# Patient Record
Sex: FEMALE | Race: WHITE | NOT HISPANIC OR LATINO | Employment: FULL TIME | URBAN - METROPOLITAN AREA
[De-identification: names, ages, dates, MRNs, and addresses within clinical notes are randomized per-mention and may not be internally consistent; named-entity substitution may affect disease eponyms.]

---

## 2017-11-20 ENCOUNTER — OFFICE VISIT (OUTPATIENT)
Dept: OBSTETRICS AND GYNECOLOGY | Facility: CLINIC | Age: 42
End: 2017-11-20
Payer: COMMERCIAL

## 2017-11-20 VITALS
DIASTOLIC BLOOD PRESSURE: 70 MMHG | BODY MASS INDEX: 30.86 KG/M2 | WEIGHT: 174.19 LBS | HEIGHT: 63 IN | SYSTOLIC BLOOD PRESSURE: 118 MMHG

## 2017-11-20 DIAGNOSIS — Z12.39 BREAST CANCER SCREENING: ICD-10-CM

## 2017-11-20 DIAGNOSIS — Z01.419 WELL WOMAN EXAM WITH ROUTINE GYNECOLOGICAL EXAM: Primary | ICD-10-CM

## 2017-11-20 DIAGNOSIS — Z12.4 CERVICAL CANCER SCREENING: ICD-10-CM

## 2017-11-20 PROCEDURE — 99999 PR PBB SHADOW E&M-NEW PATIENT-LVL III: CPT | Mod: PBBFAC,,, | Performed by: OBSTETRICS & GYNECOLOGY

## 2017-11-20 PROCEDURE — 88175 CYTOPATH C/V AUTO FLUID REDO: CPT

## 2017-11-20 PROCEDURE — 87624 HPV HI-RISK TYP POOLED RSLT: CPT

## 2017-11-20 PROCEDURE — 99386 PREV VISIT NEW AGE 40-64: CPT | Mod: S$GLB,,, | Performed by: OBSTETRICS & GYNECOLOGY

## 2017-11-20 NOTE — PROGRESS NOTES
[facility]  Ambulatory Clinic  Obstetrics & Gynecology    Visit Date:  2017    Chief Complaint:  Annual GYN exam    History of Present Illness:      Leia Shultz is a 42 y.o. , new pt to me, here for a gynecologic exam.    Pt has no major complaints today.      Menses are regular, not heavy or painful.    Pt current method of family planning is vasectomy.    Pt denies family h/o adverse reaction to hormonal contraception.    Pt denies h/o abnormal pap, last pap ~3 years ago per pt in [place].    Pt denies active sexually transmitted infections.    Pt denies h/o abnormal mammogram, last mammo ~2 years ago per pt.    Pt performs monthly self breast examination, non-smoker, uses seat belts, and denies abuse.     Pt denies abnormal vaginal bleeding, vaginal discharge, dysmenorrhea, dyspareunia, pelvic pain, bloating, early satiety, unintentional weight loss, breast mass/skin changes, incontinence, GI or urinary complaints.      Otherwise, the pt is in her usual state of health and has good follow-up with her PCP.    Past History:  Gynecologic history as noted above.    Review of Systems:      GENERAL:  No fever, fatigue, excessive weight gain or loss  HEENT:  No headaches, hearing changes, visual disturbance  RESPIRATORY:  No cough, shortness of breath  CARDIOVASCULAR:  No chest pain, heart palpitations, leg swelling  BREAST:  No lump, pain, nipple discharge, skin changes  GASTROINTESTINAL:  No nausea, vomiting, constipation, diarrhea, abd pain, rectal bleeding   GENITOURINARY:  See HPI  ENDOCRINE:  No heat or cold intolerance  HEMATOLOGIC:  No easy bruisability or bleeding   LYMPHATICS:  No enlarged nodes  MUSCULOSKELETAL:  No joint pain or swelling  SKIN:  No rash, lesions, jaundice  NEUROLOGIC:  No dizziness, weakness, syncope  PSYCHIATRIC:  No depression, homicidal/suicidal ideations, anxiety or mood swings    Physical Exam:     /70 (BP Location: Right arm, Patient  "Position: Sitting, BP Method: Large (Manual))   Ht 5' 3" (1.6 m)   Wt 79 kg (174 lb 2.6 oz)   LMP 2017 (Exact Date)   BMI 30.85 kg/m²   Pulse 60, Resp rate 18  Patient's last menstrual period was 2017 (exact date).     GENERAL:  No acute distress, well-nourished  HEENT:  Atraumatic, anicteric, moist mucus membranes. Neck supple w/o masses.  BREAST:  Symmetric, nontender, no obvious masses, adenopathy, skin changes or nipple discharge.  LUNGS:  Clear to auscultation  HEART:  Regular rate and rhythm, no murmurs, gallops, or rubs  ABDOMEN:  Soft, non-tender, non-distended, normoactive bowel sounds, no obvious organomegaly  EXT:  Symmetric w/o cramping, claudication, or edema. +2 distal pulses.  SKIN:  No rashes or bruising  PSYCH:  Mood and affect appropriate    GENITOURINARY:    VULVAR:  Female external genitalia w/o obvious lesions. Female hair distribution. Normal urethral meatus. No gross lymphadenopathy.   VAGINA:  Pink, moist, well-rugated. Good support. No obvious lesion. No discharge.  CERVIX:  No cervical motion tenderness, discharge, or obvious lesions.   UTERUS:  Small, non-tender, normal contour  ADNEXA:  No masses, non-tender    RECTAL:  Declined. No obvious external lesions  WET PREP:  Negative     Chaperone present for exam.    Assessment:     42 y.o. :    1. Well woman gynecologic exam    Plan:    A gynecologic health assessment was performed with age appropriate counseling.    Cervical cancer screening - pap obtained.    STI screening - pt declined.      Screening mammogram ordered, pt advised to call to schedule.    Encourage healthy lifestyle modifications, monthly self breast exams, Ca/Vit D.    F/u with PCP for health maintenance.    Return 1 year for gynecologic exam, or sooner as needed.  All questions answered, pt voiced understanding.        Cl Chacon MD      "

## 2017-11-21 ENCOUNTER — HOSPITAL ENCOUNTER (OUTPATIENT)
Dept: RADIOLOGY | Facility: HOSPITAL | Age: 42
Discharge: HOME | End: 2017-11-21
Attending: OBSTETRICS & GYNECOLOGY
Payer: COMMERCIAL

## 2017-11-21 ENCOUNTER — OFFICE VISIT (OUTPATIENT)
Dept: FAMILY MEDICINE | Facility: CLINIC | Age: 42
End: 2017-11-21
Payer: COMMERCIAL

## 2017-11-21 VITALS
HEIGHT: 63 IN | HEART RATE: 85 BPM | DIASTOLIC BLOOD PRESSURE: 80 MMHG | TEMPERATURE: 99 F | RESPIRATION RATE: 16 BRPM | WEIGHT: 172.81 LBS | SYSTOLIC BLOOD PRESSURE: 110 MMHG | OXYGEN SATURATION: 97 % | BODY MASS INDEX: 30.62 KG/M2

## 2017-11-21 DIAGNOSIS — E53.8 VITAMIN B 12 DEFICIENCY: ICD-10-CM

## 2017-11-21 DIAGNOSIS — Z12.39 BREAST CANCER SCREENING: ICD-10-CM

## 2017-11-21 DIAGNOSIS — Z98.84 HISTORY OF GASTRIC RESTRICTIVE SURGERY: ICD-10-CM

## 2017-11-21 DIAGNOSIS — E55.9 VITAMIN D DEFICIENCY: ICD-10-CM

## 2017-11-21 DIAGNOSIS — Z00.00 ANNUAL PHYSICAL EXAM: Primary | ICD-10-CM

## 2017-11-21 PROCEDURE — 77063 BREAST TOMOSYNTHESIS BI: CPT | Mod: 26,,, | Performed by: RADIOLOGY

## 2017-11-21 PROCEDURE — 99386 PREV VISIT NEW AGE 40-64: CPT | Mod: S$GLB,,, | Performed by: FAMILY MEDICINE

## 2017-11-21 PROCEDURE — 99999 PR PBB SHADOW E&M-EST. PATIENT-LVL III: CPT | Mod: PBBFAC,,, | Performed by: FAMILY MEDICINE

## 2017-11-21 PROCEDURE — 77067 SCR MAMMO BI INCL CAD: CPT | Mod: TC

## 2017-11-21 PROCEDURE — 77067 SCR MAMMO BI INCL CAD: CPT | Mod: 26,,, | Performed by: RADIOLOGY

## 2017-11-21 NOTE — PROGRESS NOTES
Lipid Panel  done received records             Pap Smear with HPV Cotest  done 11/20/17 with [email or web address]      Mammogram Scheduled for today.     Influenza Vaccine Done 11/15/17 with work ; pt will get information fax

## 2017-11-21 NOTE — PROGRESS NOTES
Subjective:       Patient ID: Leia Shultz is a 42 y.o. female.    Chief Complaint: Annual Exam    HPI    Annual Physical    Diet: very healthy overall    Exercise: 5.5 hours per day    Immunizations required: flu    Cancer screenings required: utd    Other screenings required: utd     Acute complaints today:     Patient states that she would like to have vitamin levels checked as she has had difficulty maintaining proper vitamin levels ever since her gastric sleeve surgery. Her last levels were checked 10 months ago or so. She is currently using a patch form of vitamin administration and is post to help with absorption and patients who have had bypass surgeries.      No current outpatient prescriptions on file prior to visit.     No current facility-administered medications on file prior to visit.        Past Medical History:   Diagnosis Date    Anemia     Arthritis     Heart murmur        Family History   Problem Relation Age of Onset    Clotting disorder Mother     Hypotension Mother     Skin cancer Father     Crohn's disease Sister     Cancer Brother     No Known Problems Maternal Grandmother     No Known Problems Maternal Grandfather     No Known Problems Paternal Grandmother     No Known Problems Paternal Grandfather         reports that she has never smoked. She has never used smokeless tobacco. She reports that she drinks alcohol. She reports that she does not use drugs.    Review of Systems   Constitutional: Negative for chills and fever.   HENT: Negative for congestion, ear pain, hearing loss, rhinorrhea and sore throat.    Eyes: Negative for pain and discharge.   Respiratory: Negative for cough and shortness of breath.    Cardiovascular: Negative for chest pain and palpitations.   Gastrointestinal: Negative for diarrhea, nausea and vomiting.   Genitourinary: Negative for difficulty urinating, dysuria and frequency.   Allergic/Immunologic: Negative for environmental allergies and food  allergies.   Neurological: Negative for seizures and weakness.   Psychiatric/Behavioral: Negative for dysphoric mood. The patient is not nervous/anxious.        Objective:     Vitals:    11/21/17 0834   BP: 110/80   Pulse: 85   Resp: 16   Temp: 98.7 °F (37.1 °C)        Physical Exam   Constitutional: She is oriented to person, place, and time. She appears well-developed.   O   HENT:   Head: Normocephalic and atraumatic.   Right Ear: External ear normal. No foreign bodies. Tympanic membrane is not injected. No middle ear effusion.   Left Ear: External ear normal. No foreign bodies.  No middle ear effusion.   Mouth/Throat: Oropharynx is clear and moist. No oral lesions. No dental abscesses.   Eyes: Conjunctivae and EOM are normal. Pupils are equal, round, and reactive to light. Right eye exhibits no discharge. Left eye exhibits no discharge.   Neck: No tracheal deviation present. No thyromegaly present.   Cardiovascular: Normal rate, regular rhythm and normal heart sounds.  Exam reveals no gallop and no friction rub.    No murmur heard.  Pulmonary/Chest: Effort normal and breath sounds normal. No respiratory distress. She has no wheezes. She has no rales.   Abdominal: Soft. She exhibits no distension and no mass. There is no tenderness. There is no rebound and no guarding.   Lymphadenopathy:     She has no cervical adenopathy.   Neurological: She is alert and oriented to person, place, and time.   Skin: Skin is warm and dry.   Psychiatric: She has a normal mood and affect.   Vitals reviewed.      Assessment:       1. Annual physical exam    2. History of gastric restrictive surgery    3. Vitamin D deficiency    4. Vitamin B 12 deficiency        Plan:       Leia was seen today for annual exam.    Diagnoses and all orders for this visit:    Annual physical exam  -     TSH; Future  -     T4, free; Future  Counseled on age appropriate medical preventative services, including age appropriate cancer screenings, over all  nutritional health, need for a consistent exercise regimen and an over all push towards maintaining a vigorous and active lifestyle.      Counseled on age appropriate vaccines and discussed upcoming health care needs based on age/gender.  Spent time with patient counseling on need for a good patient/doctor relationship moving forward.      Release of information request sent to prior physician to obtain recent lab work and shot records.    History of gastric restrictive surgery  -     TSH; Future  -     T4, free; Future    Vitamin D deficiency  -     Vitamin D; Future    Vitamin B 12 deficiency  -     Vitamin B12; Future  -     Folate; Future  -     TSH; Future  -     T4, free; Future            Return in about 3 months (around 2/21/2018).      Pt verbalized understanding and agreed with our plan.

## 2017-11-22 ENCOUNTER — TELEPHONE (OUTPATIENT)
Dept: FAMILY MEDICINE | Facility: CLINIC | Age: 42
End: 2017-11-22

## 2017-11-22 NOTE — TELEPHONE ENCOUNTER
Sent [employer] message, phone was constantly busy.        ----- Message from Mendez Blas MD sent at 11/21/2017  8:27 PM CST -----  Please notify patient results are abnormal. Her vitamin D is just a little low, but is not at the point where this needs to be supplemented. Her folate which is also known as B9 is low and this is significant. Please make sure that she is taking a vitamin D complex which should contain B9 by mouth. She made a reference to using patches but it seems that this is not keeping her folate level at an adequate concentration.please have patient follow-up as scheduled Thanks.

## 2017-11-24 LAB
HPV16 AG SPEC QL: NEGATIVE
HPV16+18+H RISK 12 DNA CVX-IMP: NEGATIVE
HPV18 DNA SPEC QL NAA+PROBE: NEGATIVE

## 2018-02-12 ENCOUNTER — LAB VISIT (OUTPATIENT)
Dept: LAB | Facility: HOSPITAL | Age: 43
End: 2018-02-12
Attending: FAMILY MEDICINE
Payer: COMMERCIAL

## 2018-02-12 ENCOUNTER — OFFICE VISIT (OUTPATIENT)
Dept: FAMILY MEDICINE | Facility: CLINIC | Age: 43
End: 2018-02-12
Payer: COMMERCIAL

## 2018-02-12 VITALS
BODY MASS INDEX: 31.68 KG/M2 | DIASTOLIC BLOOD PRESSURE: 82 MMHG | TEMPERATURE: 98 F | SYSTOLIC BLOOD PRESSURE: 122 MMHG | RESPIRATION RATE: 18 BRPM | WEIGHT: 178.81 LBS | HEIGHT: 63 IN | OXYGEN SATURATION: 98 % | HEART RATE: 69 BPM

## 2018-02-12 DIAGNOSIS — D18.01 CHERRY HEMANGIOMA: ICD-10-CM

## 2018-02-12 DIAGNOSIS — E53.8 LOW FOLATE: ICD-10-CM

## 2018-02-12 DIAGNOSIS — R53.83 FATIGUE, UNSPECIFIED TYPE: ICD-10-CM

## 2018-02-12 DIAGNOSIS — Z98.84 HISTORY OF GASTRIC RESTRICTIVE SURGERY: ICD-10-CM

## 2018-02-12 DIAGNOSIS — Z98.84 HISTORY OF GASTRIC RESTRICTIVE SURGERY: Primary | ICD-10-CM

## 2018-02-12 DIAGNOSIS — L82.1 SEBORRHEIC KERATOSES: ICD-10-CM

## 2018-02-12 LAB
ALBUMIN SERPL BCP-MCNC: 3.8 G/DL
ALP SERPL-CCNC: 65 U/L
ALT SERPL W/O P-5'-P-CCNC: 12 U/L
ANION GAP SERPL CALC-SCNC: 8 MMOL/L
AST SERPL-CCNC: 16 U/L
BASOPHILS # BLD AUTO: 0.05 K/UL
BASOPHILS NFR BLD: 0.8 %
BILIRUB SERPL-MCNC: 0.6 MG/DL
BUN SERPL-MCNC: 8 MG/DL
CALCIUM SERPL-MCNC: 9.6 MG/DL
CHLORIDE SERPL-SCNC: 105 MMOL/L
CO2 SERPL-SCNC: 26 MMOL/L
CREAT SERPL-MCNC: 0.8 MG/DL
DIFFERENTIAL METHOD: NORMAL
EOSINOPHIL # BLD AUTO: 0.2 K/UL
EOSINOPHIL NFR BLD: 3.3 %
ERYTHROCYTE [DISTWIDTH] IN BLOOD BY AUTOMATED COUNT: 11.7 %
EST. GFR  (AFRICAN AMERICAN): >60 ML/MIN/1.73 M^2
EST. GFR  (NON AFRICAN AMERICAN): >60 ML/MIN/1.73 M^2
GLUCOSE SERPL-MCNC: 88 MG/DL
HCT VFR BLD AUTO: 39.9 %
HGB BLD-MCNC: 13 G/DL
IMM GRANULOCYTES # BLD AUTO: 0.03 K/UL
IMM GRANULOCYTES NFR BLD AUTO: 0.5 %
LYMPHOCYTES # BLD AUTO: 1.9 K/UL
LYMPHOCYTES NFR BLD: 31.3 %
MAGNESIUM SERPL-MCNC: 2.1 MG/DL
MCH RBC QN AUTO: 31 PG
MCHC RBC AUTO-ENTMCNC: 32.6 G/DL
MCV RBC AUTO: 95 FL
MONOCYTES # BLD AUTO: 0.4 K/UL
MONOCYTES NFR BLD: 6.7 %
NEUTROPHILS # BLD AUTO: 3.5 K/UL
NEUTROPHILS NFR BLD: 57.4 %
NRBC BLD-RTO: 0 /100 WBC
PLATELET # BLD AUTO: 309 K/UL
PMV BLD AUTO: 9.2 FL
POTASSIUM SERPL-SCNC: 4.8 MMOL/L
PROT SERPL-MCNC: 7.2 G/DL
RBC # BLD AUTO: 4.19 M/UL
SODIUM SERPL-SCNC: 139 MMOL/L
WBC # BLD AUTO: 6.13 K/UL

## 2018-02-12 PROCEDURE — 84630 ASSAY OF ZINC: CPT

## 2018-02-12 PROCEDURE — 80053 COMPREHEN METABOLIC PANEL: CPT

## 2018-02-12 PROCEDURE — 99999 PR PBB SHADOW E&M-EST. PATIENT-LVL IV: CPT | Mod: PBBFAC,,, | Performed by: FAMILY MEDICINE

## 2018-02-12 PROCEDURE — 85025 COMPLETE CBC W/AUTO DIFF WBC: CPT

## 2018-02-12 PROCEDURE — 83735 ASSAY OF MAGNESIUM: CPT

## 2018-02-12 PROCEDURE — 99214 OFFICE O/P EST MOD 30 MIN: CPT | Mod: S$GLB,,, | Performed by: FAMILY MEDICINE

## 2018-02-12 PROCEDURE — 36415 COLL VENOUS BLD VENIPUNCTURE: CPT | Mod: PO

## 2018-02-12 PROCEDURE — 3008F BODY MASS INDEX DOCD: CPT | Mod: S$GLB,,, | Performed by: FAMILY MEDICINE

## 2018-02-12 NOTE — PROGRESS NOTES
"Subjective:       Patient ID: Leia Shultz is a 42 y.o. female.    Chief Complaint: lab paperwork (discuss lab and life insurance paperwork) and Skin Problem (DERMATOLOGY REFERRAL)    HPI    Fatigue - a/w nausea x 3 weeks but progressively worsening since the onset. We reviewed her recent labs which showed a low folate level.     She is adherent with her vitamin supplementation but feels "off".     She also has noted some skin lesions that she noted over the past 3 + months ago. She has a famhx of skin cancer melanoma in her dad so she is considered.     Current Outpatient Prescriptions on File Prior to Visit   Medication Sig Dispense Refill    MULTIVITAMIN ORAL Take by mouth once daily.      OMEPRAZOLE ORAL Take by mouth as needed.       No current facility-administered medications on file prior to visit.        Past Medical History:   Diagnosis Date    Anemia     Arthritis     Heart murmur        Family History   Problem Relation Age of Onset    Clotting disorder Mother     Hypotension Mother     Skin cancer Father     Crohn's disease Sister     Cancer Brother     No Known Problems Maternal Grandmother     No Known Problems Maternal Grandfather     No Known Problems Paternal Grandmother     No Known Problems Paternal Grandfather         reports that she has never smoked. She has never used smokeless tobacco. She reports that she drinks alcohol. She reports that she does not use drugs.    Review of Systems   Constitutional: Positive for fatigue. Negative for activity change, fever and unexpected weight change.   HENT: Negative for hearing loss, rhinorrhea and trouble swallowing.    Eyes: Negative for discharge and visual disturbance.   Respiratory: Negative for chest tightness and wheezing.    Cardiovascular: Negative for chest pain and palpitations.   Gastrointestinal: Positive for nausea. Negative for blood in stool, constipation, diarrhea and vomiting.   Endocrine: Negative for polydipsia and " polyuria.   Genitourinary: Negative for difficulty urinating, dysuria, hematuria and menstrual problem.   Musculoskeletal: Negative for arthralgias, joint swelling and neck pain.   Neurological: Negative for weakness and headaches.   Psychiatric/Behavioral: Negative for confusion and dysphoric mood.       Objective:     Vitals:    02/12/18 0849   BP: 122/82   Pulse: 69   Resp: 18   Temp: 98 °F (36.7 °C)        Physical Exam   Constitutional: She appears well-developed. No distress.   HENT:   Head: Normocephalic and atraumatic.       Eyes: Conjunctivae are normal. No scleral icterus.   Pulmonary/Chest: Effort normal.   Neurological: She is alert.   Skin: She is not diaphoretic.        Psychiatric: She has a normal mood and affect. Her behavior is normal.   Vitals reviewed.      Assessment:       1. History of gastric restrictive surgery    2. Low folate    3. Fatigue, unspecified type    4. Seborrheic keratoses    5. Cherry hemangioma        Plan:       Leia was seen today for lab paperwork and skin problem.    Diagnoses and all orders for this visit:    History of gastric restrictive surgery  -     Zinc; Future  -     CBC auto differential; Future  -     Comprehensive metabolic panel; Future  -     Magnesium; Future  Will check the remainder of her usual labs to assure that she is not having deficiency creating her current sxs of fatigue.     Low folate  Pt asked to trouble her current dose.     Fatigue, unspecified type  Pt asked to trouble her current dose.     Seborrheic keratoses  -     Ambulatory consult to Dermatology  Benign - pt does have famhx of melanoma, so should be followed by derm.     Cherry hemangioma  -     Ambulatory consult to Dermatology            Follow-up if symptoms worsen or fail to improve.        Pt verbalized understanding and agreed with our plan.     At least 25 minutes were spent today with the patient in the office, which more than 50% of the time was spent on evaluation and  counseling regarding The primary encounter diagnosis was History of gastric restrictive surgery. Diagnoses of Low folate, Fatigue, unspecified type, Seborrheic keratoses, and Cherry hemangioma were also pertinent to this visit..

## 2018-02-15 LAB — ZINC SERPL-MCNC: 74 UG/DL (ref 60–130)

## 2018-11-20 ENCOUNTER — OFFICE VISIT (OUTPATIENT)
Dept: OBSTETRICS AND GYNECOLOGY | Facility: CLINIC | Age: 43
End: 2018-11-20
Payer: COMMERCIAL

## 2018-11-20 VITALS
WEIGHT: 157.88 LBS | BODY MASS INDEX: 27.97 KG/M2 | DIASTOLIC BLOOD PRESSURE: 70 MMHG | SYSTOLIC BLOOD PRESSURE: 110 MMHG | HEIGHT: 63 IN

## 2018-11-20 DIAGNOSIS — Z01.419 WELL WOMAN EXAM WITH ROUTINE GYNECOLOGICAL EXAM: Primary | ICD-10-CM

## 2018-11-20 DIAGNOSIS — Z12.39 BREAST CANCER SCREENING: ICD-10-CM

## 2018-11-20 DIAGNOSIS — Z30.09 FAMILY PLANNING COUNSELING: ICD-10-CM

## 2018-11-20 PROCEDURE — 99396 PREV VISIT EST AGE 40-64: CPT | Mod: S$GLB,,, | Performed by: OBSTETRICS & GYNECOLOGY

## 2018-11-20 PROCEDURE — 99999 PR PBB SHADOW E&M-EST. PATIENT-LVL III: CPT | Mod: PBBFAC,,, | Performed by: OBSTETRICS & GYNECOLOGY

## 2018-11-20 NOTE — PROGRESS NOTES
"[facility]  Ambulatory Clinic  Obstetrics & Gynecology    Visit Date:  2018    Chief Complaint:  Annual GYN exam    History of Present Illness:      Leia Shultz is a 43 y.o.  here for a gynecologic exam.  Pt has no major complaints today.    Menses are regular, not heavy or painful.  Pt current method of family planning is vasectomy.    Pt is in process of getting a divorce and is considering Copper IUD.  Pt denies family h/o adverse reaction to hormonal contraception.  Pt denies h/o abnormal pap, last pap .  Pt denies active sexually transmitted infections.  Pt denies h/o abnormal mammogram, last mammo .  Pt performs monthly self breast examination, non-smoker, uses seat belts, and denies abuse.   Pt denies abnormal vaginal bleeding, vaginal discharge, dysmenorrhea, dyspareunia, pelvic pain, bloating, early satiety, unintentional weight loss, breast mass/skin changes, incontinence, GI or urinary complaints.    Otherwise, the pt is in her usual state of health.    Past History:  Gynecologic history as noted above.    Review of Systems:      GENERAL:  No fever, fatigue, excessive weight gain or loss  HEENT:  No headaches, hearing changes, visual disturbance  RESPIRATORY:  No cough, shortness of breath  CARDIOVASCULAR:  No chest pain, heart palpitations, leg swelling  BREAST:  No lump, pain, nipple discharge, skin changes  GASTROINTESTINAL:  No nausea, vomiting, constipation, diarrhea, abd pain, rectal bleeding   GENITOURINARY:  See HPI  ENDOCRINE:  No heat or cold intolerance  HEMATOLOGIC:  No easy bruisability or bleeding   LYMPHATICS:  No enlarged nodes  MUSCULOSKELETAL:  No joint pain or swelling  SKIN:  No rash, lesions, jaundice  NEUROLOGIC:  No dizziness, weakness, syncope  PSYCHIATRIC:  No depression, homicidal/suicidal ideations, anxiety or mood swings    Physical Exam:     /70 (BP Location: Right arm, Patient Position: Sitting)   Ht 5' 3" (1.6 m)   Wt " 71.6 kg (157 lb 13.6 oz)   LMP 10/28/2018 (Exact Date)   BMI 27.96 kg/m²       GENERAL:  No acute distress, well-nourished  HEENT:  Atraumatic, anicteric, moist mucus membranes, neck supple w/o masses.  BREAST:  Symmetric, nontender, no obvious masses, adenopathy, skin changes or nipple discharge.  LUNGS:  Clear to auscultation  HEART:  Regular rate and rhythm, no murmurs, gallops, or rubs  ABDOMEN:  Soft, non-tender, non-distended, normoactive bowel sounds, no obvious organomegaly  EXT:  Symmetric w/o cramping, claudication, or edema. +2 distal pulses.  SKIN:  No rashes or bruising  PSYCH:  Mood and affect appropriate  GENITOURINARY:  NFEG no lesion. No vaginal or cervical lesion. No bleeding or discharge. No CMT. Uterus and ovaries small, NT. Declined rectal exam. No obvious external lesions.    Chaperone present for exam.    Assessment:     43 y.o. :    1. Well woman gynecologic exam  2. Family planning - Copper IUD    Plan:    A gynecologic health assessment was performed with age appropriate counseling.  Cervical cancer screening - pap up to date  STI screening - pt declined.    Screening mammogram ordered, pt advised to call to schedule.  Discussed contraceptive options.  Pt is considering copper IUD.  Order form signed.  Pt will notified us of her decision.  Risks, benefits, and alternatives to copper IUD discussed.  Encourage healthy lifestyle modifications, monthly self breast exams, Ca/Vit D, f/u with PCP for health maintenance.  F/u 1 year for GYN exam, or sooner as needed.    All questions answered, pt voiced understanding.        Cl Chacon MD

## 2018-11-26 ENCOUNTER — PATIENT MESSAGE (OUTPATIENT)
Dept: OBSTETRICS AND GYNECOLOGY | Facility: CLINIC | Age: 43
End: 2018-11-26

## 2018-11-27 ENCOUNTER — HOSPITAL ENCOUNTER (OUTPATIENT)
Dept: RADIOLOGY | Facility: HOSPITAL | Age: 43
Discharge: HOME | End: 2018-11-27
Attending: OBSTETRICS & GYNECOLOGY
Payer: COMMERCIAL

## 2018-11-27 DIAGNOSIS — Z12.39 BREAST CANCER SCREENING: ICD-10-CM

## 2018-11-27 PROCEDURE — 77067 SCR MAMMO BI INCL CAD: CPT | Mod: 26,,, | Performed by: RADIOLOGY

## 2018-11-27 PROCEDURE — 77063 BREAST TOMOSYNTHESIS BI: CPT | Mod: 26,,, | Performed by: RADIOLOGY

## 2018-11-27 PROCEDURE — 77063 BREAST TOMOSYNTHESIS BI: CPT | Mod: TC

## 2018-11-28 ENCOUNTER — OFFICE VISIT (OUTPATIENT)
Dept: FAMILY MEDICINE | Facility: CLINIC | Age: 43
End: 2018-11-28
Payer: COMMERCIAL

## 2018-11-28 ENCOUNTER — LAB VISIT (OUTPATIENT)
Dept: LAB | Facility: HOSPITAL | Age: 43
End: 2018-11-28
Attending: FAMILY MEDICINE
Payer: COMMERCIAL

## 2018-11-28 VITALS
HEART RATE: 93 BPM | TEMPERATURE: 98 F | OXYGEN SATURATION: 99 % | BODY MASS INDEX: 27.03 KG/M2 | DIASTOLIC BLOOD PRESSURE: 80 MMHG | HEIGHT: 63 IN | RESPIRATION RATE: 16 BRPM | WEIGHT: 152.56 LBS | SYSTOLIC BLOOD PRESSURE: 108 MMHG

## 2018-11-28 DIAGNOSIS — Z00.00 WELL ADULT EXAM: Primary | ICD-10-CM

## 2018-11-28 DIAGNOSIS — Z00.00 WELL ADULT EXAM: ICD-10-CM

## 2018-11-28 DIAGNOSIS — Z98.84 HISTORY OF GASTRIC RESTRICTIVE SURGERY: ICD-10-CM

## 2018-11-28 DIAGNOSIS — J30.9 ALLERGIC RHINITIS, UNSPECIFIED SEASONALITY, UNSPECIFIED TRIGGER: ICD-10-CM

## 2018-11-28 LAB
25(OH)D3+25(OH)D2 SERPL-MCNC: 29 NG/ML
BASOPHILS # BLD AUTO: 0.07 K/UL
BASOPHILS NFR BLD: 1.1 %
CHOLEST SERPL-MCNC: 193 MG/DL
CHOLEST/HDLC SERPL: 2.5 {RATIO}
DIFFERENTIAL METHOD: ABNORMAL
EOSINOPHIL # BLD AUTO: 0.3 K/UL
EOSINOPHIL NFR BLD: 5.2 %
ERYTHROCYTE [DISTWIDTH] IN BLOOD BY AUTOMATED COUNT: 12.2 %
FOLATE SERPL-MCNC: 2.1 NG/ML
HCT VFR BLD AUTO: 43.8 %
HDLC SERPL-MCNC: 78 MG/DL
HDLC SERPL: 40.4 %
HGB BLD-MCNC: 14.2 G/DL
IMM GRANULOCYTES # BLD AUTO: 0.03 K/UL
IMM GRANULOCYTES NFR BLD AUTO: 0.5 %
LDLC SERPL CALC-MCNC: 105.6 MG/DL
LYMPHOCYTES # BLD AUTO: 1.7 K/UL
LYMPHOCYTES NFR BLD: 26.2 %
MCH RBC QN AUTO: 31.4 PG
MCHC RBC AUTO-ENTMCNC: 32.4 G/DL
MCV RBC AUTO: 97 FL
MONOCYTES # BLD AUTO: 0.5 K/UL
MONOCYTES NFR BLD: 7.2 %
NEUTROPHILS # BLD AUTO: 3.8 K/UL
NEUTROPHILS NFR BLD: 59.8 %
NONHDLC SERPL-MCNC: 115 MG/DL
NRBC BLD-RTO: 0 /100 WBC
PLATELET # BLD AUTO: 395 K/UL
PMV BLD AUTO: 8.7 FL
RBC # BLD AUTO: 4.52 M/UL
TRIGL SERPL-MCNC: 47 MG/DL
VIT B12 SERPL-MCNC: 443 PG/ML
WBC # BLD AUTO: 6.37 K/UL

## 2018-11-28 PROCEDURE — 36415 COLL VENOUS BLD VENIPUNCTURE: CPT | Mod: PO

## 2018-11-28 PROCEDURE — 82746 ASSAY OF FOLIC ACID SERUM: CPT

## 2018-11-28 PROCEDURE — 80061 LIPID PANEL: CPT

## 2018-11-28 PROCEDURE — 82306 VITAMIN D 25 HYDROXY: CPT

## 2018-11-28 PROCEDURE — 85025 COMPLETE CBC W/AUTO DIFF WBC: CPT

## 2018-11-28 PROCEDURE — 99396 PREV VISIT EST AGE 40-64: CPT | Mod: S$GLB,,, | Performed by: FAMILY MEDICINE

## 2018-11-28 PROCEDURE — 82607 VITAMIN B-12: CPT

## 2018-11-28 PROCEDURE — 99999 PR PBB SHADOW E&M-EST. PATIENT-LVL IV: CPT | Mod: PBBFAC,,, | Performed by: FAMILY MEDICINE

## 2018-11-28 RX ORDER — FLUTICASONE PROPIONATE 50 MCG
1 SPRAY, SUSPENSION (ML) NASAL 2 TIMES DAILY
Qty: 1 BOTTLE | Refills: 3 | Status: SHIPPED | OUTPATIENT
Start: 2018-11-28 | End: 2019-12-06

## 2018-11-28 NOTE — PROGRESS NOTES
Subjective:       Patient ID: Leia Shultz is a 43 y.o. female.    Chief Complaint: Annual Exam    HPI    Annual Physical    Diet: Pt has cut back on carb intake significantly.     Exercise: dances semi regularly    Immunizations required: UTD    Cancer screenings required: Pap smear    Other screenings required: vitamin screenings.     Acute complaints today:    Sinus x 3 days that began when the weather changed. A/w runny nose and dry cough.     Current Outpatient Medications on File Prior to Visit   Medication Sig Dispense Refill    FLUVIRIN [id number], PF, 45 mcg (15 mcg x 3)/0.5 mL Syrg ADM 0.5ML IM UTD  0    MULTIVITAMIN ORAL Take by mouth once daily.      OMEPRAZOLE ORAL Take by mouth as needed.       No current facility-administered medications on file prior to visit.        Past Medical History:   Diagnosis Date    Anemia     Arthritis     Heart murmur        Family History   Problem Relation Age of Onset    Clotting disorder Mother     Hypotension Mother     Skin cancer Father     Crohn's disease Sister     Cancer Brother     No Known Problems Maternal Grandmother     No Known Problems Maternal Grandfather     No Known Problems Paternal Grandmother     No Known Problems Paternal Grandfather         reports that  has never smoked. she has never used smokeless tobacco. She reports that she drinks alcohol. She reports that she does not use drugs.    Review of Systems   Constitutional: Negative for activity change and unexpected weight change.   HENT: Negative for hearing loss, rhinorrhea and trouble swallowing.    Eyes: Negative for discharge and visual disturbance.   Respiratory: Negative for chest tightness and wheezing.    Cardiovascular: Negative for chest pain and palpitations.   Gastrointestinal: Positive for constipation. Negative for blood in stool, diarrhea and vomiting.   Endocrine: Negative for polydipsia and polyuria.   Genitourinary: Negative for difficulty urinating, dysuria,  hematuria and menstrual problem.   Musculoskeletal: Negative for arthralgias, joint swelling and neck pain.   Neurological: Positive for headaches. Negative for weakness.   Psychiatric/Behavioral: Positive for dysphoric mood. Negative for confusion.       Objective:     Vitals:    11/28/18 0751   BP: 108/80   Pulse: 93   Resp: 16   Temp: 98 °F (36.7 °C)        Physical Exam    Assessment:       1. Well adult exam    2. History of gastric restrictive surgery    3. Allergic rhinitis, unspecified seasonality, unspecified trigger        Plan:       Leia was seen today for annual exam.    Diagnoses and all orders for this visit:    Well adult exam  -     Lipid panel; Future  -     CBC auto differential; Future  -     Folate; Future  -     Vitamin B12; Future  -     Vitamin D; Future  Pt down 26 lbs with diet and exercise!!!!   Counseled on age appropriate medical preventative services, including age appropriate cancer screenings, over all nutritional health, need for a consistent exercise regimen and an over all push towards maintaining a vigorous and active lifestyle.      Counseled on age appropriate vaccines and discussed upcoming health care needs based on age/gender.  Spent time with patient counseling on need for a good patient/doctor relationship moving forward.      History of gastric restrictive surgery  -     Lipid panel; Future  -     CBC auto differential; Future  -     Folate; Future  -     Vitamin B12; Future  -     Vitamin D; Future    Allergic rhinitis, unspecified seasonality, unspecified trigger  -     fluticasone (FLONASE) 50 mcg/actuation nasal spray; 1 spray (50 mcg total) by Each Nare route 2 (two) times daily.            Follow-up for Annual Physical.        Pt verbalized understanding and agreed with our plan.

## 2018-11-29 ENCOUNTER — PATIENT MESSAGE (OUTPATIENT)
Dept: FAMILY MEDICINE | Facility: CLINIC | Age: 43
End: 2018-11-29

## 2018-12-26 ENCOUNTER — TELEPHONE (OUTPATIENT)
Dept: OBSTETRICS AND GYNECOLOGY | Facility: CLINIC | Age: 43
End: 2018-12-26

## 2019-01-10 ENCOUNTER — PROCEDURE VISIT (OUTPATIENT)
Dept: OBSTETRICS AND GYNECOLOGY | Facility: CLINIC | Age: 44
End: 2019-01-10
Payer: COMMERCIAL

## 2019-01-10 VITALS
BODY MASS INDEX: 27.12 KG/M2 | WEIGHT: 153.13 LBS | DIASTOLIC BLOOD PRESSURE: 76 MMHG | SYSTOLIC BLOOD PRESSURE: 104 MMHG

## 2019-01-10 DIAGNOSIS — Z30.430 ENCOUNTER FOR INSERTION OF COPPER IUD: Primary | ICD-10-CM

## 2019-01-10 DIAGNOSIS — Z32.02 NEGATIVE PREGNANCY TEST: ICD-10-CM

## 2019-01-10 LAB
B-HCG UR QL: NEGATIVE
CTP QC/QA: YES

## 2019-01-10 PROCEDURE — 81025 URINE PREGNANCY TEST: CPT | Mod: S$GLB,,, | Performed by: OBSTETRICS & GYNECOLOGY

## 2019-01-10 PROCEDURE — 58300 INSERT INTRAUTERINE DEVICE: CPT | Mod: S$GLB,,, | Performed by: OBSTETRICS & GYNECOLOGY

## 2019-01-10 PROCEDURE — 58300 PR INSERT INTRAUTERINE DEVICE: ICD-10-PCS | Mod: S$GLB,,, | Performed by: OBSTETRICS & GYNECOLOGY

## 2019-01-10 PROCEDURE — 81025 POCT URINE PREGNANCY: ICD-10-PCS | Mod: S$GLB,,, | Performed by: OBSTETRICS & GYNECOLOGY

## 2019-01-10 NOTE — PROCEDURES
[facility]  Ambulatory Clinic   Obstetrics & Gynecology    Date:  1/10/2019    Procedure:  ParaGARD IUD insertion (CPT 28211)    LMP:  Patient's last menstrual period was 2018 (exact date).    UPT:  Negative    Indication:  IUD contraception    History:  Leia Shultz is a 43 y.o. , here for ParaGARD IUD insertion.  Pt has no major complaints today.       Consents:  We discussed the risks, benefits, indications, and alternatives to IUD use. She understands that with IUD insertion there is a risk of bleeding, infection, uterine perforation, and expulsion of device. All of her questions were answered to her satisfaction. Pt voiced understanding, Informed consents obtained.     Vitals:  /76 (BP Location: Right arm)   Wt 69.4 kg (153 lb 1.8 oz)   LMP 2018 (Exact Date)   BMI 27.12 kg/m²     Physical Exam:  Abdomen soft, non-tender, no masses. External genitalia, vaginal wall and cervix without gross abnormality.  Bimanual exam reveals a small week size, non-tender, mid-plain uterus, without adnexal mass or tenderness.     Procedure Details:      A time out was performed to confirmed the correct patient and procedure.    The cervix was visualized with a speculum.     Cervix was cleaned with betadine.      A single tooth tenaculum was placed on the anterior lip of the cervix.     The uterus sounds to ~7 cm using sterile technique.     The IUD was loaded and placed high in the uterine fundus without difficulty using sterile technique.     The string was then cut with a ~3 cm tail.    The tenaculum and speculum were removed.      The patient tolerated the procedure well.  Sterile technique was maintained.  Hemostasis noted.  VSSAF, pain scale 0/10 at end of procedure.    Assessment:      Encounter for insertion of intrauterine contraceptive device (ParaGARD (NDC 33703-647-05), Lot: [id number] EXP: 10/2024)    Plan:      Post IUD placement counseling and precautions  discussed.    Manage post IUD placement pain with NSAIDS, Tylenol prn.    Pt advised on how to check for IUD strings.    Pt was clearly reminded that the ParaGARD IUD will need to be removed within 10 years from date of insertion.    Return 4 weeks for IUD check, or sooner for any concerns.  All questions answered, pt voiced understanding.  Go to ER for any emergencies.        Cl Chacon MD

## 2019-02-07 ENCOUNTER — OFFICE VISIT (OUTPATIENT)
Dept: OBSTETRICS AND GYNECOLOGY | Facility: CLINIC | Age: 44
End: 2019-02-07
Payer: COMMERCIAL

## 2019-02-07 VITALS
SYSTOLIC BLOOD PRESSURE: 114 MMHG | WEIGHT: 156.94 LBS | HEIGHT: 63 IN | BODY MASS INDEX: 27.81 KG/M2 | DIASTOLIC BLOOD PRESSURE: 72 MMHG

## 2019-02-07 DIAGNOSIS — Z30.431 IUD CHECK UP: Primary | ICD-10-CM

## 2019-02-07 PROCEDURE — 3008F BODY MASS INDEX DOCD: CPT | Mod: CPTII,S$GLB,, | Performed by: OBSTETRICS & GYNECOLOGY

## 2019-02-07 PROCEDURE — 99999 PR PBB SHADOW E&M-EST. PATIENT-LVL III: CPT | Mod: PBBFAC,,, | Performed by: OBSTETRICS & GYNECOLOGY

## 2019-02-07 PROCEDURE — 99999 PR PBB SHADOW E&M-EST. PATIENT-LVL III: ICD-10-PCS | Mod: PBBFAC,,, | Performed by: OBSTETRICS & GYNECOLOGY

## 2019-02-07 PROCEDURE — 99213 OFFICE O/P EST LOW 20 MIN: CPT | Mod: S$GLB,,, | Performed by: OBSTETRICS & GYNECOLOGY

## 2019-02-07 PROCEDURE — 3008F PR BODY MASS INDEX (BMI) DOCUMENTED: ICD-10-PCS | Mod: CPTII,S$GLB,, | Performed by: OBSTETRICS & GYNECOLOGY

## 2019-02-07 PROCEDURE — 99213 PR OFFICE/OUTPT VISIT, EST, LEVL III, 20-29 MIN: ICD-10-PCS | Mod: S$GLB,,, | Performed by: OBSTETRICS & GYNECOLOGY

## 2019-02-07 RX ORDER — NITROFURANTOIN 25; 75 MG/1; MG/1
CAPSULE ORAL
Refills: 0 | COMMUNITY
Start: 2019-01-23 | End: 2019-06-14

## 2019-02-07 NOTE — PROGRESS NOTES
"[facility]  Ambulatory Clinic  Obstetrics & Gynecology    Visit Date:  2019    Chief Complaint:  ParaGARD IUD check      Subjective:      Leia Shultz is a 43 y.o.  here for ParaGARD IUD check.  Pt is very pleased with her IUD.    She denies any abnormal vaginal bleeding, vaginal discharge, dysmenorrhea, dyspareunia, pelvic pain, breast mass/skin changes, GI or urinary complaints.     Otherwise, pt is in her usual state of health and has good follow-up with her PCP.    Review of Systems:     Constitutional:  No fever, fatigue  Respiratory:  No shortness of breath  Cardiovascular:  No chest pain, leg swelling  Gastrointestinal:  No abdominal pain  Genitourinary:  No dysuria, frequency  Reproductive:  See HPI     Objective:    /72 (BP Location: Right arm, Patient Position: Sitting)   Ht 5' 3" (1.6 m)   Wt 71.2 kg (156 lb 15.5 oz)   BMI 27.81 kg/m²      GENERAL:  NAD. Well-nourished.  HEENT:  NCAT, moist mucus membranes. Neck supple.  LUNGS:  CTA-B.  HEART:  RRR, no m/g/r.  ABDOMEN:  Soft, non-tender, non-distended. Normoactive BS. No obvious organomegaly.   EXT:  Symmetric w/o cramping, claudication, or edema. +2 distal pulses.   SKIN:  No rashes or bruising.  NEURO:  Grossly intact bilaterally.  PSYCH:  Mood & affect appropriate.      PELVIC:  Female external genitalia w/o any obvious lesions. Female hair distribution. Adequate perineal body. Normal urethral meatus. No gross lymphadenopathy.   Vagina:  Pink, moist, well-rugated. Adequate support. No obvious lesion. No discharge.   Cervix:  No cervical motion tenderness, discharge, or obvious lesions. IUD string visualized ~3 cm.  Uterus:  Small, non-tender, normal contour.  Adnexa:  No masses, non-tender.   Rectal:  Declined. No obvious external lesions.   Wet prep:  Negative.    Chaperone present for exam.    Assessment:     43 y.o. :    1. ParaGARD IUD check     Plan:    Risks, benefits, and alternatives to " ParaGARD reviewed.  Pt tolerating it well and would like to continue.    Pt advised on how to check for IUD strings.    Pt was clearly reminded that the ParaGARD IUD will need to be removed within 10 years from date of insertion.    Encourage healthy lifestyle modifications.      F/u with PCP for health maintenance.    Return 1 year for well woman exam, or sooner as needed.  All questions answered, pt voiced understanding.       Cl Chacon MD

## 2019-02-14 ENCOUNTER — PATIENT MESSAGE (OUTPATIENT)
Dept: OBSTETRICS AND GYNECOLOGY | Facility: CLINIC | Age: 44
End: 2019-02-14

## 2019-04-04 ENCOUNTER — PATIENT MESSAGE (OUTPATIENT)
Dept: FAMILY MEDICINE | Facility: CLINIC | Age: 44
End: 2019-04-04

## 2019-06-14 ENCOUNTER — OFFICE VISIT (OUTPATIENT)
Dept: FAMILY MEDICINE | Facility: CLINIC | Age: 44
End: 2019-06-14
Payer: COMMERCIAL

## 2019-06-14 ENCOUNTER — LAB VISIT (OUTPATIENT)
Dept: LAB | Facility: HOSPITAL | Age: 44
End: 2019-06-14
Attending: FAMILY MEDICINE
Payer: COMMERCIAL

## 2019-06-14 ENCOUNTER — PATIENT MESSAGE (OUTPATIENT)
Dept: FAMILY MEDICINE | Facility: CLINIC | Age: 44
End: 2019-06-14

## 2019-06-14 VITALS
BODY MASS INDEX: 27.65 KG/M2 | WEIGHT: 156.06 LBS | DIASTOLIC BLOOD PRESSURE: 72 MMHG | OXYGEN SATURATION: 99 % | HEIGHT: 63 IN | RESPIRATION RATE: 20 BRPM | HEART RATE: 81 BPM | TEMPERATURE: 98 F | SYSTOLIC BLOOD PRESSURE: 98 MMHG

## 2019-06-14 DIAGNOSIS — E53.8 FOLATE DEFICIENCY: ICD-10-CM

## 2019-06-14 DIAGNOSIS — R53.83 FATIGUE, UNSPECIFIED TYPE: Primary | ICD-10-CM

## 2019-06-14 DIAGNOSIS — L98.9 SKIN LESION: Primary | ICD-10-CM

## 2019-06-14 DIAGNOSIS — Z90.3 HISTORY OF SLEEVE GASTRECTOMY: ICD-10-CM

## 2019-06-14 DIAGNOSIS — R53.83 FATIGUE, UNSPECIFIED TYPE: ICD-10-CM

## 2019-06-14 LAB
25(OH)D3+25(OH)D2 SERPL-MCNC: 28 NG/ML (ref 30–96)
ALBUMIN SERPL BCP-MCNC: 3.9 G/DL (ref 3.5–5.2)
ALP SERPL-CCNC: 62 U/L (ref 55–135)
ALT SERPL W/O P-5'-P-CCNC: 10 U/L (ref 10–44)
ANION GAP SERPL CALC-SCNC: 10 MMOL/L (ref 8–16)
AST SERPL-CCNC: 15 U/L (ref 10–40)
BASOPHILS # BLD AUTO: 0.09 K/UL (ref 0–0.2)
BASOPHILS NFR BLD: 1.4 % (ref 0–1.9)
BILIRUB SERPL-MCNC: 0.6 MG/DL (ref 0.1–1)
BUN SERPL-MCNC: 8 MG/DL (ref 6–20)
CALCIUM SERPL-MCNC: 9.5 MG/DL (ref 8.7–10.5)
CHLORIDE SERPL-SCNC: 106 MMOL/L (ref 95–110)
CO2 SERPL-SCNC: 25 MMOL/L (ref 23–29)
CREAT SERPL-MCNC: 0.7 MG/DL (ref 0.5–1.4)
DIFFERENTIAL METHOD: ABNORMAL
EOSINOPHIL # BLD AUTO: 0.2 K/UL (ref 0–0.5)
EOSINOPHIL NFR BLD: 3.6 % (ref 0–8)
ERYTHROCYTE [DISTWIDTH] IN BLOOD BY AUTOMATED COUNT: 11.8 % (ref 11.5–14.5)
EST. GFR  (AFRICAN AMERICAN): >60 ML/MIN/1.73 M^2
EST. GFR  (NON AFRICAN AMERICAN): >60 ML/MIN/1.73 M^2
FOLATE SERPL-MCNC: 4.7 NG/ML (ref 4–24)
GLUCOSE SERPL-MCNC: 83 MG/DL (ref 70–110)
HCT VFR BLD AUTO: 43.3 % (ref 37–48.5)
HGB BLD-MCNC: 14 G/DL (ref 12–16)
IMM GRANULOCYTES # BLD AUTO: 0.05 K/UL (ref 0–0.04)
IMM GRANULOCYTES NFR BLD AUTO: 0.8 % (ref 0–0.5)
LYMPHOCYTES # BLD AUTO: 2.6 K/UL (ref 1–4.8)
LYMPHOCYTES NFR BLD: 39.9 % (ref 18–48)
MCH RBC QN AUTO: 31.3 PG (ref 27–31)
MCHC RBC AUTO-ENTMCNC: 32.3 G/DL (ref 32–36)
MCV RBC AUTO: 97 FL (ref 82–98)
MONOCYTES # BLD AUTO: 0.4 K/UL (ref 0.3–1)
MONOCYTES NFR BLD: 5.5 % (ref 4–15)
NEUTROPHILS # BLD AUTO: 3.1 K/UL (ref 1.8–7.7)
NEUTROPHILS NFR BLD: 48.8 % (ref 38–73)
NRBC BLD-RTO: 0 /100 WBC
PLATELET # BLD AUTO: 353 K/UL (ref 150–350)
PMV BLD AUTO: 8.9 FL (ref 9.2–12.9)
POTASSIUM SERPL-SCNC: 4.6 MMOL/L (ref 3.5–5.1)
PROT SERPL-MCNC: 6.8 G/DL (ref 6–8.4)
RBC # BLD AUTO: 4.47 M/UL (ref 4–5.4)
SODIUM SERPL-SCNC: 141 MMOL/L (ref 136–145)
VIT B12 SERPL-MCNC: 342 PG/ML (ref 210–950)
WBC # BLD AUTO: 6.39 K/UL (ref 3.9–12.7)

## 2019-06-14 PROCEDURE — 82306 VITAMIN D 25 HYDROXY: CPT

## 2019-06-14 PROCEDURE — 36415 COLL VENOUS BLD VENIPUNCTURE: CPT | Mod: PO

## 2019-06-14 PROCEDURE — 99214 PR OFFICE/OUTPT VISIT, EST, LEVL IV, 30-39 MIN: ICD-10-PCS | Mod: S$GLB,,, | Performed by: FAMILY MEDICINE

## 2019-06-14 PROCEDURE — 99999 PR PBB SHADOW E&M-EST. PATIENT-LVL IV: ICD-10-PCS | Mod: PBBFAC,,, | Performed by: FAMILY MEDICINE

## 2019-06-14 PROCEDURE — 82746 ASSAY OF FOLIC ACID SERUM: CPT

## 2019-06-14 PROCEDURE — 3008F BODY MASS INDEX DOCD: CPT | Mod: CPTII,S$GLB,, | Performed by: FAMILY MEDICINE

## 2019-06-14 PROCEDURE — 80053 COMPREHEN METABOLIC PANEL: CPT

## 2019-06-14 PROCEDURE — 3008F PR BODY MASS INDEX (BMI) DOCUMENTED: ICD-10-PCS | Mod: CPTII,S$GLB,, | Performed by: FAMILY MEDICINE

## 2019-06-14 PROCEDURE — 99999 PR PBB SHADOW E&M-EST. PATIENT-LVL IV: CPT | Mod: PBBFAC,,, | Performed by: FAMILY MEDICINE

## 2019-06-14 PROCEDURE — 99214 OFFICE O/P EST MOD 30 MIN: CPT | Mod: S$GLB,,, | Performed by: FAMILY MEDICINE

## 2019-06-14 PROCEDURE — 85025 COMPLETE CBC W/AUTO DIFF WBC: CPT

## 2019-06-14 PROCEDURE — 82607 VITAMIN B-12: CPT

## 2019-06-14 RX ORDER — MUPIROCIN 20 MG/G
OINTMENT TOPICAL 3 TIMES DAILY
Qty: 30 G | Refills: 1 | Status: SHIPPED | OUTPATIENT
Start: 2019-06-14 | End: 2020-11-23 | Stop reason: ALTCHOICE

## 2019-06-14 NOTE — PROGRESS NOTES
"Subjective:       Patient ID: Leia Shultz is a 44 y.o. female.    Chief Complaint: Consult (regarding getting lab drawn check "vitaminm, mineral levels, exhaustion.")    HPI    Pt is noticing increasing fatigue over the past 6 weeks that has worsened over the last 2 weeks.     Stress at work has improved.     She is taking naps which is not usual for her due to this fatigue.      Menstrual cycles have been heavier over the past few months which a change from her normal. Pt has Paraguard.          Current Outpatient Medications on File Prior to Visit   Medication Sig Dispense Refill    ergocalciferol, vitamin D2, (VITAMIN D ORAL) Take by mouth once daily.      fluticasone (FLONASE) 50 mcg/actuation nasal spray 1 spray (50 mcg total) by Each Nare route 2 (two) times daily. 1 Bottle 3    FLUVIRIN [id number], PF, 45 mcg (15 mcg x 3)/0.5 mL Syrg ADM 0.5ML IM UTD  0    INV folate 800 MCG tablet Take 800 mcg by mouth 2 (two) times daily. FOR INVESTIGATIONAL USE ONLY      MULTIVITAMIN ORAL Take by mouth once daily.      OMEPRAZOLE ORAL Take by mouth as needed.      [DISCONTINUED] nitrofurantoin, macrocrystal-monohydrate, (MACROBID) 100 MG capsule TK 1 C PO BID FOR 7 DAYS  0     No current facility-administered medications on file prior to visit.        Past Medical History:   Diagnosis Date    Anemia     Arthritis     Heart murmur        Family History   Problem Relation Age of Onset    Clotting disorder Mother     Hypotension Mother     Skin cancer Father     Crohn's disease Sister     Cancer Brother     No Known Problems Maternal Grandmother     No Known Problems Maternal Grandfather     No Known Problems Paternal Grandmother     No Known Problems Paternal Grandfather         reports that she has never smoked. She has never used smokeless tobacco. She reports that she drinks alcohol. She reports that she does not use drugs.    Review of Systems   Constitutional: Negative for activity change and " unexpected weight change.   HENT: Negative for hearing loss, rhinorrhea and trouble swallowing.    Eyes: Negative for discharge and visual disturbance.   Respiratory: Negative for chest tightness and wheezing.    Cardiovascular: Negative for chest pain and palpitations.   Gastrointestinal: Negative for blood in stool, constipation, diarrhea and vomiting.   Endocrine: Negative for polydipsia and polyuria.   Genitourinary: Positive for menstrual problem. Negative for difficulty urinating, dysuria and hematuria.   Musculoskeletal: Positive for arthralgias and neck pain. Negative for joint swelling.   Neurological: Positive for headaches. Negative for weakness.   Psychiatric/Behavioral: Negative for confusion and dysphoric mood.       Objective:     Vitals:    06/14/19 0801   BP: 98/72   Pulse: 81   Resp: 20   Temp: 98 °F (36.7 °C)        Physical Exam   Constitutional: She appears well-developed. No distress.   o   HENT:   Head: Normocephalic and atraumatic.   Eyes: Conjunctivae are normal. No scleral icterus.   Pulmonary/Chest: Effort normal.   Neurological: She is alert.   Skin: She is not diaphoretic.   Psychiatric: She has a normal mood and affect. Her behavior is normal.   Vitals reviewed.      Assessment:       1. Fatigue, unspecified type    2. Folate deficiency    3. History of sleeve gastrectomy        Plan:       Leia was seen today for consult.    Diagnoses and all orders for this visit:    Fatigue, unspecified type  -     CBC auto differential; Future  -     Comprehensive metabolic panel; Future  New-differential includes poor sleep quality, anemia from heavy periods, vitamin deficiency, increased stress with new job and 1 year anniversary of her brother's passing.  Will check labs as shown here and patient will alert me if she develops any new or worsening symptoms.  Folate deficiency  -     Folate; Future    History of sleeve gastrectomy  -     Vitamin B12; Future  -     Vitamin D; Future             Follow up in about 6 months (around 12/14/2019) for Annual Physical.        Pt verbalized understanding and agreed with our plan.

## 2019-06-21 ENCOUNTER — OFFICE VISIT (OUTPATIENT)
Dept: PSYCHIATRY | Facility: CLINIC | Age: 44
End: 2019-06-21
Payer: COMMERCIAL

## 2019-06-21 DIAGNOSIS — F43.23 ADJUSTMENT DISORDER WITH MIXED ANXIETY AND DEPRESSED MOOD: Primary | ICD-10-CM

## 2019-06-21 PROCEDURE — 90791 PSYCH DIAGNOSTIC EVALUATION: CPT | Mod: S$GLB,,, | Performed by: SOCIAL WORKER

## 2019-06-21 PROCEDURE — 90791 PR PSYCHIATRIC DIAGNOSTIC EVALUATION: ICD-10-PCS | Mod: S$GLB,,, | Performed by: SOCIAL WORKER
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
## 2019-07-03 ENCOUNTER — OFFICE VISIT (OUTPATIENT)
Dept: PSYCHIATRY | Facility: CLINIC | Age: 44
End: 2019-07-03
Payer: COMMERCIAL

## 2019-07-03 DIAGNOSIS — F43.23 ADJUSTMENT DISORDER WITH MIXED ANXIETY AND DEPRESSED MOOD: Primary | ICD-10-CM

## 2019-07-03 PROCEDURE — 90834 PSYTX W PT 45 MINUTES: CPT | Mod: S$GLB,,, | Performed by: SOCIAL WORKER

## 2019-07-03 PROCEDURE — 90834 PR PSYCHOTHERAPY W/PATIENT, 45 MIN: ICD-10-PCS | Mod: S$GLB,,, | Performed by: SOCIAL WORKER
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
## 2019-07-16 ENCOUNTER — OFFICE VISIT (OUTPATIENT)
Dept: PSYCHIATRY | Facility: CLINIC | Age: 44
End: 2019-07-16
Payer: COMMERCIAL

## 2019-07-16 DIAGNOSIS — F43.23 ADJUSTMENT DISORDER WITH MIXED ANXIETY AND DEPRESSED MOOD: Primary | ICD-10-CM

## 2019-07-16 PROCEDURE — 90834 PR PSYCHOTHERAPY W/PATIENT, 45 MIN: ICD-10-PCS | Mod: S$GLB,,, | Performed by: SOCIAL WORKER

## 2019-07-16 PROCEDURE — 90834 PSYTX W PT 45 MINUTES: CPT | Mod: S$GLB,,, | Performed by: SOCIAL WORKER
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
## 2019-07-26 ENCOUNTER — OFFICE VISIT (OUTPATIENT)
Dept: PSYCHIATRY | Facility: CLINIC | Age: 44
End: 2019-07-26
Payer: COMMERCIAL

## 2019-07-26 DIAGNOSIS — F43.23 ADJUSTMENT DISORDER WITH MIXED ANXIETY AND DEPRESSED MOOD: Primary | ICD-10-CM

## 2019-07-26 PROCEDURE — 90834 PSYTX W PT 45 MINUTES: CPT | Mod: S$GLB,,, | Performed by: SOCIAL WORKER

## 2019-07-26 PROCEDURE — 90834 PR PSYCHOTHERAPY W/PATIENT, 45 MIN: ICD-10-PCS | Mod: S$GLB,,, | Performed by: SOCIAL WORKER
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
## 2019-08-09 ENCOUNTER — TELEPHONE (OUTPATIENT)
Dept: PSYCHIATRY | Facility: CLINIC | Age: 44
End: 2019-08-09

## 2019-08-09 NOTE — TELEPHONE ENCOUNTER
Returned patient's call that was left on our voicemail regarding copay charged to see Cassandra Rockweiler, LCSW  Patient was provided phone number to call [employer] Customer [employer] to clarify and help resolve issue with copay

## 2019-08-12 ENCOUNTER — OFFICE VISIT (OUTPATIENT)
Dept: PSYCHIATRY | Facility: CLINIC | Age: 44
End: 2019-08-12
Payer: COMMERCIAL

## 2019-08-12 DIAGNOSIS — F43.23 ADJUSTMENT DISORDER WITH MIXED ANXIETY AND DEPRESSED MOOD: Primary | ICD-10-CM

## 2019-08-12 PROCEDURE — 90834 PR PSYCHOTHERAPY W/PATIENT, 45 MIN: ICD-10-PCS | Mod: S$GLB,,, | Performed by: SOCIAL WORKER

## 2019-08-12 PROCEDURE — 90834 PSYTX W PT 45 MINUTES: CPT | Mod: S$GLB,,, | Performed by: SOCIAL WORKER

## 2019-08-12 NOTE — PROGRESS NOTES
"Individual Psychotherapy (PhD/LCSW)    8/12/2019    Site:  [facility]         Therapeutic Intervention: Met with patient.  Outpatient - Insight oriented psychotherapy 45 min - CPT code 24514 and Outpatient - Supportive psychotherapy 45 min - CPT Code 63896    Chief complaint/reason for encounter: depression and anxiety     Interval history and content of current session: Patient returned to clinic for follow up psychotherapy. Patient reports that she is doing "okay but weekend was bad". States that this was the first week of school and it was very difficult. 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 States that at the same time she was having a couple problems with boyfriend. States that he went on vacation with friend but before he left they got into it a couple times. States that on Monday she went to go spend time with him but they argued for over an hour about what to eat. States that only later to realize that neither one of them was very hungry. On Tuesday they got into it because he said that he was going to come to her show and then decided that he had work to do and was going to go to [employer]. States that she got upset because she thought this was bad time management. This all led to the problems Saturday night. States that boyfriend got off of work late. Came home and told her that he wanted to go out and then he was going to go spend time with his friends because he had a rough day. States that in the past they have gotten into it about her being "manipulative" when she wanted to spend time with him. They have talked about this and his struggles in past relationships. States that it hasn't been a problem but patient is hesitant about saying what she needs from relationship. Discussed ways of communicating and giving boyfriend that option to make his own choice. States that when she doesn't tell or ask him things she is taking away his " ability to make decisions. Also brought up male relationships. States that she feels like she makes these relationships and that they platonic but they want more. Discussed self compassion.     Treatment plan:  · Target symptoms: depression, anxiety   · Why chosen therapy is appropriate versus another modality: relevant to diagnosis, evidence based practice  · Outcome monitoring methods: self-report, observation  · Therapeutic intervention type: insight oriented psychotherapy, supportive psychotherapy    Risk parameters:  Patient reports no suicidal ideation  Patient reports no homicidal ideation  Patient reports no self-injurious behavior  Patient reports no violent behavior    Verbal deficits: None    Patient's response to intervention:  The patient's response to intervention is accepting.    Progress toward goals and other mental status changes:  The patient's progress toward goals is fair .    Diagnosis:     ICD-10-CM ICD-9-CM   1. Adjustment disorder with mixed anxiety and depressed mood F43.23 309.28       Plan:  individual psychotherapy    Return to clinic: 2 weeks, 1 month    Length of Service (minutes): 45     Cassandra Rockweiler, [facility]

## 2019-08-27 ENCOUNTER — OFFICE VISIT (OUTPATIENT)
Dept: PSYCHIATRY | Facility: CLINIC | Age: 44
End: 2019-08-27
Payer: COMMERCIAL

## 2019-08-27 DIAGNOSIS — F43.23 ADJUSTMENT DISORDER WITH MIXED ANXIETY AND DEPRESSED MOOD: Primary | ICD-10-CM

## 2019-08-27 PROCEDURE — 90834 PR PSYCHOTHERAPY W/PATIENT, 45 MIN: ICD-10-PCS | Mod: S$GLB,,, | Performed by: SOCIAL WORKER

## 2019-08-27 PROCEDURE — 90834 PSYTX W PT 45 MINUTES: CPT | Mod: S$GLB,,, | Performed by: SOCIAL WORKER

## 2019-08-27 NOTE — PROGRESS NOTES
"Individual Psychotherapy (PhD/LCSW)    8/27/2019    Site:  [facility]         Therapeutic Intervention: Met with patient.  Outpatient - Insight oriented psychotherapy 45 min - CPT code 42861 and Outpatient - Supportive psychotherapy 45 min - CPT Code 51848    Chief complaint/reason for encounter: depression and anxiety     Interval history and content of current session: Patient returned to clinic for follow up psychotherapy. Patient reports that she is doing okay, but made some big decisions. States that she made a step forward in her divorce. Reports that she asked her  to call [employer] and get his name off of the account. Called but later told patient that he would have to go to a branch to get name off of it. [employer] is a [place] bank so would not be able to do that for awhile. States that  "went off" on her. Later called [employer] and closed the account. Also working on moving her 401k but because she is still technically  she has to get a form signed by . Reports that she decided to get a . Called/emailed  and found one that she liked. States that she told them that she is not putting a deposit down until later. When she found the one that she liked she dropped off deposit but then went back and got it because she is unsure if she wants to go forward with this divorce. Continues to go between getting the fast divorce with no money and the possibly longer divorce with more money. Can't make a decision because she is unsure of what could happen. States that she feels stuck because she doesn't want to put herself in a position to have  go "crazy" when they go through with the divorce or when she asks for money. Discussed that she can't base her behaviors on her emotions. Discussed that she can only prepare for as much as she can with 's behaviors. Discussed that if she will never know if decision is the "right" one. Discussed make a pros/con list " for both decisions.     Treatment plan:  · Target symptoms: depression, anxiety   · Why chosen therapy is appropriate versus another modality: relevant to diagnosis, evidence based practice  · Outcome monitoring methods: self-report, observation  · Therapeutic intervention type: insight oriented psychotherapy, supportive psychotherapy    Risk parameters:  Patient reports no suicidal ideation  Patient reports no homicidal ideation  Patient reports no self-injurious behavior  Patient reports no violent behavior    Verbal deficits: None    Patient's response to intervention:  The patient's response to intervention is accepting.    Progress toward goals and other mental status changes:  The patient's progress toward goals is fair .    Diagnosis:     ICD-10-CM ICD-9-CM   1. Adjustment disorder with mixed anxiety and depressed mood F43.23 309.28       Plan:  individual psychotherapy    Return to clinic: 2 weeks, 1 month    Length of Service (minutes): 45     Cassandra Rockweiler, LCSW-BACS

## 2019-09-16 ENCOUNTER — OFFICE VISIT (OUTPATIENT)
Dept: PSYCHIATRY | Facility: CLINIC | Age: 44
End: 2019-09-16
Payer: COMMERCIAL

## 2019-09-16 DIAGNOSIS — F43.23 ADJUSTMENT DISORDER WITH MIXED ANXIETY AND DEPRESSED MOOD: Primary | ICD-10-CM

## 2019-09-16 PROCEDURE — 90834 PR PSYCHOTHERAPY W/PATIENT, 45 MIN: ICD-10-PCS | Mod: S$GLB,,, | Performed by: SOCIAL WORKER

## 2019-09-16 PROCEDURE — 90834 PSYTX W PT 45 MINUTES: CPT | Mod: S$GLB,,, | Performed by: SOCIAL WORKER

## 2019-09-16 NOTE — PROGRESS NOTES
"Individual Psychotherapy (PhD/LCSW)    9/16/2019    Site:  [facility]         Therapeutic Intervention: Met with patient.  Outpatient - Insight oriented psychotherapy 45 min - CPT code 03364 and Outpatient - Supportive psychotherapy 45 min - CPT Code 78817    Chief complaint/reason for encounter: depression and anxiety     Interval history and content of current session: Patient returned to clinic for follow up psychotherapy. Patient reports that she has been on a "roller coaster". States that she decided to an uncontested divorce. Reports that she found out that she could get a home loan and have  sign away any rights to it. Went to a seminar for first time home buyers and found out that she can get a special loan and grants from the government. Reports that she decided that if she could get a house on her own with little down payment then she was going to go through without putting up to much of a fight with . States that since they decided to go through with the divorce  has been extra helpful is getting patient set up with a  and other things. Thinks that  was worried that she was going to take him for all his money. Reports that she is worried about the other shoe dropping. States that things have been going on for so long that she is worried that they won't stay "good". States that she has been having a problem with her boss at work. Reports that boss wants her to do things that are not possible or not in her job description. States that she tries to tell him this but he doesn't listen, this is overwhelming to patient. Reports that she feels frustrated by her increase emotionality. States that she is not use to being emotional and more of a logical person. Discussed that she has had to be a logical person her whole life since she has been on her own. Discussed how at times the brain can keep emotions "locked" up because they are not advantageous " but when a person is emotionally equip to deal with them they will be let out. Discussed that because she is in a better place her brain is allowing her to be more emotional. Discussed boundaries and being open with people.     Treatment plan:  · Target symptoms: depression, anxiety   · Why chosen therapy is appropriate versus another modality: relevant to diagnosis, evidence based practice  · Outcome monitoring methods: self-report, observation  · Therapeutic intervention type: insight oriented psychotherapy, supportive psychotherapy    Risk parameters:  Patient reports no suicidal ideation  Patient reports no homicidal ideation  Patient reports no self-injurious behavior  Patient reports no violent behavior    Verbal deficits: None    Patient's response to intervention:  The patient's response to intervention is accepting.    Progress toward goals and other mental status changes:  The patient's progress toward goals is fair .    Diagnosis:     ICD-10-CM ICD-9-CM   1. Adjustment disorder with mixed anxiety and depressed mood F43.23 309.28       Plan:  individual psychotherapy    Return to clinic: 2 weeks, 1 month    Length of Service (minutes): 45     Cassandra Rockweiler, LCSW-JOSES

## 2019-10-02 ENCOUNTER — PATIENT MESSAGE (OUTPATIENT)
Dept: PSYCHIATRY | Facility: CLINIC | Age: 44
End: 2019-10-02

## 2019-10-03 ENCOUNTER — OFFICE VISIT (OUTPATIENT)
Dept: PSYCHIATRY | Facility: CLINIC | Age: 44
End: 2019-10-03
Payer: COMMERCIAL

## 2019-10-03 DIAGNOSIS — F43.23 ADJUSTMENT DISORDER WITH MIXED ANXIETY AND DEPRESSED MOOD: Primary | ICD-10-CM

## 2019-10-03 PROCEDURE — 90834 PR PSYCHOTHERAPY W/PATIENT, 45 MIN: ICD-10-PCS | Mod: S$GLB,,, | Performed by: SOCIAL WORKER

## 2019-10-03 PROCEDURE — 90834 PSYTX W PT 45 MINUTES: CPT | Mod: S$GLB,,, | Performed by: SOCIAL WORKER

## 2019-10-03 NOTE — PROGRESS NOTES
"Individual Psychotherapy (PhD/LCSW)    10/3/2019    Site:  [facility]         Therapeutic Intervention: Met with patient.  Outpatient - Insight oriented psychotherapy 45 min - CPT code 31369 and Outpatient - Supportive psychotherapy 45 min - CPT Code 07407    Chief complaint/reason for encounter: depression and anxiety     Interval history and content of current session: Patient returned to clinic for follow up psychotherapy. Patient reports that she has been doing "well". States that today is a "massive" day. After appointment is going to be going to court house to do last bit of paperwork for divorce. States that she didn't know how easy and fast the uncontested divorce could be. Last week went in and started the paperwork with the help of . States that ex- has been much nicer since he found out about patient going uncontested route. States that the two still share custody of the dogs and she frequently goes to get them all and spend time with them. States that she doesn't know if she is going to ask for them full time when she moves into her home. States that she doesn't know if it will be financially feasible. States that she also doesn't want to break up the pack and only take her dog. Discussed that she is also getting appraisal for house today and might be able to do closing as soon as Halloween. Reports that she hasn't had any "freak outs" in 3 weeks. Discussed that she isn't sure if she will have any issues today with all this going on. Discussed following up if she thinks that she isn't able to manage today after all her tasks.     Treatment plan:  · Target symptoms: depression, anxiety   · Why chosen therapy is appropriate versus another modality: relevant to diagnosis, evidence based practice  · Outcome monitoring methods: self-report, observation  · Therapeutic intervention type: insight oriented psychotherapy, supportive psychotherapy    Risk parameters:  Patient " reports no suicidal ideation  Patient reports no homicidal ideation  Patient reports no self-injurious behavior  Patient reports no violent behavior    Verbal deficits: None    Patient's response to intervention:  The patient's response to intervention is accepting.    Progress toward goals and other mental status changes:  The patient's progress toward goals is fair .    Diagnosis:     ICD-10-CM ICD-9-CM   1. Adjustment disorder with mixed anxiety and depressed mood F43.23 309.28       Plan:  individual psychotherapy    Return to clinic: 2 weeks, 1 month    Length of Service (minutes): 45     Cassandra Rockweiler, L[facility]

## 2019-10-29 ENCOUNTER — OFFICE VISIT (OUTPATIENT)
Dept: PSYCHIATRY | Facility: CLINIC | Age: 44
End: 2019-10-29
Payer: COMMERCIAL

## 2019-10-29 DIAGNOSIS — F43.23 ADJUSTMENT DISORDER WITH MIXED ANXIETY AND DEPRESSED MOOD: Primary | ICD-10-CM

## 2019-10-29 PROCEDURE — 90834 PSYTX W PT 45 MINUTES: CPT | Mod: S$GLB,,, | Performed by: SOCIAL WORKER

## 2019-10-29 PROCEDURE — 90834 PR PSYCHOTHERAPY W/PATIENT, 45 MIN: ICD-10-PCS | Mod: S$GLB,,, | Performed by: SOCIAL WORKER
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
## 2019-11-15 ENCOUNTER — OFFICE VISIT (OUTPATIENT)
Dept: PSYCHIATRY | Facility: CLINIC | Age: 44
End: 2019-11-15
Payer: COMMERCIAL

## 2019-11-15 DIAGNOSIS — F43.23 ADJUSTMENT DISORDER WITH MIXED ANXIETY AND DEPRESSED MOOD: Primary | ICD-10-CM

## 2019-11-15 PROCEDURE — 90834 PR PSYCHOTHERAPY W/PATIENT, 45 MIN: ICD-10-PCS | Mod: S$GLB,,, | Performed by: SOCIAL WORKER

## 2019-11-15 PROCEDURE — 90834 PSYTX W PT 45 MINUTES: CPT | Mod: S$GLB,,, | Performed by: SOCIAL WORKER

## 2019-11-15 NOTE — PROGRESS NOTES
"Individual Psychotherapy (PhD/LCSW)    11/15/2019    Site:  [facility]         Therapeutic Intervention: Met with patient.  Outpatient - Insight oriented psychotherapy 45 min - CPT code 96454 and Outpatient - Supportive psychotherapy 45 min - CPT Code 12495    Chief complaint/reason for encounter: depression and anxiety     Interval history and content of current session: Patient returned to clinic for follow up psychotherapy. Patient reports that she is doing "okay". States that she has been doing a lot of "house stuff". 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 Was very sore the next day. 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 Interested in the producing side and might start looking into that in the future. States that her and boyfriend have been doing well since last visit. Notices that when she makes a comment he works on correcting it. States that she is also having issues with getting "unintentionally drunk". States that at times she will get drunk and black out. Doesn't drink much or often. States that it can happen with one or four drinks. States that she doesn't recognize it until the next day. Discussed ways of managing alcohol intake better. Discussed that she notices that when she leaves the house in the evening she has a lot of anxiety about something bad happening. Discussed that it could be that she has been anxious for so long and now her brain doesn't know how to function "not being anxious". Discussed that she is going to [place] to visit her family. Hasn't seen them in several years and is excited.     Treatment plan:  · Target symptoms: depression, anxiety   · Why chosen therapy is appropriate versus another modality: relevant to diagnosis, evidence based practice  · Outcome monitoring methods: self-report, observation  · Therapeutic intervention type: insight oriented psychotherapy, supportive psychotherapy    Risk parameters:  Patient " reports no suicidal ideation  Patient reports no homicidal ideation  Patient reports no self-injurious behavior  Patient reports no violent behavior    Verbal deficits: None    Patient's response to intervention:  The patient's response to intervention is accepting.    Progress toward goals and other mental status changes:  The patient's progress toward goals is fair .    Diagnosis:     ICD-10-CM ICD-9-CM   1. Adjustment disorder with mixed anxiety and depressed mood F43.23 309.28       Plan:  individual psychotherapy    Return to clinic: 2 weeks, 1 month    Length of Service (minutes): 45     Cassandra Rockweiler, [facility]

## 2019-11-26 ENCOUNTER — PATIENT OUTREACH (OUTPATIENT)
Dept: ADMINISTRATIVE | Facility: OTHER | Age: 44
End: 2019-11-26

## 2019-11-26 ENCOUNTER — OFFICE VISIT (OUTPATIENT)
Dept: OBSTETRICS AND GYNECOLOGY | Facility: CLINIC | Age: 44
End: 2019-11-26
Payer: COMMERCIAL

## 2019-11-26 VITALS
HEIGHT: 63 IN | SYSTOLIC BLOOD PRESSURE: 110 MMHG | WEIGHT: 161.25 LBS | DIASTOLIC BLOOD PRESSURE: 74 MMHG | BODY MASS INDEX: 28.57 KG/M2

## 2019-11-26 DIAGNOSIS — Z01.419 WELL WOMAN EXAM WITH ROUTINE GYNECOLOGICAL EXAM: Primary | ICD-10-CM

## 2019-11-26 DIAGNOSIS — Z12.39 BREAST CANCER SCREENING, HIGH RISK PATIENT: ICD-10-CM

## 2019-11-26 DIAGNOSIS — Z30.431 IUD CHECK UP: ICD-10-CM

## 2019-11-26 PROCEDURE — 99999 PR PBB SHADOW E&M-EST. PATIENT-LVL III: CPT | Mod: PBBFAC,,, | Performed by: OBSTETRICS & GYNECOLOGY

## 2019-11-26 PROCEDURE — 99396 PR PREVENTIVE VISIT,EST,40-64: ICD-10-PCS | Mod: S$GLB,,, | Performed by: OBSTETRICS & GYNECOLOGY

## 2019-11-26 PROCEDURE — 99396 PREV VISIT EST AGE 40-64: CPT | Mod: S$GLB,,, | Performed by: OBSTETRICS & GYNECOLOGY

## 2019-11-26 PROCEDURE — 99999 PR PBB SHADOW E&M-EST. PATIENT-LVL III: ICD-10-PCS | Mod: PBBFAC,,, | Performed by: OBSTETRICS & GYNECOLOGY

## 2019-11-29 NOTE — PROGRESS NOTES
"[facility]  Ambulatory Clinic  Obstetrics & Gynecology    Visit Date:  2019    Chief Complaint:  Annual GYN exam    History of Present Illness:      Leia Shultz is a 44 y.o.  here for a gynecologic exam.    Pt has no major complaints today.    Menses are regular, not heavy or painful.    Pt current method of family planning is copper IUD.    Pt denies h/o abnormal pap, last pap .    Pt denies active sexually transmitted infections.    Pt denies h/o abnormal mammogram, last mammo 2018.    Pt performs monthly self breast examination, non-smoker, uses seat belts, and denies abuse.     Pt denies abnormal vaginal bleeding, vaginal discharge, dysmenorrhea, dyspareunia, pelvic pain, bloating, early satiety, unintentional weight loss, breast mass/skin changes, incontinence, GI or urinary complaints.      Otherwise, the pt is in her usual state of health.    Past History:  Gynecologic history as noted above.    Review of Systems:    GENERAL:  No fever, fatigue, excessive weight gain or loss  HEENT:  No head injury, headaches, hearing changes, visual disturbance  RESPIRATORY:  No cough, shortness of breath  CARDIOVASCULAR:  No chest pain, heart palpitations, leg swelling  BREAST:  No lump, pain, nipple discharge, skin changes  GASTROINTESTINAL:  No nausea, vomiting, constipation, diarrhea, abd pain, rectal bleeding   URINARY:  No dysuria, frequency, hematuria  GENITOURINARY:  See HPI  ENDOCRINE:  No heat or cold intolerance  HEMATOLOGIC:  No easy bruisability or bleeding   LYMPHATICS:  No enlarged nodes  MUSCULOSKELETAL:  No acute joint pain or swelling  SKIN:  No rash, lesions, jaundice  NEUROLOGIC:  No dizziness, weakness, syncope  PSYCHIATRIC:  No suicidal or homicidal ideations    Physical Exam:     /74 (BP Location: Right arm, Patient Position: Sitting)   Ht 5' 3" (1.6 m)   Wt 73.1 kg (161 lb 4.3 oz)   BMI 28.57 kg/m²       GENERAL:  No acute distress, " well-nourished  HEENT:  Atraumatic, anicteric, moist mucus membranes, neck supple w/o masses.  BREAST:  Symmetric, non-tender, no obvious masses, no skin changes, no nipple discharge.  LUNGS:  Clear to auscultation  HEART:  Regular rate and rhythm, no murmurs, gallops, or rubs  ABDOMEN:  Soft, non-tender, non-distended, normoactive bowel sounds, no obvious organomegaly  EXT:  Symmetric w/o cramping, claudication, or edema. +2 distal pulses.  SKIN:  No rashes or bruising  PSYCH:  Mood and affect appropriate    GENITOURINARY:  NFEG no lesion. No vaginal or cervical lesion. No bleeding or discharge. Good support. No CMT. Uterus and ovaries small, NT. IUD string visualized ~3 cm from Os. Declined rectal exam. No obvious external lesions.    Chaperone present for exam.    Assessment:     44 y.o.  with copper IUD:    1. Well woman gynecologic exam  2. IUD check    Plan:    A gynecologic health assessment was performed with age appropriate counseling.    Cervical cancer screening - pap up to date.    STI screening - pt declined.  Safe sex discussed.      Screening mammogram ordered, pt advised to call to schedule.    Continue Copper IUD.  Risks, benefits, and alternatives to Copper reviewed.  Pt advised Copper IUD will need to be removed 10 yrs from insertion date.  Advised on self string check.  Discussed safe sex.    Encourage healthy lifestyle modifications, monthly self breast exams, Ca/Vit D.    F/u with PCP for health maintenance.    Return 1 year for gynecologic exam, or sooner as needed.  All questions answered, pt voiced understanding.        Cl Chacon MD

## 2019-12-02 ENCOUNTER — PATIENT MESSAGE (OUTPATIENT)
Dept: OBSTETRICS AND GYNECOLOGY | Facility: CLINIC | Age: 44
End: 2019-12-02

## 2019-12-05 ENCOUNTER — HOSPITAL ENCOUNTER (OUTPATIENT)
Dept: RADIOLOGY | Facility: HOSPITAL | Age: 44
Discharge: HOME | End: 2019-12-05
Attending: OBSTETRICS & GYNECOLOGY
Payer: COMMERCIAL

## 2019-12-05 ENCOUNTER — OFFICE VISIT (OUTPATIENT)
Dept: PSYCHIATRY | Facility: CLINIC | Age: 44
End: 2019-12-05
Payer: COMMERCIAL

## 2019-12-05 DIAGNOSIS — F43.23 ADJUSTMENT DISORDER WITH MIXED ANXIETY AND DEPRESSED MOOD: Primary | ICD-10-CM

## 2019-12-05 DIAGNOSIS — Z12.31 ENCOUNTER FOR SCREENING MAMMOGRAM FOR HIGH-RISK PATIENT: ICD-10-CM

## 2019-12-05 DIAGNOSIS — Z12.39 BREAST CANCER SCREENING, HIGH RISK PATIENT: ICD-10-CM

## 2019-12-05 PROCEDURE — 90834 PSYTX W PT 45 MINUTES: CPT | Mod: S$GLB,,, | Performed by: SOCIAL WORKER

## 2019-12-05 PROCEDURE — 77063 MAMMO DIGITAL SCREENING BILAT WITH TOMOSYNTHESIS_CAD: ICD-10-PCS | Mod: 26,,, | Performed by: RADIOLOGY

## 2019-12-05 PROCEDURE — 77067 MAMMO DIGITAL SCREENING BILAT WITH TOMOSYNTHESIS_CAD: ICD-10-PCS | Mod: 26,,, | Performed by: RADIOLOGY

## 2019-12-05 PROCEDURE — 77063 BREAST TOMOSYNTHESIS BI: CPT | Mod: 26,,, | Performed by: RADIOLOGY

## 2019-12-05 PROCEDURE — 77067 SCR MAMMO BI INCL CAD: CPT | Mod: 26,,, | Performed by: RADIOLOGY

## 2019-12-05 PROCEDURE — 90834 PR PSYCHOTHERAPY W/PATIENT, 45 MIN: ICD-10-PCS | Mod: S$GLB,,, | Performed by: SOCIAL WORKER

## 2019-12-05 PROCEDURE — 77067 SCR MAMMO BI INCL CAD: CPT | Mod: TC

## 2019-12-05 NOTE — PROGRESS NOTES
"Individual Psychotherapy (PhD/LCSW)    12/5/2019    Site:  [facility]         Therapeutic Intervention: Met with patient.  Outpatient - Insight oriented psychotherapy 45 min - CPT code 34552 and Outpatient - Supportive psychotherapy 45 min - CPT Code 94850    Chief complaint/reason for encounter: depression and anxiety     Interval history and content of current session: Patient returned to clinic for follow up psychotherapy. Patient reports that she is doing well. States that she injured herself the other day with her dog and pulled a disc. Is now going to physical therapy and the chiropractor to see if that helps before surgery option. 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 States that she is only on day 3 and is already "bored". States that she has things that she has wanted to do a lot of things but never had time. Reports that things with boyfriend are going "okay". Has been more open about communication and it seems to help. States that he made the comment they would be spending Thanksgiving together. Then one day stated that he was going out of town and he would meet up with her later. States that she was upset and the next day they spoke about it. Reports that he is making more comments about their future together which is not something that they would do before. Is going home to his family and is going to tell them about her so that they can meet at the next holiday. Discussed stress at work and how her mind always goes to stress eating. Discussed other quick coping skills to help alleviate stress.     Treatment plan:  · Target symptoms: depression, anxiety   · Why chosen therapy is appropriate versus another modality: relevant to diagnosis, evidence based practice  · Outcome monitoring methods: self-report, observation  · Therapeutic intervention type: insight oriented psychotherapy, supportive psychotherapy    Risk parameters:  Patient reports no " suicidal ideation  Patient reports no homicidal ideation  Patient reports no self-injurious behavior  Patient reports no violent behavior    Verbal deficits: None    Patient's response to intervention:  The patient's response to intervention is accepting.    Progress toward goals and other mental status changes:  The patient's progress toward goals is fair .    Diagnosis:     ICD-10-CM ICD-9-CM   1. Adjustment disorder with mixed anxiety and depressed mood F43.23 309.28       Plan:  individual psychotherapy    Return to clinic: 2 weeks, 1 month    Length of Service (minutes): 45     Cassandra Rockweiler, LCS[facility]

## 2019-12-06 ENCOUNTER — OFFICE VISIT (OUTPATIENT)
Dept: FAMILY MEDICINE | Facility: CLINIC | Age: 44
End: 2019-12-06
Payer: COMMERCIAL

## 2019-12-06 ENCOUNTER — TELEPHONE (OUTPATIENT)
Dept: PSYCHIATRY | Facility: CLINIC | Age: 44
End: 2019-12-06

## 2019-12-06 VITALS
DIASTOLIC BLOOD PRESSURE: 79 MMHG | WEIGHT: 162.94 LBS | BODY MASS INDEX: 28.87 KG/M2 | OXYGEN SATURATION: 98 % | TEMPERATURE: 98 F | HEART RATE: 93 BPM | HEIGHT: 63 IN | SYSTOLIC BLOOD PRESSURE: 118 MMHG | RESPIRATION RATE: 17 BRPM

## 2019-12-06 DIAGNOSIS — M54.42 CHRONIC BILATERAL LOW BACK PAIN WITH BILATERAL SCIATICA: ICD-10-CM

## 2019-12-06 DIAGNOSIS — G89.29 CHRONIC BILATERAL LOW BACK PAIN WITH BILATERAL SCIATICA: ICD-10-CM

## 2019-12-06 DIAGNOSIS — Z00.00 WELL WOMAN EXAM (NO GYNECOLOGICAL EXAM): Primary | ICD-10-CM

## 2019-12-06 DIAGNOSIS — M54.41 CHRONIC BILATERAL LOW BACK PAIN WITH BILATERAL SCIATICA: ICD-10-CM

## 2019-12-06 PROCEDURE — 90715 TDAP VACCINE GREATER THAN OR EQUAL TO 7YO IM: ICD-10-PCS | Mod: S$GLB,,, | Performed by: FAMILY MEDICINE

## 2019-12-06 PROCEDURE — 90715 TDAP VACCINE 7 YRS/> IM: CPT | Mod: S$GLB,,, | Performed by: FAMILY MEDICINE

## 2019-12-06 PROCEDURE — 90471 IMMUNIZATION ADMIN: CPT | Mod: S$GLB,,, | Performed by: FAMILY MEDICINE

## 2019-12-06 PROCEDURE — 99999 PR PBB SHADOW E&M-EST. PATIENT-LVL IV: ICD-10-PCS | Mod: PBBFAC,,, | Performed by: FAMILY MEDICINE

## 2019-12-06 PROCEDURE — 90471 TDAP VACCINE GREATER THAN OR EQUAL TO 7YO IM: ICD-10-PCS | Mod: S$GLB,,, | Performed by: FAMILY MEDICINE

## 2019-12-06 PROCEDURE — 99999 PR PBB SHADOW E&M-EST. PATIENT-LVL IV: CPT | Mod: PBBFAC,,, | Performed by: FAMILY MEDICINE

## 2019-12-06 PROCEDURE — 99396 PREV VISIT EST AGE 40-64: CPT | Mod: 25,S$GLB,, | Performed by: FAMILY MEDICINE

## 2019-12-06 PROCEDURE — 99396 PR PREVENTIVE VISIT,EST,40-64: ICD-10-PCS | Mod: 25,S$GLB,, | Performed by: FAMILY MEDICINE

## 2019-12-06 NOTE — TELEPHONE ENCOUNTER
Called patient, per Cassandra Rockweiler, LCSW to schedule follow up appointment for January. Appointment is on January 14th at 8:00 per patient.

## 2019-12-06 NOTE — PROGRESS NOTES
"Well Woman VISIT      CHIEF COMPLAINT  Chief Complaint   Patient presents with    [facility]    Annual Exam       HPI  Leia Shultz is a 44 y.o. female who presents for physical.     Social Factors  Tobacco use: No  Ready to Quit: No  Alcohol: Yes on occasion  Intimate partner violence screening  "Do you feel safe in your current relationship?" single  "Have you ever been in a relationship in which your partner frightened you or hurt you?" No  Living Will/POA: No  Regular Exercise: Yes     Depression  Over the past two weeks, have you felt down, depressed, or hopeless? No  Over the past two weeks, have you felt little interest or pleasure in doing things? No    Reproductive Health  Followed by Dr. Cl Chacon    CHD, HTN, DM2  CHD Risk Factors: none  Women 45 years and older should be screened for dyslipidemia if at increased risk of CHD  Women 20 to 45 years of age should be screened for dyslipidemia if at increased risk of CHD  Asymptomatic adults with sustained blood pressure greater than 135/80 mm Hg (treated or untreated) should be screened for type 2 diabetes mellitus    Estimated body mass index is 28.87 kg/m² as calculated from the following:    Height as of this encounter: 5' 2.99" (1.6 m).    Weight as of this encounter: 73.9 kg (162 lb 14.7 oz).      Screening  Mammogram needed: utd  Colonoscopy needed: n/a  Osteoporosis screen needed: n/a     Women 50 to 74 years of age should be screened for breast cancer with mammography biennially.  Women should be screened for cervical cancer with Pap tests beginning at 21 years of age. Low-risk women should receive Pap testing every three years. Co-testing for human papillomavirus is an option beginning at 30 years of age, and can extend the screening interval to five years. Cervical cancer screening should be discontinued at 65 years of age or after total hysterectomy if the woman has a benign gynecologic history  Adults 50 to 75 years of age should be " "screened for colorectal cancer with an FOBT annually, sigmoidoscopy every five years with an FOBT every three years, or colonoscopy every 10 years.  Women 65 years and older should be screened for osteoporosis. Women younger than 65 years should be screened if the risk of fracture is greater than or equal to that of a 65-year-old white woman without additional risk factors.      Immunizations  delayed    ALLERGIES and MEDS were verified.   PMHx, PSHx, FHx, SOCIALHx were updated as pertinent.    REVIEW OF SYSTEMS  Review of Systems   Constitutional: Negative.    HENT: Negative for hearing loss.    Eyes: Negative for discharge.   Respiratory: Negative for wheezing.    Cardiovascular: Negative for chest pain and palpitations.   Gastrointestinal: Negative for blood in stool, constipation, diarrhea and vomiting.   Genitourinary: Negative for dysuria and hematuria.   Musculoskeletal: Negative for neck pain.   Skin: Negative.    Neurological: Negative for weakness and headaches.   Endo/Heme/Allergies: Negative for polydipsia.         PHYSICAL EXAM  VITAL SIGNS: /79 (BP Location: Left arm, Patient Position: Sitting, BP Method: Medium (Automatic))   Pulse 93   Temp 97.9 °F (36.6 °C) (Oral)   Resp 17   Ht 5' 2.99" (1.6 m)   Wt 73.9 kg (162 lb 14.7 oz)   SpO2 98%   BMI 28.87 kg/m²   GEN: Well developed, Well nourished, No acute distress.  HENT: Normocephalic, Atraumatic, Bilateral external ears normal, Nose normal, Oropharynx moist, No oral exudates.   Eyes: PERRL, EOMI, Conjunctiva normal, No discharge.   Neck: Supple, No tenderness.  Lymphatic: No cervical or supraclavicular lymphadenopathy noted.   Cardiovascular: Normal heart rate, Normal rhythm, No murmurs, No rubs, No gallops.   Thorax & Lungs: Normal breath sounds, No respiratory distress, No wheezing.  Abdomen: Soft, No tenderness, Bowel sounds normal.  Breast: deferred  Genital: deferred   Skin: Warm, Dry, No erythema, No rash.   Extremities: No edema, No " tenderness.       ASSESSMENT/PLAN    Leia was seen today for establish care and annual exam.    Diagnoses and all orders for this visit:    Well woman exam (no gynecological exam)  -     CBC auto differential; Future  -     Comprehensive metabolic panel; Future  -     Lipid panel; Future  -     Vitamin B12; Future  -     Folate; Future  -     Vitamin D; Future  -     Urinalysis; Future  -     TSH; Future  -     T4, free; Future  -     (In Office Administered) Tdap Vaccine    Chronic bilateral low back pain with bilateral sciatica  -     [facility]           FOLLOW UP: 6 months or sooner if needed    Meliton Gaviria MD

## 2019-12-18 ENCOUNTER — PATIENT MESSAGE (OUTPATIENT)
Dept: OBSTETRICS AND GYNECOLOGY | Facility: CLINIC | Age: 44
End: 2019-12-18

## 2019-12-30 ENCOUNTER — OFFICE VISIT (OUTPATIENT)
Dept: PSYCHIATRY | Facility: CLINIC | Age: 44
End: 2019-12-30
Payer: COMMERCIAL

## 2019-12-30 DIAGNOSIS — F43.23 ADJUSTMENT DISORDER WITH MIXED ANXIETY AND DEPRESSED MOOD: Primary | ICD-10-CM

## 2019-12-30 PROCEDURE — 90834 PR PSYCHOTHERAPY W/PATIENT, 45 MIN: ICD-10-PCS | Mod: S$GLB,,, | Performed by: SOCIAL WORKER

## 2019-12-30 PROCEDURE — 90834 PSYTX W PT 45 MINUTES: CPT | Mod: S$GLB,,, | Performed by: SOCIAL WORKER

## 2019-12-30 NOTE — PROGRESS NOTES
"Individual Psychotherapy (PhD/LCSW)    12/30/2019    Site:  [facility]         Therapeutic Intervention: Met with patient.  Outpatient - Insight oriented psychotherapy 45 min - CPT code 87162 and Outpatient - Supportive psychotherapy 45 min - CPT Code 02886    Chief complaint/reason for encounter: depression and anxiety     Interval history and content of current session: Patient returned to clinic for follow up psychotherapy. Patient reports that she is doing "okay". States that she went on trip to see family and it went well. States that she thought the trip was a little long but that it was nice to see family. States that she continues to have stress at work. Did leave work one day to run some errands. States that one of her biggest issues with work is that she is required to do things that don't fall under her job description. States that it is mandatory that she go to events and parties but not everyone has to go. 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 Discussed that she feels pressured to do it because the  asks. Discussed that sometimes he has grandiose ideas that he wants to do but then leaves all the work up to the employees. 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 Tried to find other suggestions but they weren't popular enough for the . Discussed communicating with  and supervisor to express her concerns in an assertive manner. Discussed being more upfront about saying no and giving tasks back to the appropriate people.     Treatment plan:  · Target symptoms: depression, anxiety   · Why chosen therapy is appropriate versus another modality: relevant to diagnosis, evidence based practice  · Outcome monitoring methods: self-report, observation  · Therapeutic intervention type: insight oriented psychotherapy, supportive psychotherapy    Risk " parameters:  Patient reports no suicidal ideation  Patient reports no homicidal ideation  Patient reports no self-injurious behavior  Patient reports no violent behavior    Verbal deficits: None    Patient's response to intervention:  The patient's response to intervention is accepting.    Progress toward goals and other mental status changes:  The patient's progress toward goals is fair .    Diagnosis:     ICD-10-CM ICD-9-CM   1. Adjustment disorder with mixed anxiety and depressed mood F43.23 309.28       Plan:  individual psychotherapy    Return to clinic: 2 weeks, 1 month    Length of Service (minutes): 45     Cassandra Rockweiler, LCSW-[facility]

## 2020-01-08 ENCOUNTER — CLINICAL SUPPORT (OUTPATIENT)
Dept: REHABILITATION | Facility: HOSPITAL | Age: 45
End: 2020-01-08
Attending: FAMILY MEDICINE
Payer: COMMERCIAL

## 2020-01-08 DIAGNOSIS — G89.29 CHRONIC BILATERAL LOW BACK PAIN WITH BILATERAL SCIATICA: ICD-10-CM

## 2020-01-08 DIAGNOSIS — R29.3 POOR POSTURE: ICD-10-CM

## 2020-01-08 DIAGNOSIS — M54.41 CHRONIC BILATERAL LOW BACK PAIN WITH BILATERAL SCIATICA: ICD-10-CM

## 2020-01-08 DIAGNOSIS — M62.81 WEAKNESS OF TRUNK MUSCULATURE: ICD-10-CM

## 2020-01-08 DIAGNOSIS — M54.42 CHRONIC BILATERAL LOW BACK PAIN WITH BILATERAL SCIATICA: ICD-10-CM

## 2020-01-08 PROCEDURE — 97161 PT EVAL LOW COMPLEX 20 MIN: CPT | Mod: PN

## 2020-01-08 PROCEDURE — 97110 THERAPEUTIC EXERCISES: CPT | Mod: PN

## 2020-01-08 NOTE — PATIENT INSTRUCTIONS
Knee-to-Chest Stretch: Bilateral        With hands behind knees, pull both knees in to chest until a comfortable stretch is felt in lower back and buttocks. Keep back relaxed. Hold ____ seconds.  Repeat ____ times per set. Do ____ sets per session. Do ____ sessions per day.     https://[employer]/128     Copyright © [employer]. All rights reserved.   Lower Trunk Rotation Stretch        Keeping back flat and feet together, rotate knees to left side. Hold ____ seconds.  Repeat ____ times per set. Do ____ sets per session. Do ____ sessions per day.     https://[employer]/122     Copyright © [employer]. All rights reserved.   Pelvic Tilt        Flatten back by tightening stomach muscles and buttocks.  Repeat ____ times per set. Do ____ sets per session. Do ____ sessions per day.     https://[employer]/134     Copyright © [employer]. All rights reserved.

## 2020-01-08 NOTE — PLAN OF CARE
[facility] - PHYSICAL THERAPY EVALUATION     Name: Leia Shultz  Clinic Number: [id number]    Therapy Diagnosis:   Encounter Diagnoses   Name Primary?    Chronic bilateral low back pain with bilateral sciatica     Poor posture     Weakness of trunk musculature      Physician: Meliton Gaviria MD    Physician Orders: PT Eval and Treat   Medical Diagnosis from Referral: Chronic bilateral low back pain with bilateral sciatica  Evaluation Date: 1/8/2020  Authorization Period Expiration: 12//5/20  Plan of Care Expiration: 4/8/20  Reassessment Due: 2/8/20  Visit # / Visits authorized: 1/ 5 (as of 1/8/20)    Time In: 7am  Time Out: 2830am  Total Billable Time: 90 minutes    Precautions: Standard/heart murmur/anemia    Pattern of pain determined: 1PEN      Subjective   Date of onset: 12/6/19  History of current condition - Leia report shronic LBP starting at 7 when she fell down a flight of stairs. As per pt report she had several hairline fractures in lower thoracic/ upper lumbar region(no xrays on file in [facility]).   Pt reports xrays are at chiropractor, whom she has sessions with 1x/wk. Pt states she has had chronic LBP since the incident in her childhood, which was exacerbated by obesity. Pt has lost several hundred pounds within the last 9 years, which has helped LBP overall.  Pt states LBP pain is L>R currently and intermittent.  Pt reports she has avoided activities in her daily life to decrease pain. Pt reports she experiences pain with carrying groceries, walking, dancing,prolonged sitting, standing approx 1 hour.  Pt reports improvement with ice and rest and chiropractic care.  Pt denies numbness/tingling into LE's, no LE weakness noted. Pt also reports pain in cervical region with HA's.     Medical History:   Past Medical History:   Diagnosis Date    Anemia     Arthritis     Heart murmur        Surgical History:   Leia Shultz  has a past surgical history that includes Dilation  "and curettage of uterus (2002); Laparoscopic gastric banding; gastric sleeve; [place] tooth extraction; and Root canal.    Medications:   Leia has a current medication list which includes the following prescription(s): ergocalciferol (vitamin d2), fluvirin [id number] (pf), inv folate, multivitamin, mupirocin, and omeprazole.    Allergies:   Review of patient's allergies indicates:  No Known Allergies     Imaging, none:     Prior Therapy: none  Prior Treatment: chiropactic care, massage  Social History: lives alone  Occupation: [employer], primarily sitting, computer work  Leisure: dancing, solo performer, performs with fire and 4 lbs swords, likes to read      Prior Level of Function: I c/ all ADL's  Current Level of Function: pain with ADL's, unable to unpack into home she just bought because of her back, unable to dance and lift, vacuuming, laundry  DME owned/used Back brace/TENS uses ocassionally        Pain:  Current 3/10, worst 5/10, best 0/10   Location: bilateral back   Description: Aching, Throbbing and Sharp  Aggravating Factors: Sitting, Standing, Walking and Lifting  Easing Factors: massage and ice  Disturbed Sleep: no        Pattern of pain questions:  1.  Where is your pain the worst? LB  2.  Is your pain constant or intermittent? intermittent  3.  Does bending forward make your typical pain worse? Coming up from bending  4.  Since the start of your back pain, has there been a change in your bowel or bladder? no  5.  What can't you do now that you use to be able to do? Dance, lifting, housework, sitting prolonged      Pts goals: "Get back to dancing"      Red Flag Screening:   Cough  Sneeze  Strain: (--)  Bladder/ bowel: (--)  Falls: (--)  Night pain: (--)  Unexplained weight loss: (--)  General health: good, vitamin deficiency    OBJECTIVE     Postural examination/scapula alignment: Rounded shoulder, Head forward, Slouched posture and Increased kyphosis  Joint integrity: Soft end feel  Skin " integrity: intact B LE's  Edema: Mild  Sitting: poor  Standing: poor  Correction of posture: better with lumbar roll    MOVEMENT LOSS    ROM Loss   Flexion hypermobile   Extension within functional limits hinging at L1   Side bending Right within functional limits   Side bending Left within functional limits   Rotation Right minimal loss   Rotation Left minimal loss     Lower Extremity Strength  Right LE  Left LE    Hip flexion: 4/5 Hip flexion: 4/5   Hip extension:  4/5 Hip extension: 4/5   Hip abduction: 4/5 Hip abduction: 4/5   Hip adduction:  4/5 Hip adduction:  4/5   Hip Internal rotation   4-/5 Hip Internal rotation 4-/5   Knee Flexion 4/5 Knee Flexion 4/5   Knee Extension 4/5 Knee Extension 4/5   Ankle dorsiflexion: 4/5 Ankle dorsiflexion: 4/5   Ankle plantarflexion: 4/5 Ankle plantarflexion: 4/5       GAIT:  Assistive Device used: none  Level of Assistance: independent  Patient displays the following gait deviations:  no gait deviations observed.     Special Tests:   Test Name  Test Result   Prone Instability Test (--)   SI Joint Provocation Test (--)   Straight Leg Raise (--)   Neural Tension Test (--)   Crossed Straight Leg Raise (--)   Walking on toes (--)   Walking on heels  (--)       NEUROLOGICAL SCREENING     Sensory deficit: light touch intact BLE's    Reflexes:    Left Right   Patella Tendon 1+ 1+   Achilles Tendon 1+ 1+   Babinski  (--) (--)   Clonus (--) (--)     REPEATED TEST MOVEMENTS:  Repeated Flexion in Standing Pain during movement, no worse   Repeated Extension in Standing pain during motion  worse   Repeated Flexion in lying pain during motion  worse   Repeated Extension in lying  pain during motion  no worse       STATIC TESTS   Sitting slouched  worse   Sitting erect better   Standing slouched worse   Standing erect  better   Lying prone in extension  no worse   Long sitting   not tested       Baseline Isometric Testing on Med X equipment: Testing administered by PT  Date of testing:  1/8/20  ROM 6-48 deg   Max Peak Torque 116   Min Peak Torque 78   Flex/Ext Ratio 1.4:1   % below normative data 24%         CMS Impairment/Limitation/Restriction for FOTO  Survey    Therapist reviewed FOTO scores for Leia Shultz on 1/8/2020.   FOTO documents entered into [employer] - see Media section.    Limitation Score: 40%  Category: Mobility    Current : CJ = at least 20% but < 40% impaired, limited or restricted  Goal: CJ = at least 20% but < 40% impaired, limited or restricted  Discharge: CJ = at least 20% but < 40% impaired, limited or restricted             Treatment   Treatment Time In: 8am  Treatment Time Out: 830  Total Treatment time separate from Evaluation: 30 minutes      Leia received therapeutic exercises to develop/improve posture, lumbar/cervical ROM, strength and muscular endurance for 30 minutes including the following exercises:     Med x dynamic exercise and baseline IM test    [facility] 1/8/2020   Visit Number 1   VAS Pain Rating 2   Flexion in Lying 10   Lumbar Extension Seat Pad 1   Femur Restraint 5   Top Dead Center 24   Counterweight 204   Lumbar Flexion 48   Lumbar Extension 6   Lumbar Peak Torque 116   Min Torque 78   Test Percent Below Normative Data 24   Ice - Z Lie (in min.) 10     Lower trunk rotation 10x  Flexion in lying 10x  Posterior pelvic tilt 10x      Written Home Exercises Provided: yes.  Exercises were reviewed and Leia was able to demonstrate them prior to the end of the session.  Leia demonstrated good  understanding of the education provided.     See EMR under Patient Instructions for exercises provided 1/8/2020.      Education provided:   - Patient received education regarding proper posture and body mechanics.  Patient was given top 10 tips handout which discusses posture seated, standing, lifting correctly, components of exercise, importance of nutrition and hydration, and importance of sleep.  - Crissy desai tried, recommended, and purchase  information was provided.    - Patient received a handout regarding anticipated muscular soreness following the isometric test and strategies for management were reviewed with patient including stretching, using ice and scheduled rest.   - Patient received education on the [facility] program, purpose of the isometric test, progression of back strengthening as well as wellness approach and systemic strengthening.  Details of the program were discussed.  Reviewed that patient should feel support/pressure from med ex restraints but no pain or discomfort and patient expressed understanding.    Leia received cold pack for 10 minutes to LB.    Assessment   Leia is a 44 y.o. female referred to [facility] with a medical diagnosis of Chronic bilateral low back pain with bilateral sciatica . Pt presents with decreased trunk strength, poor posture, 24% strength deficit compared to age norms, decreased LE strength, 40% Foto disability score, decreased endurance and decreased motor control.   Functional Activities limited by deconditioning and pain.  Pt would like to return to solo dance performances which would require having 2, 4lb swords in each hand.      Pain Pattern: 1PEN       Pt prognosis is Good.   Pt will benefit from skilled outpatient Physical Therapy to address the deficits stated above and in the chart below, provide pt/family education, and to maximize pt's level of independence. Based on the above history and physical examination an active physical therapy program is recommended.  Pt will continue to benefit from skilled outpatient physical therapy to address the deficits listed below in the chart, provide pt/family education and to maximize pt's level of independence in the home and community environment. .       Plan of care discussed with patient: Yes  Pt's spiritual, cultural and educational needs considered and patient is agreeable to the plan of care and goals as stated below:      Anticipated Barriers for therapy: none    PT Evaluation Completed? Yes    Medical necessity is demonstrated by the following problem list.    Pt presents with the following impairments:     History  Co-morbidities and personal factors that may impact the plan of care Co-morbidities:   heart murmur    Personal Factors:   lifestyle     low   Examination  Body Structures and Functions, activity limitations and participation restrictions that may impact the plan of care Body Regions:   neck  back  lower extremities  trunk    Body Systems:    gross symmetry  ROM  strength  gross coordinated movement  transfers  transitions  motor control    Participation Restrictions:   none    Activity limitations:   Learning and applying knowledge  no deficits    General Tasks and Commands  no deficits    Communication  no deficits    Mobility  lifting and carrying objects  walking    Self care  no deficits    Domestic Life  doing house work (cleaning house, washing dishes, laundry)    Interactions/Relationships  no deficits    Life Areas  no deficits    Community and Social Life  community life  recreation and leisure         low   Clinical Presentation stable and uncomplicated low   Decision Making/ Complexity Score: low       GOALS: Pt is in agreement with the following goals.    Short term goals:  6 weeks or 10 visits   1.  Pt will demonstrate increased lumbar ROM by at least 6 degrees from the initial ROM value with improvements noted in functional ROM and ability to perform ADLs.  2.  Pt will demonstrate increased maximum isometric torque value by 20% when compared to the initial value resulting in improved ability to perform bending, lifting, and carrying activities safely, confidently.    3.  Patient report a reduction in worst pain score by 1-2 points for improved tolerance for walking and sitting  4.  Pt able to perform HEP correctly with minimal cueing or supervision from therapist to encourage independent management of  "symptoms.       Long term goals: 10 weeks or 20 visits   1. Pt will demonstrate increased lumbar ROM by at least 9 degrees from initial ROM value, resulting in improved ability to perform functional fwd bending while standing and sitting.   2. Pt will demonstrate increased maximum isometric torque value by 40% when compared to the initial value resulting in improved ability to perform bending, lifting, and carrying activities safely, confidently.  3. Pt to demonstrate ability to independently control and reduce their pain through posture positioning and mechanical movements throughout a typical day.  4.  Pt will demonstrate reduced pain and improved functional outcomes as reported on the FOTO by reaching a score of CJ = at least 20% but < 40% impaired, limited or restricted or less in order to demonstrate subjective improvement in pt's condition.    5. Pt will demonstrate independence with the HEP at discharge  6.  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 Pt will be able to unpack boxes at her house( to move in) with minimal c/o LBP    Plan   Outpatient physical therapy 2x week for 10 weeks or 20 visits to include the following:   - Patient education  - Therapeutic exercise  - Manual therapy  - Performance testing   - Neuromuscular Re-education  - Therapeutic activity   - Modalities    Pt may be seen by PTA as part of the rehabilitation team.     Therapist: Steffanie Giles, PT  1/8/2020    "I certify the need for these services furnished under this plan of treatment and while under my care."    ____________________________________  Physician/Referring Practitioner    _______________  Date of Signature          "

## 2020-01-14 ENCOUNTER — OFFICE VISIT (OUTPATIENT)
Dept: PSYCHIATRY | Facility: CLINIC | Age: 45
End: 2020-01-14
Payer: COMMERCIAL

## 2020-01-14 DIAGNOSIS — F43.23 ADJUSTMENT DISORDER WITH MIXED ANXIETY AND DEPRESSED MOOD: Primary | ICD-10-CM

## 2020-01-14 PROCEDURE — 90834 PSYTX W PT 45 MINUTES: CPT | Mod: S$GLB,,, | Performed by: SOCIAL WORKER

## 2020-01-14 PROCEDURE — 90834 PR PSYCHOTHERAPY W/PATIENT, 45 MIN: ICD-10-PCS | Mod: S$GLB,,, | Performed by: SOCIAL WORKER

## 2020-01-14 NOTE — PROGRESS NOTES
"[facility] Physical Therapy Treatment      Name: Leia Shultz  Clinic Number: [id number]    Therapy Diagnosis:   Encounter Diagnoses   Name Primary?    Poor posture     Weakness of trunk musculature      Physician: Meliton Gaviria MD    Visit Date: 1/15/2020    Physician Orders: eval and treat  Medical Diagnosis: M54.42,M54.41,G89.29 (ICD-10-CM) - Chronic bilateral low back pain with bilateral sciatica  Evaluation Date: 20  Authorization Period Expiration: 20  Reassessment Due: 20  Plan of Care Certification Period: 20  Visit #/Visits authorized:      Time In: 7am  Time Out: 8am  Total Billable Time: 60 minutes    Precautions: Standard/heart murmur/anemia    Pattern of pain determined: 1PEN    Subjective   Leia reports no increase in symptoms following evaluation.      Patient reports tolerating previous visit well  Patient reports their pain to be 3/10 on a 0-10 scale with 0 being no pain and 10 being the worst pain imaginable.  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   FOTO documents entered into [employer] - see Media section.     Limitation Score: 40%  Category: Mobility     Current : CJ = at least 20% but < 40% impaired, limited or restricted  Goal: CJ = at least 20% but < 40% impaired, limited or restricted  Discharge: CJ = at least 20% but < 40% impaired, limited or restricted              Treatment    Pt was instructed in and " performed the following:     Leia received therapeutic exercises to develop/improved posture, cardiovascular endurance, muscular endurance, lumbar/cervical ROM, strength and muscular endurance for 60 minutes including the following exercises:        [facility] 1/15/2020   Visit Number 2   VAS Pain Rating 3   Treadmill Time (in min.) 10   Speed 2   Flexion in Lying 10   Lumbar Extension Seat Pad -   Femur Restraint -   Top Dead Center -   Counterweight -   Lumbar Flexion -   Lumbar Extension -   Lumbar Peak Torque -   Min Torque -   Test Percent Below Normative Data -   Lumbar Weight 55   Repetitions 20   Rating of Perceived Exertion 2   Ice - Z Lie (in min.) 10       Lower trunk rotation 10x  Flexion in lying 10x 10 seconds  Posterior pelvic tilt 10x   Posterior pelvic tilt c/ LE extension 10x          Peripheral muscle strengthening which included 1 set of 15-20 repetitions at a slow, controlled 10-13 second per rep pace focused on strengthening supporting musculature for improved body mechanics and functional mobility.  Pt and therapist focused on proper form during treatment to ensure optimal strengthening of each targeted muscle group.  Machines were utilized including torso rotation, leg extension, leg curl, chest press, upright row. Tricep extension, bicep curl, leg press, and hip abduction added visit 3          Home Exercises Provided and Patient Education Provided     Education provided:   Exertion levels and progressions with program    Written Home Exercises Provided: Patient instructed to cont prior HEP.  Exercises were reviewed and Leia was able to demonstrate them prior to the end of the session.  Leia demonstrated good  understanding of the education provided.     See EMR under Patient Instructions for exercises provided prior visit.          Assessment     Initiated Med X exercise at 55ft/lbs with pt completing 20 reps at 3/10 level of exertion.  Increase 10% next visit.  Pt able to  perform HEP with minimal cueing and reports compliance at home.  Added pelvic tilt with LE extension to increase stability efforts.  Patient is making good progress towards established goals.  Pt will continue to benefit from skilled outpatient physical therapy to address the deficits stated in the impairment chart, provide pt/family education and to maximize pt's level of independence in the home and community environment.     Anticipated Barriers for therapy: none  Pt's spiritual, cultural and educational needs considered and pt agreeable to plan of care and goals as stated below:            GOALS: Pt is in agreement with the following goals.     Short term goals:  6 weeks or 10 visits   1.  Pt will demonstrate increased lumbar ROM by at least 6 degrees from the initial ROM value with improvements noted in functional ROM and ability to perform ADLs.  2.  Pt will demonstrate increased maximum isometric torque value by 20% when compared to the initial value resulting in improved ability to perform bending, lifting, and carrying activities safely, confidently.     3.  Patient report a reduction in worst pain score by 1-2 points for improved tolerance for walking and sitting  4.  Pt able to perform HEP correctly with minimal cueing or supervision from therapist to encourage independent management of symptoms.         Long term goals: 10 weeks or 20 visits   1. Pt will demonstrate increased lumbar ROM by at least 9 degrees from initial ROM value, resulting in improved ability to perform functional fwd bending while standing and sitting.   2. Pt will demonstrate increased maximum isometric torque value by 40% when compared to the initial value resulting in improved ability to perform bending, lifting, and carrying activities safely, confidently.  3. Pt to demonstrate ability to independently control and reduce their pain through posture positioning and mechanical movements throughout a typical day.  4.  Pt will  demonstrate reduced pain and improved functional outcomes as reported on the FOTO by reaching a score of CJ = at least 20% but < 40% impaired, limited or restricted or less in order to demonstrate subjective improvement in pt's condition.    5. Pt will demonstrate independence with the HEP at discharge  6.  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 Pt will be able to unpack boxes at her house( to move in) with minimal c/o LBP    Plan   Continue with established Plan of Care towards established PT goals.

## 2020-01-14 NOTE — PROGRESS NOTES
"Individual Psychotherapy (PhD/LCSW)    1/14/2020    Site:  [facility]         Therapeutic Intervention: Met with patient.  Outpatient - Insight oriented psychotherapy 45 min - CPT code 68090 and Outpatient - Supportive psychotherapy 45 min - CPT Code 59443    Chief complaint/reason for encounter: depression and anxiety     Interval history and content of current session: Patient returned to clinic for follow up psychotherapy. Patient reports that she is "tired". States that she hasn't been sleeping well lately. States that she is able to fall asleep but struggles to stay asleep. States that she wakes up around 3 and struggles to return to sleep. Discussed that she is also having issues with her boyfriend. States that the other night the two got into an argument and she went back to his house and started packing her stuff. States that most distressing part of this is that she had been drinking and blacked out for 45 minutes. Unsure of what made her so upset that she wanted to end the relationship. States that she knows that there are things the two need to discuss but wouldn't want it to be prompted that way. States that she has connected her drinking black out to her emotional state. States in the past she has blacked out when she was upset as well. States that issues with boyfriend started a week ago. Boyfriend made plans to have a "date night" and then later in the night wanted to go out drinking. States that she wouldn't have been mad if he didn't make it this big thing that they have a date night. States that then over the weekend he was talking about his friends daughter's birthday party and how patient was going to have to move her party. This was upsetting and this is what started the fight. Doesn't believe that she is a priority for boyfriend. Also believes that he has started to lie about his drinking and his wanting to go out. Discussed that in the past she would have been very upset " and fearful of relationship ending. She states that the other day it switched from fear to anger. Discussed that in the past she would have looked at herself as unworthy and doesn't feel like that anymore. Discussed sitting with boyfriend to discuss issues when neither has been drinking.     Treatment plan:  · Target symptoms: depression, anxiety   · Why chosen therapy is appropriate versus another modality: relevant to diagnosis, evidence based practice  · Outcome monitoring methods: self-report, observation  · Therapeutic intervention type: insight oriented psychotherapy, supportive psychotherapy    Risk parameters:  Patient reports no suicidal ideation  Patient reports no homicidal ideation  Patient reports no self-injurious behavior  Patient reports no violent behavior    Verbal deficits: None    Patient's response to intervention:  The patient's response to intervention is accepting.    Progress toward goals and other mental status changes:  The patient's progress toward goals is fair .    Diagnosis:     ICD-10-CM ICD-9-CM   1. Adjustment disorder with mixed anxiety and depressed mood F43.23 309.28       Plan:  individual psychotherapy    Return to clinic: 2 weeks, 1 month    Length of Service (minutes): 45     Cassandra Rockweiler, LCS-BACS

## 2020-01-15 ENCOUNTER — CLINICAL SUPPORT (OUTPATIENT)
Dept: REHABILITATION | Facility: HOSPITAL | Age: 45
End: 2020-01-15
Attending: FAMILY MEDICINE
Payer: COMMERCIAL

## 2020-01-15 DIAGNOSIS — M62.81 WEAKNESS OF TRUNK MUSCULATURE: ICD-10-CM

## 2020-01-15 DIAGNOSIS — R29.3 POOR POSTURE: ICD-10-CM

## 2020-01-15 PROCEDURE — 97110 THERAPEUTIC EXERCISES: CPT | Mod: PN

## 2020-01-27 ENCOUNTER — CLINICAL SUPPORT (OUTPATIENT)
Dept: REHABILITATION | Facility: HOSPITAL | Age: 45
End: 2020-01-27
Attending: FAMILY MEDICINE
Payer: COMMERCIAL

## 2020-01-27 DIAGNOSIS — R29.3 POOR POSTURE: ICD-10-CM

## 2020-01-27 DIAGNOSIS — M62.81 WEAKNESS OF TRUNK MUSCULATURE: ICD-10-CM

## 2020-01-27 PROCEDURE — 97110 THERAPEUTIC EXERCISES: CPT | Mod: PN

## 2020-01-27 NOTE — PROGRESS NOTES
"[facility]      Name: Leia Shultz  Clinic Number: [id number]    Therapy Diagnosis:   Encounter Diagnoses   Name Primary?    Poor posture     Weakness of trunk musculature      Physician: Meliton Gaviria MD    Visit Date: 2020    Physician Orders: eval and treat  Medical Diagnosis: M54.42,M54.41,G89.29 (ICD-10-CM) - Chronic bilateral low back pain with bilateral sciatica  Evaluation Date: 20  Authorization Period Expiration: 20  Reassessment Due: 20  Plan of Care Certification Period: 20  Visit #/Visits authorized: 3/ 20     Time In: 7am  Time Out: 8am  Total Billable Time: 60 minutes    Precautions: Standard/heart murmur/anemia    Pattern of pain determined: 1PEN    Subjective   Leia reports an increase in achiness/pain today with weather and she had an event where she had to stand for 6 hours.    Patient reports tolerating previous visit well  Patient reports their pain to be 5/10 on a 0-10 scale with 0 being no pain and 10 being the worst pain imaginable.  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   FOTO documents entered into [employer] - see Media section.     Limitation Score: 40%  Category: Mobility     Current : CJ = at least 20% but < 40% impaired, limited or restricted  Goal: CJ = at least 20% but < 40% impaired, limited or restricted  Discharge: CJ = at least 20% but < 40% impaired, limited or " restricted              Treatment    Pt was instructed in and performed the following:     Leia received therapeutic exercises to develop/improved posture, cardiovascular endurance, muscular endurance, lumbar/cervical ROM, strength and muscular endurance for 60 minutes including the following exercises:        [facility] 1/27/2020   Visit Number 3   VAS Pain Rating 5   Treadmill Time (in min.) 10   Speed 2   Flexion in Lying 10   Manual Therapy 5   Lumbar Extension Seat Pad -   Femur Restraint -   Top Dead Center -   Counterweight -   Lumbar Flexion -   Lumbar Extension -   Lumbar Peak Torque -   Min Torque -   Test Percent Below Normative Data -   Lumbar Weight 60   Repetitions 15   Rating of Perceived Exertion 3   Ice - Z Lie (in min.) 10       Lower trunk rotation 10x  Flexion in lying 10x 10 seconds  Posterior pelvic tilt 10x   Posterior pelvic tilt c/ LE extension 10x      MANUAL:  STM  To B LB paraspinals x 5 min    Peripheral muscle strengthening which included 1 set of 15-20 repetitions at a slow, controlled 10-13 second per rep pace focused on strengthening supporting musculature for improved body mechanics and functional mobility.  Pt and therapist focused on proper form during treatment to ensure optimal strengthening of each targeted muscle group.  Machines were utilized including torso rotation, leg extension, leg curl, chest press, upright row. Tricep extension, bicep curl, leg press, and hip abduction added visit 3          Home Exercises Provided and Patient Education Provided     Education provided:   Exertion levels and progressions with program    Written Home Exercises Provided: Patient instructed to cont prior HEP.  Exercises were reviewed and Leia was able to demonstrate them prior to the end of the session.  Leia demonstrated good  understanding of the education provided.     See EMR under Patient Instructions for exercises provided prior visit.          Assessment   Pt  arrives today and states she is having increased pain from standing prolonged and weather.  Pt states with LTR to the right she felt increased L sided pain today.  Initiated STM to B LB paraspinals with pt reporting improvement in symptoms following. Increased Lumbar Med X weight to 60ft/lbs with pt completing 15 reps with 3/10 exertion.  Advised pt to only go to 15 reps secondary to increase pain today.  Patient is making good progress towards established goals.  Pt will continue to benefit from skilled outpatient physical therapy to address the deficits stated in the impairment chart, provide pt/family education and to maximize pt's level of independence in the home and community environment.     Anticipated Barriers for therapy: none  Pt's spiritual, cultural and educational needs considered and pt agreeable to plan of care and goals as stated below:            GOALS: Pt is in agreement with the following goals.     Short term goals:  6 weeks or 10 visits   1.  Pt will demonstrate increased lumbar ROM by at least 6 degrees from the initial ROM value with improvements noted in functional ROM and ability to perform ADLs.  2.  Pt will demonstrate increased maximum isometric torque value by 20% when compared to the initial value resulting in improved ability to perform bending, lifting, and carrying activities safely, confidently.     3.  Patient report a reduction in worst pain score by 1-2 points for improved tolerance for walking and sitting  4.  Pt able to perform HEP correctly with minimal cueing or supervision from therapist to encourage independent management of symptoms.         Long term goals: 10 weeks or 20 visits   1. Pt will demonstrate increased lumbar ROM by at least 9 degrees from initial ROM value, resulting in improved ability to perform functional fwd bending while standing and sitting.   2. Pt will demonstrate increased maximum isometric torque value by 40% when compared to the initial value  resulting in improved ability to perform bending, lifting, and carrying activities safely, confidently.  3. Pt to demonstrate ability to independently control and reduce their pain through posture positioning and mechanical movements throughout a typical day.  4.  Pt will demonstrate reduced pain and improved functional outcomes as reported on the FOTO by reaching a score of CJ = at least 20% but < 40% impaired, limited or restricted or less in order to demonstrate subjective improvement in pt's condition.    5. Pt will demonstrate independence with the HEP at discharge  6.  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 Pt will be able to unpack boxes at her house( to move in) with minimal c/o LBP    Plan   Continue with established Plan of Care towards established PT goals.

## 2020-01-28 NOTE — PROGRESS NOTES
"[facility]      Name: Leia Shultz  Clinic Number: [id number]    Therapy Diagnosis:   Encounter Diagnoses   Name Primary?    Poor posture     Weakness of trunk musculature      Physician: Meliton Gaviria MD    Visit Date: 2020    Physician Orders: eval and treat  Medical Diagnosis: M54.42,M54.41,G89.29 (ICD-10-CM) - Chronic bilateral low back pain with bilateral sciatica  Evaluation Date: 20  Authorization Period Expiration: 20  Reassessment Due: 20  Plan of Care Certification Period: 20  Visit #/Visits authorized:      Time In: 1658  Time Out:   Total Billable Time: 25 minutes    Precautions: Standard/heart murmur/anemia    Pattern of pain determined: 1PEN    Subjective   Leia reports continuous pain in low back especially on L side. She expresses apprehension with if she is doing too much because she does not want to further injure herself.     Patient reports tolerating previous visit well  Patient reports their pain to be 5/10 on a 0-10 scale with 0 being no pain and 10 being the worst pain imaginable.  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   FOTO documents entered into [employer] - see Media section.     Limitation Score: 40%  Category: Mobility     Current : CJ = at least 20% but < 40% impaired, limited or restricted  Goal: CJ = at least 20% but < 40% impaired, limited or restricted  Discharge: " CJ = at least 20% but < 40% impaired, limited or restricted              Treatment    Pt was instructed in and performed the following:     Leia received therapeutic exercises to develop/improved posture, cardiovascular endurance, muscular endurance, lumbar/cervical ROM, strength and muscular endurance for 58 minutes including the following exercises:     Lower trunk rotation 10x  Flexion in lying 10x 10 seconds  Posterior pelvic tilt 10x   Posterior pelvic tilt c/ LE extension 10x  +bridges 2x10  +pallof press GTB x20 ea    60# x20 3/10; last 2 reps more difficulty     [facility] 1/30/2020   Visit Number 4   VAS Pain Rating 5   Treadmill Time (in min.) 10   Speed 2   Lumbar Flexion 48   Lumbar Extension 6   Lumbar Weight 60   Repetitions 20   Rating of Perceived Exertion 3   Heat - Z Lie (in min.) 10       MANUAL:  STM  To B LB paraspinals x 8 min    Peripheral muscle strengthening which included 1 set of 15-20 repetitions at a slow, controlled 10-13 second per rep pace focused on strengthening supporting musculature for improved body mechanics and functional mobility.  Pt and therapist focused on proper form during treatment to ensure optimal strengthening of each targeted muscle group.  Machines were utilized including torso rotation, leg extension, leg curl, chest press, upright row. Tricep extension, bicep curl, leg press, and hip abduction added visit 3          Home Exercises Provided and Patient Education Provided     Education provided:   Exertion levels and progressions with program    Written Home Exercises Provided: Patient instructed to cont prior HEP.  Exercises were reviewed and Leia was able to demonstrate them prior to the end of the session.  Leia demonstrated good  understanding of the education provided.     See EMR under Patient Instructions for exercises provided prior visit.          Assessment     Pt arrives today and states she is having consistent intensity of pain and  apprehension with exercise because she does not want to exacerbate symptoms.  Continued with STM to B LB paraspinals with pt reporting improvement in symptoms following. Continued Lumbar Med X weight at 60ft/lbs with pt completing progression to 20 reps with 3/10 exertion. Reports increased difficulty with last 2-3 reps. Able to perform all peripheral strengthening without exacerbation of symptoms. Spoke to her about symptom management and exercising without exacerbating symptoms. Also spoke with her about dry needling for next session.     Patient is making good progress towards established goals.  Pt will continue to benefit from skilled outpatient physical therapy to address the deficits stated in the impairment chart, provide pt/family education and to maximize pt's level of independence in the home and community environment.     Anticipated Barriers for therapy: none  Pt's spiritual, cultural and educational needs considered and pt agreeable to plan of care and goals as stated below:            GOALS: Pt is in agreement with the following goals.     Short term goals:  6 weeks or 10 visits   1.  Pt will demonstrate increased lumbar ROM by at least 6 degrees from the initial ROM value with improvements noted in functional ROM and ability to perform ADLs. - progressing  2.  Pt will demonstrate increased maximum isometric torque value by 20% when compared to the initial value resulting in improved ability to perform bending, lifting, and carrying activities safely, confidently.  - progressing     3.  Patient report a reduction in worst pain score by 1-2 points for improved tolerance for walking and sitting - progressing  4.  Pt able to perform HEP correctly with minimal cueing or supervision from therapist to encourage independent management of symptoms.  - progressing        Long term goals: 10 weeks or 20 visits   1. Pt will demonstrate increased lumbar ROM by at least 9 degrees from initial ROM value, resulting  in improved ability to perform functional fwd bending while standing and sitting.   2. Pt will demonstrate increased maximum isometric torque value by 40% when compared to the initial value resulting in improved ability to perform bending, lifting, and carrying activities safely, confidently.  3. Pt to demonstrate ability to independently control and reduce their pain through posture positioning and mechanical movements throughout a typical day.  4.  Pt will demonstrate reduced pain and improved functional outcomes as reported on the FOTO by reaching a score of CJ = at least 20% but < 40% impaired, limited or restricted or less in order to demonstrate subjective improvement in pt's condition.    5. Pt will demonstrate independence with the HEP at discharge  6.  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 Pt will be able to unpack boxes at her house( to move in) with minimal c/o LBP    Plan   Continue with established Plan of Care towards established PT goals.     Consider 5% increase next visit. Continue with manual therapy as needed. Perform dry needling to B lumbar paraspinals if needed to reduce soft tissue restrictions and to help with pain threshold.     Saturnino Schmitt, PT, DPT  1/29/2020     details…

## 2020-01-29 ENCOUNTER — CLINICAL SUPPORT (OUTPATIENT)
Dept: REHABILITATION | Facility: HOSPITAL | Age: 45
End: 2020-01-29
Attending: FAMILY MEDICINE
Payer: COMMERCIAL

## 2020-01-29 DIAGNOSIS — M62.81 WEAKNESS OF TRUNK MUSCULATURE: ICD-10-CM

## 2020-01-29 DIAGNOSIS — R29.3 POOR POSTURE: ICD-10-CM

## 2020-01-29 PROCEDURE — 97140 MANUAL THERAPY 1/> REGIONS: CPT | Mod: PN

## 2020-01-29 PROCEDURE — 97110 THERAPEUTIC EXERCISES: CPT | Mod: PN

## 2020-02-03 ENCOUNTER — CLINICAL SUPPORT (OUTPATIENT)
Dept: REHABILITATION | Facility: HOSPITAL | Age: 45
End: 2020-02-03
Attending: FAMILY MEDICINE
Payer: COMMERCIAL

## 2020-02-03 DIAGNOSIS — M62.81 WEAKNESS OF TRUNK MUSCULATURE: ICD-10-CM

## 2020-02-03 DIAGNOSIS — R29.3 POOR POSTURE: ICD-10-CM

## 2020-02-03 PROCEDURE — 97140 MANUAL THERAPY 1/> REGIONS: CPT | Mod: PN

## 2020-02-03 PROCEDURE — 97110 THERAPEUTIC EXERCISES: CPT | Mod: PN

## 2020-02-03 NOTE — PROGRESS NOTES
"[facility]      Name: Leia Shultz  Clinic Number: [id number]    Therapy Diagnosis:   Encounter Diagnoses   Name Primary?    Poor posture     Weakness of trunk musculature      Physician: Meliton Gaviria MD    Visit Date: 2/3/2020    Physician Orders: eval and treat  Medical Diagnosis: M54.42,M54.41,G89.29 (ICD-10-CM) - Chronic bilateral low back pain with bilateral sciatica  Evaluation Date: 20  Authorization Period Expiration: 20  Reassessment Due: 20  Plan of Care Certification Period: 20  Visit #/Visits authorized:      Time In: 1700  Time Out: 1805  Total Billable Time: 25 minutes    Precautions: Standard/heart murmur/anemia    Pattern of pain determined: 1PEN    Subjective   Leia reports feeling better today compared to previous visit. Had minimal symptoms over the weekend from standing for a while.     Patient reports tolerating previous visit well  Patient reports their pain to be 2/10 on a 0-10 scale with 0 being no pain and 10 being the worst pain imaginable.  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   FOTO documents entered into [employer] - see Media section.     Limitation Score: 40%  Category: Mobility     Current : CJ = at least 20% but < 40% impaired, limited or restricted  Goal: CJ = at least 20% but < 40% impaired, limited or restricted  Discharge: CJ = at least 20% but < 40% impaired, limited " or restricted              Treatment    Pt was instructed in and performed the following:     Leia received therapeutic exercises to develop/improved posture, cardiovascular endurance, muscular endurance, lumbar/cervical ROM, strength and muscular endurance for 47 minutes including the following exercises:     LTR x20  DKTC x20    [facility] 2/3/2020   Visit Number 5   VAS Pain Rating 3   Treadmill Time (in min.) 10   Speed 2   Lumbar Flexion 48   Lumbar Extension 6   Lumbar Weight 63   Repetitions 15   Rating of Perceived Exertion 4   Ice - Z Lie (in min.) 0          Peripheral muscle strengthening which included 1 set of 15-20 repetitions at a slow, controlled 10-13 second per rep pace focused on strengthening supporting musculature for improved body mechanics and functional mobility.  Pt and therapist focused on proper form during treatment to ensure optimal strengthening of each targeted muscle group.  Machines were utilized including torso rotation, leg extension, leg curl, chest press, upright row. Tricep extension, bicep curl, leg press, and hip abduction added visit 3      Leia received the following manual therapy techniques: dry needling were applied to the: lumbar paraspinals for 17 minutes, including:    Patient provided written and verbal consent to receive functional dry needling at today's visit (see consent form scanned into chart). Pt cleared of contraindications and verbal and written consent acquired. Pt given option of copy of consent form. Pt educated on benefits and potential side effects of DN.   FDN performed to B lumbar paraspinals (4 on L and 2 on R) using unilateral twisting technique. FDN performed to reduce pain and muscle tension, promote blood flow, and improve ROM and function x 17 minutes. Pt reports familiar pain with two needles on L side (L4-5). Pt was educated on what to expect following the procedure and she verbalized understanding. Dry needling performed by Saturnino  Keshia, PT, DPT.       Home Exercises Provided and Patient Education Provided     Education provided:   Exertion levels and progressions with program  - risks/benefits of dry needling  - use of heat after dry needling session    Written Home Exercises Provided: Patient instructed to cont prior HEP.  Exercises were reviewed and Leia was able to demonstrate them prior to the end of the session.  Leia demonstrated good  understanding of the education provided.     See EMR under Patient Instructions for exercises provided prior visit.      Assessment     Pt arrives today and states she is having less symptoms compared to previous visit.  Performed dry needling to B lumbar paraspinals today. See above in OBJECTIVE for details about needle placement, pt reponse, and rationale. Progressed Lumbar Med X weight at 63ft/lbs with pt completing 15 reps with 4/10 exertion. Reports increased difficulty with last 2-3 reps. Able to perform all peripheral strengthening without exacerbation of symptoms. Spoke to her about symptom management and exercising without exacerbating symptoms.     Patient is making good progress towards established goals.  Pt will continue to benefit from skilled outpatient physical therapy to address the deficits stated in the impairment chart, provide pt/family education and to maximize pt's level of independence in the home and community environment.     Anticipated Barriers for therapy: none  Pt's spiritual, cultural and educational needs considered and pt agreeable to plan of care and goals as stated below:         GOALS: Pt is in agreement with the following goals.     Short term goals:  6 weeks or 10 visits   1.  Pt will demonstrate increased lumbar ROM by at least 6 degrees from the initial ROM value with improvements noted in functional ROM and ability to perform ADLs. - progressing  2.  Pt will demonstrate increased maximum isometric torque value by 20% when compared to the initial value  resulting in improved ability to perform bending, lifting, and carrying activities safely, confidently.  - progressing     3.  Patient report a reduction in worst pain score by 1-2 points for improved tolerance for walking and sitting - progressing  4.  Pt able to perform HEP correctly with minimal cueing or supervision from therapist to encourage independent management of symptoms.  - progressing        Long term goals: 10 weeks or 20 visits   1. Pt will demonstrate increased lumbar ROM by at least 9 degrees from initial ROM value, resulting in improved ability to perform functional fwd bending while standing and sitting.   2. Pt will demonstrate increased maximum isometric torque value by 40% when compared to the initial value resulting in improved ability to perform bending, lifting, and carrying activities safely, confidently.  3. Pt to demonstrate ability to independently control and reduce their pain through posture positioning and mechanical movements throughout a typical day.  4.  Pt will demonstrate reduced pain and improved functional outcomes as reported on the FOTO by reaching a score of CJ = at least 20% but < 40% impaired, limited or restricted or less in order to demonstrate subjective improvement in pt's condition.    5. Pt will demonstrate independence with the HEP at discharge  6.  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 Pt will be able to unpack boxes at her house( to move in) with minimal c/o LBP    Plan   Continue with established Plan of Care towards established PT goals.     Continue with manual therapy as needed. Perform dry needling to B lumbar paraspinals if needed to reduce soft tissue restrictions and to help with pain threshold.     Saturnino Schmitt, PT, DPT  2/3/2020

## 2020-02-11 ENCOUNTER — CLINICAL SUPPORT (OUTPATIENT)
Dept: REHABILITATION | Facility: HOSPITAL | Age: 45
End: 2020-02-11
Attending: FAMILY MEDICINE
Payer: COMMERCIAL

## 2020-02-11 DIAGNOSIS — R29.3 POOR POSTURE: ICD-10-CM

## 2020-02-11 DIAGNOSIS — M62.81 WEAKNESS OF TRUNK MUSCULATURE: ICD-10-CM

## 2020-02-11 PROCEDURE — 97110 THERAPEUTIC EXERCISES: CPT | Mod: PN

## 2020-02-11 NOTE — PROGRESS NOTES
"[facility]      Name: Leia Shultz  Clinic Number: [id number]    Therapy Diagnosis:   Encounter Diagnoses   Name Primary?    Poor posture     Weakness of trunk musculature        Physician: Meliton Gaviria MD    Visit Date: 2020    Physician Orders: eval and treat  Medical Diagnosis: M54.42,M54.41,G89.29 (ICD-10-CM) - Chronic bilateral low back pain with bilateral sciatica  Evaluation Date: 20  Authorization Period Expiration: 20  Reassessment Due: 20  Plan of Care Certification Period: 20  Visit #/Visits authorized:      Time In: 5:00pm  Time Out: 6:06pm  Total Billable Time: 30 minutes    Precautions: Standard/heart murmur/anemia    Pattern of pain determined: 1PEN    Subjective   Leia reports her back feels fine, her abs were sore from previous visit.     Patient reports tolerating previous visit well  Patient reports their pain to be 0/10 on a 0-10 scale with 0 being no pain and 10 being the worst pain imaginable.  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, likes to read      Pt goals: Get back to dancing"     Objective          Baseline Isometric Testing on Med X equipment: Testing administered by PT  Date of testin20  ROM 6-48 deg   Max Peak Torque 116   Min Peak Torque 78   Flex/Ext Ratio 1.4:1   % below normative data 24%         Treatment    Pt was instructed in and performed the following:     Leia received therapeutic exercises to develop/improved posture, cardiovascular endurance, muscular endurance, lumbar/cervical ROM, strength and muscular endurance for 56 minutes including the following exercises:     LTR x 10 each  DKTC x20  Posterior pelvic tilt c/ LE extension 10x  pallof press GTB x20 ea    [facility] 2020   Visit Number 6   VAS Pain Rating 1   Treadmill Time (in " min.) 10   Speed 2   Lumbar Flexion 48   Lumbar Extension 6   Lumbar Weight 63   Repetitions 20   Rating of Perceived Exertion 4   Ice - Prone (in min.) 10       Peripheral muscle strengthening which included 1 set of 15-20 repetitions at a slow, controlled 10-13 second per rep pace focused on strengthening supporting musculature for improved body mechanics and functional mobility.  Pt and therapist focused on proper form during treatment to ensure optimal strengthening of each targeted muscle group.  Machines were utilized including torso rotation, leg extension, leg curl, chest press, upright row. Tricep extension, bicep curl, leg press, and hip abduction added visit 3      Leia received the following manual therapy techniques: dry needling were applied to the: lumbar paraspinals for 00 minutes, including:    Patient provided written and verbal consent to receive functional dry needling at today's visit (see consent form scanned into chart). Pt cleared of contraindications and verbal and written consent acquired. Pt given option of copy of consent form. Pt educated on benefits and potential side effects of DN.   FDN performed to B lumbar paraspinals (4 on L and 2 on R) using unilateral twisting technique. FDN performed to reduce pain and muscle tension, promote blood flow, and improve ROM and function x 17 minutes. Pt reports familiar pain with two needles on L side (L4-5). Pt was educated on what to expect following the procedure and she verbalized understanding. Dry needling performed by Saturnino Schmitt, PT, DPT.       Home Exercises Provided and Patient Education Provided     Education provided:   Exertion levels and progressions with program  - risks/benefits of dry needling  - use of heat after dry needling session    Written Home Exercises Provided: Patient instructed to cont prior HEP.  Exercises were reviewed and Leia was able to demonstrate them prior to the end of the session.  Leia demonstrated  good  understanding of the education provided.     See EMR under Patient Instructions for exercises provided prior visit.      Assessment     Pt tolerated treatment well today. Arrived reporting no low back pain. States her abs were sore after previous visit. Pt reports good tolerance to dry needling performed at previous visit. Denies any exacerbation of symptoms. Continued Lumbar Med X weight at 63ft/lbs with pt completing 20 reps with 4/10 exertion. Reports increased difficulty with last 3-4 reps. Able to perform all peripheral strengthening without exacerbation of symptoms.     Patient is making good progress towards established goals.  Pt will continue to benefit from skilled outpatient physical therapy to address the deficits stated in the impairment chart, provide pt/family education and to maximize pt's level of independence in the home and community environment.     Anticipated Barriers for therapy: none  Pt's spiritual, cultural and educational needs considered and pt agreeable to plan of care and goals as stated below:         GOALS: Pt is in agreement with the following goals.     Short term goals:  6 weeks or 10 visits   1.  Pt will demonstrate increased lumbar ROM by at least 6 degrees from the initial ROM value with improvements noted in functional ROM and ability to perform ADLs. - progressing  2.  Pt will demonstrate increased maximum isometric torque value by 20% when compared to the initial value resulting in improved ability to perform bending, lifting, and carrying activities safely, confidently.  - progressing     3.  Patient report a reduction in worst pain score by 1-2 points for improved tolerance for walking and sitting - progressing  4.  Pt able to perform HEP correctly with minimal cueing or supervision from therapist to encourage independent management of symptoms.  - progressing        Long term goals: 10 weeks or 20 visits   1. Pt will demonstrate increased lumbar ROM by at least 9  degrees from initial ROM value, resulting in improved ability to perform functional fwd bending while standing and sitting.   2. Pt will demonstrate increased maximum isometric torque value by 40% when compared to the initial value resulting in improved ability to perform bending, lifting, and carrying activities safely, confidently.  3. Pt to demonstrate ability to independently control and reduce their pain through posture positioning and mechanical movements throughout a typical day.  4.  Pt will demonstrate reduced pain and improved functional outcomes as reported on the FOTO by reaching a score of CJ = at least 20% but < 40% impaired, limited or restricted or less in order to demonstrate subjective improvement in pt's condition.    5. Pt will demonstrate independence with the HEP at discharge  6.  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 Pt will be able to unpack boxes at her house( to move in) with minimal c/o LBP    Plan   Continue with established Plan of Care towards established PT goals.     Consider increase Med X resistance by 5% at next visit     Tasha Trinidad, PT, DPT  2/11/2020

## 2020-02-13 ENCOUNTER — CLINICAL SUPPORT (OUTPATIENT)
Dept: REHABILITATION | Facility: HOSPITAL | Age: 45
End: 2020-02-13
Attending: FAMILY MEDICINE
Payer: COMMERCIAL

## 2020-02-13 ENCOUNTER — OFFICE VISIT (OUTPATIENT)
Dept: PSYCHIATRY | Facility: CLINIC | Age: 45
End: 2020-02-13
Payer: COMMERCIAL

## 2020-02-13 DIAGNOSIS — F43.23 ADJUSTMENT DISORDER WITH MIXED ANXIETY AND DEPRESSED MOOD: Primary | ICD-10-CM

## 2020-02-13 DIAGNOSIS — R29.3 POOR POSTURE: ICD-10-CM

## 2020-02-13 DIAGNOSIS — M62.81 WEAKNESS OF TRUNK MUSCULATURE: ICD-10-CM

## 2020-02-13 PROCEDURE — 97140 MANUAL THERAPY 1/> REGIONS: CPT | Mod: PN

## 2020-02-13 PROCEDURE — 90834 PSYTX W PT 45 MINUTES: CPT | Mod: S$GLB,,, | Performed by: SOCIAL WORKER

## 2020-02-13 PROCEDURE — 97110 THERAPEUTIC EXERCISES: CPT | Mod: PN

## 2020-02-13 PROCEDURE — 90834 PR PSYCHOTHERAPY W/PATIENT, 45 MIN: ICD-10-PCS | Mod: S$GLB,,, | Performed by: SOCIAL WORKER

## 2020-02-13 NOTE — PROGRESS NOTES
"Individual Psychotherapy (PhD/LCSW)    2/13/2020    Site:  [facility]         Therapeutic Intervention: Met with patient.  Outpatient - Insight oriented psychotherapy 45 min - CPT code 37336 and Outpatient - Supportive psychotherapy 45 min - CPT Code 74400    Chief complaint/reason for encounter: depression and anxiety     Interval history and content of current session: Patient returned to clinic for follow up psychotherapy. Patient reports that she is doing well. States that she has been going to PT for her back the last couple weeks. Has been going to PT to hopefully forego surgery. States that she isn't keen on having surgery and wants to do what she can to avoid it. States that a couple weeks ago was having an "identity crisis". Fears that surgery will mean that she can't lift or dance like she use to. Discussed that she also recently had a change in medication for her folate deficiency. Once she changed medication she noticed a huge change in her anxiety and medication. States that she feels like she is back to her "normal" self. Reports that she has been thinking a lot about her relationship with boyfriend. States that she finally said something to him about birthday celebrations. States that he pushed patient to move it so that he could go to his friend's little girls birthday. States that they pushed it to the next weekend that she didn't want to do. States that he then started making plans to go out with a friend. Later told him that she doesn't think he makes her a priority and that he steam rolls her into doing things. Feels like boyfriend at times takes her for granted. Discussed the discrepancies between their priority levels. Discussed that she notices that shift in relationship and views it as growing pains. Hasn't been worried or anxious about relationship possibly ending. States that she doesn't want to break up but it doesn't cause her a great deal of stress when it comes to " mind.     Treatment plan:  · Target symptoms: depression, anxiety   · Why chosen therapy is appropriate versus another modality: relevant to diagnosis, evidence based practice  · Outcome monitoring methods: self-report, observation  · Therapeutic intervention type: insight oriented psychotherapy, supportive psychotherapy    Risk parameters:  Patient reports no suicidal ideation  Patient reports no homicidal ideation  Patient reports no self-injurious behavior  Patient reports no violent behavior    Verbal deficits: None    Patient's response to intervention:  The patient's response to intervention is accepting.    Progress toward goals and other mental status changes:  The patient's progress toward goals is fair .    Diagnosis:     ICD-10-CM ICD-9-CM   1. Adjustment disorder with mixed anxiety and depressed mood F43.23 309.28       Plan:  individual psychotherapy    Return to clinic: 2 weeks, 1 month    Length of Service (minutes): 45     Cassandra Rockweiler, LCSW-JOSES

## 2020-02-13 NOTE — PROGRESS NOTES
"[facility]      Name: Leia Shultz  Clinic Number: [id number]    Therapy Diagnosis:   Encounter Diagnoses   Name Primary?    Poor posture     Weakness of trunk musculature        Physician: Meliton Gaviria MD    Visit Date: 2020    Physician Orders: eval and treat  Medical Diagnosis: M54.42,M54.41,G89.29 (ICD-10-CM) - Chronic bilateral low back pain with bilateral sciatica  Evaluation Date: 2020  Authorization Period Expiration: 2020  Reassessment Due: 2020  Plan of Care Certification Period: 2020  Visit #/Visits authorized:      Time In: 1700  Time Out: 1801  Total Billable Time: 40 minutes    Precautions: Standard/heart murmur/anemia    Pattern of pain determined: 1PEN    Subjective   Leia reports increased soreness and symptoms following last visit. Doing better now but still has some symptoms. Pain more on R side today.    Patient reports tolerating previous visit well  Patient reports their pain to be 4/10 on a 0-10 scale with 0 being no pain and 10 being the worst pain imaginable.  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, likes to read      Pt goals: Get back to dancing"     Objective          Baseline Isometric Testing on Med X equipment: Testing administered by PT  Date of testin20  ROM 6-48 deg   Max Peak Torque 116   Min Peak Torque 78   Flex/Ext Ratio 1.4:1   % below normative data 24%         Treatment    Pt was instructed in and performed the following:     Leia received therapeutic exercises to develop/improved posture, cardiovascular endurance, muscular endurance, lumbar/cervical ROM, strength and muscular endurance for 45 minutes including the following exercises:     LTR x 10 each  DKTC x20  Posterior pelvic tilt c/ LE extension 10x  pallof press GTB x20 ea    [facility] " 2/13/2020   Visit Number 7   VAS Pain Rating 4   Treadmill Time (in min.) 10   Speed 2.6   Lumbar Flexion 48   Lumbar Extension 6   Lumbar Weight 63   Repetitions 13   Rating of Perceived Exertion 6       Peripheral muscle strengthening which included 1 set of 15-20 repetitions at a slow, controlled 10-13 second per rep pace focused on strengthening supporting musculature for improved body mechanics and functional mobility.  Pt and therapist focused on proper form during treatment to ensure optimal strengthening of each targeted muscle group.  Machines were utilized including torso rotation, leg extension, leg curl, chest press, upright row. Tricep extension, bicep curl, leg press, and hip abduction added visit 3      Leia received the following manual therapy techniques: dry needling were applied to the: lumbar paraspinals for 15 minutes, including:    Patient provided written and verbal consent to receive functional dry needling at today's visit (see consent form scanned into chart). Pt cleared of contraindications and verbal and written consent acquired. Pt given option of copy of consent form. Pt educated on benefits and potential side effects of DN.   FDN performed to B lumbar paraspinals (1 on L and 3 on R) using unilateral twisting technique. FDN performed to reduce pain and muscle tension, promote blood flow, and improve ROM and function x 15 minutes. Pt reports familiar pain with two needles on R side (L4-5). Pt was educated on what to expect following the procedure and she verbalized understanding. Dry needling performed by Saturnino Schmitt, PT, DPT.       Home Exercises Provided and Patient Education Provided     Education provided:   Exertion levels and progressions with program  - risks/benefits of dry needling  - use of heat after dry needling session    Written Home Exercises Provided: Patient instructed to cont prior HEP.  Exercises were reviewed and Leia was able to demonstrate them prior to the  end of the session.  Leia demonstrated good  understanding of the education provided.     See EMR under Patient Instructions for exercises provided prior visit.      Assessment     Pt arrived with increased symptoms compared to previous session. Instructed her to increase frequency of stretches during flare up of symptoms. Verbalized understanding.  Continued Lumbar Med X weight at 63ft/lbs with pt completing 13 reps with 6/10 exertion. Reports increased pain with last 6* of motion. Attempted reducing motion to 12-48* but still pain. Stopped at 13 reps. Able to perform all LE peripheral strengthening without exacerbation of symptoms. Reports some symptoms with UE peripheral. Performed dry needling today. See above in OBJECTIVE for details of needle placement, pt response, and rationale.     Patient is making good progress towards established goals.  Pt will continue to benefit from skilled outpatient physical therapy to address the deficits stated in the impairment chart, provide pt/family education and to maximize pt's level of independence in the home and community environment.     Anticipated Barriers for therapy: none  Pt's spiritual, cultural and educational needs considered and pt agreeable to plan of care and goals as stated below:         GOALS: Pt is in agreement with the following goals.     Short term goals:  6 weeks or 10 visits   1.  Pt will demonstrate increased lumbar ROM by at least 6 degrees from the initial ROM value with improvements noted in functional ROM and ability to perform ADLs. - progressing  2.  Pt will demonstrate increased maximum isometric torque value by 20% when compared to the initial value resulting in improved ability to perform bending, lifting, and carrying activities safely, confidently.  - progressing     3.  Patient report a reduction in worst pain score by 1-2 points for improved tolerance for walking and sitting - progressing  4.  Pt able to perform HEP correctly with  minimal cueing or supervision from therapist to encourage independent management of symptoms.  - progressing        Long term goals: 10 weeks or 20 visits   1. Pt will demonstrate increased lumbar ROM by at least 9 degrees from initial ROM value, resulting in improved ability to perform functional fwd bending while standing and sitting.   2. Pt will demonstrate increased maximum isometric torque value by 40% when compared to the initial value resulting in improved ability to perform bending, lifting, and carrying activities safely, confidently.  3. Pt to demonstrate ability to independently control and reduce their pain through posture positioning and mechanical movements throughout a typical day.  4.  Pt will demonstrate reduced pain and improved functional outcomes as reported on the FOTO by reaching a score of CJ = at least 20% but < 40% impaired, limited or restricted or less in order to demonstrate subjective improvement in pt's condition.    5. Pt will demonstrate independence with the HEP at discharge  6.  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 Pt will be able to unpack boxes at her house( to move in) with minimal c/o LBP    Plan   Continue with established Plan of Care towards established PT goals.     Assess pt's response to visit. Continue at same resistance.     Saturnino Schmitt, PT, DPT  2/13/2020

## 2020-02-20 ENCOUNTER — CLINICAL SUPPORT (OUTPATIENT)
Dept: REHABILITATION | Facility: HOSPITAL | Age: 45
End: 2020-02-20
Attending: FAMILY MEDICINE
Payer: COMMERCIAL

## 2020-02-20 DIAGNOSIS — M62.81 WEAKNESS OF TRUNK MUSCULATURE: ICD-10-CM

## 2020-02-20 DIAGNOSIS — R29.3 POOR POSTURE: ICD-10-CM

## 2020-02-20 PROCEDURE — 97110 THERAPEUTIC EXERCISES: CPT | Mod: PN

## 2020-02-20 NOTE — PROGRESS NOTES
"[facility]      Name: Leia Shultz  Clinic Number: [id number]    Therapy Diagnosis:   Encounter Diagnoses   Name Primary?    Poor posture     Weakness of trunk musculature        Physician: Meliton Gaviria MD    Visit Date: 2020    Physician Orders: eval and treat  Medical Diagnosis: M54.42,M54.41,G89.29 (ICD-10-CM) - Chronic bilateral low back pain with bilateral sciatica  Evaluation Date: 2020  Authorization Period Expiration: 2020  Reassessment Due: 2020  Plan of Care Certification Period: 2020  Visit #/Visits authorized:      Time In: 1702  Time Out: 1753  Total Billable Time: 45 minutes    Precautions: Standard/heart murmur/anemia    Pattern of pain determined: 1PEN    Subjective   Leia reports significant relief of symptoms compared to previous visit. Minimal back pain today.     Patient reports tolerating previous visit well  Patient reports their pain to be 2/10 on a 0-10 scale with 0 being no pain and 10 being the worst pain imaginable.  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, likes to read      Pt goals: Get back to dancing"     Objective          Baseline Isometric Testing on Med X equipment: Testing administered by PT  Date of testin20  ROM 6-48 deg   Max Peak Torque 116   Min Peak Torque 78   Flex/Ext Ratio 1.4:1   % below normative data 24%         Treatment    Pt was instructed in and performed the following:     Leia received therapeutic exercises to develop/improved posture, cardiovascular endurance, muscular endurance, lumbar/cervical ROM, strength and muscular endurance for 51 minutes including the following exercises:     LTR x 10 each  DKTC x20  +dying bugs x10 ea  +quadruped alt legs x8 c/ 5" holds ea  +SALPD c/ alt marches RTB x12 ea  pallof press GTB x20 ea    [facility] " [facility] 2/20/2020   Visit Number 8   VAS Pain Rating 1   Treadmill Time (in min.) 10   Speed 2.6   Lumbar Flexion 48   Lumbar Extension 6   Lumbar Weight 63   Repetitions 15   Rating of Perceived Exertion 3       Peripheral muscle strengthening which included 1 set of 15-20 repetitions at a slow, controlled 10-13 second per rep pace focused on strengthening supporting musculature for improved body mechanics and functional mobility.  Pt and therapist focused on proper form during treatment to ensure optimal strengthening of each targeted muscle group.  Machines were utilized including torso rotation, leg extension, leg curl, chest press, upright row. Tricep extension, bicep curl, leg press, and hip abduction added visit 3      Leia received the following manual therapy techniques: dry needling were applied to the: lumbar paraspinals for 0 minutes, including:    Patient provided written and verbal consent to receive functional dry needling at today's visit (see consent form scanned into chart). Pt cleared of contraindications and verbal and written consent acquired. Pt given option of copy of consent form. Pt educated on benefits and potential side effects of DN.   FDN performed to B lumbar paraspinals (1 on L and 3 on R) using unilateral twisting technique. FDN performed to reduce pain and muscle tension, promote blood flow, and improve ROM and function x 15 minutes. Pt reports familiar pain with two needles on R side (L4-5). Pt was educated on what to expect following the procedure and she verbalized understanding. Dry needling performed by Saturnino Schmitt, PT, DPT.       Home Exercises Provided and Patient Education Provided     Education provided:   Exertion levels and progressions with program  - risks/benefits of dry needling  - use of heat after dry needling session    Written Home Exercises Provided: Patient instructed to cont prior HEP.  Exercises were reviewed and Leia was able to demonstrate them prior  to the end of the session.  Leia demonstrated good  understanding of the education provided.     See EMR under Patient Instructions for exercises provided prior visit.      Assessment     Pt arrived with reduced symptoms compared to previous visit. Progressed with abdominal bracing interventions. Pt reports feeling band exercises more in arms than trunk/core. Performed 15 reps at 63 ft/lbs with 3/10 on RPE scale. No complaints of symptoms with all peripheral strengthening.     Patient is making good progress towards established goals.  Pt will continue to benefit from skilled outpatient physical therapy to address the deficits stated in the impairment chart, provide pt/family education and to maximize pt's level of independence in the home and community environment.     Anticipated Barriers for therapy: none  Pt's spiritual, cultural and educational needs considered and pt agreeable to plan of care and goals as stated below:         GOALS: Pt is in agreement with the following goals.     Short term goals:  6 weeks or 10 visits   1.  Pt will demonstrate increased lumbar ROM by at least 6 degrees from the initial ROM value with improvements noted in functional ROM and ability to perform ADLs. - progressing  2.  Pt will demonstrate increased maximum isometric torque value by 20% when compared to the initial value resulting in improved ability to perform bending, lifting, and carrying activities safely, confidently.  - progressing     3.  Patient report a reduction in worst pain score by 1-2 points for improved tolerance for walking and sitting - progressing  4.  Pt able to perform HEP correctly with minimal cueing or supervision from therapist to encourage independent management of symptoms.  - progressing        Long term goals: 10 weeks or 20 visits   1. Pt will demonstrate increased lumbar ROM by at least 9 degrees from initial ROM value, resulting in improved ability to perform functional fwd bending while  standing and sitting.   2. Pt will demonstrate increased maximum isometric torque value by 40% when compared to the initial value resulting in improved ability to perform bending, lifting, and carrying activities safely, confidently.  3. Pt to demonstrate ability to independently control and reduce their pain through posture positioning and mechanical movements throughout a typical day.  4.  Pt will demonstrate reduced pain and improved functional outcomes as reported on the FOTO by reaching a score of CJ = at least 20% but < 40% impaired, limited or restricted or less in order to demonstrate subjective improvement in pt's condition.    5. Pt will demonstrate independence with the HEP at discharge  6.  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 Pt will be able to unpack boxes at her house( to move in) with minimal c/o LBP    Plan   Continue with established Plan of Care towards established PT goals.     Assess pt's response to visit. Progress as appropriate with reps/resistance.     Saturnino Schmitt, PT, DPT  2/20/2020

## 2020-02-27 ENCOUNTER — CLINICAL SUPPORT (OUTPATIENT)
Dept: REHABILITATION | Facility: HOSPITAL | Age: 45
End: 2020-02-27
Attending: FAMILY MEDICINE
Payer: COMMERCIAL

## 2020-02-27 DIAGNOSIS — M62.81 WEAKNESS OF TRUNK MUSCULATURE: ICD-10-CM

## 2020-02-27 DIAGNOSIS — R29.3 POOR POSTURE: ICD-10-CM

## 2020-02-27 PROCEDURE — 97110 THERAPEUTIC EXERCISES: CPT | Mod: PN

## 2020-02-27 NOTE — PROGRESS NOTES
"[facility]      Name: Leia Shultz  Clinic Number: [id number]    Therapy Diagnosis:   Encounter Diagnoses   Name Primary?    Poor posture     Weakness of trunk musculature        Physician: Meliton Gaviria MD    Visit Date: 2020    Physician Orders: eval and treat  Medical Diagnosis: M54.42,M54.41,G89.29 (ICD-10-CM) - Chronic bilateral low back pain with bilateral sciatica  Evaluation Date: 2020  Authorization Period Expiration: 2020  Reassessment Due: 2020  Plan of Care Certification Period: 2020  Visit #/Visits authorized:      Time In: 1658  Time Out: 1759  Total Billable Time: 55 minutes    Precautions: Standard/heart murmur/anemia    Pattern of pain determined: 1PEN    Subjective   Leia reports significant relief of symptoms. She reports improvement with mopping and bending forward.        Patient reports tolerating previous visit well  Patient reports their pain to be 2/10 on a 0-10 scale with 0 being no pain and 10 being the worst pain imaginable.  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, likes to read      Pt goals: Get back to dancing"     Objective          Baseline Isometric Testing on Med X equipment: Testing administered by PT  Date of testin20  ROM 6-48 deg   Max Peak Torque 116   Min Peak Torque 78   Flex/Ext Ratio 1.4:1   % below normative data 24%         Treatment    Pt was instructed in and performed the following:     Leia received therapeutic exercises to develop/improved posture, cardiovascular endurance, muscular endurance, lumbar/cervical ROM, strength and muscular endurance for 60 minutes including the following exercises:     LTR x2'  DKTC x2'  +cauldrons GTB x20 ea  +reverse crunches c/ YTB 2x30"  +supermans x15 c/ 5" holds    [facility] 2020   Visit " Number 9   VAS Pain Rating 1   Treadmill Time (in min.) 10   Speed 2.6   Lumbar Flexion 48   Lumbar Extension 6   Lumbar Weight 63   Repetitions 12   Rating of Perceived Exertion 5   Ice - Z Lie (in min.) 0       Peripheral muscle strengthening which included 1 set of 15-20 repetitions at a slow, controlled 10-13 second per rep pace focused on strengthening supporting musculature for improved body mechanics and functional mobility.  Pt and therapist focused on proper form during treatment to ensure optimal strengthening of each targeted muscle group.  Machines were utilized including torso rotation, leg extension, leg curl, chest press, upright row. Tricep extension, bicep curl, leg press, and hip abduction added visit 3        Home Exercises Provided and Patient Education Provided     Education provided:   Exertion levels and progressions with program  - risks/benefits of dry needling  - use of heat after dry needling session    Written Home Exercises Provided: Patient instructed to cont prior HEP.  Exercises were reviewed and Leia was able to demonstrate them prior to the end of the session.  Leia demonstrated good  understanding of the education provided.     See EMR under Patient Instructions for exercises provided prior visit.      Assessment     Pt arrived with continued reduced symptoms. Reports of improved functional tolerance. Progressed with abdominal bracing interventions. Pt cont reports feeling band exercise (cauldrons) more in arms than trunk/core. Performed 12 reps at 63 ft/lbs with 5/10 on RPE scale. Consider reducing resistance next visit. No complaints of symptoms with all peripheral strengthening.     Patient is making good progress towards established goals.  Pt will continue to benefit from skilled outpatient physical therapy to address the deficits stated in the impairment chart, provide pt/family education and to maximize pt's level of independence in the home and community environment.      Anticipated Barriers for therapy: none  Pt's spiritual, cultural and educational needs considered and pt agreeable to plan of care and goals as stated below:         GOALS: Pt is in agreement with the following goals.     Short term goals:  6 weeks or 10 visits   1.  Pt will demonstrate increased lumbar ROM by at least 6 degrees from the initial ROM value with improvements noted in functional ROM and ability to perform ADLs. - progressing  2.  Pt will demonstrate increased maximum isometric torque value by 20% when compared to the initial value resulting in improved ability to perform bending, lifting, and carrying activities safely, confidently.  - progressing     3.  Patient report a reduction in worst pain score by 1-2 points for improved tolerance for walking and sitting - progressing  4.  Pt able to perform HEP correctly with minimal cueing or supervision from therapist to encourage independent management of symptoms.  - progressing        Long term goals: 10 weeks or 20 visits   1. Pt will demonstrate increased lumbar ROM by at least 9 degrees from initial ROM value, resulting in improved ability to perform functional fwd bending while standing and sitting.   2. Pt will demonstrate increased maximum isometric torque value by 40% when compared to the initial value resulting in improved ability to perform bending, lifting, and carrying activities safely, confidently.  3. Pt to demonstrate ability to independently control and reduce their pain through posture positioning and mechanical movements throughout a typical day.  4.  Pt will demonstrate reduced pain and improved functional outcomes as reported on the FOTO by reaching a score of CJ = at least 20% but < 40% impaired, limited or restricted or less in order to demonstrate subjective improvement in pt's condition.    5. Pt will demonstrate independence with the HEP at discharge  6.  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  Pt will be able to unpack boxes at her house( to move in) with minimal c/o LBP    Plan   Continue with established Plan of Care towards established PT goals.     Assess pt's response to visit. Progress as appropriate with reps/resistance. Consider reducing resistance next visit.     Saturnino Schmitt, PT, DPT  2/27/2020

## 2020-03-16 ENCOUNTER — CLINICAL SUPPORT (OUTPATIENT)
Dept: REHABILITATION | Facility: HOSPITAL | Age: 45
End: 2020-03-16
Attending: FAMILY MEDICINE
Payer: COMMERCIAL

## 2020-03-16 DIAGNOSIS — M62.81 WEAKNESS OF TRUNK MUSCULATURE: ICD-10-CM

## 2020-03-16 DIAGNOSIS — R29.3 POOR POSTURE: ICD-10-CM

## 2020-03-16 PROCEDURE — 97110 THERAPEUTIC EXERCISES: CPT | Mod: PN

## 2020-03-16 PROCEDURE — 97750 PHYSICAL PERFORMANCE TEST: CPT | Mod: PN

## 2020-03-16 NOTE — PROGRESS NOTES
"[facility]          Name: Leia Shultz  Clinic Number: [id number]    Therapy Diagnosis:   Encounter Diagnoses   Name Primary?    Poor posture     Weakness of trunk musculature        Physician: Meliton Gaviria MD    Visit Date: 3/16/2020    Physician Orders: eval and treat  Medical Diagnosis: M54.42,M54.41,G89.29 (ICD-10-CM) - Chronic bilateral low back pain with bilateral sciatica  Evaluation Date: 2020  Authorization Period Expiration: 2020  Reassessment Due: 2020  Plan of Care Certification Period: 2020  Visit #/Visits authorized:      Time In: 1655  Time Out: 1755  Total Billable Time: 60 minutes    Precautions: Standard/heart murmur/anemia    Pattern of pain determined: 1PEN    Subjective   Leia reports significant relief of symptoms. She reportsthat she has begun getting back to her usual yard work the last few weeks and generally increasing activity; activities have been strenuous but they have not caused any increase in low back pain.        Patient reports tolerating previous visit well  Patient reports their pain to be 0/10 on a 0-10 scale with 0 being no pain and 10 being the worst pain imaginable.  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, likes to read      Pt goals: Get back to dancing"     Objective          Baseline Isometric Testing on Med X equipment: Testing administered by PT  Date of testin20  ROM 6-48 deg   Max Peak Torque 116   Min Peak Torque 78   Flex/Ext Ratio 1.4:1   % below normative data 24%      [facility] Isometric Testing on Med X equipment: Testing administered by PT  Date of testing: 3/16/20  ROM 0-60 deg   Max Peak Torque 193   Min Peak Torque 79   Flex/Ext Ratio 2.4:1   % above normative data 43%        Treatment    Pt was instructed in and performed the following:     Leia received " "therapeutic exercises to develop/improved posture, cardiovascular endurance, muscular endurance, lumbar/cervical ROM, strength and muscular endurance for 60 minutes including the following exercises:       cauldrons GTB x20 ea  reverse crunches c/ YTB 2x30"  supermans x15 c/ 5" holds  Quadruped multifidus lift x20    [facility] 3/16/2020   Visit Number 10   VAS Pain Rating 0   Treadmill Time (in min.) 10   Speed 2.5   Flexion in Lying -   Manual Therapy -   Lumbar Extension Seat Pad 5   Femur Restraint 1   Top Dead Center 24   Counterweight 204   Lumbar Flexion 60   Lumbar Extension 0   Lumbar Peak Torque 193   Min Torque 79   Test Percent Below Normative Data -   Test Percent Gain in Strength from Initial  177   Lumbar Weight -   Repetitions -   Rating of Perceived Exertion -   Ice - Z Lie (in min.) 0   Ice - Prone (in min.) -       Peripheral muscle strengthening which included 1 set of 15-20 repetitions at a slow, controlled 10-13 second per rep pace focused on strengthening supporting musculature for improved body mechanics and functional mobility.  Pt and therapist focused on proper form during treatment to ensure optimal strengthening of each targeted muscle group.  Machines were utilized including torso rotation, leg extension, leg curl, chest press, upright row. Tricep extension, bicep curl, leg press, and hip abduction added visit 3        Home Exercises Provided and Patient Education Provided     Education provided:   Exertion levels and progressions with program      Written Home Exercises Provided: Patient instructed to cont prior HEP.  Exercises were reviewed and Leia was able to demonstrate them prior to the end of the session.  Leia demonstrated good  understanding of the education provided.     Added quadruped multifidus lift and standing D2 flexion with core stabilization and RTB, listed in patient instructions and printed for patient.    See EMR under Patient Instructions for " exercises provided prior visit.      Assessment     Pt arrived with continued reduced symptoms. Reports of improved functional tolerance. Progressed with abdominal bracing interventions. No complaints of symptoms with all peripheral strengthening.     Patient has attended 10 visits at [facility] which included MD evaluation, PT evaluation with isometric testing, and physical therapy treatment including HEP instruction, education, aerobic work, dynamic strengthening on med ex equipment for the spine, and whole body strengthening on med ex equipment with increasing weight loads.  Patient  is demonstrating increased ability to reduce symptoms, improved posture, improved to 0-60  ROM, and improved   strength on med ex test by 177%  Average though continued imbalance due to gaining ROM but continued weakness at new end-range comfortable lumbar extension.    Patient is making good progress towards established goals.  Pt will continue to benefit from skilled outpatient physical therapy to address the deficits stated in the impairment chart, provide pt/family education and to maximize pt's level of independence in the home and community environment.     Anticipated Barriers for therapy: none  Pt's spiritual, cultural and educational needs considered and pt agreeable to plan of care and goals as stated below:         GOALS: Pt is in agreement with the following goals.     Short term goals:  6 weeks or 10 visits   1.  Pt will demonstrate increased lumbar ROM by at least 6 degrees from the initial ROM value with improvements noted in functional ROM and ability to perform ADLs. - MET 3/16/2020  2.  Pt will demonstrate increased maximum isometric torque value by 20% when compared to the initial value resulting in improved ability to perform bending, lifting, and carrying activities safely, confidently.  - MET 3/16/2020     3.  Patient report a reduction in worst pain score by 1-2 points for improved tolerance for  walking and sitting - MET 3/16/2020  4.  Pt able to perform HEP correctly with minimal cueing or supervision from therapist to encourage independent management of symptoms.  - progressing        Long term goals: 10 weeks or 20 visits   1. Pt will demonstrate increased lumbar ROM by at least 9 degrees from initial ROM value, resulting in improved ability to perform functional fwd bending while standing and sitting. MET 3/16/2020  2. Pt will demonstrate increased maximum isometric torque value by 40% when compared to the initial value resulting in improved ability to perform bending, lifting, and carrying activities safely, confidently. MET 3/16/2020  3. Pt to demonstrate ability to independently control and reduce their pain through posture positioning and mechanical movements throughout a typical day.  4.  Pt will demonstrate reduced pain and improved functional outcomes as reported on the FOTO by reaching a score of CJ = at least 20% but < 40% impaired, limited or restricted or less in order to demonstrate subjective improvement in pt's condition.    5. Pt will demonstrate independence with the HEP at discharge  6.  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 Pt will be able to unpack boxes at her house( to move in) with minimal c/o LBP    Plan   Continue with established Plan of Care towards established PT goals.     Assess pt's response to visit. Progress as appropriate with reps/resistance. Consider reducing resistance next visit.     Chapin Silveira, PT, DPT  3/16/2020    Plan   Continue with established Plan of Care towards established PT goals.

## 2020-03-18 ENCOUNTER — PATIENT MESSAGE (OUTPATIENT)
Dept: PSYCHIATRY | Facility: CLINIC | Age: 45
End: 2020-03-18

## 2020-03-25 ENCOUNTER — OFFICE VISIT (OUTPATIENT)
Dept: PSYCHIATRY | Facility: CLINIC | Age: 45
End: 2020-03-25
Payer: COMMERCIAL

## 2020-03-25 DIAGNOSIS — F43.23 ADJUSTMENT DISORDER WITH MIXED ANXIETY AND DEPRESSED MOOD: Primary | ICD-10-CM

## 2020-03-25 PROCEDURE — 90834 PR PSYCHOTHERAPY W/PATIENT, 45 MIN: ICD-10-PCS | Mod: 95,,, | Performed by: SOCIAL WORKER

## 2020-03-25 PROCEDURE — 90834 PSYTX W PT 45 MINUTES: CPT | Mod: 95,,, | Performed by: SOCIAL WORKER

## 2020-03-25 NOTE — PROGRESS NOTES
"The patient location is: [facility]  The chief complaint leading to consultation is: depression, anxiety  Visit type: Virtual visit with synchronous audio and video  Total time spent with patient: 45 minutes  Each patient to whom he or she provides medical services by telemedicine is:  (1) informed of the relationship between the physician and patient and the respective role of any other health care provider with respect to management of the patient; and (2) notified that he or she may decline to receive medical services by telemedicine and may withdraw from such care at any time.    Notes: Patient returned for psychotherapy. Patient states that she feels like "a lot" is going on. Reports that she has been working from home since the 13th. Feels more busy working from home because they have put more work on her. Had a friend that committed suicide the other day. It was very shocking to her because she saw him not that long before and everything seemed okay. Reports that during [place] break decided to foster a dog and her 12 puppies. The puppies have been sent to other foster homes and she still has the mother. Reports that it is nice having a dog back in the house. Other day she went out with boyfriend, had two drinks, and got tipsy. States that she let him "have it" about all the things that she had been holding on too. States that she broke up with him and doesn't remember. They spoke the next day for a long time about all the expectations they have of each other. States that they both realized that they have expectations of a relationship that isn't what they are currently doing. States that things have been better but both are not overly sad about the possibility of this not being "long term" like they previously thought. Discussed issues with friendship and not having many friends in the city. Discussed work and the increased stress.    Encounter Diagnosis   Name Primary?    Adjustment disorder with " mixed anxiety and depressed mood Yes

## 2020-04-02 ENCOUNTER — OFFICE VISIT (OUTPATIENT)
Dept: PSYCHIATRY | Facility: CLINIC | Age: 45
End: 2020-04-02
Payer: COMMERCIAL

## 2020-04-02 DIAGNOSIS — F43.23 ADJUSTMENT DISORDER WITH MIXED ANXIETY AND DEPRESSED MOOD: Primary | ICD-10-CM

## 2020-04-02 PROCEDURE — 90834 PR PSYCHOTHERAPY W/PATIENT, 45 MIN: ICD-10-PCS | Mod: 95,,, | Performed by: SOCIAL WORKER

## 2020-04-02 PROCEDURE — 90834 PSYTX W PT 45 MINUTES: CPT | Mod: 95,,, | Performed by: SOCIAL WORKER

## 2020-04-02 NOTE — PROGRESS NOTES
The patient location is: [facility]  The chief complaint leading to consultation is: depression, anxiety  Visit type: Virtual visit with synchronous audio and video  Total time spent with patient: 45 minutes  Each patient to whom he or she provides medical services by telemedicine is:  (1) informed of the relationship between the physician and patient and the respective role of any other health care provider with respect to management of the patient; and (2) notified that he or she may decline to receive medical services by telemedicine and may withdraw from such care at any time.    Notes: Patient returned for psychotherapy. Patient states that she is doing well. States that she has been working a lot. 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 States that this will continue to put extra pressure on patient with getting information to people. 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 States that she is also struggling with feeling burnout. States that she will work into the night at times. Discussed ways to reduce burnout. Discuss speaking up for staff that gets unnoticed, including herself. Discussed that she is thinking about adopting dog. Is worried about what boyfriend will say as he is allergic. Discussed that she has thought of compromises and believes that he will be okay with it.     Encounter Diagnosis   Name Primary?    Adjustment disorder with mixed anxiety and depressed mood Yes

## 2020-04-13 ENCOUNTER — TELEPHONE (OUTPATIENT)
Dept: REHABILITATION | Facility: HOSPITAL | Age: 45
End: 2020-04-13

## 2020-04-13 NOTE — TELEPHONE ENCOUNTER
Leia was called in regards to [facility] now offering telemedicine virtual visits through the [facility].     Left voice message to see if pt has interest in doing a virtual visit.     Neelam Funes PT

## 2020-04-23 ENCOUNTER — OFFICE VISIT (OUTPATIENT)
Dept: PSYCHIATRY | Facility: CLINIC | Age: 45
End: 2020-04-23
Payer: COMMERCIAL

## 2020-04-23 DIAGNOSIS — F43.23 ADJUSTMENT DISORDER WITH MIXED ANXIETY AND DEPRESSED MOOD: Primary | ICD-10-CM

## 2020-04-23 PROCEDURE — 90834 PR PSYCHOTHERAPY W/PATIENT, 45 MIN: ICD-10-PCS | Mod: 95,,, | Performed by: SOCIAL WORKER

## 2020-04-23 PROCEDURE — 90834 PSYTX W PT 45 MINUTES: CPT | Mod: 95,,, | Performed by: SOCIAL WORKER

## 2020-04-23 NOTE — PROGRESS NOTES
"The patient location is: [facility]  The chief complaint leading to consultation is: depression, anxiety  Visit type: Virtual visit with synchronous audio and video  Total time spent with patient: 45 minutes  Each patient to whom he or she provides medical services by telemedicine is:  (1) informed of the relationship between the physician and patient and the respective role of any other health care provider with respect to management of the patient; and (2) notified that he or she may decline to receive medical services by telemedicine and may withdraw from such care at any time.    Notes: Patient returned for psychotherapy. Patient states she is "stressed". Reports that she has been doing a lot of work, most days working 12 hours. Some of the work that she is doing doesn't fall under her normal tasks but has been asked to help out. Also frustrated that she is given tasks but when giving suggestions for those problems told that those are not viable options. One of the biggest struggles is finding time for herself. States that by the time she is done with work the last thing that she wants to do is facetime or skype with anyone. Feels burnout with work. Reports an incident with her boss that was very upsetting and almost made her want to quit. Discussed that boyfriend and her are doing well. She did have conversation with boyfriend about adopting the dog. Boyfriend expressed concerns about her schedule and being able to take care of the dog. Did take some time over to work on things around the house for the dog. Expressed frustration with house and increased problems with house.     Encounter Diagnosis   Name Primary?    Adjustment disorder with mixed anxiety and depressed mood Yes         "

## 2020-04-29 ENCOUNTER — DOCUMENTATION ONLY (OUTPATIENT)
Dept: REHABILITATION | Facility: HOSPITAL | Age: 45
End: 2020-04-29

## 2020-04-29 NOTE — PROGRESS NOTES
Spoke with Pt via telephone with regards to coming into the clinic to be reassessed and start with [facility]. Pt verbalized that she is ready to start back with PT visits. Pt notified that we are taking all necessary precautions with regards to COVID19.    Marcial Walker, PTA

## 2020-05-06 ENCOUNTER — PATIENT MESSAGE (OUTPATIENT)
Dept: REHABILITATION | Facility: HOSPITAL | Age: 45
End: 2020-05-06

## 2020-05-15 ENCOUNTER — OFFICE VISIT (OUTPATIENT)
Dept: PSYCHIATRY | Facility: CLINIC | Age: 45
End: 2020-05-15
Payer: COMMERCIAL

## 2020-05-15 DIAGNOSIS — F43.23 ADJUSTMENT DISORDER WITH MIXED ANXIETY AND DEPRESSED MOOD: Primary | ICD-10-CM

## 2020-05-15 PROCEDURE — 90834 PR PSYCHOTHERAPY W/PATIENT, 45 MIN: ICD-10-PCS | Mod: 95,,, | Performed by: SOCIAL WORKER

## 2020-05-15 PROCEDURE — 90834 PSYTX W PT 45 MINUTES: CPT | Mod: 95,,, | Performed by: SOCIAL WORKER

## 2020-05-15 NOTE — PROGRESS NOTES
"The patient location is: [facility]  The chief complaint leading to consultation is: depression, anxiety  Visit type: Virtual visit with synchronous audio and video  Total time spent with patient: 45 minutes  Each patient to whom he or she provides medical services by telemedicine is:  (1) informed of the relationship between the physician and patient and the respective role of any other health care provider with respect to management of the patient; and (2) notified that he or she may decline to receive medical services by telemedicine and may withdraw from such care at any time.    Notes: Patient returned for psychotherapy. Patient states she is "okay". Continues to work and at times feels overwhelmed. Got a cryptic email about a meeting. [place] like at the meeting that she and another coworker were in the "hot seat" and asked to explain their jobs and work. Later found out that it is possibly part of their restructuring process. Wishes that they would have mentioned that prior to the meeting and she would have avoided 4 plus days of anxiety. Reports that she isn't sleeping well. Has been up in the middle of the night and unable to return back to sleep. Discussed ways of helping her get back to sleep when she wakes up in the middle of the night. Continues to struggle with her dog. Went to the vet and they think that surgery will be more extensive and expensive. Patient continues to struggle with  from work. States that she finds herself continuing to do 12 hour days regularly. 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 Discussed learning to separate anxious thoughts from reality based thoughts.     Encounter Diagnosis   Name Primary?    Adjustment disorder with mixed anxiety and depressed mood Yes         "

## 2020-06-22 ENCOUNTER — OFFICE VISIT (OUTPATIENT)
Dept: PSYCHIATRY | Facility: CLINIC | Age: 45
End: 2020-06-22
Payer: COMMERCIAL

## 2020-06-22 DIAGNOSIS — F41.1 GENERALIZED ANXIETY DISORDER: Primary | ICD-10-CM

## 2020-06-22 PROCEDURE — 90834 PSYTX W PT 45 MINUTES: CPT | Mod: S$GLB,,, | Performed by: SOCIAL WORKER

## 2020-06-22 PROCEDURE — 90834 PR PSYCHOTHERAPY W/PATIENT, 45 MIN: ICD-10-PCS | Mod: S$GLB,,, | Performed by: SOCIAL WORKER

## 2020-06-22 NOTE — PROGRESS NOTES
"  Individual Psychotherapy (PhD/LCSW)    6/22/2020    Site:  [facility]         Therapeutic Intervention: Met with patient.  Outpatient - Insight oriented psychotherapy 45 min - CPT code 79916 and Outpatient - Supportive psychotherapy 45 min - CPT Code 43236    Chief complaint/reason for encounter: depression and anxiety     Interval history and content of current session: Patient returned to clinic for follow up psychotherapy. Patient reports that she is "overall okay". States that the last couple of days she has been sleeping better. States that previously she was only getting 2-3 hours a night. Would attempt to take Zzzquil but it would just make her feel groggy. Reports that she decided to not apply for other job. 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 Reports that she is having more anxiety. States that she feels like it is hanging over her all the time. Discussed an incident with a coworker. Was going on vacation and night before he emailed her about a parent having blocked the school email. States that she tried to explain to him that the parent has to unblock the school and that there is no way for her to do anything. This coworker was insistent and the next day emailed several other people about the problem. Reports that she feels like this is just another thing that she "cannot do right". States that she also has anxiety about work because she hasn't gotten a contract renewal. Doesn't want to ask supervisor because she is afraid that she will be told that she doesn't have a job. Discussed not allowing anxiety to control her behaviors. Discussed that other skills that she can use to manage her anxiety when it is elevated.     Treatment plan:  · Target symptoms: depression, anxiety   · Why chosen therapy is appropriate versus another modality: relevant to diagnosis, evidence based " practice  · Outcome monitoring methods: self-report, observation  · Therapeutic intervention type: insight oriented psychotherapy, supportive psychotherapy    Risk parameters:  Patient reports no suicidal ideation  Patient reports no homicidal ideation  Patient reports no self-injurious behavior  Patient reports no violent behavior    Verbal deficits: None    Patient's response to intervention:  The patient's response to intervention is accepting.    Progress toward goals and other mental status changes:  The patient's progress toward goals is fair .    Diagnosis:     ICD-10-CM ICD-9-CM   1. Generalized anxiety disorder  F41.1 300.02       Plan:  individual psychotherapy    Return to clinic: 2 weeks, 1 month    Length of Service (minutes): 45     Cassandra Rockweiler, LCSW-[facility]

## 2020-07-07 ENCOUNTER — DOCUMENTATION ONLY (OUTPATIENT)
Dept: REHABILITATION | Facility: HOSPITAL | Age: 45
End: 2020-07-07

## 2020-07-07 NOTE — PROGRESS NOTES
Patient attended ten visits of physical therapy in the healthy back program and then was required to take a break from therapy due to clinic restrictions created by COVID-19 pandemic. Patient then elected to not resume therapy and as such has been discharged, however was doing well with program and HEP at time of therapy discontinuation.    Chapin Silveira, PT, DPT

## 2020-07-24 ENCOUNTER — OFFICE VISIT (OUTPATIENT)
Dept: PSYCHIATRY | Facility: CLINIC | Age: 45
End: 2020-07-24
Payer: COMMERCIAL

## 2020-07-24 DIAGNOSIS — F41.1 GENERALIZED ANXIETY DISORDER: Primary | ICD-10-CM

## 2020-07-24 PROCEDURE — 90834 PSYTX W PT 45 MINUTES: CPT | Mod: S$GLB,,, | Performed by: SOCIAL WORKER

## 2020-07-24 PROCEDURE — 90834 PR PSYCHOTHERAPY W/PATIENT, 45 MIN: ICD-10-PCS | Mod: S$GLB,,, | Performed by: SOCIAL WORKER

## 2020-07-24 NOTE — PROGRESS NOTES
"  Individual Psychotherapy (PhD/LCSW)    7/24/2020    Site:  [facility]         Therapeutic Intervention: Met with patient.  Outpatient - Insight oriented psychotherapy 45 min - CPT code 99801 and Outpatient - Supportive psychotherapy 45 min - CPT Code 97175    Chief complaint/reason for encounter: depression and anxiety     Interval history and content of current session: Patient returned to clinic for follow up psychotherapy. Patient reports that she is "tired". Reports that she continues to have increased stress about work. States that there has been a lot of back and forth about school starting. States that the school decided most recently that they are going to be virtual for at least the first two weeks. States that she has been dealing with a lot of logistics for this plan. States that she is back in the office. Being back has relieved some anxiety because she can see how stressed other people are and is more aware of why they may seem short. Being back at work also has been more helpful for her boundaries. States that she has cut back on her hours because she is not working at home. States that they might return to a rotating schedule which she hopes that she will be able to stay in the office. Discussed that she is starting to feel burnt out. States that she is frustrated with work and time off. 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 States that she wasn't allowed to take time off during the summer due to COVID. Discussed the effects of stress on the body and the importance of self care.     Treatment plan:  · Target symptoms: depression, anxiety   · Why chosen therapy is appropriate versus another modality: relevant to diagnosis, evidence based practice  · Outcome monitoring methods: self-report, observation  · Therapeutic intervention type: insight oriented psychotherapy, supportive psychotherapy    Risk parameters:  Patient " reports no suicidal ideation  Patient reports no homicidal ideation  Patient reports no self-injurious behavior  Patient reports no violent behavior    Verbal deficits: None    Patient's response to intervention:  The patient's response to intervention is accepting.    Progress toward goals and other mental status changes:  The patient's progress toward goals is fair .    Diagnosis:     ICD-10-CM ICD-9-CM   1. Generalized anxiety disorder  F41.1 300.02       Plan:  individual psychotherapy    Return to clinic: 2 weeks, 1 month    Length of Service (minutes): 45     Cassandra Rockweiler, LCSW-[facility]

## 2020-08-14 DIAGNOSIS — Z11.59 NEED FOR HEPATITIS C SCREENING TEST: ICD-10-CM

## 2020-09-28 ENCOUNTER — OFFICE VISIT (OUTPATIENT)
Dept: FAMILY MEDICINE | Facility: CLINIC | Age: 45
End: 2020-09-28
Payer: COMMERCIAL

## 2020-09-28 VITALS
WEIGHT: 180.13 LBS | DIASTOLIC BLOOD PRESSURE: 66 MMHG | BODY MASS INDEX: 31.92 KG/M2 | HEIGHT: 63 IN | RESPIRATION RATE: 17 BRPM | HEART RATE: 76 BPM | OXYGEN SATURATION: 97 % | TEMPERATURE: 98 F | SYSTOLIC BLOOD PRESSURE: 108 MMHG

## 2020-09-28 DIAGNOSIS — T78.40XA ALLERGIC REACTION, INITIAL ENCOUNTER: Primary | ICD-10-CM

## 2020-09-28 PROCEDURE — 99999 PR PBB SHADOW E&M-EST. PATIENT-LVL III: ICD-10-PCS | Mod: PBBFAC,,, | Performed by: FAMILY MEDICINE

## 2020-09-28 PROCEDURE — 99214 OFFICE O/P EST MOD 30 MIN: CPT | Mod: S$GLB,,, | Performed by: FAMILY MEDICINE

## 2020-09-28 PROCEDURE — 99214 PR OFFICE/OUTPT VISIT, EST, LEVL IV, 30-39 MIN: ICD-10-PCS | Mod: S$GLB,,, | Performed by: FAMILY MEDICINE

## 2020-09-28 PROCEDURE — 3008F PR BODY MASS INDEX (BMI) DOCUMENTED: ICD-10-PCS | Mod: CPTII,S$GLB,, | Performed by: FAMILY MEDICINE

## 2020-09-28 PROCEDURE — 99999 PR PBB SHADOW E&M-EST. PATIENT-LVL III: CPT | Mod: PBBFAC,,, | Performed by: FAMILY MEDICINE

## 2020-09-28 PROCEDURE — 3008F BODY MASS INDEX DOCD: CPT | Mod: CPTII,S$GLB,, | Performed by: FAMILY MEDICINE

## 2020-09-28 RX ORDER — EPINEPHRINE 0.3 MG/.3ML
1 INJECTION SUBCUTANEOUS ONCE
Qty: 2 EACH | Refills: 0 | Status: SHIPPED | OUTPATIENT
Start: 2020-09-28 | End: 2023-02-06 | Stop reason: SDUPTHER

## 2020-09-28 RX ORDER — PREDNISONE 20 MG/1
60 TABLET ORAL DAILY
Qty: 15 TABLET | Refills: 0 | Status: SHIPPED | OUTPATIENT
Start: 2020-09-28 | End: 2020-11-23 | Stop reason: ALTCHOICE

## 2020-09-28 NOTE — PROGRESS NOTES
"Routine Office Visit    Patient Name: Leia Shultz    : 1975  MRN: [id number]    Subjective:  Leia is a 45 y.o. female who presents today for:    1. Allergic reaction  Patient presenting today after having an allergic reaction to fire ants while doing yard work Saturday.  She states that she didn't realize she had gotten bitten by so many at the time.  All of the bites resulted in her getting hives, feeling dizzy and nauseous, and having a weird sensation in her ears.  She never got short of breath or wheezy, but states it did "feel weird" in her neck.  She states she took benadryl and symptoms improved.  She does continue to have several pustules on her arms and legs.  There is no redness nor any hives remaining.      Past Medical History  Past Medical History:   Diagnosis Date    Anemia     Arthritis     Heart murmur        Past Surgical History  Past Surgical History:   Procedure Laterality Date    DILATION AND CURETTAGE OF UTERUS      gastric sleeve      LAPAROSCOPIC GASTRIC BANDING      ROOT CANAL      2    WISDOM TOOTH EXTRACTION      all 4       Family History  Family History   Problem Relation Age of Onset    Clotting disorder Mother     Hypotension Mother     Skin cancer Father     Crohn's disease Sister     Cancer Brother     No Known Problems Maternal Grandmother     No Known Problems Maternal Grandfather     No Known Problems Paternal Grandmother     No Known Problems Paternal Grandfather        Social History  Social History     Socioeconomic History    Marital status:      Spouse name: Not on file    Number of children: Not on file    Years of education: Not on file    Highest education level: Not on file   Occupational History    Not on file   Social Needs    Financial resource strain: Not very hard    Food insecurity     Worry: Never true     Inability: Never true    Transportation needs     Medical: No     Non-medical: No   Tobacco Use    Smoking " status: Never Smoker    Smokeless tobacco: Never Used   Substance and Sexual Activity    Alcohol use: Yes     Frequency: 2-4 times a month     Drinks per session: 1 or 2     Binge frequency: Never     Comment: once a week    Drug use: No    Sexual activity: Yes     Partners: Male     Birth control/protection: None     Comment: 11/21/17 -vasectomy   Lifestyle    Physical activity     Days per week: 3 days     Minutes per session: 40 min    Stress: Very much   Relationships    Social connections     Talks on phone: More than three times a week     Gets together: Once a week     Attends Alevism service: Not on file     Active member of club or organization: No     Attends meetings of clubs or organizations: Never     Relationship status:    Other Topics Concern    Not on file   Social History Narrative    Not on file       Current Medications  Current Outpatient Medications on File Prior to Visit   Medication Sig Dispense Refill    ergocalciferol, vitamin D2, (VITAMIN D ORAL) Take by mouth once daily.      INV folate 800 MCG tablet Take 800 mcg by mouth 2 (two) times daily. FOR INVESTIGATIONAL USE ONLY      MULTIVITAMIN ORAL Take by mouth once daily.      mupirocin (BACTROBAN) 2 % ointment Apply topically 3 (three) times daily. (Patient not taking: Reported on 11/26/2019) 30 g 1    [DISCONTINUED] FLUVIRIN [id number], PF, 45 mcg (15 mcg x 3)/0.5 mL Syrg ADM 0.5ML IM UTD  0    [DISCONTINUED] OMEPRAZOLE ORAL Take by mouth as needed.       No current facility-administered medications on file prior to visit.        Allergies   Review of patient's allergies indicates:   Allergen Reactions    Fire ant Anxiety, Blisters, Dermatitis, Hives, Itching, Rash and Swelling    Pollen extracts Dermatitis, Hives and Rash       Review of Systems (Pertinent positives)  Review of Systems   Constitutional: Negative for fever.   HENT: Negative for congestion and sore throat.    Eyes: Positive for pain.  "  Respiratory: Negative for cough and shortness of breath.    Gastrointestinal: Negative for diarrhea and vomiting.   Musculoskeletal: Positive for joint pain.   Skin: Positive for rash.         /66 (BP Location: Left arm, Patient Position: Sitting, BP Method: Medium (Manual))   Pulse 76   Temp 98.4 °F (36.9 °C) (Oral)   Resp 17   Ht 5' 2.99" (1.6 m)   Wt 81.7 kg (180 lb 1.9 oz)   SpO2 97%   BMI 31.91 kg/m²     GENERAL APPEARANCE: in no apparent distress and well developed and well nourished  HEENT: PERRL, EOMI, Sclera clear, anicteric, Oropharynx clear, no lesions, Neck supple with midline trachea  NECK: normal, supple, no adenopathy, thyroid normal in size  RESPIRATORY: appears well, vitals normal, no respiratory distress, acyanotic, normal RR, chest clear, no wheezing, crepitations, rhonchi, normal symmetric air entry  HEART: regular rate and rhythm, S1, S2 normal, no murmur, click, rub or gallop.    ABDOMEN: abdomen is soft without tenderness, no masses, no hernias, no organomegaly, no rebound, no guarding. Suprapubic tenderness absent. No CVA tenderness.  SKIN: numerous pustules noted to bilateral arms and legs without surrounding erythema nor any streaking  PSYCH: Alert, oriented x 3, thought content appropriate, speech normal, pleasant and cooperative, good eye contact, well groomed    Assessment/Plan:  Leia Shultz is a 45 y.o. female who presents today for :    Leia was seen today for insect bite.    Diagnoses and all orders for this visit:    Allergic reaction, initial encounter  -     EPINEPHrine (EPIPEN 2-KEVIN) 0.3 mg/0.3 mL AtIn; Inject 0.3 mLs (0.3 mg total) into the muscle once. for 1 dose  -     predniSONE (DELTASONE) 20 MG tablet; Take 3 tablets (60 mg total) by mouth once daily. for 5 days      1.  Given her reaction to ant bites increases with each event, will give epi pens in the event the next event leads to anaphylaxis  2.  Prednisone for any future reactions that are " mild-moderate  3.  Follow up in November as discussed  4.  Take all medications as prescribed      Meliton Gaviria MD

## 2020-10-21 ENCOUNTER — TELEPHONE (OUTPATIENT)
Dept: OBSTETRICS AND GYNECOLOGY | Facility: CLINIC | Age: 45
End: 2020-10-21

## 2020-10-21 NOTE — TELEPHONE ENCOUNTER
Left message for pt to call and reschedule her 11/23/2020 appt, as Dr Chacon will be out of the office.

## 2020-11-23 ENCOUNTER — LAB VISIT (OUTPATIENT)
Dept: LAB | Facility: HOSPITAL | Age: 45
End: 2020-11-23
Attending: FAMILY MEDICINE
Payer: COMMERCIAL

## 2020-11-23 ENCOUNTER — OFFICE VISIT (OUTPATIENT)
Dept: FAMILY MEDICINE | Facility: CLINIC | Age: 45
End: 2020-11-23
Payer: COMMERCIAL

## 2020-11-23 VITALS
SYSTOLIC BLOOD PRESSURE: 118 MMHG | WEIGHT: 179.88 LBS | HEART RATE: 75 BPM | BODY MASS INDEX: 31.87 KG/M2 | RESPIRATION RATE: 20 BRPM | OXYGEN SATURATION: 98 % | HEIGHT: 63 IN | DIASTOLIC BLOOD PRESSURE: 64 MMHG

## 2020-11-23 DIAGNOSIS — Z11.59 NEED FOR HEPATITIS C SCREENING TEST: ICD-10-CM

## 2020-11-23 DIAGNOSIS — Z00.00 ANNUAL PHYSICAL EXAM: ICD-10-CM

## 2020-11-23 DIAGNOSIS — Z00.00 ANNUAL PHYSICAL EXAM: Primary | ICD-10-CM

## 2020-11-23 DIAGNOSIS — F51.01 PRIMARY INSOMNIA: ICD-10-CM

## 2020-11-23 LAB
ALBUMIN SERPL BCP-MCNC: 3.6 G/DL (ref 3.5–5.2)
ALP SERPL-CCNC: 58 U/L (ref 55–135)
ALT SERPL W/O P-5'-P-CCNC: 11 U/L (ref 10–44)
ANION GAP SERPL CALC-SCNC: 3 MMOL/L (ref 8–16)
AST SERPL-CCNC: 16 U/L (ref 10–40)
BASOPHILS # BLD AUTO: 0.07 K/UL (ref 0–0.2)
BASOPHILS NFR BLD: 1.3 % (ref 0–1.9)
BILIRUB SERPL-MCNC: 0.6 MG/DL (ref 0.1–1)
BUN SERPL-MCNC: 12 MG/DL (ref 6–20)
CALCIUM SERPL-MCNC: 8.7 MG/DL (ref 8.7–10.5)
CHLORIDE SERPL-SCNC: 109 MMOL/L (ref 95–110)
CHOLEST SERPL-MCNC: 165 MG/DL (ref 120–199)
CHOLEST/HDLC SERPL: 2.5 {RATIO} (ref 2–5)
CO2 SERPL-SCNC: 26 MMOL/L (ref 23–29)
CREAT SERPL-MCNC: 0.7 MG/DL (ref 0.5–1.4)
DIFFERENTIAL METHOD: ABNORMAL
EOSINOPHIL # BLD AUTO: 0.3 K/UL (ref 0–0.5)
EOSINOPHIL NFR BLD: 5.4 % (ref 0–8)
ERYTHROCYTE [DISTWIDTH] IN BLOOD BY AUTOMATED COUNT: 11.8 % (ref 11.5–14.5)
EST. GFR  (AFRICAN AMERICAN): >60 ML/MIN/1.73 M^2
EST. GFR  (NON AFRICAN AMERICAN): >60 ML/MIN/1.73 M^2
GLUCOSE SERPL-MCNC: 88 MG/DL (ref 70–110)
HCT VFR BLD AUTO: 38.4 % (ref 37–48.5)
HDLC SERPL-MCNC: 65 MG/DL (ref 40–75)
HDLC SERPL: 39.4 % (ref 20–50)
HGB BLD-MCNC: 12.3 G/DL (ref 12–16)
IMM GRANULOCYTES # BLD AUTO: 0.03 K/UL (ref 0–0.04)
IMM GRANULOCYTES NFR BLD AUTO: 0.6 % (ref 0–0.5)
LDLC SERPL CALC-MCNC: 90.4 MG/DL (ref 63–159)
LYMPHOCYTES # BLD AUTO: 2.1 K/UL (ref 1–4.8)
LYMPHOCYTES NFR BLD: 39.1 % (ref 18–48)
MCH RBC QN AUTO: 30.7 PG (ref 27–31)
MCHC RBC AUTO-ENTMCNC: 32 G/DL (ref 32–36)
MCV RBC AUTO: 96 FL (ref 82–98)
MONOCYTES # BLD AUTO: 0.3 K/UL (ref 0.3–1)
MONOCYTES NFR BLD: 6.3 % (ref 4–15)
NEUTROPHILS # BLD AUTO: 2.5 K/UL (ref 1.8–7.7)
NEUTROPHILS NFR BLD: 47.3 % (ref 38–73)
NONHDLC SERPL-MCNC: 100 MG/DL
NRBC BLD-RTO: 0 /100 WBC
PLATELET # BLD AUTO: 314 K/UL (ref 150–350)
PMV BLD AUTO: 8.7 FL (ref 9.2–12.9)
POTASSIUM SERPL-SCNC: 4.5 MMOL/L (ref 3.5–5.1)
PROT SERPL-MCNC: 6.5 G/DL (ref 6–8.4)
RBC # BLD AUTO: 4.01 M/UL (ref 4–5.4)
SODIUM SERPL-SCNC: 138 MMOL/L (ref 136–145)
TRIGL SERPL-MCNC: 48 MG/DL (ref 30–150)
TSH SERPL DL<=0.005 MIU/L-ACNC: 1.11 UIU/ML (ref 0.4–4)
WBC # BLD AUTO: 5.37 K/UL (ref 3.9–12.7)

## 2020-11-23 PROCEDURE — 3008F PR BODY MASS INDEX (BMI) DOCUMENTED: ICD-10-PCS | Mod: CPTII,S$GLB,, | Performed by: FAMILY MEDICINE

## 2020-11-23 PROCEDURE — 1126F PR PAIN SEVERITY QUANTIFIED, NO PAIN PRESENT: ICD-10-PCS | Mod: S$GLB,,, | Performed by: FAMILY MEDICINE

## 2020-11-23 PROCEDURE — 36415 COLL VENOUS BLD VENIPUNCTURE: CPT | Mod: PN

## 2020-11-23 PROCEDURE — 99396 PR PREVENTIVE VISIT,EST,40-64: ICD-10-PCS | Mod: S$GLB,,, | Performed by: FAMILY MEDICINE

## 2020-11-23 PROCEDURE — 99396 PREV VISIT EST AGE 40-64: CPT | Mod: S$GLB,,, | Performed by: FAMILY MEDICINE

## 2020-11-23 PROCEDURE — 80061 LIPID PANEL: CPT

## 2020-11-23 PROCEDURE — 99999 PR PBB SHADOW E&M-EST. PATIENT-LVL III: ICD-10-PCS | Mod: PBBFAC,,, | Performed by: FAMILY MEDICINE

## 2020-11-23 PROCEDURE — 80053 COMPREHEN METABOLIC PANEL: CPT

## 2020-11-23 PROCEDURE — 84443 ASSAY THYROID STIM HORMONE: CPT

## 2020-11-23 PROCEDURE — 85025 COMPLETE CBC W/AUTO DIFF WBC: CPT

## 2020-11-23 PROCEDURE — 3008F BODY MASS INDEX DOCD: CPT | Mod: CPTII,S$GLB,, | Performed by: FAMILY MEDICINE

## 2020-11-23 PROCEDURE — 83036 HEMOGLOBIN GLYCOSYLATED A1C: CPT

## 2020-11-23 PROCEDURE — 99999 PR PBB SHADOW E&M-EST. PATIENT-LVL III: CPT | Mod: PBBFAC,,, | Performed by: FAMILY MEDICINE

## 2020-11-23 PROCEDURE — 86803 HEPATITIS C AB TEST: CPT

## 2020-11-23 PROCEDURE — 1126F AMNT PAIN NOTED NONE PRSNT: CPT | Mod: S$GLB,,, | Performed by: FAMILY MEDICINE

## 2020-11-23 RX ORDER — TRAZODONE HYDROCHLORIDE 50 MG/1
50 TABLET ORAL NIGHTLY
Qty: 30 TABLET | Refills: 0 | Status: SHIPPED | OUTPATIENT
Start: 2020-11-23 | End: 2020-12-08

## 2020-11-23 NOTE — PROGRESS NOTES
"Well Woman VISIT      CHIEF COMPLAINT  Chief Complaint   Patient presents with    Insomnia    Annual Exam       HPI  Leia Shultz is a 45 y.o. female who presents for physical.     Social Factors  Tobacco use: No  Ready to Quit: No  Alcohol: Yes on occasion  Intimate partner violence screening  "Do you feel safe in your current relationship?" single  "Have you ever been in a relationship in which your partner frightened you or hurt you?" No  Living Will/POA: No  Regular Exercise: Yes     Depression  Over the past two weeks, have you felt down, depressed, or hopeless? No  Over the past two weeks, have you felt little interest or pleasure in doing things? No    Reproductive Health  Followed by Dr. Cl Chacon    CHD, HTN, DM2  CHD Risk Factors: none  Women 45 years and older should be screened for dyslipidemia if at increased risk of CHD  Women 20 to 45 years of age should be screened for dyslipidemia if at increased risk of CHD  Asymptomatic adults with sustained blood pressure greater than 135/80 mm Hg (treated or untreated) should be screened for type 2 diabetes mellitus    Estimated body mass index is 31.87 kg/m² as calculated from the following:    Height as of this encounter: 5' 3" (1.6 m).    Weight as of this encounter: 81.6 kg (179 lb 14.3 oz).      Screening  Mammogram needed: utd  Colonoscopy needed: n/a  Osteoporosis screen needed: n/a     Women 50 to 74 years of age should be screened for breast cancer with mammography biennially.  Women should be screened for cervical cancer with Pap tests beginning at 21 years of age. Low-risk women should receive Pap testing every three years. Co-testing for human papillomavirus is an option beginning at 30 years of age, and can extend the screening interval to five years. Cervical cancer screening should be discontinued at 65 years of age or after total hysterectomy if the woman has a benign gynecologic history  Adults 50 to 75 years of age should be screened " "for colorectal cancer with an FOBT annually, sigmoidoscopy every five years with an FOBT every three years, or colonoscopy every 10 years.  Women 65 years and older should be screened for osteoporosis. Women younger than 65 years should be screened if the risk of fracture is greater than or equal to that of a 65-year-old white woman without additional risk factors.      Immunizations  delayed    ALLERGIES and MEDS were verified.   PMHx, PSHx, FHx, SOCIALHx were updated as pertinent.    REVIEW OF SYSTEMS  Review of Systems   Constitutional: Negative.    HENT: Negative for hearing loss.    Eyes: Negative for discharge.   Respiratory: Negative for wheezing.    Cardiovascular: Negative for chest pain and palpitations.   Gastrointestinal: Negative for blood in stool, constipation, diarrhea and vomiting.   Genitourinary: Negative for dysuria and hematuria.   Musculoskeletal: Negative for neck pain.   Skin: Negative.    Neurological: Negative for weakness and headaches.   Endo/Heme/Allergies: Negative for polydipsia.         PHYSICAL EXAM  VITAL SIGNS: /64 (BP Location: Right arm, Patient Position: Sitting, BP Method: Large (Manual))   Pulse 75   Resp 20   Ht 5' 3" (1.6 m)   Wt 81.6 kg (179 lb 14.3 oz)   LMP 11/18/2020 (Exact Date)   SpO2 98%   BMI 31.87 kg/m²   GEN: Well developed, Well nourished, No acute distress.  HENT: Normocephalic, Atraumatic, Bilateral external ears normal, Nose normal, Oropharynx moist, No oral exudates.   Eyes: PERRL, EOMI, Conjunctiva normal, No discharge.   Neck: Supple, No tenderness.  Lymphatic: No cervical or supraclavicular lymphadenopathy noted.   Cardiovascular: Normal heart rate, Normal rhythm, No murmurs, No rubs, No gallops.   Thorax & Lungs: Normal breath sounds, No respiratory distress, No wheezing.  Abdomen: Soft, No tenderness, Bowel sounds normal.  Breast: deferred  Genital: deferred   Skin: Warm, Dry, No erythema, No rash.   Extremities: No edema, No tenderness. "       ASSESSMENT/PLAN    Leia was seen today for insomnia and annual exam.    Diagnoses and all orders for this visit:    Annual physical exam  -     CBC Auto Differential; Future  -     Comprehensive Metabolic Panel; Future  -     Lipid Panel; Future  -     TSH; Future  -     Hemoglobin A1C; Future    Primary insomnia  -     traZODone (DESYREL) 50 MG tablet; Take 1 tablet (50 mg total) by mouth every evening.           FOLLOW UP: 6 months or sooner if needed    Meliton Gaviria MD

## 2020-11-24 ENCOUNTER — PATIENT MESSAGE (OUTPATIENT)
Dept: FAMILY MEDICINE | Facility: CLINIC | Age: 45
End: 2020-11-24

## 2020-11-24 ENCOUNTER — OFFICE VISIT (OUTPATIENT)
Dept: FAMILY MEDICINE | Facility: CLINIC | Age: 45
End: 2020-11-24
Payer: COMMERCIAL

## 2020-11-24 DIAGNOSIS — N39.0 URINARY TRACT INFECTION WITHOUT HEMATURIA, SITE UNSPECIFIED: Primary | ICD-10-CM

## 2020-11-24 LAB
ESTIMATED AVG GLUCOSE: 88 MG/DL (ref 68–131)
HBA1C MFR BLD HPLC: 4.7 % (ref 4–5.6)

## 2020-11-24 PROCEDURE — 99213 PR OFFICE/OUTPT VISIT, EST, LEVL III, 20-29 MIN: ICD-10-PCS | Mod: 95,,, | Performed by: FAMILY MEDICINE

## 2020-11-24 PROCEDURE — 99213 OFFICE O/P EST LOW 20 MIN: CPT | Mod: 95,,, | Performed by: FAMILY MEDICINE

## 2020-11-24 RX ORDER — SULFAMETHOXAZOLE AND TRIMETHOPRIM 800; 160 MG/1; MG/1
1 TABLET ORAL 2 TIMES DAILY
Qty: 10 TABLET | Refills: 0 | Status: SHIPPED | OUTPATIENT
Start: 2020-11-24 | End: 2020-11-29

## 2020-11-24 NOTE — PROGRESS NOTES
Answers for HPI/ROS submitted by the patient on 11/24/2020   Dysuria  Chronicity: recurrent  Onset: yesterday  Frequency: every urination  Progression since onset: gradually worsening  Pain quality: burning  Pain - numeric: 4/10  Fever: no fever  Sexually active?: Yes  History of pyelonephritis?: No  chills: No  discharge: No  flank pain: No  frequency: Yes  hematuria: Yes  hesitancy: No  nausea: No  possible pregnancy: No  sweats: No  urgency: Yes  vomiting: No  constipation: No  rash: No  weight loss: No  withholding: No  behavior changes: No  Treatments tried: nothing  Pain severity: mild  catheterization: No  diabetes insipidus: No  diabetes mellitus: No  genitourinary reflux: No  hypertension: No  recurrent UTIs: Yes  single kidney: No  STD: No  urinary stasis: No  urological procedure: No  kidney stones: No

## 2020-11-24 NOTE — PROGRESS NOTES
The 10-year ASCVD risk score (Aminata HARDY Jr., et al., 2013) is: 0.4%    Values used to calculate the score:      Age: 45 years      Sex: Female      Is Non- : No      Diabetic: No      Tobacco smoker: No      Systolic Blood Pressure: 118 mmHg      Is BP treated: No      HDL Cholesterol: 65 mg/dL      Total Cholesterol: 165 mg/dL

## 2020-11-24 NOTE — PROGRESS NOTES
Subjective:       Patient ID: Leia Shultz is a 45 y.o. female.    Chief Complaint: No chief complaint on file.    The patient location is: [place]  The chief complaint leading to consultation is: uti    Visit type: audiovisual    Face to Face time with patient:   2 minutes of total time spent on the encounter, which includes face to face time and non-face to face time preparing to see the patient (eg, review of tests), Obtaining and/or reviewing separately obtained history, Documenting clinical information in the electronic or other health record, Independently interpreting results (not separately reported) and communicating results to the patient/family/caregiver, or Care coordination (not separately reported).         Each patient to whom he or she provides medical services by telemedicine is:  (1) informed of the relationship between the physician and patient and the respective role of any other health care provider with respect to management of the patient; and (2) notified that he or she may decline to receive medical services by telemedicine and may withdraw from such care at any time.    Notes:       Dysuria   This is a new problem. The current episode started yesterday. The problem occurs every urination. The problem has been gradually worsening. The quality of the pain is described as burning. The pain is at a severity of 4/10. The pain is mild. There has been no fever. She is sexually active. There is no history of pyelonephritis. Associated symptoms include frequency, hematuria and urgency. Pertinent negatives include no behavior changes, chills, discharge, flank pain, hesitancy, nausea, possible pregnancy, sweats, vomiting, weight loss, constipation, rash or withholding. She has tried nothing for the symptoms. Her past medical history is significant for recurrent UTIs. There is no history of catheterization, diabetes insipidus, diabetes mellitus, genitourinary reflux, hypertension, kidney stones, a  single kidney, STD, urinary stasis or a urological procedure.     Review of Systems   Constitutional: Negative for chills and weight loss.   Gastrointestinal: Negative for constipation, nausea and vomiting.   Genitourinary: Positive for dysuria, frequency, hematuria and urgency. Negative for flank pain and hesitancy.   Integumentary:  Negative for rash.         Objective:      Physical Exam    Assessment:       1. Urinary tract infection without hematuria, site unspecified        Plan:       Diagnoses and all orders for this visit:    Urinary tract infection without hematuria, site unspecified  -     sulfamethoxazole-trimethoprim 800-160mg (BACTRIM DS) 800-160 mg Tab; Take 1 tablet by mouth 2 (two) times daily. for 5 days

## 2020-11-25 ENCOUNTER — PATIENT MESSAGE (OUTPATIENT)
Dept: FAMILY MEDICINE | Facility: CLINIC | Age: 45
End: 2020-11-25

## 2020-11-25 LAB — HCV AB SERPL QL IA: NEGATIVE

## 2020-12-04 ENCOUNTER — PATIENT MESSAGE (OUTPATIENT)
Dept: FAMILY MEDICINE | Facility: CLINIC | Age: 45
End: 2020-12-04

## 2020-12-04 DIAGNOSIS — F51.01 PRIMARY INSOMNIA: Primary | ICD-10-CM

## 2020-12-08 RX ORDER — TRAZODONE HYDROCHLORIDE 100 MG/1
100 TABLET ORAL NIGHTLY
Qty: 30 TABLET | Refills: 5 | Status: SHIPPED | OUTPATIENT
Start: 2020-12-08 | End: 2021-05-25 | Stop reason: SDUPTHER

## 2020-12-17 ENCOUNTER — OFFICE VISIT (OUTPATIENT)
Dept: OBSTETRICS AND GYNECOLOGY | Facility: CLINIC | Age: 45
End: 2020-12-17
Payer: COMMERCIAL

## 2020-12-17 VITALS
WEIGHT: 179.44 LBS | DIASTOLIC BLOOD PRESSURE: 68 MMHG | SYSTOLIC BLOOD PRESSURE: 108 MMHG | HEIGHT: 63 IN | BODY MASS INDEX: 31.79 KG/M2

## 2020-12-17 DIAGNOSIS — Z01.419 WELL WOMAN EXAM WITH ROUTINE GYNECOLOGICAL EXAM: Primary | ICD-10-CM

## 2020-12-17 DIAGNOSIS — Z12.4 CERVICAL CANCER SCREENING: ICD-10-CM

## 2020-12-17 DIAGNOSIS — Z12.31 SCREENING MAMMOGRAM FOR HIGH-RISK PATIENT: ICD-10-CM

## 2020-12-17 DIAGNOSIS — Z30.431 IUD CHECK UP: ICD-10-CM

## 2020-12-17 PROCEDURE — 87624 HPV HI-RISK TYP POOLED RSLT: CPT

## 2020-12-17 PROCEDURE — 99396 PR PREVENTIVE VISIT,EST,40-64: ICD-10-PCS | Mod: S$GLB,,, | Performed by: OBSTETRICS & GYNECOLOGY

## 2020-12-17 PROCEDURE — 1126F AMNT PAIN NOTED NONE PRSNT: CPT | Mod: S$GLB,,, | Performed by: OBSTETRICS & GYNECOLOGY

## 2020-12-17 PROCEDURE — 88175 CYTOPATH C/V AUTO FLUID REDO: CPT

## 2020-12-17 PROCEDURE — 99999 PR PBB SHADOW E&M-EST. PATIENT-LVL III: ICD-10-PCS | Mod: PBBFAC,,, | Performed by: OBSTETRICS & GYNECOLOGY

## 2020-12-17 PROCEDURE — 1126F PR PAIN SEVERITY QUANTIFIED, NO PAIN PRESENT: ICD-10-PCS | Mod: S$GLB,,, | Performed by: OBSTETRICS & GYNECOLOGY

## 2020-12-17 PROCEDURE — 99396 PREV VISIT EST AGE 40-64: CPT | Mod: S$GLB,,, | Performed by: OBSTETRICS & GYNECOLOGY

## 2020-12-17 PROCEDURE — 99999 PR PBB SHADOW E&M-EST. PATIENT-LVL III: CPT | Mod: PBBFAC,,, | Performed by: OBSTETRICS & GYNECOLOGY

## 2020-12-17 PROCEDURE — 3008F PR BODY MASS INDEX (BMI) DOCUMENTED: ICD-10-PCS | Mod: CPTII,S$GLB,, | Performed by: OBSTETRICS & GYNECOLOGY

## 2020-12-17 PROCEDURE — 3008F BODY MASS INDEX DOCD: CPT | Mod: CPTII,S$GLB,, | Performed by: OBSTETRICS & GYNECOLOGY

## 2020-12-17 NOTE — PROGRESS NOTES
"[facility]  Ambulatory Clinic  Obstetrics & Gynecology    Visit Date:  2020    Chief Complaint:  Annual GYN exam    History of Present Illness:      Leia Shultz is a 45 y.o.  here for a gynecologic exam.    Pt has no major complaints today.    Menses are regular, not heavy or painful.  Pt current method of family planning is Paragard IUD, and reports no problems with this method.    Pt denies adverse reaction to hormonal contraception.  Pt denies h/o abnormal pap, last pap ~2017.  Pt denies active sexually transmitted infections.  Pt denies h/o abnormal mammogram, last mammo ~2019.  Pt performs monthly self breast examination, non-smoker, uses seat belts, and denies abuse.   Pt denies abnormal vaginal bleeding, vaginal discharge, dysmenorrhea, dyspareunia, pelvic pain, bloating, early satiety, unintentional weight loss, breast mass/skin changes, incontinence, GI or urinary complaints.    Otherwise, the pt is in her usual state of health.    Past History:  Gynecologic history as noted above.    Review of Systems:      GENERAL:  No fever, fatigue, excessive weight gain or loss  HEENT:  No headaches, hearing changes, visual disturbance  RESPIRATORY:  No cough, shortness of breath  CARDIOVASCULAR:  No chest pain, heart palpitations, leg swelling  BREAST:  No lump, pain, nipple discharge, skin changes  GASTROINTESTINAL:  No nausea, vomiting, constipation, diarrhea, abd pain, rectal bleeding   GENITOURINARY:  See HPI  ENDOCRINE:  No heat or cold intolerance  HEMATOLOGIC:  No easy bruisability or bleeding   LYMPHATICS:  No enlarged nodes  MUSCULOSKELETAL:  No acute joint pain or swelling  SKIN:  No rash, lesions, jaundice  NEUROLOGIC:  No dizziness, weakness, syncope  PSYCHIATRIC:  No significant mood changes, homicidal/suicidal ideations    Physical Exam:     /68   Ht 5' 3" (1.6 m)   Wt 81.4 kg (179 lb 7.3 oz)   LMP 2020   BMI 31.79 kg/m²   Pulse 66, Resp rate " 18     GENERAL:  No acute distress, well-nourished  HEENT:  Atraumatic, anicteric, moist mucus membranes. Neck supple w/o masses.  BREAST:  Symmetric, nontender, no obvious masses, adenopathy, skin changes or nipple discharge.  LUNGS:  Clear to auscultation  HEART:  Regular rate and rhythm, no murmurs, gallops, or rubs  ABDOMEN:  Soft, non-tender, non-distended, normoactive bowel sounds, no obvious organomegaly  EXT:  Symmetric w/o cramping, claudication, or edema. +2 distal pulses.  SKIN:  No rashes or bruising  PSYCH:  Mood and affect appropriate  NEURO:  Grossly intact bilaterally    GENITOURINARY:    VULVAR:  Female external genitalia w/o obvious lesions. Female hair distribution. Normal urethral meatus. No gross lymphadenopathy.    VAGINA:  Pink, moist, well-rugated. Good support. No obvious lesion. No discharge.  CERVIX:  No cervical motion tenderness, discharge, or obvious lesions. IUD string visualized ~3 cm from Os.  UTERUS:  Small, non-tender, normal contour  ADNEXA:  No masses, non-tender    RECTAL:  Declined. No obvious external lesions    Chaperone present for exam.    Assessment:     45 y.o.  with copper IUD:    1. Well woman gynecologic exam  2. IUD check     Plan:    A gynecologic health assessment was performed with age appropriate counseling.  Cervical cancer screening - pap obtained.  STI screening - pt declined.    Screening mammogram ordered, pt advised to call to schedule.  Continue Copper IUD.  Risks, benefits, and alternatives to Copper reviewed.  Pt advised Copper IUD will need to be removed 10 yrs from insertion date.  Advised on self string check.  Discussed safe sex.  Encourage healthy lifestyle modifications, monthly self breast exams, Ca/Vit D, f/u with PCP for health maintenance.  Return 1 year for gynecologic exam or sooner as needed.    All questions answered, pt voiced understanding.        Cl Chacon MD

## 2020-12-23 LAB
HPV HR 12 DNA SPEC QL NAA+PROBE: NEGATIVE
HPV16 AG SPEC QL: NEGATIVE
HPV18 DNA SPEC QL NAA+PROBE: NEGATIVE

## 2020-12-28 ENCOUNTER — HOSPITAL ENCOUNTER (OUTPATIENT)
Dept: RADIOLOGY | Facility: HOSPITAL | Age: 45
Discharge: HOME | End: 2020-12-28
Attending: OBSTETRICS & GYNECOLOGY
Payer: COMMERCIAL

## 2020-12-28 DIAGNOSIS — Z12.31 SCREENING MAMMOGRAM FOR HIGH-RISK PATIENT: ICD-10-CM

## 2020-12-28 PROCEDURE — 77063 BREAST TOMOSYNTHESIS BI: CPT | Mod: 26,,, | Performed by: RADIOLOGY

## 2020-12-28 PROCEDURE — 77067 MAMMO DIGITAL SCREENING BILAT WITH TOMO: ICD-10-PCS | Mod: 26,,, | Performed by: RADIOLOGY

## 2020-12-28 PROCEDURE — 77067 SCR MAMMO BI INCL CAD: CPT | Mod: TC

## 2020-12-28 PROCEDURE — 77067 SCR MAMMO BI INCL CAD: CPT | Mod: 26,,, | Performed by: RADIOLOGY

## 2020-12-28 PROCEDURE — 77063 MAMMO DIGITAL SCREENING BILAT WITH TOMO: ICD-10-PCS | Mod: 26,,, | Performed by: RADIOLOGY

## 2021-01-14 ENCOUNTER — OFFICE VISIT (OUTPATIENT)
Dept: PSYCHIATRY | Facility: CLINIC | Age: 46
End: 2021-01-14
Payer: COMMERCIAL

## 2021-01-14 DIAGNOSIS — F41.1 GENERALIZED ANXIETY DISORDER: Primary | ICD-10-CM

## 2021-01-14 PROCEDURE — 90834 PSYTX W PT 45 MINUTES: CPT | Mod: 95,,, | Performed by: SOCIAL WORKER

## 2021-01-14 PROCEDURE — 90834 PR PSYCHOTHERAPY W/PATIENT, 45 MIN: ICD-10-PCS | Mod: 95,,, | Performed by: SOCIAL WORKER

## 2021-01-27 LAB
FINAL PATHOLOGIC DIAGNOSIS: NORMAL
Lab: NORMAL

## 2021-02-26 ENCOUNTER — OFFICE VISIT (OUTPATIENT)
Dept: PSYCHIATRY | Facility: CLINIC | Age: 46
End: 2021-02-26
Payer: COMMERCIAL

## 2021-02-26 DIAGNOSIS — F41.1 GENERALIZED ANXIETY DISORDER: Primary | ICD-10-CM

## 2021-02-26 PROCEDURE — 90832 PSYTX W PT 30 MINUTES: CPT | Mod: 95,,, | Performed by: SOCIAL WORKER

## 2021-02-26 PROCEDURE — 90832 PR PSYCHOTHERAPY W/PATIENT, 30 MIN: ICD-10-PCS | Mod: 95,,, | Performed by: SOCIAL WORKER

## 2021-03-15 ENCOUNTER — PATIENT MESSAGE (OUTPATIENT)
Dept: FAMILY MEDICINE | Facility: CLINIC | Age: 46
End: 2021-03-15

## 2021-03-15 DIAGNOSIS — L98.9 SKIN LESION: ICD-10-CM

## 2021-03-16 RX ORDER — MUPIROCIN 20 MG/G
OINTMENT TOPICAL 3 TIMES DAILY
Qty: 30 G | Refills: 1 | Status: SHIPPED | OUTPATIENT
Start: 2021-03-16 | End: 2022-11-15 | Stop reason: ALTCHOICE

## 2021-05-10 ENCOUNTER — OFFICE VISIT (OUTPATIENT)
Dept: PSYCHIATRY | Facility: CLINIC | Age: 46
End: 2021-05-10
Payer: COMMERCIAL

## 2021-05-10 DIAGNOSIS — F41.1 GENERALIZED ANXIETY DISORDER: Primary | ICD-10-CM

## 2021-05-10 PROCEDURE — 90834 PR PSYCHOTHERAPY W/PATIENT, 45 MIN: ICD-10-PCS | Mod: 95,,, | Performed by: SOCIAL WORKER

## 2021-05-10 PROCEDURE — 90834 PSYTX W PT 45 MINUTES: CPT | Mod: 95,,, | Performed by: SOCIAL WORKER

## 2021-07-16 ENCOUNTER — OFFICE VISIT (OUTPATIENT)
Dept: PSYCHIATRY | Facility: CLINIC | Age: 46
End: 2021-07-16
Payer: COMMERCIAL

## 2021-07-16 DIAGNOSIS — F41.1 GENERALIZED ANXIETY DISORDER: Primary | ICD-10-CM

## 2021-07-16 PROCEDURE — 90834 PR PSYCHOTHERAPY W/PATIENT, 45 MIN: ICD-10-PCS | Mod: 95,,, | Performed by: SOCIAL WORKER

## 2021-07-16 PROCEDURE — 90834 PSYTX W PT 45 MINUTES: CPT | Mod: 95,,, | Performed by: SOCIAL WORKER

## 2021-07-26 ENCOUNTER — OFFICE VISIT (OUTPATIENT)
Dept: PRIMARY CARE CLINIC | Facility: CLINIC | Age: 46
End: 2021-07-26
Payer: COMMERCIAL

## 2021-07-26 DIAGNOSIS — N39.0 UTI (URINARY TRACT INFECTION), UNCOMPLICATED: Primary | ICD-10-CM

## 2021-07-26 PROCEDURE — 1160F PR REVIEW ALL MEDS BY PRESCRIBER/CLIN PHARMACIST DOCUMENTED: ICD-10-PCS | Mod: CPTII,,, | Performed by: PHYSICIAN ASSISTANT

## 2021-07-26 PROCEDURE — 99214 OFFICE O/P EST MOD 30 MIN: CPT | Mod: 95,,, | Performed by: PHYSICIAN ASSISTANT

## 2021-07-26 PROCEDURE — 99214 PR OFFICE/OUTPT VISIT, EST, LEVL IV, 30-39 MIN: ICD-10-PCS | Mod: 95,,, | Performed by: PHYSICIAN ASSISTANT

## 2021-07-26 PROCEDURE — 1159F MED LIST DOCD IN RCRD: CPT | Mod: CPTII,,, | Performed by: PHYSICIAN ASSISTANT

## 2021-07-26 PROCEDURE — 1159F PR MEDICATION LIST DOCUMENTED IN MEDICAL RECORD: ICD-10-PCS | Mod: CPTII,,, | Performed by: PHYSICIAN ASSISTANT

## 2021-07-26 PROCEDURE — 1160F RVW MEDS BY RX/DR IN RCRD: CPT | Mod: CPTII,,, | Performed by: PHYSICIAN ASSISTANT

## 2021-07-26 RX ORDER — NITROFURANTOIN 25; 75 MG/1; MG/1
100 CAPSULE ORAL 2 TIMES DAILY
Qty: 14 CAPSULE | Refills: 0 | Status: SHIPPED | OUTPATIENT
Start: 2021-07-26 | End: 2021-07-29 | Stop reason: SDUPTHER

## 2021-07-28 ENCOUNTER — PATIENT MESSAGE (OUTPATIENT)
Dept: PRIMARY CARE CLINIC | Facility: CLINIC | Age: 46
End: 2021-07-28

## 2021-07-28 DIAGNOSIS — N39.0 UTI (URINARY TRACT INFECTION), UNCOMPLICATED: ICD-10-CM

## 2021-07-29 ENCOUNTER — OFFICE VISIT (OUTPATIENT)
Dept: PSYCHIATRY | Facility: CLINIC | Age: 46
End: 2021-07-29
Payer: COMMERCIAL

## 2021-07-29 DIAGNOSIS — F41.1 GENERALIZED ANXIETY DISORDER: Primary | ICD-10-CM

## 2021-07-29 PROCEDURE — 90834 PR PSYCHOTHERAPY W/PATIENT, 45 MIN: ICD-10-PCS | Mod: 95,,, | Performed by: SOCIAL WORKER

## 2021-07-29 PROCEDURE — 90834 PSYTX W PT 45 MINUTES: CPT | Mod: 95,,, | Performed by: SOCIAL WORKER

## 2021-07-29 RX ORDER — NITROFURANTOIN 25; 75 MG/1; MG/1
100 CAPSULE ORAL 2 TIMES DAILY
Qty: 14 CAPSULE | Refills: 0 | Status: SHIPPED | OUTPATIENT
Start: 2021-07-29 | End: 2021-08-05

## 2021-08-10 ENCOUNTER — OFFICE VISIT (OUTPATIENT)
Dept: PSYCHIATRY | Facility: CLINIC | Age: 46
End: 2021-08-10
Payer: COMMERCIAL

## 2021-08-10 DIAGNOSIS — F41.1 GENERALIZED ANXIETY DISORDER: Primary | ICD-10-CM

## 2021-08-10 PROCEDURE — 90834 PR PSYCHOTHERAPY W/PATIENT, 45 MIN: ICD-10-PCS | Mod: 95,,, | Performed by: SOCIAL WORKER

## 2021-08-10 PROCEDURE — 90834 PSYTX W PT 45 MINUTES: CPT | Mod: 95,,, | Performed by: SOCIAL WORKER

## 2021-08-26 ENCOUNTER — OFFICE VISIT (OUTPATIENT)
Dept: PSYCHIATRY | Facility: CLINIC | Age: 46
End: 2021-08-26
Payer: COMMERCIAL

## 2021-08-26 DIAGNOSIS — F41.1 GENERALIZED ANXIETY DISORDER: Primary | ICD-10-CM

## 2021-08-26 PROCEDURE — 90834 PR PSYCHOTHERAPY W/PATIENT, 45 MIN: ICD-10-PCS | Mod: 95,,, | Performed by: SOCIAL WORKER

## 2021-08-26 PROCEDURE — 90834 PSYTX W PT 45 MINUTES: CPT | Mod: 95,,, | Performed by: SOCIAL WORKER

## 2021-09-13 ENCOUNTER — OFFICE VISIT (OUTPATIENT)
Dept: PSYCHIATRY | Facility: CLINIC | Age: 46
End: 2021-09-13
Payer: COMMERCIAL

## 2021-09-13 DIAGNOSIS — F41.1 GENERALIZED ANXIETY DISORDER: Primary | ICD-10-CM

## 2021-09-13 PROCEDURE — 90834 PSYTX W PT 45 MINUTES: CPT | Mod: 95,,, | Performed by: SOCIAL WORKER

## 2021-09-13 PROCEDURE — 90834 PR PSYCHOTHERAPY W/PATIENT, 45 MIN: ICD-10-PCS | Mod: 95,,, | Performed by: SOCIAL WORKER

## 2021-09-29 ENCOUNTER — OFFICE VISIT (OUTPATIENT)
Dept: PSYCHIATRY | Facility: CLINIC | Age: 46
End: 2021-09-29
Payer: COMMERCIAL

## 2021-09-29 DIAGNOSIS — F41.1 GENERALIZED ANXIETY DISORDER: Primary | ICD-10-CM

## 2021-09-29 PROCEDURE — 90834 PR PSYCHOTHERAPY W/PATIENT, 45 MIN: ICD-10-PCS | Mod: 95,,, | Performed by: SOCIAL WORKER

## 2021-09-29 PROCEDURE — 90834 PSYTX W PT 45 MINUTES: CPT | Mod: 95,,, | Performed by: SOCIAL WORKER

## 2021-09-30 ENCOUNTER — PATIENT MESSAGE (OUTPATIENT)
Dept: PSYCHIATRY | Facility: CLINIC | Age: 46
End: 2021-09-30

## 2021-10-14 ENCOUNTER — OFFICE VISIT (OUTPATIENT)
Dept: PSYCHIATRY | Facility: CLINIC | Age: 46
End: 2021-10-14
Payer: COMMERCIAL

## 2021-10-14 DIAGNOSIS — F41.1 GENERALIZED ANXIETY DISORDER: Primary | ICD-10-CM

## 2021-10-14 PROCEDURE — 90834 PSYTX W PT 45 MINUTES: CPT | Mod: 95,,, | Performed by: SOCIAL WORKER

## 2021-10-14 PROCEDURE — 90834 PR PSYCHOTHERAPY W/PATIENT, 45 MIN: ICD-10-PCS | Mod: 95,,, | Performed by: SOCIAL WORKER

## 2021-10-25 ENCOUNTER — PATIENT MESSAGE (OUTPATIENT)
Dept: PSYCHIATRY | Facility: CLINIC | Age: 46
End: 2021-10-25
Payer: COMMERCIAL

## 2021-10-27 ENCOUNTER — OFFICE VISIT (OUTPATIENT)
Dept: PSYCHIATRY | Facility: CLINIC | Age: 46
End: 2021-10-27
Payer: COMMERCIAL

## 2021-10-27 DIAGNOSIS — F41.1 GENERALIZED ANXIETY DISORDER: Primary | ICD-10-CM

## 2021-10-27 PROCEDURE — 90834 PR PSYCHOTHERAPY W/PATIENT, 45 MIN: ICD-10-PCS | Mod: 95,,, | Performed by: SOCIAL WORKER

## 2021-10-27 PROCEDURE — 90834 PSYTX W PT 45 MINUTES: CPT | Mod: 95,,, | Performed by: SOCIAL WORKER

## 2021-11-08 ENCOUNTER — OFFICE VISIT (OUTPATIENT)
Dept: PSYCHIATRY | Facility: CLINIC | Age: 46
End: 2021-11-08
Payer: COMMERCIAL

## 2021-11-08 DIAGNOSIS — F41.1 GENERALIZED ANXIETY DISORDER: Primary | ICD-10-CM

## 2021-11-08 PROCEDURE — 90834 PR PSYCHOTHERAPY W/PATIENT, 45 MIN: ICD-10-PCS | Mod: 95,,, | Performed by: SOCIAL WORKER

## 2021-11-08 PROCEDURE — 90834 PSYTX W PT 45 MINUTES: CPT | Mod: 95,,, | Performed by: SOCIAL WORKER

## 2021-11-16 ENCOUNTER — OFFICE VISIT (OUTPATIENT)
Dept: PSYCHIATRY | Facility: CLINIC | Age: 46
End: 2021-11-16
Payer: COMMERCIAL

## 2021-11-16 DIAGNOSIS — F41.1 GENERALIZED ANXIETY DISORDER: Primary | ICD-10-CM

## 2021-11-16 PROCEDURE — 90834 PR PSYCHOTHERAPY W/PATIENT, 45 MIN: ICD-10-PCS | Mod: 95,,, | Performed by: SOCIAL WORKER

## 2021-11-16 PROCEDURE — 90834 PSYTX W PT 45 MINUTES: CPT | Mod: 95,,, | Performed by: SOCIAL WORKER

## 2021-11-22 ENCOUNTER — OFFICE VISIT (OUTPATIENT)
Dept: FAMILY MEDICINE | Facility: CLINIC | Age: 46
End: 2021-11-22
Payer: COMMERCIAL

## 2021-11-22 VITALS
WEIGHT: 175.25 LBS | RESPIRATION RATE: 19 BRPM | HEART RATE: 78 BPM | SYSTOLIC BLOOD PRESSURE: 106 MMHG | BODY MASS INDEX: 31.05 KG/M2 | HEIGHT: 63 IN | DIASTOLIC BLOOD PRESSURE: 78 MMHG | OXYGEN SATURATION: 96 %

## 2021-11-22 DIAGNOSIS — F51.01 PRIMARY INSOMNIA: ICD-10-CM

## 2021-11-22 DIAGNOSIS — E55.9 VITAMIN D DEFICIENCY: ICD-10-CM

## 2021-11-22 DIAGNOSIS — Z98.84 HISTORY OF GASTRIC RESTRICTIVE SURGERY: ICD-10-CM

## 2021-11-22 DIAGNOSIS — Z00.00 WELL WOMAN EXAM WITHOUT GYNECOLOGICAL EXAM: Primary | ICD-10-CM

## 2021-11-22 DIAGNOSIS — E53.8 LOW FOLATE: ICD-10-CM

## 2021-11-22 DIAGNOSIS — E53.8 VITAMIN B 12 DEFICIENCY: ICD-10-CM

## 2021-11-22 PROCEDURE — 99396 PREV VISIT EST AGE 40-64: CPT | Mod: S$GLB,,, | Performed by: FAMILY MEDICINE

## 2021-11-22 PROCEDURE — 99999 PR PBB SHADOW E&M-EST. PATIENT-LVL III: ICD-10-PCS | Mod: PBBFAC,,, | Performed by: FAMILY MEDICINE

## 2021-11-22 PROCEDURE — 99396 PR PREVENTIVE VISIT,EST,40-64: ICD-10-PCS | Mod: S$GLB,,, | Performed by: FAMILY MEDICINE

## 2021-11-22 PROCEDURE — 99999 PR PBB SHADOW E&M-EST. PATIENT-LVL III: CPT | Mod: PBBFAC,,, | Performed by: FAMILY MEDICINE

## 2021-11-22 RX ORDER — TRAZODONE HYDROCHLORIDE 100 MG/1
100 TABLET ORAL NIGHTLY
Qty: 30 TABLET | Refills: 5 | Status: SHIPPED | OUTPATIENT
Start: 2021-11-22 | End: 2022-05-20 | Stop reason: SDUPTHER

## 2021-11-24 ENCOUNTER — LAB VISIT (OUTPATIENT)
Dept: LAB | Facility: HOSPITAL | Age: 46
End: 2021-11-24
Attending: FAMILY MEDICINE
Payer: COMMERCIAL

## 2021-11-24 DIAGNOSIS — E53.8 LOW FOLATE: ICD-10-CM

## 2021-11-24 DIAGNOSIS — Z00.00 WELL WOMAN EXAM WITHOUT GYNECOLOGICAL EXAM: ICD-10-CM

## 2021-11-24 DIAGNOSIS — Z98.84 HISTORY OF GASTRIC RESTRICTIVE SURGERY: ICD-10-CM

## 2021-11-24 DIAGNOSIS — E53.8 VITAMIN B 12 DEFICIENCY: ICD-10-CM

## 2021-11-24 DIAGNOSIS — E55.9 VITAMIN D DEFICIENCY: ICD-10-CM

## 2021-11-24 LAB
25(OH)D3+25(OH)D2 SERPL-MCNC: 24 NG/ML (ref 30–96)
ALBUMIN SERPL BCP-MCNC: 3.7 G/DL (ref 3.5–5.2)
ALP SERPL-CCNC: 61 U/L (ref 55–135)
ALT SERPL W/O P-5'-P-CCNC: 11 U/L (ref 10–44)
ANION GAP SERPL CALC-SCNC: 5 MMOL/L (ref 8–16)
AST SERPL-CCNC: 19 U/L (ref 10–40)
BASOPHILS # BLD AUTO: 0.06 K/UL (ref 0–0.2)
BASOPHILS NFR BLD: 1.1 % (ref 0–1.9)
BILIRUB SERPL-MCNC: 0.6 MG/DL (ref 0.1–1)
BUN SERPL-MCNC: 13 MG/DL (ref 6–20)
CALCIUM SERPL-MCNC: 9.1 MG/DL (ref 8.7–10.5)
CHLORIDE SERPL-SCNC: 106 MMOL/L (ref 95–110)
CHOLEST SERPL-MCNC: 192 MG/DL (ref 120–199)
CHOLEST/HDLC SERPL: 3 {RATIO} (ref 2–5)
CO2 SERPL-SCNC: 27 MMOL/L (ref 23–29)
CREAT SERPL-MCNC: 0.8 MG/DL (ref 0.5–1.4)
DIFFERENTIAL METHOD: ABNORMAL
EOSINOPHIL # BLD AUTO: 0.3 K/UL (ref 0–0.5)
EOSINOPHIL NFR BLD: 4.6 % (ref 0–8)
ERYTHROCYTE [DISTWIDTH] IN BLOOD BY AUTOMATED COUNT: 11.6 % (ref 11.5–14.5)
EST. GFR  (AFRICAN AMERICAN): >60 ML/MIN/1.73 M^2
EST. GFR  (NON AFRICAN AMERICAN): >60 ML/MIN/1.73 M^2
FOLATE SERPL-MCNC: 3.9 NG/ML (ref 4–24)
GLUCOSE SERPL-MCNC: 86 MG/DL (ref 70–110)
HCT VFR BLD AUTO: 40.2 % (ref 37–48.5)
HDLC SERPL-MCNC: 63 MG/DL (ref 40–75)
HDLC SERPL: 32.8 % (ref 20–50)
HGB BLD-MCNC: 12.9 G/DL (ref 12–16)
IMM GRANULOCYTES # BLD AUTO: 0.03 K/UL (ref 0–0.04)
IMM GRANULOCYTES NFR BLD AUTO: 0.5 % (ref 0–0.5)
LDLC SERPL CALC-MCNC: 120 MG/DL (ref 63–159)
LYMPHOCYTES # BLD AUTO: 2.1 K/UL (ref 1–4.8)
LYMPHOCYTES NFR BLD: 36.9 % (ref 18–48)
MCH RBC QN AUTO: 31.2 PG (ref 27–31)
MCHC RBC AUTO-ENTMCNC: 32.1 G/DL (ref 32–36)
MCV RBC AUTO: 97 FL (ref 82–98)
MONOCYTES # BLD AUTO: 0.5 K/UL (ref 0.3–1)
MONOCYTES NFR BLD: 8.5 % (ref 4–15)
NEUTROPHILS # BLD AUTO: 2.8 K/UL (ref 1.8–7.7)
NEUTROPHILS NFR BLD: 48.4 % (ref 38–73)
NONHDLC SERPL-MCNC: 129 MG/DL
NRBC BLD-RTO: 0 /100 WBC
PLATELET # BLD AUTO: 320 K/UL (ref 150–450)
PMV BLD AUTO: 8.9 FL (ref 9.2–12.9)
POTASSIUM SERPL-SCNC: 4.3 MMOL/L (ref 3.5–5.1)
PROT SERPL-MCNC: 6.8 G/DL (ref 6–8.4)
RBC # BLD AUTO: 4.13 M/UL (ref 4–5.4)
SODIUM SERPL-SCNC: 138 MMOL/L (ref 136–145)
TRIGL SERPL-MCNC: 45 MG/DL (ref 30–150)
VIT B12 SERPL-MCNC: 462 PG/ML (ref 210–950)
WBC # BLD AUTO: 5.67 K/UL (ref 3.9–12.7)

## 2021-11-24 PROCEDURE — 80053 COMPREHEN METABOLIC PANEL: CPT | Performed by: FAMILY MEDICINE

## 2021-11-24 PROCEDURE — 80061 LIPID PANEL: CPT | Performed by: FAMILY MEDICINE

## 2021-11-24 PROCEDURE — 85025 COMPLETE CBC W/AUTO DIFF WBC: CPT | Performed by: FAMILY MEDICINE

## 2021-11-24 PROCEDURE — 36415 COLL VENOUS BLD VENIPUNCTURE: CPT | Mod: PN | Performed by: FAMILY MEDICINE

## 2021-11-24 PROCEDURE — 82306 VITAMIN D 25 HYDROXY: CPT | Performed by: FAMILY MEDICINE

## 2021-11-24 PROCEDURE — 82746 ASSAY OF FOLIC ACID SERUM: CPT | Performed by: FAMILY MEDICINE

## 2021-11-24 PROCEDURE — 82607 VITAMIN B-12: CPT | Performed by: FAMILY MEDICINE

## 2021-11-30 ENCOUNTER — OFFICE VISIT (OUTPATIENT)
Dept: PSYCHIATRY | Facility: CLINIC | Age: 46
End: 2021-11-30
Payer: COMMERCIAL

## 2021-11-30 DIAGNOSIS — F41.1 GENERALIZED ANXIETY DISORDER: Primary | ICD-10-CM

## 2021-11-30 PROCEDURE — 90834 PR PSYCHOTHERAPY W/PATIENT, 45 MIN: ICD-10-PCS | Mod: 95,,, | Performed by: SOCIAL WORKER

## 2021-11-30 PROCEDURE — 90834 PSYTX W PT 45 MINUTES: CPT | Mod: 95,,, | Performed by: SOCIAL WORKER

## 2021-12-16 ENCOUNTER — OFFICE VISIT (OUTPATIENT)
Dept: PSYCHIATRY | Facility: CLINIC | Age: 46
End: 2021-12-16
Payer: COMMERCIAL

## 2021-12-16 DIAGNOSIS — F41.1 GENERALIZED ANXIETY DISORDER: Primary | ICD-10-CM

## 2021-12-16 PROCEDURE — 90834 PSYTX W PT 45 MINUTES: CPT | Mod: 95,,, | Performed by: SOCIAL WORKER

## 2021-12-16 PROCEDURE — 90834 PR PSYCHOTHERAPY W/PATIENT, 45 MIN: ICD-10-PCS | Mod: 95,,, | Performed by: SOCIAL WORKER

## 2021-12-27 ENCOUNTER — OFFICE VISIT (OUTPATIENT)
Dept: OBSTETRICS AND GYNECOLOGY | Facility: CLINIC | Age: 46
End: 2021-12-27
Payer: COMMERCIAL

## 2021-12-27 VITALS
DIASTOLIC BLOOD PRESSURE: 68 MMHG | HEIGHT: 63 IN | BODY MASS INDEX: 31.79 KG/M2 | WEIGHT: 179.38 LBS | SYSTOLIC BLOOD PRESSURE: 102 MMHG

## 2021-12-27 DIAGNOSIS — Z30.431 IUD CHECK UP: ICD-10-CM

## 2021-12-27 DIAGNOSIS — Z01.419 WELL WOMAN EXAM WITH ROUTINE GYNECOLOGICAL EXAM: Primary | ICD-10-CM

## 2021-12-27 PROCEDURE — 1159F PR MEDICATION LIST DOCUMENTED IN MEDICAL RECORD: ICD-10-PCS | Mod: CPTII,S$GLB,, | Performed by: OBSTETRICS & GYNECOLOGY

## 2021-12-27 PROCEDURE — 3008F BODY MASS INDEX DOCD: CPT | Mod: CPTII,S$GLB,, | Performed by: OBSTETRICS & GYNECOLOGY

## 2021-12-27 PROCEDURE — 3078F DIAST BP <80 MM HG: CPT | Mod: CPTII,S$GLB,, | Performed by: OBSTETRICS & GYNECOLOGY

## 2021-12-27 PROCEDURE — 1159F MED LIST DOCD IN RCRD: CPT | Mod: CPTII,S$GLB,, | Performed by: OBSTETRICS & GYNECOLOGY

## 2021-12-27 PROCEDURE — 99396 PREV VISIT EST AGE 40-64: CPT | Mod: S$GLB,,, | Performed by: OBSTETRICS & GYNECOLOGY

## 2021-12-27 PROCEDURE — 3074F PR MOST RECENT SYSTOLIC BLOOD PRESSURE < 130 MM HG: ICD-10-PCS | Mod: CPTII,S$GLB,, | Performed by: OBSTETRICS & GYNECOLOGY

## 2021-12-27 PROCEDURE — 3008F PR BODY MASS INDEX (BMI) DOCUMENTED: ICD-10-PCS | Mod: CPTII,S$GLB,, | Performed by: OBSTETRICS & GYNECOLOGY

## 2021-12-27 PROCEDURE — 3078F PR MOST RECENT DIASTOLIC BLOOD PRESSURE < 80 MM HG: ICD-10-PCS | Mod: CPTII,S$GLB,, | Performed by: OBSTETRICS & GYNECOLOGY

## 2021-12-27 PROCEDURE — 99999 PR PBB SHADOW E&M-EST. PATIENT-LVL III: CPT | Mod: PBBFAC,,, | Performed by: OBSTETRICS & GYNECOLOGY

## 2021-12-27 PROCEDURE — 1160F RVW MEDS BY RX/DR IN RCRD: CPT | Mod: CPTII,S$GLB,, | Performed by: OBSTETRICS & GYNECOLOGY

## 2021-12-27 PROCEDURE — 3074F SYST BP LT 130 MM HG: CPT | Mod: CPTII,S$GLB,, | Performed by: OBSTETRICS & GYNECOLOGY

## 2021-12-27 PROCEDURE — 1160F PR REVIEW ALL MEDS BY PRESCRIBER/CLIN PHARMACIST DOCUMENTED: ICD-10-PCS | Mod: CPTII,S$GLB,, | Performed by: OBSTETRICS & GYNECOLOGY

## 2021-12-27 PROCEDURE — 99999 PR PBB SHADOW E&M-EST. PATIENT-LVL III: ICD-10-PCS | Mod: PBBFAC,,, | Performed by: OBSTETRICS & GYNECOLOGY

## 2021-12-27 PROCEDURE — 99396 PR PREVENTIVE VISIT,EST,40-64: ICD-10-PCS | Mod: S$GLB,,, | Performed by: OBSTETRICS & GYNECOLOGY

## 2021-12-29 ENCOUNTER — HOSPITAL ENCOUNTER (OUTPATIENT)
Dept: RADIOLOGY | Facility: HOSPITAL | Age: 46
Discharge: HOME | End: 2021-12-29
Attending: FAMILY MEDICINE
Payer: COMMERCIAL

## 2021-12-29 VITALS — WEIGHT: 179.44 LBS | BODY MASS INDEX: 31.79 KG/M2 | HEIGHT: 63 IN

## 2021-12-29 DIAGNOSIS — Z00.00 WELL WOMAN EXAM WITHOUT GYNECOLOGICAL EXAM: ICD-10-CM

## 2021-12-29 PROCEDURE — 77067 MAMMO DIGITAL SCREENING BILAT WITH TOMO: ICD-10-PCS | Mod: 26,,, | Performed by: RADIOLOGY

## 2021-12-29 PROCEDURE — 77067 SCR MAMMO BI INCL CAD: CPT | Mod: 26,,, | Performed by: RADIOLOGY

## 2021-12-29 PROCEDURE — 77063 BREAST TOMOSYNTHESIS BI: CPT | Mod: 26,,, | Performed by: RADIOLOGY

## 2021-12-29 PROCEDURE — 77063 MAMMO DIGITAL SCREENING BILAT WITH TOMO: ICD-10-PCS | Mod: 26,,, | Performed by: RADIOLOGY

## 2021-12-29 PROCEDURE — 77067 SCR MAMMO BI INCL CAD: CPT | Mod: TC

## 2022-01-04 ENCOUNTER — OFFICE VISIT (OUTPATIENT)
Dept: PSYCHIATRY | Facility: CLINIC | Age: 47
End: 2022-01-04
Payer: COMMERCIAL

## 2022-01-04 DIAGNOSIS — F41.1 GENERALIZED ANXIETY DISORDER: Primary | ICD-10-CM

## 2022-01-04 PROCEDURE — 90834 PR PSYCHOTHERAPY W/PATIENT, 45 MIN: ICD-10-PCS | Mod: 95,,, | Performed by: SOCIAL WORKER

## 2022-01-04 PROCEDURE — 90834 PSYTX W PT 45 MINUTES: CPT | Mod: 95,,, | Performed by: SOCIAL WORKER

## 2022-01-04 NOTE — PROGRESS NOTES
"The patient location is: [place]  The chief complaint leading to consultation is: anxiety, alcohol use    Visit type: audiovisual    Face to Face time with patient: 45 minutes  60 minutes of total time spent on the encounter, which includes face to face time and non-face to face time preparing to see the patient (eg, review of tests), Obtaining and/or reviewing separately obtained history, Documenting clinical information in the electronic or other health record, Independently interpreting results (not separately reported) and communicating results to the patient/family/caregiver, or Care coordination (not separately reported).     Each patient to whom he or she provides medical services by telemedicine is:  (1) informed of the relationship between the physician and patient and the respective role of any other health care provider with respect to management of the patient; and (2) notified that he or she may decline to receive medical services by telemedicine and may withdraw from such care at any time.    Notes:   Individual Psychotherapy (PhD/LCSW)    1/4/2022    Therapeutic Intervention: Met with patient.  Outpatient - Insight oriented psychotherapy 45 min - CPT code 33943 and Outpatient - Supportive psychotherapy 45 min - CPT Code 88893    Chief complaint/reason for encounter: depression and anxiety     Interval history and content of current session: Patient returned for follow up psychotherapy. Patient is doing "pretty good". States that her job is very upsetting and "really hates it". States that she is disappointed that she didn't hear back from the job that she did two interviews. States that one of the difficulties of her job is that she has to do others tasks and isn't able to do her tasks. Discussed the importance of boundaries with work. States that she was pushed to do work because she gets anxious if it doesn't get done. Discussed working on skills for next job. Discussed that she has been working " out more and more mindful of her eating. Ex and patient were talking and he asked her weight and this sent her into a tailspin. Discussed her frustration with exercise level and lack of weight loss. Discussed continuing to focus on non-weight metrics for health to keep her motivated.     Treatment plan:  · Target symptoms: depression, anxiety   · Why chosen therapy is appropriate versus another modality: relevant to diagnosis, evidence based practice  · Outcome monitoring methods: self-report, observation  · Therapeutic intervention type: insight oriented psychotherapy, supportive psychotherapy    Risk parameters:  Patient reports no suicidal ideation  Patient reports no homicidal ideation  Patient reports no self-injurious behavior  Patient reports no violent behavior    Verbal deficits: None    Patient's response to intervention:  The patient's response to intervention is accepting.    Progress toward goals and other mental status changes:  The patient's progress toward goals is fair .    Diagnosis:     ICD-10-CM ICD-9-CM   1. Generalized anxiety disorder  F41.1 300.02       Plan:  individual psychotherapy    Return to clinic: 2 weeks, 1 month    Length of Service (minutes): 45     Cassandra Rockweiler, LCSW-JOSES

## 2022-01-19 ENCOUNTER — OFFICE VISIT (OUTPATIENT)
Dept: PSYCHIATRY | Facility: CLINIC | Age: 47
End: 2022-01-19
Payer: COMMERCIAL

## 2022-01-19 DIAGNOSIS — F41.1 GENERALIZED ANXIETY DISORDER: Primary | ICD-10-CM

## 2022-01-19 PROCEDURE — 90834 PR PSYCHOTHERAPY W/PATIENT, 45 MIN: ICD-10-PCS | Mod: 95,,, | Performed by: SOCIAL WORKER

## 2022-01-19 PROCEDURE — 90834 PSYTX W PT 45 MINUTES: CPT | Mod: 95,,, | Performed by: SOCIAL WORKER
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
## 2022-02-04 ENCOUNTER — OFFICE VISIT (OUTPATIENT)
Dept: PSYCHIATRY | Facility: CLINIC | Age: 47
End: 2022-02-04
Payer: COMMERCIAL

## 2022-02-04 DIAGNOSIS — F41.1 GENERALIZED ANXIETY DISORDER: Primary | ICD-10-CM

## 2022-02-04 PROCEDURE — 90834 PR PSYCHOTHERAPY W/PATIENT, 45 MIN: ICD-10-PCS | Mod: 95,,, | Performed by: SOCIAL WORKER

## 2022-02-04 PROCEDURE — 90834 PSYTX W PT 45 MINUTES: CPT | Mod: 95,,, | Performed by: SOCIAL WORKER
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
## 2022-02-17 ENCOUNTER — OFFICE VISIT (OUTPATIENT)
Dept: PSYCHIATRY | Facility: CLINIC | Age: 47
End: 2022-02-17
Payer: COMMERCIAL

## 2022-02-17 DIAGNOSIS — F41.1 GENERALIZED ANXIETY DISORDER: Primary | ICD-10-CM

## 2022-02-17 PROCEDURE — 90834 PSYTX W PT 45 MINUTES: CPT | Mod: 95,,, | Performed by: SOCIAL WORKER

## 2022-02-17 PROCEDURE — 90834 PR PSYCHOTHERAPY W/PATIENT, 45 MIN: ICD-10-PCS | Mod: 95,,, | Performed by: SOCIAL WORKER

## 2022-02-17 NOTE — PROGRESS NOTES
"The patient location is: [place]  The chief complaint leading to consultation is: anxiety, alcohol use    Visit type: audiovisual    Face to Face time with patient: 45 minutes  60 minutes of total time spent on the encounter, which includes face to face time and non-face to face time preparing to see the patient (eg, review of tests), Obtaining and/or reviewing separately obtained history, Documenting clinical information in the electronic or other health record, Independently interpreting results (not separately reported) and communicating results to the patient/family/caregiver, or Care coordination (not separately reported).     Each patient to whom he or she provides medical services by telemedicine is:  (1) informed of the relationship between the physician and patient and the respective role of any other health care provider with respect to management of the patient; and (2) notified that he or she may decline to receive medical services by telemedicine and may withdraw from such care at any time.    Notes:   Individual Psychotherapy (PhD/LCSW)    2/17/2022    Therapeutic Intervention: Met with patient.  Outpatient - Insight oriented psychotherapy 45 min - CPT code 02219 and Outpatient - Supportive psychotherapy 45 min - CPT Code 59811    Chief complaint/reason for encounter: depression and anxiety     Interval history and content of current session: Patient returned for follow up psychotherapy. Patient reports that things are "okay". States that she has been busy at work. Also busy with job interviews. Since last visit has done 10 interviews. Feels like she is getting burnt out by the whole process right now. Feels frustrated that she has done so many interviews but hasn't gotten very far. States that she has started to think about doing contract work. Discussed that work has been busy. States that they wanted her to work a Saturday and she told them no because she doesn't get paid for it. States that she " and others have noticed that she is doing better at coping. Ex boyfriend told her that in the past not getting a job or all the problems at work would make her very anxious. States that she has been working on thinking things through and not going into a panic at the first signs of stress. Discussed the importance of staying consistent with coping skills to maintain the feelings that she has currently.     Treatment plan:  · Target symptoms: depression, anxiety   · Why chosen therapy is appropriate versus another modality: relevant to diagnosis, evidence based practice  · Outcome monitoring methods: self-report, observation  · Therapeutic intervention type: insight oriented psychotherapy, supportive psychotherapy    Risk parameters:  Patient reports no suicidal ideation  Patient reports no homicidal ideation  Patient reports no self-injurious behavior  Patient reports no violent behavior    Verbal deficits: None    Patient's response to intervention:  The patient's response to intervention is accepting.    Progress toward goals and other mental status changes:  The patient's progress toward goals is fair .    Diagnosis:     ICD-10-CM ICD-9-CM   1. Generalized anxiety disorder  F41.1 300.02       Plan:  individual psychotherapy    Return to clinic: 2 weeks, 1 month    Length of Service (minutes): 45     Cassandra Rockweiler, LCSW-BACS

## 2022-03-04 ENCOUNTER — OFFICE VISIT (OUTPATIENT)
Dept: PSYCHIATRY | Facility: CLINIC | Age: 47
End: 2022-03-04
Payer: COMMERCIAL

## 2022-03-04 DIAGNOSIS — F41.1 GENERALIZED ANXIETY DISORDER: Primary | ICD-10-CM

## 2022-03-04 PROCEDURE — 90834 PR PSYCHOTHERAPY W/PATIENT, 45 MIN: ICD-10-PCS | Mod: 95,,, | Performed by: SOCIAL WORKER

## 2022-03-04 PROCEDURE — 90834 PSYTX W PT 45 MINUTES: CPT | Mod: 95,,, | Performed by: SOCIAL WORKER
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
## 2022-03-16 ENCOUNTER — OFFICE VISIT (OUTPATIENT)
Dept: FAMILY MEDICINE | Facility: CLINIC | Age: 47
End: 2022-03-16
Payer: COMMERCIAL

## 2022-03-16 DIAGNOSIS — M25.50 ARTHRALGIA, UNSPECIFIED JOINT: ICD-10-CM

## 2022-03-16 DIAGNOSIS — R53.83 FATIGUE, UNSPECIFIED TYPE: Primary | ICD-10-CM

## 2022-03-16 PROCEDURE — 99214 PR OFFICE/OUTPT VISIT, EST, LEVL IV, 30-39 MIN: ICD-10-PCS | Mod: 95,,, | Performed by: FAMILY MEDICINE

## 2022-03-16 PROCEDURE — 99214 OFFICE O/P EST MOD 30 MIN: CPT | Mod: 95,,, | Performed by: FAMILY MEDICINE

## 2022-03-16 NOTE — PROGRESS NOTES
[facility]  Progress Note    SUBJECTIVE:     Chief Complaint   Patient presents with    Fatigue    Joint Pain       HPI   Leia Shultz  is a 47 y.o. female here for c/o feeling fatigued, cold, and having joint pains. No recent illness. Onset was sudden past week. She also noticed some random bruising, and is unsure if she hit it or not.     Review of patient's allergies indicates:   Allergen Reactions    Fire ant Anxiety, Blisters, Dermatitis, Hives, Itching, Rash and Swelling    Pollen extracts Dermatitis, Hives and Rash    Sulfa (sulfonamide antibiotics) Nausea Only       Past Medical History:   Diagnosis Date    Anemia     Arthritis     Heart murmur      Past Surgical History:   Procedure Laterality Date    DILATION AND CURETTAGE OF UTERUS  2002    gastric sleeve      LAPAROSCOPIC GASTRIC BANDING      ROOT CANAL      2    WISDOM TOOTH EXTRACTION      all 4     Family History   Problem Relation Age of Onset    Clotting disorder Mother     Hypotension Mother     Skin cancer Father     Crohn's disease Sister     Cancer Brother     No Known Problems Maternal Grandmother     No Known Problems Maternal Grandfather     No Known Problems Paternal Grandmother     No Known Problems Paternal Grandfather      Social History     Tobacco Use    Smoking status: Never Smoker    Smokeless tobacco: Never Used   Substance Use Topics    Alcohol use: Yes     Comment: once a week    Drug use: No        Review of Systems   Constitutional: Positive for malaise/fatigue.   HENT: Negative for hearing loss.    Eyes: Negative for discharge.   Respiratory: Negative for wheezing.    Cardiovascular: Negative for chest pain and palpitations.   Gastrointestinal: Negative for blood in stool, constipation, diarrhea and vomiting.   Genitourinary: Negative for dysuria and hematuria.   Musculoskeletal: Positive for joint pain. Negative for neck pain.   Neurological: Negative for weakness and headaches.    Endo/Heme/Allergies: Negative for polydipsia. Bruises/bleeds easily.     OBJECTIVE:   There were no vitals filed for this visit.  There is no height or weight on file to calculate BMI.    Physical Exam  Constitutional:       General: She is not in acute distress.     Appearance: She is not toxic-appearing or diaphoretic.   HENT:      Head: Normocephalic and atraumatic.   Eyes:      General:         Right eye: No foreign body.         Left eye: No foreign body.      Conjunctiva/sclera: Conjunctivae normal.      Right eye: Right conjunctiva is not injected. No exudate or hemorrhage.     Left eye: Left conjunctiva is not injected. No exudate or hemorrhage.  Pulmonary:      Effort: Pulmonary effort is normal. No respiratory distress.   Neurological:      Mental Status: She is oriented to person, place, and time. She is not lethargic.   Psychiatric:         Mood and Affect: Affect is not labile, blunt or inappropriate.         Behavior: Behavior is not agitated.         Old records were reviewed. Labs and/or images were independently reviewed.    ASSESSMENT     1. Fatigue, unspecified type    2. Arthralgia, unspecified joint        PLAN:     Fatigue, unspecified type  -     CBC Auto Differential; Future  -     APTT; Future; Expected date: 03/16/2022  -     Protime-INR; Future; Expected date: 03/16/2022  -     TSH; Future  -     T4, Free; Future  -     Comprehensive Metabolic Panel; Future  -     JERARDO Screen w/Reflex; Future; Expected date: 03/16/2022  -     Check basic labs, coag factors, and JERARDO to r/o autoimmune conditions.     Arthralgia, unspecified joint  -     CBC Auto Differential; Future  -     APTT; Future; Expected date: 03/16/2022  -     Protime-INR; Future; Expected date: 03/16/2022  -     TSH; Future  -     T4, Free; Future  -     Comprehensive Metabolic Panel; Future  -     JERARDO Screen w/Reflex; Future; Expected date: 03/16/2022      RTC JOSTIN Chacon MD  03/16/2022 1:27 PM

## 2022-03-17 ENCOUNTER — LAB VISIT (OUTPATIENT)
Dept: LAB | Facility: HOSPITAL | Age: 47
End: 2022-03-17
Attending: FAMILY MEDICINE
Payer: COMMERCIAL

## 2022-03-17 DIAGNOSIS — M25.50 ARTHRALGIA, UNSPECIFIED JOINT: ICD-10-CM

## 2022-03-17 DIAGNOSIS — R53.83 FATIGUE, UNSPECIFIED TYPE: ICD-10-CM

## 2022-03-17 LAB
ALBUMIN SERPL BCP-MCNC: 3.7 G/DL (ref 3.5–5.2)
ALP SERPL-CCNC: 56 U/L (ref 55–135)
ALT SERPL W/O P-5'-P-CCNC: 14 U/L (ref 10–44)
ANION GAP SERPL CALC-SCNC: 8 MMOL/L (ref 8–16)
APTT BLDCRRT: 25.8 SEC (ref 21–32)
AST SERPL-CCNC: 18 U/L (ref 10–40)
BASOPHILS # BLD AUTO: 0.06 K/UL (ref 0–0.2)
BASOPHILS NFR BLD: 0.9 % (ref 0–1.9)
BILIRUB SERPL-MCNC: 0.6 MG/DL (ref 0.1–1)
BUN SERPL-MCNC: 13 MG/DL (ref 6–20)
CALCIUM SERPL-MCNC: 9.1 MG/DL (ref 8.7–10.5)
CHLORIDE SERPL-SCNC: 105 MMOL/L (ref 95–110)
CO2 SERPL-SCNC: 25 MMOL/L (ref 23–29)
CREAT SERPL-MCNC: 0.7 MG/DL (ref 0.5–1.4)
DIFFERENTIAL METHOD: ABNORMAL
EOSINOPHIL # BLD AUTO: 0.2 K/UL (ref 0–0.5)
EOSINOPHIL NFR BLD: 2.8 % (ref 0–8)
ERYTHROCYTE [DISTWIDTH] IN BLOOD BY AUTOMATED COUNT: 12.4 % (ref 11.5–14.5)
EST. GFR  (AFRICAN AMERICAN): >60 ML/MIN/1.73 M^2
EST. GFR  (NON AFRICAN AMERICAN): >60 ML/MIN/1.73 M^2
GLUCOSE SERPL-MCNC: 87 MG/DL (ref 70–110)
HCT VFR BLD AUTO: 39.1 % (ref 37–48.5)
HGB BLD-MCNC: 12.7 G/DL (ref 12–16)
IMM GRANULOCYTES # BLD AUTO: 0.04 K/UL (ref 0–0.04)
IMM GRANULOCYTES NFR BLD AUTO: 0.6 % (ref 0–0.5)
INR PPP: 0.9 (ref 0.8–1.2)
LYMPHOCYTES # BLD AUTO: 2.1 K/UL (ref 1–4.8)
LYMPHOCYTES NFR BLD: 32 % (ref 18–48)
MCH RBC QN AUTO: 31.4 PG (ref 27–31)
MCHC RBC AUTO-ENTMCNC: 32.5 G/DL (ref 32–36)
MCV RBC AUTO: 97 FL (ref 82–98)
MONOCYTES # BLD AUTO: 0.6 K/UL (ref 0.3–1)
MONOCYTES NFR BLD: 9.2 % (ref 4–15)
NEUTROPHILS # BLD AUTO: 3.6 K/UL (ref 1.8–7.7)
NEUTROPHILS NFR BLD: 54.5 % (ref 38–73)
NRBC BLD-RTO: 0 /100 WBC
PLATELET # BLD AUTO: 322 K/UL (ref 150–450)
PMV BLD AUTO: 9 FL (ref 9.2–12.9)
POTASSIUM SERPL-SCNC: 4.2 MMOL/L (ref 3.5–5.1)
PROT SERPL-MCNC: 6.8 G/DL (ref 6–8.4)
PROTHROMBIN TIME: 9.8 SEC (ref 9–12.5)
RBC # BLD AUTO: 4.04 M/UL (ref 4–5.4)
SODIUM SERPL-SCNC: 138 MMOL/L (ref 136–145)
T4 FREE SERPL-MCNC: 1.05 NG/DL (ref 0.71–1.51)
TSH SERPL DL<=0.005 MIU/L-ACNC: 2.97 UIU/ML (ref 0.4–4)
WBC # BLD AUTO: 6.51 K/UL (ref 3.9–12.7)

## 2022-03-17 PROCEDURE — 80053 COMPREHEN METABOLIC PANEL: CPT | Performed by: FAMILY MEDICINE

## 2022-03-17 PROCEDURE — 85025 COMPLETE CBC W/AUTO DIFF WBC: CPT | Performed by: FAMILY MEDICINE

## 2022-03-17 PROCEDURE — 85730 THROMBOPLASTIN TIME PARTIAL: CPT | Performed by: FAMILY MEDICINE

## 2022-03-17 PROCEDURE — 84439 ASSAY OF FREE THYROXINE: CPT | Performed by: FAMILY MEDICINE

## 2022-03-17 PROCEDURE — 85610 PROTHROMBIN TIME: CPT | Performed by: FAMILY MEDICINE

## 2022-03-17 PROCEDURE — 84443 ASSAY THYROID STIM HORMONE: CPT | Performed by: FAMILY MEDICINE

## 2022-03-17 PROCEDURE — 36415 COLL VENOUS BLD VENIPUNCTURE: CPT | Mod: PN | Performed by: FAMILY MEDICINE

## 2022-03-17 PROCEDURE — 86038 ANTINUCLEAR ANTIBODIES: CPT | Performed by: FAMILY MEDICINE

## 2022-03-18 ENCOUNTER — OFFICE VISIT (OUTPATIENT)
Dept: PSYCHIATRY | Facility: CLINIC | Age: 47
End: 2022-03-18
Payer: COMMERCIAL

## 2022-03-18 DIAGNOSIS — F41.1 GENERALIZED ANXIETY DISORDER: Primary | ICD-10-CM

## 2022-03-18 LAB — ANA SER QL IF: NORMAL

## 2022-03-18 PROCEDURE — 90834 PSYTX W PT 45 MINUTES: CPT | Mod: 95,,, | Performed by: SOCIAL WORKER

## 2022-03-18 PROCEDURE — 90834 PR PSYCHOTHERAPY W/PATIENT, 45 MIN: ICD-10-PCS | Mod: 95,,, | Performed by: SOCIAL WORKER

## 2022-03-18 NOTE — PROGRESS NOTES
"The patient location is: [place]  The chief complaint leading to consultation is: anxiety, alcohol use    Visit type: audiovisual    Face to Face time with patient: 45 minutes  60 minutes of total time spent on the encounter, which includes face to face time and non-face to face time preparing to see the patient (eg, review of tests), Obtaining and/or reviewing separately obtained history, Documenting clinical information in the electronic or other health record, Independently interpreting results (not separately reported) and communicating results to the patient/family/caregiver, or Care coordination (not separately reported).     Each patient to whom he or she provides medical services by telemedicine is:  (1) informed of the relationship between the physician and patient and the respective role of any other health care provider with respect to management of the patient; and (2) notified that he or she may decline to receive medical services by telemedicine and may withdraw from such care at any time.    Notes:   Individual Psychotherapy (PhD/LCSW)    3/18/2022    Therapeutic Intervention: Met with patient.  Outpatient - Insight oriented psychotherapy 45 min - CPT code 60538 and Outpatient - Supportive psychotherapy 45 min - CPT Code 17907    Chief complaint/reason for encounter: depression and anxiety     Interval history and content of current session: Patient returned for follow up psychotherapy. Patient reports that she is "okay". Reports that she feels sad because she might have to get someone else to foster the puppy she has currently. States that she has been concerned about her health. States that she has been having problems with random bruising. Thought it might be her anemia. Saw a doctor via virtual and wanted to get labs. Waiting to get those back to see what the results are. Tuesday a coworker had a conversation that her contract wasn't going to be renewed. States that they thought it would be the " two of them that would get let go. Discussed issue with supervisor. She called to talk to her about something that isn't in her job duties. This was very annoying to patient. Did have a conversation with a  and thought it went well. Later that day  came in and told her that she wasn't on the list of people that might be let go. This was annoying to patient because she was told to look for a job when she isn't on the list of people that aren't getting contracts. After she was able to calm down able to see that this will work for her. States that she plans to continue to look for a job but doesn't feel pressured to leave for just any job but the job she wants. Discussed that she also plans to do some contract work to help with getting extra money and current non-school experience. Had fun on recent weekend trip that she took. Got food poisoning but was able to have fun regardless. Went with ex to trip and while there he brought up casual dating. Discussed that she spoke with him about her issues with getting back together.     Treatment plan:  · Target symptoms: depression, anxiety   · Why chosen therapy is appropriate versus another modality: relevant to diagnosis, evidence based practice  · Outcome monitoring methods: self-report, observation  · Therapeutic intervention type: insight oriented psychotherapy, supportive psychotherapy    Risk parameters:  Patient reports no suicidal ideation  Patient reports no homicidal ideation  Patient reports no self-injurious behavior  Patient reports no violent behavior    Verbal deficits: None    Patient's response to intervention:  The patient's response to intervention is accepting.    Progress toward goals and other mental status changes:  The patient's progress toward goals is fair .    Diagnosis:     ICD-10-CM ICD-9-CM   1. Generalized anxiety disorder  F41.1 300.02       Plan:  individual psychotherapy    Return to clinic: 2 weeks, 1 month    Length of  Service (minutes): 45     Cassandra Rockweiler, [facility]

## 2022-03-21 ENCOUNTER — PATIENT MESSAGE (OUTPATIENT)
Dept: ADMINISTRATIVE | Facility: HOSPITAL | Age: 47
End: 2022-03-21
Payer: COMMERCIAL

## 2022-03-30 ENCOUNTER — OFFICE VISIT (OUTPATIENT)
Dept: PSYCHIATRY | Facility: CLINIC | Age: 47
End: 2022-03-30
Payer: COMMERCIAL

## 2022-03-30 ENCOUNTER — PATIENT MESSAGE (OUTPATIENT)
Dept: PSYCHIATRY | Facility: CLINIC | Age: 47
End: 2022-03-30

## 2022-03-30 DIAGNOSIS — F41.1 GENERALIZED ANXIETY DISORDER: Primary | ICD-10-CM

## 2022-03-30 PROCEDURE — 90834 PR PSYCHOTHERAPY W/PATIENT, 45 MIN: ICD-10-PCS | Mod: 95,,, | Performed by: SOCIAL WORKER

## 2022-03-30 PROCEDURE — 90834 PSYTX W PT 45 MINUTES: CPT | Mod: 95,,, | Performed by: SOCIAL WORKER

## 2022-03-30 NOTE — PROGRESS NOTES
"The patient location is: [place]  The chief complaint leading to consultation is: anxiety, alcohol use    Visit type: audiovisual    Face to Face time with patient: 45 minutes  60 minutes of total time spent on the encounter, which includes face to face time and non-face to face time preparing to see the patient (eg, review of tests), Obtaining and/or reviewing separately obtained history, Documenting clinical information in the electronic or other health record, Independently interpreting results (not separately reported) and communicating results to the patient/family/caregiver, or Care coordination (not separately reported).     Each patient to whom he or she provides medical services by telemedicine is:  (1) informed of the relationship between the physician and patient and the respective role of any other health care provider with respect to management of the patient; and (2) notified that he or she may decline to receive medical services by telemedicine and may withdraw from such care at any time.    Notes:   Individual Psychotherapy (PhD/LCSW)    3/30/2022    Therapeutic Intervention: Met with patient.  Outpatient - Insight oriented psychotherapy 45 min - CPT code 41748 and Outpatient - Supportive psychotherapy 45 min - CPT Code 69758    Chief complaint/reason for encounter: depression and anxiety     Interval history and content of current session: Patient returned for follow up psychotherapy. Patient reports that she is "okay". Reports that since last session felt exhausted, cold, and body aches. States that she had to force herself to get up because she was just so exhausted. Did a first live show since 2019. Went from feeling so good to feeling very low and crying. States that later she got her period and that was horrible as well. Reports that she has no more filter at work. Discussed that she continues to have problems with supervisor. States that she doesn't understand . 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 States that supervisor later asked why this other work didn't get done and patient said that she couldn't get it done because she was doing another task. States that she maintains boundaries and not doing things that aren't her job. States that she continues to job interviews. Also looking into contract work so that she can leave job sooner rather than later. 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 Foster puppy went to another foster because it was getting to hard for patient's dog. Reports that mother has been very upset because her brother (patient's uncle) not doing well. States that uncle is an alcoholic but doesn't want to give up drinking. Mother is upset that patient isn't more upset. Reminds mother that she has lost a brother and knows what she is going through. States that she will be there for her but if uncle doesn't want to change they have to respect his choices.     Treatment plan:  · Target symptoms: depression, anxiety   · Why chosen therapy is appropriate versus another modality: relevant to diagnosis, evidence based practice  · Outcome monitoring methods: self-report, observation  · Therapeutic intervention type: insight oriented psychotherapy, supportive psychotherapy    Risk parameters:  Patient reports no suicidal ideation  Patient reports no homicidal ideation  Patient reports no self-injurious behavior  Patient reports no violent behavior    Verbal deficits: None    Patient's response to intervention:  The patient's response to intervention is accepting.    Progress toward goals and other mental status changes:  The patient's progress toward goals is fair .    Diagnosis:     ICD-10-CM ICD-9-CM   1. Generalized anxiety disorder  F41.1 300.02       Plan:  individual psychotherapy    Return to clinic: 2 weeks, 1 month    Length of Service (minutes): 45     Cassandra Rockweiler, LCSW-BACS

## 2022-03-31 DIAGNOSIS — Z12.11 SCREENING FOR COLON CANCER: ICD-10-CM

## 2022-04-01 ENCOUNTER — PATIENT MESSAGE (OUTPATIENT)
Dept: PSYCHIATRY | Facility: CLINIC | Age: 47
End: 2022-04-01
Payer: COMMERCIAL

## 2022-04-12 ENCOUNTER — OFFICE VISIT (OUTPATIENT)
Dept: PSYCHIATRY | Facility: CLINIC | Age: 47
End: 2022-04-12
Payer: COMMERCIAL

## 2022-04-12 DIAGNOSIS — F41.1 GENERALIZED ANXIETY DISORDER: Primary | ICD-10-CM

## 2022-04-12 PROCEDURE — 90832 PSYTX W PT 30 MINUTES: CPT | Mod: 95,,, | Performed by: SOCIAL WORKER

## 2022-04-12 PROCEDURE — 90832 PR PSYCHOTHERAPY W/PATIENT, 30 MIN: ICD-10-PCS | Mod: 95,,, | Performed by: SOCIAL WORKER

## 2022-04-12 NOTE — PROGRESS NOTES
"The patient location is: [place]  The chief complaint leading to consultation is: anxiety, alcohol use    Visit type: audiovisual    Face to Face time with patient: 30 minutes  35 minutes of total time spent on the encounter, which includes face to face time and non-face to face time preparing to see the patient (eg, review of tests), Obtaining and/or reviewing separately obtained history, Documenting clinical information in the electronic or other health record, Independently interpreting results (not separately reported) and communicating results to the patient/family/caregiver, or Care coordination (not separately reported).     Each patient to whom he or she provides medical services by telemedicine is:  (1) informed of the relationship between the physician and patient and the respective role of any other health care provider with respect to management of the patient; and (2) notified that he or she may decline to receive medical services by telemedicine and may withdraw from such care at any time.    Notes:   Individual Psychotherapy (PhD/LCSW)    4/12/2022    Therapeutic Intervention: Met with patient.  Outpatient - Insight oriented psychotherapy 30 min - CPT code 49117 and Outpatient - Supportive psychotherapy 30 min - CPT Code 79566    Chief complaint/reason for encounter: depression and anxiety     Interval history and content of current session: Patient returned for follow up psychotherapy. Patient reports that she is "okay". 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 States that the scope will be much narrower and she is looking forward to that. Excited to leave and being in a new area of [place]. Is going to work from home and has several other benefits that she is excited about. Irritated about how boss reacted when she told her that she was leaving. States that boss acted very childish for the remainder of the week. Discussed that she is excited to " see what next week brings.     Treatment plan:  · Target symptoms: depression, anxiety   · Why chosen therapy is appropriate versus another modality: relevant to diagnosis, evidence based practice  · Outcome monitoring methods: self-report, observation  · Therapeutic intervention type: insight oriented psychotherapy, supportive psychotherapy    Risk parameters:  Patient reports no suicidal ideation  Patient reports no homicidal ideation  Patient reports no self-injurious behavior  Patient reports no violent behavior    Verbal deficits: None    Patient's response to intervention:  The patient's response to intervention is accepting.    Progress toward goals and other mental status changes:  The patient's progress toward goals is fair .    Diagnosis:     ICD-10-CM ICD-9-CM   1. Generalized anxiety disorder  F41.1 300.02       Plan:  individual psychotherapy    Return to clinic: 2 weeks, 1 month    Length of Service (minutes): 30     Cassandra Rockweiler, LCSW-[facility]

## 2022-04-13 DIAGNOSIS — Z12.11 COLON CANCER SCREENING: ICD-10-CM

## 2022-04-27 ENCOUNTER — OFFICE VISIT (OUTPATIENT)
Dept: PSYCHIATRY | Facility: CLINIC | Age: 47
End: 2022-04-27
Payer: COMMERCIAL

## 2022-04-27 ENCOUNTER — PATIENT OUTREACH (OUTPATIENT)
Dept: ADMINISTRATIVE | Facility: HOSPITAL | Age: 47
End: 2022-04-27
Payer: COMMERCIAL

## 2022-04-27 ENCOUNTER — PATIENT MESSAGE (OUTPATIENT)
Dept: ADMINISTRATIVE | Facility: HOSPITAL | Age: 47
End: 2022-04-27
Payer: COMMERCIAL

## 2022-04-27 DIAGNOSIS — F41.1 GENERALIZED ANXIETY DISORDER: Primary | ICD-10-CM

## 2022-04-27 PROCEDURE — 90834 PR PSYCHOTHERAPY W/PATIENT, 45 MIN: ICD-10-PCS | Mod: 95,,, | Performed by: SOCIAL WORKER

## 2022-04-27 PROCEDURE — 90834 PSYTX W PT 45 MINUTES: CPT | Mod: 95,,, | Performed by: SOCIAL WORKER

## 2022-04-27 NOTE — PROGRESS NOTES
"The patient location is: [place]  The chief complaint leading to consultation is: anxiety, alcohol use    Visit type: audiovisual    Face to Face time with patient: 30 minutes  35 minutes of total time spent on the encounter, which includes face to face time and non-face to face time preparing to see the patient (eg, review of tests), Obtaining and/or reviewing separately obtained history, Documenting clinical information in the electronic or other health record, Independently interpreting results (not separately reported) and communicating results to the patient/family/caregiver, or Care coordination (not separately reported).     Each patient to whom he or she provides medical services by telemedicine is:  (1) informed of the relationship between the physician and patient and the respective role of any other health care provider with respect to management of the patient; and (2) notified that he or she may decline to receive medical services by telemedicine and may withdraw from such care at any time.    Notes:   Individual Psychotherapy (PhD/LCSW)    4/27/2022    Therapeutic Intervention: Met with patient.  Outpatient - Insight oriented psychotherapy 45 min - CPT code 22663 and Outpatient - Supportive psychotherapy 45 min - CPT Code 86608    Chief complaint/reason for encounter: depression and anxiety     Interval history and content of current session: Patient returned for follow up psychotherapy. Patient reports that she is "good". In second week of new job. States that everything is great. States that the work load is much less and very flexible. Due to no commute she is working out in the morning. Wants to focus on losing the last thirty pounds so that at the beginning of next year she can get the skin surgery. Did a 5k on Saturday and had a lot of fun. It was a mud run and was surprised that she was able to do all the obstacles. Gained 4 pounds since November and this triggered a lot of feelings about her " weight. Plans to do a 6 week program to jump start her weight loss. Continues to journal regularly and going to online AA meetings. Switched to a new group because last group was fitting all her needs. New group is much more open and not so focused on the steps. Reports that things with ex have been good. They met up after the mud run. Doing casual dating things, like movies or dinner. Doesn't feel like she needs to see him every day like she was in the past. Discussed plans that she is going to make for his birthday this year. Is excited because for the first time she will have money that she feel comfortable spending and doing something fun for the both of them.     Treatment plan:  · Target symptoms: depression, anxiety   · Why chosen therapy is appropriate versus another modality: relevant to diagnosis, evidence based practice  · Outcome monitoring methods: self-report, observation  · Therapeutic intervention type: insight oriented psychotherapy, supportive psychotherapy    Risk parameters:  Patient reports no suicidal ideation  Patient reports no homicidal ideation  Patient reports no self-injurious behavior  Patient reports no violent behavior    Verbal deficits: None    Patient's response to intervention:  The patient's response to intervention is accepting.    Progress toward goals and other mental status changes:  The patient's progress toward goals is fair .    Diagnosis:     ICD-10-CM ICD-9-CM   1. Generalized anxiety disorder  F41.1 300.02       Plan:  individual psychotherapy    Return to clinic: 2 weeks, 1 month    Length of Service (minutes): 45     Cassandra Rockweiler, [facility]CS[facility]

## 2022-05-02 ENCOUNTER — PATIENT MESSAGE (OUTPATIENT)
Dept: ADMINISTRATIVE | Facility: HOSPITAL | Age: 47
End: 2022-05-02
Payer: COMMERCIAL

## 2022-05-05 ENCOUNTER — PATIENT MESSAGE (OUTPATIENT)
Dept: PSYCHIATRY | Facility: CLINIC | Age: 47
End: 2022-05-05
Payer: COMMERCIAL

## 2022-05-16 ENCOUNTER — PATIENT MESSAGE (OUTPATIENT)
Dept: ADMINISTRATIVE | Facility: HOSPITAL | Age: 47
End: 2022-05-16
Payer: COMMERCIAL

## 2022-05-16 ENCOUNTER — PATIENT MESSAGE (OUTPATIENT)
Dept: PSYCHIATRY | Facility: CLINIC | Age: 47
End: 2022-05-16
Payer: COMMERCIAL

## 2022-05-18 ENCOUNTER — OFFICE VISIT (OUTPATIENT)
Dept: PSYCHIATRY | Facility: CLINIC | Age: 47
End: 2022-05-18
Payer: COMMERCIAL

## 2022-05-18 DIAGNOSIS — F41.1 GENERALIZED ANXIETY DISORDER: Primary | ICD-10-CM

## 2022-05-18 PROCEDURE — 90834 PR PSYCHOTHERAPY W/PATIENT, 45 MIN: ICD-10-PCS | Mod: 95,,, | Performed by: SOCIAL WORKER

## 2022-05-18 PROCEDURE — 90834 PSYTX W PT 45 MINUTES: CPT | Mod: 95,,, | Performed by: SOCIAL WORKER

## 2022-05-18 NOTE — PROGRESS NOTES
"The patient location is: [place]  The chief complaint leading to consultation is: anxiety, alcohol use    Visit type: audiovisual    Face to Face time with patient: 45 minutes  60 minutes of total time spent on the encounter, which includes face to face time and non-face to face time preparing to see the patient (eg, review of tests), Obtaining and/or reviewing separately obtained history, Documenting clinical information in the electronic or other health record, Independently interpreting results (not separately reported) and communicating results to the patient/family/caregiver, or Care coordination (not separately reported).     Each patient to whom he or she provides medical services by telemedicine is:  (1) informed of the relationship between the physician and patient and the respective role of any other health care provider with respect to management of the patient; and (2) notified that he or she may decline to receive medical services by telemedicine and may withdraw from such care at any time.    Notes:   Individual Psychotherapy (PhD/LCSW)    5/18/2022    Therapeutic Intervention: Met with patient.  Outpatient - Insight oriented psychotherapy 45 min - CPT code 16348 and Outpatient - Supportive psychotherapy 45 min - CPT Code 47298    Chief complaint/reason for encounter: depression and anxiety     Interval history and content of current session: Patient returned for follow up psychotherapy. Therapy started 15 minutes due to technical difficulties. Patient reports that she is "a mess". Reports that job is going great. Doesn't have a lot of work to do just yet. States that she has had thoughts of them realizing that they don't need her and will fire her. States that she had meeting with boss and she was very happy with patient's performance. 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 Has felt very critical of herself as of late. Discussed reframing to neutral thoughts. Discussed how " the ebb and flow of mood and the importance of consistency. Finds herself over thinking everything. States that mood has been going on about a week. Discussed the importance of doing self care and or adding in valued activities to help with mood.     Treatment plan:  · Target symptoms: depression, anxiety   · Why chosen therapy is appropriate versus another modality: relevant to diagnosis, evidence based practice  · Outcome monitoring methods: self-report, observation  · Therapeutic intervention type: insight oriented psychotherapy, supportive psychotherapy    Risk parameters:  Patient reports no suicidal ideation  Patient reports no homicidal ideation  Patient reports no self-injurious behavior  Patient reports no violent behavior    Verbal deficits: None    Patient's response to intervention:  The patient's response to intervention is accepting.    Progress toward goals and other mental status changes:  The patient's progress toward goals is fair .    Diagnosis:     ICD-10-CM ICD-9-CM   1. Generalized anxiety disorder  F41.1 300.02       Plan:  individual psychotherapy    Return to clinic: 2 weeks, 1 month    Length of Service (minutes): 45     Cassandra Rockweiler, LCSW-BACS

## 2022-05-20 ENCOUNTER — OFFICE VISIT (OUTPATIENT)
Dept: FAMILY MEDICINE | Facility: CLINIC | Age: 47
End: 2022-05-20
Payer: COMMERCIAL

## 2022-05-20 DIAGNOSIS — T78.40XA ALLERGIC REACTION, INITIAL ENCOUNTER: Primary | ICD-10-CM

## 2022-05-20 PROCEDURE — 99213 OFFICE O/P EST LOW 20 MIN: CPT | Mod: 95,,, | Performed by: STUDENT IN AN ORGANIZED HEALTH CARE EDUCATION/TRAINING PROGRAM

## 2022-05-20 PROCEDURE — 99213 PR OFFICE/OUTPT VISIT, EST, LEVL III, 20-29 MIN: ICD-10-PCS | Mod: 95,,, | Performed by: STUDENT IN AN ORGANIZED HEALTH CARE EDUCATION/TRAINING PROGRAM

## 2022-05-20 RX ORDER — PREDNISONE 20 MG/1
60 TABLET ORAL DAILY
Qty: 15 TABLET | Refills: 1 | Status: SHIPPED | OUTPATIENT
Start: 2022-05-20 | End: 2022-05-25

## 2022-05-20 NOTE — PROGRESS NOTES
05/20/2022    Leia Shultz  [id number]    CC: fire ant bites    HPI    Allergic to fire ant bites  Was bitten only twice but the area is swollen and red and she has a funny sensation in her throat  It has been several hours. Denies any Hives, SOB, or chest pain  Took benadryl and prednisone.  Just wanted to know the scenario when she should be using and epipen        Negative 10 point ROS outside of HPI    Social History     Socioeconomic History    Marital status:    Tobacco Use    Smoking status: Never Smoker    Smokeless tobacco: Never Used   Substance and Sexual Activity    Alcohol use: Yes     Comment: once a week    Drug use: No    Sexual activity: Yes     Partners: Male     Birth control/protection: I.U.D.     Comment: 11/21/17 -vasectomy     Social Determinants of Health     Financial Resource Strain: Low Risk     Difficulty of Paying Living Expenses: Not hard at all   Food Insecurity: No Food Insecurity    Worried About Running Out of Food in the Last Year: Never true    Ran Out of Food in the Last Year: Never true   Transportation Needs: No Transportation Needs    Lack of Transportation (Medical): No    Lack of Transportation (Non-Medical): No   Physical Activity: Sufficiently Active    Days of Exercise per Week: 7 days    Minutes of Exercise per Session: 40 min   Stress: Stress Concern Present    Feeling of Stress : To some extent   Social Connections: Unknown    Frequency of Communication with Friends and Family: More than three times a week    Frequency of Social Gatherings with Friends and Family: Twice a week    Active Member of Clubs or Organizations: No    Attends Club or Organization Meetings: Never    Marital Status:    Housing Stability: Low Risk     Unable to Pay for Housing in the Last Year: No    Number of Places Lived in the Last Year: 1    Unstable Housing in the Last Year: No           Current Outpatient Medications:     EPINEPHrine (EPIPEN  2-KEVIN) 0.3 mg/0.3 mL AtIn, Inject 0.3 mLs (0.3 mg total) into the muscle once. for 1 dose, Disp: 2 each, Rfl: 0    ergocalciferol, vitamin D2, (VITAMIN D ORAL), Take by mouth once daily., Disp: , Rfl:     INV folate 800 MCG tablet, Take 800 mcg by mouth 2 (two) times daily. FOR INVESTIGATIONAL USE ONLY, Disp: , Rfl:     MULTIVITAMIN ORAL, Take by mouth once daily., Disp: , Rfl:     mupirocin (BACTROBAN) 2 % ointment, Apply topically 3 (three) times daily. (Patient not taking: Reported on 12/27/2021), Disp: 30 g, Rfl: 1    traZODone (DESYREL) 100 MG tablet, Take 1 tablet (100 mg total) by mouth every evening., Disp: 30 tablet, Rfl: 5      Physical Exam  Vitals unable to obtain    Gen: well appearing  Resp: speaks in full sentences. Non labored breathing  Cv: non-diaphoretic      1. Allergic reaction, initial encounter  - REFILLED predniSONE (DELTASONE) 20 MG tablet; Take 3 tablets (60 mg total) by mouth once daily. for 5 days  Dispense: 15 tablet; Refill: 1    Discussed signs and symptoms to use epi-pen and then present to the ED      RTC as needed    Ledy Dominguez MD  Family Medicine

## 2022-05-24 ENCOUNTER — TELEPHONE (OUTPATIENT)
Dept: FAMILY MEDICINE | Facility: CLINIC | Age: 47
End: 2022-05-24
Payer: COMMERCIAL

## 2022-05-25 ENCOUNTER — OFFICE VISIT (OUTPATIENT)
Dept: FAMILY MEDICINE | Facility: CLINIC | Age: 47
End: 2022-05-25
Payer: COMMERCIAL

## 2022-05-25 DIAGNOSIS — Z12.11 COLON CANCER SCREENING: Primary | ICD-10-CM

## 2022-05-25 PROCEDURE — 99214 PR OFFICE/OUTPT VISIT, EST, LEVL IV, 30-39 MIN: ICD-10-PCS | Mod: 95,,, | Performed by: FAMILY MEDICINE

## 2022-05-25 PROCEDURE — 99214 OFFICE O/P EST MOD 30 MIN: CPT | Mod: 95,,, | Performed by: FAMILY MEDICINE

## 2022-05-26 LAB — HEMOCCULT STL QL IA: NORMAL

## 2022-06-06 ENCOUNTER — OFFICE VISIT (OUTPATIENT)
Dept: PSYCHIATRY | Facility: CLINIC | Age: 47
End: 2022-06-06
Payer: COMMERCIAL

## 2022-06-06 DIAGNOSIS — F41.1 GENERALIZED ANXIETY DISORDER: Primary | ICD-10-CM

## 2022-06-06 PROCEDURE — 90834 PSYTX W PT 45 MINUTES: CPT | Mod: 95,,, | Performed by: SOCIAL WORKER

## 2022-06-06 PROCEDURE — 90834 PR PSYCHOTHERAPY W/PATIENT, 45 MIN: ICD-10-PCS | Mod: 95,,, | Performed by: SOCIAL WORKER

## 2022-06-06 NOTE — PROGRESS NOTES
"The patient location is: [place]  The chief complaint leading to consultation is: anxiety    Visit type: audiovisual    Face to Face time with patient: 45 minutes  60 minutes of total time spent on the encounter, which includes face to face time and non-face to face time preparing to see the patient (eg, review of tests), Obtaining and/or reviewing separately obtained history, Documenting clinical information in the electronic or other health record, Independently interpreting results (not separately reported) and communicating results to the patient/family/caregiver, or Care coordination (not separately reported).     Each patient to whom he or she provides medical services by telemedicine is:  (1) informed of the relationship between the physician and patient and the respective role of any other health care provider with respect to management of the patient; and (2) notified that he or she may decline to receive medical services by telemedicine and may withdraw from such care at any time.    Notes:   Individual Psychotherapy (PhD/LCSW)    6/6/2022    Therapeutic Intervention: Met with patient.  Outpatient - Insight oriented psychotherapy 45 min - CPT code 59355 and Outpatient - Supportive psychotherapy 45 min - CPT Code 81804    Chief complaint/reason for encounter: depression and anxiety     Interval history and content of current session: Patient returned for follow up psychotherapy. Reports that she is "exhausted". States that she has been doing a lot of tests with PCP because of her fatigue. Has had some issues but did not have a meltdown. States that she had people over on Saturday and then oven just stopped. States that yesterday was mowing and saw that water was leaking. Was proud of how she handled both situation. States that in the past she would have had a meltdown but didn't. Discussed book that she is reading. States that one thing that stuck out to her is that the author said to choose guilt over " resentment. States that in the past she would do things because she thought she needed to do these things because they are expected. States that friend told her that she struggles to relax. Discussed that if she does nothing she doesn't feel productive.     Treatment plan:  · Target symptoms: depression, anxiety   · Why chosen therapy is appropriate versus another modality: relevant to diagnosis, evidence based practice  · Outcome monitoring methods: self-report, observation  · Therapeutic intervention type: insight oriented psychotherapy, supportive psychotherapy    Risk parameters:  Patient reports no suicidal ideation  Patient reports no homicidal ideation  Patient reports no self-injurious behavior  Patient reports no violent behavior    Verbal deficits: None    Patient's response to intervention:  The patient's response to intervention is accepting.    Progress toward goals and other mental status changes:  The patient's progress toward goals is fair .    Diagnosis:     ICD-10-CM ICD-9-CM   1. Generalized anxiety disorder  F41.1 300.02       Plan:  individual psychotherapy    Return to clinic: 2 weeks, 1 month    Length of Service (minutes): 45     Cassandra Rockweiler, LCSW-BACS

## 2022-06-08 LAB — NONINV COLON CA DNA+OCC BLD SCRN STL QL: NEGATIVE

## 2022-06-22 ENCOUNTER — OFFICE VISIT (OUTPATIENT)
Dept: PSYCHIATRY | Facility: CLINIC | Age: 47
End: 2022-06-22
Payer: COMMERCIAL

## 2022-06-22 DIAGNOSIS — F41.1 GENERALIZED ANXIETY DISORDER: Primary | ICD-10-CM

## 2022-06-22 PROCEDURE — 90834 PR PSYCHOTHERAPY W/PATIENT, 45 MIN: ICD-10-PCS | Mod: 95,,, | Performed by: SOCIAL WORKER

## 2022-06-22 PROCEDURE — 90834 PSYTX W PT 45 MINUTES: CPT | Mod: 95,,, | Performed by: SOCIAL WORKER

## 2022-06-22 NOTE — PROGRESS NOTES
"The patient location is: [place]  The chief complaint leading to consultation is: anxiety    Visit type: audiovisual    Face to Face time with patient: 45 minutes  60 minutes of total time spent on the encounter, which includes face to face time and non-face to face time preparing to see the patient (eg, review of tests), Obtaining and/or reviewing separately obtained history, Documenting clinical information in the electronic or other health record, Independently interpreting results (not separately reported) and communicating results to the patient/family/caregiver, or Care coordination (not separately reported).     Each patient to whom he or she provides medical services by telemedicine is:  (1) informed of the relationship between the physician and patient and the respective role of any other health care provider with respect to management of the patient; and (2) notified that he or she may decline to receive medical services by telemedicine and may withdraw from such care at any time.    Notes:   Individual Psychotherapy (PhD/LCSW)    6/22/2022    Therapeutic Intervention: Met with patient.  Outpatient - Insight oriented psychotherapy 45 min - CPT code 59433 and Outpatient - Supportive psychotherapy 45 min - CPT Code 29803    Chief complaint/reason for encounter: depression and anxiety     Interval history and content of current session: Patient returned for follow up psychotherapy. Reports that she is "mell".  Last week was the anniversary of brother's death as well as his birthday so that was difficult. States that hasn't been feeling good physically. Think that she is starting to go through menopause. Has been having hot flashes at night that are disrupting her sleep. Feels like she is more "cranky" then she has been in the past. Discussed how she had a day last week where she felt like she was trying to be overly positive and then felt weird after. Discussed that she was over compensating and told a " coworker that if they needed a place to evacuate they could come to patient's home. States that after she felt weird and wasn't sure why she did that. States that she thinks that symptoms are starting to effect her work. Doesn't feel as focused as she would like to be.  Discussed that she has a friend that has been trying to take her on a date. States that she has told him no multiple times but he continues to ask. Due to crankiness has had to stop talking to him because she didn't want to be mean. Discussed that she is getting better at saying no to events but finds it difficult to say no to people. Discussed that since starting new job anxiety has lessen significantly. Discussed talking with OB about hormones.         Treatment plan:  · Target symptoms: depression, anxiety   · Why chosen therapy is appropriate versus another modality: relevant to diagnosis, evidence based practice  · Outcome monitoring methods: self-report, observation  · Therapeutic intervention type: insight oriented psychotherapy, supportive psychotherapy    Risk parameters:  Patient reports no suicidal ideation  Patient reports no homicidal ideation  Patient reports no self-injurious behavior  Patient reports no violent behavior    Verbal deficits: None    Patient's response to intervention:  The patient's response to intervention is accepting.    Progress toward goals and other mental status changes:  The patient's progress toward goals is fair .    Diagnosis:     ICD-10-CM ICD-9-CM   1. Generalized anxiety disorder  F41.1 300.02       Plan:  individual psychotherapy    Return to clinic: 2 weeks, 1 month    Length of Service (minutes): 45     Cassandra Rockweiler, [facility]

## 2022-07-06 ENCOUNTER — PATIENT MESSAGE (OUTPATIENT)
Dept: FAMILY MEDICINE | Facility: CLINIC | Age: 47
End: 2022-07-06
Payer: COMMERCIAL

## 2022-07-06 ENCOUNTER — TELEPHONE (OUTPATIENT)
Dept: BARIATRICS | Facility: CLINIC | Age: 47
End: 2022-07-06
Payer: COMMERCIAL

## 2022-07-06 ENCOUNTER — PATIENT MESSAGE (OUTPATIENT)
Dept: OBSTETRICS AND GYNECOLOGY | Facility: CLINIC | Age: 47
End: 2022-07-06
Payer: COMMERCIAL

## 2022-07-06 NOTE — TELEPHONE ENCOUNTER
Informed patient that bariatrics only except patients that have had surgery here at bariatrics. Patient stated that she would call her pcp to get a referral for a dietician

## 2022-07-06 NOTE — TELEPHONE ENCOUNTER
----- Message from Angela Corral sent at 7/6/2022 11:58 AM CDT -----  Regarding: Appt  Contact: PT @ [phone]  Pt is calling to speak to someone in bariatric dietician's office. Pt states that she previously had the band in 2009 and was removed, then had gastric sleeve in 2014 back in [place] and was advised by her PCP to schedule with a dietician.     Pt states that she has gained weight since her surgery and would like to schedule. Pt had advised me that she has already called her insurance and was advised to schedule w/ Letha Portillo. Pt would like to be scheduled in the [facility]. Please call.

## 2022-07-08 ENCOUNTER — OFFICE VISIT (OUTPATIENT)
Dept: PSYCHIATRY | Facility: CLINIC | Age: 47
End: 2022-07-08
Payer: COMMERCIAL

## 2022-07-08 DIAGNOSIS — Z01.419 WELL WOMAN EXAM WITH ROUTINE GYNECOLOGICAL EXAM: Primary | ICD-10-CM

## 2022-07-08 DIAGNOSIS — F41.1 GENERALIZED ANXIETY DISORDER: Primary | ICD-10-CM

## 2022-07-08 PROCEDURE — 90834 PSYTX W PT 45 MINUTES: CPT | Mod: 95,,, | Performed by: SOCIAL WORKER

## 2022-07-08 PROCEDURE — 90834 PR PSYCHOTHERAPY W/PATIENT, 45 MIN: ICD-10-PCS | Mod: 95,,, | Performed by: SOCIAL WORKER

## 2022-07-08 NOTE — PROGRESS NOTES
"The patient location is: [place]  The chief complaint leading to consultation is: anxiety    Visit type: audiovisual    Face to Face time with patient: 45 minutes  60 minutes of total time spent on the encounter, which includes face to face time and non-face to face time preparing to see the patient (eg, review of tests), Obtaining and/or reviewing separately obtained history, Documenting clinical information in the electronic or other health record, Independently interpreting results (not separately reported) and communicating results to the patient/family/caregiver, or Care coordination (not separately reported).     Each patient to whom he or she provides medical services by telemedicine is:  (1) informed of the relationship between the physician and patient and the respective role of any other health care provider with respect to management of the patient; and (2) notified that he or she may decline to receive medical services by telemedicine and may withdraw from such care at any time.    Notes:   Individual Psychotherapy (PhD/LCSW)    7/8/2022    Therapeutic Intervention: Met with patient.  Outpatient - Insight oriented psychotherapy 45 min - CPT code 73224 and Outpatient - Supportive psychotherapy 45 min - CPT Code 11960    Chief complaint/reason for encounter: depression and anxiety     Interval history and content of current session: Patient returned for follow up psychotherapy. Reports that "a lot" has been going on. Got a message last week about a derogatory remark on her credit report. Reports that she feels overwhelmed and exhausted. Discussed how she feels a little bit of imposter syndrome at work. Doesn't feel like she knows enough about the industry just yet and also doesn't feel like she is doing "enough" work.  Made a list of tasks that she would like to do on a regular day. Discussed that this is a good tool to help with doing tasks that help her mental health.     Treatment plan:  · Target " symptoms: depression, anxiety   · Why chosen therapy is appropriate versus another modality: relevant to diagnosis, evidence based practice  · Outcome monitoring methods: self-report, observation  · Therapeutic intervention type: insight oriented psychotherapy, supportive psychotherapy    Risk parameters:  Patient reports no suicidal ideation  Patient reports no homicidal ideation  Patient reports no self-injurious behavior  Patient reports no violent behavior    Verbal deficits: None    Patient's response to intervention:  The patient's response to intervention is accepting.    Progress toward goals and other mental status changes:  The patient's progress toward goals is fair .    Diagnosis:     ICD-10-CM ICD-9-CM   1. Generalized anxiety disorder  F41.1 300.02       Plan:  individual psychotherapy    Return to clinic: 2 weeks, 1 month    Length of Service (minutes): 45     Cassandra Rockweiler, LCSW-ELMER

## 2022-07-12 ENCOUNTER — LAB VISIT (OUTPATIENT)
Dept: LAB | Facility: HOSPITAL | Age: 47
End: 2022-07-12
Attending: OBSTETRICS & GYNECOLOGY
Payer: COMMERCIAL

## 2022-07-12 ENCOUNTER — PATIENT MESSAGE (OUTPATIENT)
Dept: FAMILY MEDICINE | Facility: CLINIC | Age: 47
End: 2022-07-12
Payer: COMMERCIAL

## 2022-07-12 DIAGNOSIS — Z01.419 WELL WOMAN EXAM WITH ROUTINE GYNECOLOGICAL EXAM: ICD-10-CM

## 2022-07-12 LAB — FSH SERPL-ACNC: 11.26 MIU/ML

## 2022-07-12 PROCEDURE — 36415 COLL VENOUS BLD VENIPUNCTURE: CPT | Mod: PN | Performed by: OBSTETRICS & GYNECOLOGY

## 2022-07-12 PROCEDURE — 83001 ASSAY OF GONADOTROPIN (FSH): CPT | Performed by: OBSTETRICS & GYNECOLOGY

## 2022-07-12 NOTE — TELEPHONE ENCOUNTER
Spoke with patient and advised her to contact her pharmacy for prescription interaction questions. Patient understood.

## 2022-07-18 NOTE — PROGRESS NOTES
The patient location is: [place]  The chief complaint leading to consultation is: colon cancer screening    Visit type: audiovisual    Face to Face time with patient: 10 minutes of total time spent on the encounter, which includes face to face time and non-face to face time preparing to see the patient (eg, review of tests), Obtaining and/or reviewing separately obtained history, Documenting clinical information in the electronic or other health record, Independently interpreting results (not separately reported) and communicating results to the patient/family/caregiver, or Care coordination (not separately reported).         Each patient to whom he or she provides medical services by telemedicine is:  (1) informed of the relationship between the physician and patient and the respective role of any other health care provider with respect to management of the patient; and (2) notified that he or she may decline to receive medical services by telemedicine and may withdraw from such care at any time.    Notes:       Routine Office Visit    Patient Name: Leia Shultz    : 1975  MRN: [id number]    Subjective:  Leia is a 47 y.o. female who presents today for:    1. Colon cancer screening  Patient following up today for colon cancer screening options.  She has not had hers since new recommendation has come out about lowering the age to 45.  She has no symptoms and states overall she feels fine.  No recent changes to bowel habits.  No blood in stool that she is aware of.  She denies any abdominal pain or unexplained weight loss    Past Medical History  Past Medical History:   Diagnosis Date    Anemia     Arthritis     Heart murmur        Past Surgical History  Past Surgical History:   Procedure Laterality Date    DILATION AND CURETTAGE OF UTERUS      gastric sleeve      LAPAROSCOPIC GASTRIC BANDING      ROOT CANAL      2    WISDOM TOOTH EXTRACTION      all 4       Family History  Family History    Problem Relation Age of Onset    Clotting disorder Mother     Hypotension Mother     Skin cancer Father     Crohn's disease Sister     Cancer Brother     No Known Problems Maternal Grandmother     No Known Problems Maternal Grandfather     No Known Problems Paternal Grandmother     No Known Problems Paternal Grandfather        Social History  Social History     Socioeconomic History    Marital status:    Tobacco Use    Smoking status: Never Smoker    Smokeless tobacco: Never Used   Substance and Sexual Activity    Alcohol use: Yes     Comment: once a week    Drug use: No    Sexual activity: Yes     Partners: Male     Birth control/protection: I.U.D.     Comment: 11/21/17 -vasectomy     Social Determinants of Health     Financial Resource Strain: Low Risk     Difficulty of Paying Living Expenses: Not hard at all   Food Insecurity: No Food Insecurity    Worried About Running Out of Food in the Last Year: Never true    Ran Out of Food in the Last Year: Never true   Transportation Needs: No Transportation Needs    Lack of Transportation (Medical): No    Lack of Transportation (Non-Medical): No   Physical Activity: Sufficiently Active    Days of Exercise per Week: 6 days    Minutes of Exercise per Session: 60 min   Stress: Stress Concern Present    Feeling of Stress : To some extent   Social Connections: Unknown    Frequency of Communication with Friends and Family: More than three times a week    Frequency of Social Gatherings with Friends and Family: Three times a week    Active Member of Clubs or Organizations: No    Attends Club or Organization Meetings: Never    Marital Status:    Housing Stability: Low Risk     Unable to Pay for Housing in the Last Year: No    Number of Places Lived in the Last Year: 1    Unstable Housing in the Last Year: No       Current Medications  Current Outpatient Medications on File Prior to Visit   Medication Sig Dispense Refill     EPINEPHrine (EPIPEN 2-KEVIN) 0.3 mg/0.3 mL AtIn Inject 0.3 mLs (0.3 mg total) into the muscle once. for 1 dose 2 each 0    ergocalciferol, vitamin D2, (VITAMIN D ORAL) Take by mouth once daily.      INV folate 800 MCG tablet Take 800 mcg by mouth 2 (two) times daily. FOR INVESTIGATIONAL USE ONLY      MULTIVITAMIN ORAL Take by mouth once daily.      mupirocin (BACTROBAN) 2 % ointment Apply topically 3 (three) times daily. (Patient not taking: Reported on 12/27/2021) 30 g 1    traZODone (DESYREL) 100 MG tablet Take 1 tablet (100 mg total) by mouth every evening. 30 tablet 5     No current facility-administered medications on file prior to visit.       Allergies   Review of patient's allergies indicates:   Allergen Reactions    Fire ant Anxiety, Blisters, Dermatitis, Hives, Itching, Rash and Swelling    Pollen extracts Dermatitis, Hives and Rash    Sulfa (sulfonamide antibiotics) Nausea Only       Review of Systems (Pertinent positives)  Review of Systems   Constitutional: Negative.    HENT: Negative for hearing loss.    Eyes: Negative for discharge.   Respiratory: Negative for wheezing.    Cardiovascular: Negative for chest pain and palpitations.   Gastrointestinal: Negative for blood in stool, constipation, diarrhea and vomiting.   Genitourinary: Negative for dysuria and hematuria.   Musculoskeletal: Negative for neck pain.   Skin: Negative.    Neurological: Negative for weakness and headaches.   Endo/Heme/Allergies: Negative for polydipsia.         There were no vitals taken for this visit.    GENERAL APPEARANCE: in no apparent distress and well developed and well nourished  HEENT: PERRL, EOMI, Sclera clear, anicteric, Oropharynx clear, no lesions,  RESPIRATORY: appears well, vitals normal, no respiratory distress, acyanotic, normal RR  HEART: spontaneously beating.    ABDOMEN: no tenderness when patient palpates  SKIN: no rashes, no wounds, no other lesions  PSYCH: Alert, oriented x 3, thought content  appropriate, speech normal, pleasant and cooperative, good eye contact, well groomed,    Assessment/Plan:  Leia Shultz is a 47 y.o. female who presents today for :    Diagnoses and all orders for this visit:    Colon cancer screening  -     Cologuard Screening (Multitarget Stool DNA); Future  -     Cologuard Screening (Multitarget Stool DNA)      1.  cologuard ordered  2.  Will discuss results once back  3.  Call with any concerns      Meliton Gaviria MD

## 2022-07-20 ENCOUNTER — OFFICE VISIT (OUTPATIENT)
Dept: PSYCHIATRY | Facility: CLINIC | Age: 47
End: 2022-07-20
Payer: COMMERCIAL

## 2022-07-20 DIAGNOSIS — F41.1 GENERALIZED ANXIETY DISORDER: Primary | ICD-10-CM

## 2022-07-20 PROCEDURE — 90834 PSYTX W PT 45 MINUTES: CPT | Mod: 95,,, | Performed by: SOCIAL WORKER

## 2022-07-20 PROCEDURE — 90834 PR PSYCHOTHERAPY W/PATIENT, 45 MIN: ICD-10-PCS | Mod: 95,,, | Performed by: SOCIAL WORKER

## 2022-07-20 NOTE — PROGRESS NOTES
"The patient location is: [place]  The chief complaint leading to consultation is: anxiety    Visit type: audiovisual    Face to Face time with patient: 45 minutes  60 minutes of total time spent on the encounter, which includes face to face time and non-face to face time preparing to see the patient (eg, review of tests), Obtaining and/or reviewing separately obtained history, Documenting clinical information in the electronic or other health record, Independently interpreting results (not separately reported) and communicating results to the patient/family/caregiver, or Care coordination (not separately reported).     Each patient to whom he or she provides medical services by telemedicine is:  (1) informed of the relationship between the physician and patient and the respective role of any other health care provider with respect to management of the patient; and (2) notified that he or she may decline to receive medical services by telemedicine and may withdraw from such care at any time.    Notes:   Individual Psychotherapy (PhD/LCSW)    7/20/2022    Therapeutic Intervention: Met with patient.  Outpatient - Insight oriented psychotherapy 45 min - CPT code 50128 and Outpatient - Supportive psychotherapy 45 min - CPT Code 23182    Chief complaint/reason for encounter: depression and anxiety     Interval history and content of current session: Patient returned for follow up psychotherapy. Reports that she is "okay". Reports that a lot is going on that she doesn't have control over. Discussed that she is trying to lose weight but weight fluctuates so frequently. Discussed that she did labs for hormones and it came back that she is in the normal range. Discussed illusion of control and recognizing what she has control over. Discussed identifying values to help create committed action to help her feel more in line with values.     Treatment plan:  · Target symptoms: depression, anxiety   · Why chosen therapy is " appropriate versus another modality: relevant to diagnosis, evidence based practice  · Outcome monitoring methods: self-report, observation  · Therapeutic intervention type: insight oriented psychotherapy, supportive psychotherapy    Risk parameters:  Patient reports no suicidal ideation  Patient reports no homicidal ideation  Patient reports no self-injurious behavior  Patient reports no violent behavior    Verbal deficits: None    Patient's response to intervention:  The patient's response to intervention is accepting.    Progress toward goals and other mental status changes:  The patient's progress toward goals is fair .    Diagnosis:   No diagnosis found.    Plan:  individual psychotherapy    Return to clinic: 2 weeks, 1 month    Length of Service (minutes): 45     Cassandra Rockweiler, LCSW-BACS

## 2022-08-02 ENCOUNTER — OFFICE VISIT (OUTPATIENT)
Dept: PSYCHIATRY | Facility: CLINIC | Age: 47
End: 2022-08-02
Payer: COMMERCIAL

## 2022-08-02 DIAGNOSIS — F41.1 GENERALIZED ANXIETY DISORDER: Primary | ICD-10-CM

## 2022-08-02 PROCEDURE — 90834 PR PSYCHOTHERAPY W/PATIENT, 45 MIN: ICD-10-PCS | Mod: 95,,, | Performed by: SOCIAL WORKER

## 2022-08-02 PROCEDURE — 90834 PSYTX W PT 45 MINUTES: CPT | Mod: 95,,, | Performed by: SOCIAL WORKER

## 2022-08-02 NOTE — PROGRESS NOTES
"The patient location is: [place]  The chief complaint leading to consultation is: anxiety    Visit type: audiovisual    Face to Face time with patient: 45 minutes  60 minutes of total time spent on the encounter, which includes face to face time and non-face to face time preparing to see the patient (eg, review of tests), Obtaining and/or reviewing separately obtained history, Documenting clinical information in the electronic or other health record, Independently interpreting results (not separately reported) and communicating results to the patient/family/caregiver, or Care coordination (not separately reported).     Each patient to whom he or she provides medical services by telemedicine is:  (1) informed of the relationship between the physician and patient and the respective role of any other health care provider with respect to management of the patient; and (2) notified that he or she may decline to receive medical services by telemedicine and may withdraw from such care at any time.    Notes:   Individual Psychotherapy (PhD/LCSW)    8/2/2022    Therapeutic Intervention: Met with patient.  Outpatient - Insight oriented psychotherapy 45 min - CPT code 43425 and Outpatient - Supportive psychotherapy 45 min - CPT Code 88669    Chief complaint/reason for encounter: depression and anxiety     Interval history and content of current session: Patient returned for follow up psychotherapy. Reports that she is "okay, tired". Reports that it has been a rough couple of weeks. States that got hormone test back and doesn't put her in menopause. Continues to track calories and is gaining weight. Started to lose some hair which has been more stressful. Feels frustrated that she is not losing weight which makes her hate her body. Found out that dog has cancer but the vet thinks it can be contained. Also having a lot of family stress. All of these things make her feel emotionally drained. Discussed how stress could be a " factor in her weight gain. Saw a dietician and set up a plan to get her on the right track for the weight loss. Discussed spending less focus on weight loss. Discussed different ways of thinking about stress and weight.     Treatment plan:  · Target symptoms: depression, anxiety   · Why chosen therapy is appropriate versus another modality: relevant to diagnosis, evidence based practice  · Outcome monitoring methods: self-report, observation  · Therapeutic intervention type: insight oriented psychotherapy, supportive psychotherapy    Risk parameters:  Patient reports no suicidal ideation  Patient reports no homicidal ideation  Patient reports no self-injurious behavior  Patient reports no violent behavior    Verbal deficits: None    Patient's response to intervention:  The patient's response to intervention is accepting.    Progress toward goals and other mental status changes:  The patient's progress toward goals is fair .    Diagnosis:     ICD-10-CM ICD-9-CM   1. Generalized anxiety disorder  F41.1 300.02       Plan:  individual psychotherapy    Return to clinic: 2 weeks, 1 month    Length of Service (minutes): 45     Cassandra Rockweiler, LCS-[facility]

## 2022-08-17 ENCOUNTER — OFFICE VISIT (OUTPATIENT)
Dept: PSYCHIATRY | Facility: CLINIC | Age: 47
End: 2022-08-17
Payer: COMMERCIAL

## 2022-08-17 DIAGNOSIS — F41.1 GENERALIZED ANXIETY DISORDER: Primary | ICD-10-CM

## 2022-08-17 PROCEDURE — 90832 PR PSYCHOTHERAPY W/PATIENT, 30 MIN: ICD-10-PCS | Mod: 95,,, | Performed by: SOCIAL WORKER

## 2022-08-17 PROCEDURE — 90832 PSYTX W PT 30 MINUTES: CPT | Mod: 95,,, | Performed by: SOCIAL WORKER

## 2022-08-17 NOTE — PROGRESS NOTES
"  The patient location is: [place]  The chief complaint leading to consultation is: anxiety    Visit type: audiovisual    Face to Face time with patient: 45 minutes  60 minutes of total time spent on the encounter, which includes face to face time and non-face to face time preparing to see the patient (eg, review of tests), Obtaining and/or reviewing separately obtained history, Documenting clinical information in the electronic or other health record, Independently interpreting results (not separately reported) and communicating results to the patient/family/caregiver, or Care coordination (not separately reported).     Each patient to whom he or she provides medical services by telemedicine is:  (1) informed of the relationship between the physician and patient and the respective role of any other health care provider with respect to management of the patient; and (2) notified that he or she may decline to receive medical services by telemedicine and may withdraw from such care at any time.    Notes:   Individual Psychotherapy (PhD/LCSW)    8/17/2022    Therapeutic Intervention: Met with patient.  Outpatient - Insight oriented psychotherapy 30 min - CPT code 01371 and Outpatient - Supportive psychotherapy 30 min - CPT Code 20067    Chief complaint/reason for encounter: depression and anxiety     Interval history and content of current session: Patient returned for follow up psychotherapy. Reports that she is "okay". States that she isn't bored at work anymore. They added some additional tasks and this has her busier. 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 Is still having health problems. Started an estrogen medication but it will take about a month. Continues to do all the things that she needs to do to help her healthier. Feels discouraged by not seeing outside progress. States that another issue is her mother and step father. They are not " taking care of themselves and worries that she may need to step in and help out. States that relationship with her friend is going okay. Feels like roles are reversed and communicating better. Read a book on drinking and plans to do a 30 day no alcohol program.     Treatment plan:  · Target symptoms: depression, anxiety   · Why chosen therapy is appropriate versus another modality: relevant to diagnosis, evidence based practice  · Outcome monitoring methods: self-report, observation  · Therapeutic intervention type: insight oriented psychotherapy, supportive psychotherapy    Risk parameters:  Patient reports no suicidal ideation  Patient reports no homicidal ideation  Patient reports no self-injurious behavior  Patient reports no violent behavior    Verbal deficits: None    Patient's response to intervention:  The patient's response to intervention is accepting.    Progress toward goals and other mental status changes:  The patient's progress toward goals is fair .    Diagnosis:     ICD-10-CM ICD-9-CM   1. Generalized anxiety disorder  F41.1 300.02       Plan:  individual psychotherapy    Return to clinic: 2 weeks, 1 month    Length of Service (minutes): 45     Cassandra Rockweiler, LCSW-[facility]

## 2022-09-02 ENCOUNTER — OFFICE VISIT (OUTPATIENT)
Dept: FAMILY MEDICINE | Facility: CLINIC | Age: 47
End: 2022-09-02
Payer: COMMERCIAL

## 2022-09-02 ENCOUNTER — OFFICE VISIT (OUTPATIENT)
Dept: PSYCHIATRY | Facility: CLINIC | Age: 47
End: 2022-09-02
Payer: COMMERCIAL

## 2022-09-02 DIAGNOSIS — F41.1 GENERALIZED ANXIETY DISORDER: Primary | ICD-10-CM

## 2022-09-02 DIAGNOSIS — J06.9 VIRAL URI WITH COUGH: Primary | ICD-10-CM

## 2022-09-02 PROCEDURE — 99214 OFFICE O/P EST MOD 30 MIN: CPT | Mod: 95,,, | Performed by: NURSE PRACTITIONER

## 2022-09-02 PROCEDURE — 90834 PSYTX W PT 45 MINUTES: CPT | Mod: ,,, | Performed by: SOCIAL WORKER

## 2022-09-02 PROCEDURE — 99214 PR OFFICE/OUTPT VISIT, EST, LEVL IV, 30-39 MIN: ICD-10-PCS | Mod: 95,,, | Performed by: NURSE PRACTITIONER

## 2022-09-02 PROCEDURE — 90834 PR PSYCHOTHERAPY W/PATIENT, 45 MIN: ICD-10-PCS | Mod: ,,, | Performed by: SOCIAL WORKER

## 2022-09-02 RX ORDER — BENZONATATE 200 MG/1
200 CAPSULE ORAL 3 TIMES DAILY PRN
Qty: 30 CAPSULE | Refills: 0 | Status: SHIPPED | OUTPATIENT
Start: 2022-09-02 | End: 2022-09-12

## 2022-09-02 RX ORDER — METHYLPREDNISOLONE 4 MG/1
TABLET ORAL
Qty: 1 EACH | Refills: 0 | Status: SHIPPED | OUTPATIENT
Start: 2022-09-02 | End: 2022-09-23

## 2022-09-02 NOTE — PROGRESS NOTES
Answers submitted by the patient for this visit:  Fever Questionnaire (Submitted on 2022)  Chief Complaint: Fever  Chronicity: new  Onset: today  Frequency: constantly  Progression since onset: unchanged  Max temp prior to arrival: 101 to 101.9 F  Temperature source: an oral thermometer  abdominal pain: No  chest pain: No  congestion: Yes  cough: Yes  diarrhea: No  ear pain: No  headaches: Yes  muscle aches: Yes  nausea: Yes  rash: No  sleepiness: Yes  sore throat: Yes  vomiting: No  wheezing: No  urinary pain: No  Treatments tried: fluids  Improvement on treatment: mild    The patient location is: [place], [place]  The chief complaint leading to consultation is:  Cough, sore throat, fever    Visit type: audiovisual    Face to Face time with patient: 10 minutes  30 minutes of total time spent on the encounter, which includes face to face time and non-face to face time preparing to see the patient (eg, review of tests), Obtaining and/or reviewing separately obtained history, Documenting clinical information in the electronic or other health record, Independently interpreting results (not separately reported) and communicating results to the patient/family/caregiver, or Care coordination (not separately reported).         Each patient to whom he or she provides medical services by telemedicine is:  (1) informed of the relationship between the physician and patient and the respective role of any other health care provider with respect to management of the patient; and (2) notified that he or she may decline to receive medical services by telemedicine and may withdraw from such care at any time.    Notes:       Patient Name: Leia Shultz    : 1975  MRN: [id number]    Chief Complaint:  Cough, sore throat, fever    Subjective:  Leia is a 47 y.o. female who presents today for:    1. Cough, sore throat, fever - patient who is new to me with no pertinent past medical history reports today for evaluation.  This  morning she woke up with a number of upper respiratory symptoms, including postnasal drip, runny nose, dry cough, sore throat, myalgias, and fevers.  She checked her temperature just before visit and it was 102.2 at home.  She did just come back from a work trip in [place], but denies any recent sick contacts.  She did check herself for COVID today via a home test which was negative.  No specific medications tried.  She does feel like she has some swollen glands in the neck as well.    Past Medical History  Past Medical History:   Diagnosis Date    Anemia     Arthritis     Heart murmur        Past Surgical History  Past Surgical History:   Procedure Laterality Date    DILATION AND CURETTAGE OF UTERUS  2002    gastric sleeve      LAPAROSCOPIC GASTRIC BANDING      ROOT CANAL      2    WISDOM TOOTH EXTRACTION      all 4       Family History  Family History   Problem Relation Age of Onset    Clotting disorder Mother     Hypotension Mother     Skin cancer Father     Crohn's disease Sister     Cancer Brother     No Known Problems Maternal Grandmother     No Known Problems Maternal Grandfather     No Known Problems Paternal Grandmother     No Known Problems Paternal Grandfather        Social History  Social History     Socioeconomic History    Marital status:    Tobacco Use    Smoking status: Never    Smokeless tobacco: Never   Substance and Sexual Activity    Alcohol use: Yes     Comment: once a week    Drug use: No    Sexual activity: Yes     Partners: Male     Birth control/protection: I.U.D.     Comment: 11/21/17 -vasectomy     Social Determinants of Health     Financial Resource Strain: Low Risk     Difficulty of Paying Living Expenses: Not hard at all   Food Insecurity: No Food Insecurity    Worried About Running Out of Food in the Last Year: Never true    Ran Out of Food in the Last Year: Never true   Transportation Needs: No Transportation Needs    Lack of Transportation (Medical): No    Lack of  Transportation (Non-Medical): No   Physical Activity: Sufficiently Active    Days of Exercise per Week: 5 days    Minutes of Exercise per Session: 30 min   Stress: Stress Concern Present    Feeling of Stress : To some extent   Social Connections: Unknown    Frequency of Communication with Friends and Family: More than three times a week    Frequency of Social Gatherings with Friends and Family: More than three times a week    Active Member of Clubs or Organizations: No    Attends Club or Organization Meetings: Never    Marital Status:    Housing Stability: High Risk    Unable to Pay for Housing in the Last Year: No    Number of Places Lived in the Last Year: 1    Unstable Housing in the Last Year: Yes       Current Medications  Current Outpatient Medications on File Prior to Visit   Medication Sig Dispense Refill    EPINEPHrine (EPIPEN 2-KEVIN) 0.3 mg/0.3 mL AtIn Inject 0.3 mLs (0.3 mg total) into the muscle once. for 1 dose 2 each 0    MULTIVITAMIN ORAL Take by mouth once daily.      mupirocin (BACTROBAN) 2 % ointment Apply topically 3 (three) times daily. (Patient not taking: Reported on 12/27/2021) 30 g 1    traZODone (DESYREL) 100 MG tablet Take 1 tablet (100 mg total) by mouth every evening. 30 tablet 5    [DISCONTINUED] ergocalciferol, vitamin D2, (VITAMIN D ORAL) Take by mouth once daily.      [DISCONTINUED] INV folate 800 MCG tablet Take 800 mcg by mouth 2 (two) times daily. FOR INVESTIGATIONAL USE ONLY       No current facility-administered medications on file prior to visit.       Allergies   Review of patient's allergies indicates:   Allergen Reactions    Fire ant Anxiety, Blisters, Dermatitis, Hives, Itching, Rash and Swelling    Pollen extracts Dermatitis, Hives and Rash    Sulfa (sulfonamide antibiotics) Nausea Only       Review of Systems (Pertinent positives)  Review of Systems   Constitutional:  Positive for chills, fever and malaise/fatigue. Negative for diaphoresis and weight loss.   HENT:   Positive for congestion and sore throat. Negative for ear discharge, ear pain, hearing loss, nosebleeds, sinus pain and tinnitus.    Eyes: Negative.    Respiratory:  Positive for cough. Negative for hemoptysis, sputum production, shortness of breath, wheezing and stridor.    Cardiovascular:  Negative for chest pain, palpitations and orthopnea.   Gastrointestinal:  Positive for nausea. Negative for abdominal pain, diarrhea and vomiting.   Genitourinary:  Negative for dysuria.   Musculoskeletal:  Positive for myalgias.   Skin:  Negative for rash.   Neurological:  Positive for headaches. Negative for dizziness, tingling, tremors and sensory change.   Endo/Heme/Allergies: Negative.    Psychiatric/Behavioral: Negative.       There were no vitals taken for this visit.    Physical Exam  Constitutional:       General: She is not in acute distress.     Appearance: Normal appearance. She is not ill-appearing, toxic-appearing or diaphoretic.   Eyes:      General: No scleral icterus.        Right eye: No discharge.         Left eye: No discharge.      Extraocular Movements: Extraocular movements intact.      Conjunctiva/sclera: Conjunctivae normal.   Pulmonary:      Effort: Pulmonary effort is normal. No tachypnea, accessory muscle usage or respiratory distress.      Breath sounds: No wheezing (No audible wheezes).   Musculoskeletal:      Cervical back: Normal range of motion and neck supple.   Skin:     Coloration: Skin is not jaundiced.      Findings: No bruising, erythema or rash.   Neurological:      Mental Status: She is alert and oriented to person, place, and time.   Psychiatric:         Mood and Affect: Mood normal.         Behavior: Behavior normal.         Thought Content: Thought content normal.         Judgment: Judgment normal.        Assessment/Plan:  Leia Shultz is a 47 y.o. female who presents today for :    Diagnoses and all orders for this visit:    Viral URI with cough  -     methylPREDNISolone (MEDROL  DOSEPACK) 4 mg tablet; use as directed  -     benzonatate (TESSALON) 200 MG capsule; Take 1 capsule (200 mg total) by mouth 3 (three) times daily as needed for Cough.    Given constellation of symptoms, likely viral cause.  Treat with Medrol pack today given severity.  Unable to check for flu given nature of audiovisual visit.    P.r.n. Tessalon for cough.  Recommended patient to check herself for COVID again if symptoms do not improve in the next 2-3 days.    She can take Tylenol or ibuprofen as needed for fevers.    Other home care measures discussed.    Follow-up as needed.        This office note has been dictated.  This dictation has been generated using M-Modal Fluency Direct dictation; some phonetic errors may occur.   My collaborating physician is Dr. Declan Fernandez.
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
## 2022-09-03 ENCOUNTER — NURSE TRIAGE (OUTPATIENT)
Dept: ADMINISTRATIVE | Facility: CLINIC | Age: 47
End: 2022-09-03
Payer: COMMERCIAL

## 2022-09-03 ENCOUNTER — HOSPITAL ENCOUNTER (EMERGENCY)
Facility: HOSPITAL | Age: 47
Discharge: HOME | End: 2022-09-03
Attending: EMERGENCY MEDICINE
Payer: COMMERCIAL

## 2022-09-03 VITALS
RESPIRATION RATE: 20 BRPM | HEIGHT: 63 IN | HEART RATE: 74 BPM | SYSTOLIC BLOOD PRESSURE: 132 MMHG | DIASTOLIC BLOOD PRESSURE: 72 MMHG | BODY MASS INDEX: 32.96 KG/M2 | TEMPERATURE: 98 F | WEIGHT: 186 LBS | OXYGEN SATURATION: 99 %

## 2022-09-03 DIAGNOSIS — R06.02 SHORT OF BREATH ON EXERTION: ICD-10-CM

## 2022-09-03 DIAGNOSIS — U07.1 COVID: Primary | ICD-10-CM

## 2022-09-03 DIAGNOSIS — R05.9 COUGH: ICD-10-CM

## 2022-09-03 LAB
CTP QC/QA: YES
CTP QC/QA: YES
POC MOLECULAR INFLUENZA A AGN: NEGATIVE
POC MOLECULAR INFLUENZA B AGN: NEGATIVE
SARS-COV-2 RDRP RESP QL NAA+PROBE: POSITIVE

## 2022-09-03 PROCEDURE — 87502 INFLUENZA DNA AMP PROBE: CPT

## 2022-09-03 PROCEDURE — 99285 EMERGENCY DEPT VISIT HI MDM: CPT | Mod: 25

## 2022-09-03 PROCEDURE — U0002 COVID-19 LAB TEST NON-CDC: HCPCS

## 2022-09-03 PROCEDURE — 93010 EKG 12-LEAD: ICD-10-PCS | Mod: ,,, | Performed by: INTERNAL MEDICINE

## 2022-09-03 PROCEDURE — 93005 ELECTROCARDIOGRAM TRACING: CPT

## 2022-09-03 PROCEDURE — 93010 ELECTROCARDIOGRAM REPORT: CPT | Mod: ,,, | Performed by: INTERNAL MEDICINE

## 2022-09-03 RX ORDER — PROMETHAZINE HYDROCHLORIDE AND DEXTROMETHORPHAN HYDROBROMIDE 6.25; 15 MG/5ML; MG/5ML
5 SYRUP ORAL EVERY 4 HOURS PRN
Qty: 118 ML | Refills: 0 | Status: SHIPPED | OUTPATIENT
Start: 2022-09-03 | End: 2022-09-13

## 2022-09-03 RX ORDER — CETIRIZINE HYDROCHLORIDE 10 MG/1
10 TABLET ORAL DAILY PRN
Qty: 30 TABLET | Refills: 0 | Status: SHIPPED | OUTPATIENT
Start: 2022-09-03 | End: 2022-11-15 | Stop reason: ALTCHOICE

## 2022-09-03 RX ORDER — FLUTICASONE PROPIONATE 50 MCG
1 SPRAY, SUSPENSION (ML) NASAL 2 TIMES DAILY PRN
Qty: 15 G | Refills: 0 | Status: SHIPPED | OUTPATIENT
Start: 2022-09-03 | End: 2022-11-15 | Stop reason: ALTCHOICE

## 2022-09-03 RX ORDER — IBUPROFEN 600 MG/1
600 TABLET ORAL EVERY 6 HOURS PRN
Qty: 20 TABLET | Refills: 0 | Status: SHIPPED | OUTPATIENT
Start: 2022-09-03 | End: 2022-11-15 | Stop reason: ALTCHOICE

## 2022-09-03 NOTE — TELEPHONE ENCOUNTER
Leia calling states she tested positive for Covid via home test today. Current symptoms include sore throat, cough, sob, runny nose and weakness. Denies fever at this time. Symptoms began yesterday morning. Saw physician via virtual visit and was prescribed steroids & cough medication as she tested negative for covid yesterday just prior to visit. States chest pressure is constant & when trying to breath it feels like lungs won't expand all the way. Advised per triage protocol to go to ED now for physician eval. V/u       Reason for Disposition   SEVERE or constant chest pain or pressure  (Exception: Mild central chest pain, present only when coughing.)    Additional Information   Negative: SEVERE difficulty breathing (e.g., struggling for each breath, speaks in single words)   Negative: Difficult to awaken or acting confused (e.g., disoriented, slurred speech)   Negative: Bluish (or gray) lips or face now   Negative: Shock suspected (e.g., cold/pale/clammy skin, too weak to stand, low BP, rapid pulse)   Negative: Sounds like a life-threatening emergency to the triager    Protocols used: Coronavirus (COVID-19) Diagnosed or [id number]

## 2022-09-03 NOTE — ED PROVIDER NOTES
Encounter Date: 9/3/2022       History     Chief Complaint   Patient presents with    COVID-19 Concerns     Patient come sin with + covid test at home called nurse hotline states doesn't feel like her lungs are expanding fully, is SOB with exterion     47-year-old female with past medical history of anemia, arthritis, heart murmur presents to the ED for COVID concerns.  Patient states it started yesterday.  Patient complaining of fever and a cough.  Patient states she did a virtual visit yesterday and was prescribed a steroid pack and Tessalon Perles for her cough.  Patient states she has been using Motrin for her fever which did break this morning.  Patient states the Tessalon Perles helps for a couple hours but the cough returns.  Patient denies any sick contacts.  Patient does state that she just got back from [place].  Patient also admits to sweats, chills, sore throat, runny nose, postnasal drip, body aches, fatigue, shortness of breath on exertion, dry cough, headache, and constipation.  Patient denies chest pain, abdominal pain, nausea, vomiting, diarrhea, urinary symptoms, dizziness, lightheadedness, and rashes.    Review of patient's allergies indicates:   Allergen Reactions    Fire ant Anxiety, Blisters, Dermatitis, Hives, Itching, Rash and Swelling    Pollen extracts Dermatitis, Hives and Rash    Sulfa (sulfonamide antibiotics) Nausea Only     Past Medical History:   Diagnosis Date    Anemia     Arthritis     Heart murmur      Past Surgical History:   Procedure Laterality Date    DILATION AND CURETTAGE OF UTERUS  2002    gastric sleeve      LAPAROSCOPIC GASTRIC BANDING      ROOT CANAL      2    WISDOM TOOTH EXTRACTION      all 4     Family History   Problem Relation Age of Onset    Clotting disorder Mother     Hypotension Mother     Skin cancer Father     Crohn's disease Sister     Cancer Brother     No Known Problems Maternal Grandmother     No Known Problems Maternal Grandfather     No Known Problems  Paternal Grandmother     No Known Problems Paternal Grandfather      Social History     Tobacco Use    Smoking status: Never    Smokeless tobacco: Never   Substance Use Topics    Alcohol use: Yes     Comment: once a week    Drug use: No     Review of Systems   Constitutional:  Positive for chills, diaphoresis, fatigue and fever.   HENT:  Positive for postnasal drip, rhinorrhea and sore throat. Negative for congestion and ear pain.    Eyes:  Negative for visual disturbance.   Respiratory:  Positive for cough, chest tightness and shortness of breath (on exertion).    Cardiovascular:  Negative for chest pain.   Gastrointestinal:  Positive for constipation. Negative for abdominal pain, diarrhea, nausea and vomiting.   Genitourinary:  Negative for decreased urine volume, difficulty urinating, dysuria, frequency and urgency.   Musculoskeletal:  Positive for myalgias (body aches\).   Skin:  Negative for rash.   Neurological:  Positive for headaches. Negative for dizziness and light-headedness.     Physical Exam     Initial Vitals   BP Pulse Resp Temp SpO2   09/03/22 1206 09/03/22 1206 09/03/22 1206 09/03/22 1207 09/03/22 1206   (!) 153/67 81 18 97.8 °F (36.6 °C) 100 %      MAP       --                Physical Exam    Nursing note and vitals reviewed.  Constitutional: She appears well-developed and well-nourished. She is not diaphoretic. She is active. She does not appear ill. No distress.   HENT:   Head: Normocephalic and atraumatic.   Right Ear: External ear normal.   Left Ear: External ear normal.   Nose: Nose normal. Right sinus exhibits no maxillary sinus tenderness and no frontal sinus tenderness. Left sinus exhibits no maxillary sinus tenderness and no frontal sinus tenderness.   Mouth/Throat: Uvula is midline, oropharynx is clear and moist and mucous membranes are normal.   Eyes: Conjunctivae, EOM and lids are normal. Pupils are equal, round, and reactive to light. Right eye exhibits no discharge. Left eye exhibits  "no discharge.   Neck: Neck supple.   Normal range of motion.   Full passive range of motion without pain.     Cardiovascular:  Normal rate and regular rhythm.           Pulmonary/Chest: Effort normal and breath sounds normal. No respiratory distress.   Abdominal: She exhibits no distension.   Musculoskeletal:         General: Normal range of motion.      Cervical back: Full passive range of motion without pain, normal range of motion and neck supple.     Neurological: She is alert and oriented to person, place, and time.   Skin: Skin is dry. Capillary refill takes less than 2 seconds.       ED Course   Procedures  Labs Reviewed   SARS-COV-2 RDRP GENE - Abnormal; Notable for the following components:       Result Value    POC Rapid COVID Positive (*)     All other components within normal limits   POCT INFLUENZA A/B MOLECULAR     EKG Readings: (Independently Interpreted)   EKG showed normal sinus rhythm with sinus arrhythmia.  Rate of 77 beats per minute.  PA interval 142 milliseconds.  QRS of 78 milliseconds.  QTC of 402 milliseconds.  No STEMI noted.     Imaging Results              X-Ray Chest 1 View (Final result)  Result time 09/03/22 12:57:45      Final result by Charlie Byrd MD (09/03/22 12:57:45)                   Impression:      No acute cardiopulmonary finding identified on this single view.      Electronically signed by: Charlie Byrd MD  Date:    09/03/2022  Time:    12:57               Narrative:    EXAMINATION:  XR CHEST 1 VIEW    CLINICAL HISTORY:  Provided history is "  Cough, unspecified".    TECHNIQUE:  One view of the chest.    COMPARISON:  None.    FINDINGS:  Cardiac silhouette is not enlarged.  No focal consolidation.  No sizable pleural effusion.  No pneumothorax.                                       Medications - No data to display  Medical Decision Making:   Initial Assessment:   47-year-old female with past medical history of anemia, arthritis, heart murmur presents to the ED for " COVID concerns.   Patient's chart and medical history reviewed.  Differential Diagnosis:   COVID  Flu  Viral URI  Pneumonia   Bronchitis  PE  MI  Pleural effusion   Pneumothorax  ED Management:  Patient's vitals reviewed.  She is afebrile, no respiratory distress, and nontoxic-appearing in the ED. Patient's physical exam is unremarkable.  Patient denied needing any pain medication. EKG showed normal sinus rhythm with sinus arrhythmia; no stemi. Patient is flu negative. Patient is COVID positive. Patient's O2 sat is 100% on room air with no respiratory distress and bilateral normal breath sounds.  Ambulatory O2 saturation is 97%.  Chest x-ray showed no acute cardiopulmonary finding.  Discussed with patient she will need to quarantine for the next 4 days or until she is afebrile for 24 hours without taking any medications that would lower her temperature or any new symptoms developing. Discussed with patient she can use Tylenol and ibuprofen as needed for fevers and headaches, as well as staying hydrated and resting until this clears from her system.  I will send in high dose Motrin, promethazine cough syrup, benzocaine throat lozenges, cetrizine, and Flonase for symptomatic control.  Discussed with patient strict return precautions, she verbalized understanding.  Patient agrees with this plan.  Patient is stable for discharge.                    Clinical Impression:   Final diagnoses:  [R05.9] Cough  [R06.02] Short of breath on exertion  [U07.1] COVID (Primary)      ED Disposition Condition    Discharge Stable          ED Prescriptions       Medication Sig Dispense Start Date End Date Auth. Provider    promethazine-dextromethorphan (PROMETHAZINE-DM) 6.25-15 mg/5 mL Syrp Take 5 mLs by mouth every 4 (four) hours as needed (cough). 118 mL 9/3/2022 9/13/2022 Alayna Holdsworth, PA-C    fluticasone propionate (FLONASE) 50 mcg/actuation nasal spray 1 spray (50 mcg total) by Each Nostril route 2 (two) times daily as needed  for Rhinitis. 15 g 9/3/2022 -- Alayna Holdsworth, PA-C    ibuprofen (ADVIL,MOTRIN) 600 MG tablet Take 1 tablet (600 mg total) by mouth every 6 (six) hours as needed for Pain. 20 tablet 9/3/2022 -- Alayna Holdsworth, PA-C    benzocaine-menthoL 6-10 mg lozenge Take 1 lozenge by mouth every 2 (two) hours as needed for Pain (sore throat). 18 tablet 9/3/2022 -- Alayna Holdsworth, PA-C    cetirizine (ZYRTEC) 10 MG tablet Take 1 tablet (10 mg total) by mouth daily as needed for Allergies or Rhinitis. 30 tablet 9/3/2022 9/3/2023 Alayna Holdsworth, PA-C          Follow-up Information       Follow up With Specialties Details Why Contact Info    Meliton Gaviria MD Family Medicine, Wound Care   [address] [place] [address]  Aarti GALLO [id number]  [phone]               Alayna Holdsworth, PA-C  09/03/22 0207

## 2022-09-03 NOTE — ED TRIAGE NOTES
Pt. Reports she was seen by her MD on yesterday for a sore throat, cough, chest tightness and SOB with exertion. They tested her for covid and it was neg. Pt. Report she took an at home test and it was positive this morning. Pt. Reports she called the on call nurse and was told to come to the ED.

## 2022-09-03 NOTE — DISCHARGE INSTRUCTIONS
Thank you for coming to our Emergency Department today. It is important to remember that some problems are difficult to diagnose and may not be found during your first visit. Be sure to follow up with your primary care doctor and review any labs/imaging that was performed with them. If you do not have a primary care doctor, you may contact the one listed on your discharge paperwork or you may also call the [facility] Appointment Desk at [phone] to schedule an appointment with one.     All medications may potentially have side effects and it is impossible to predict which medications may give you side effects. If you feel that you are having a negative effect of any medication you should immediately stop taking them and seek medical attention.    Return to the ER with any questions/concerns, new/concerning symptoms, worsening or failure to improve. Do not drive or make any important decisions for 24 hours if you have received any pain medications, sedatives or mood altering drugs during your ER visit.

## 2022-09-09 ENCOUNTER — PATIENT MESSAGE (OUTPATIENT)
Dept: FAMILY MEDICINE | Facility: CLINIC | Age: 47
End: 2022-09-09
Payer: COMMERCIAL

## 2022-09-14 ENCOUNTER — OFFICE VISIT (OUTPATIENT)
Dept: PSYCHIATRY | Facility: CLINIC | Age: 47
End: 2022-09-14
Payer: COMMERCIAL

## 2022-09-14 DIAGNOSIS — F41.1 GENERALIZED ANXIETY DISORDER: Primary | ICD-10-CM

## 2022-09-14 PROCEDURE — 90834 PSYTX W PT 45 MINUTES: CPT | Mod: 95,,, | Performed by: SOCIAL WORKER

## 2022-09-14 PROCEDURE — 90834 PR PSYCHOTHERAPY W/PATIENT, 45 MIN: ICD-10-PCS | Mod: 95,,, | Performed by: SOCIAL WORKER

## 2022-09-14 NOTE — PROGRESS NOTES
"Individual Psychotherapy (PhD/LCSW)    9/14/2022    Site:  Telemed         Therapeutic Intervention: Met with patient.  Outpatient - Insight oriented psychotherapy 45 min - CPT code 94159 and Outpatient - Supportive psychotherapy 45 min - CPT Code 43870    Chief complaint/reason for encounter: anxiety     Interval history and content of current session: Patient returned for follow up psychotherapy. Patient reports that she is doing okay". States that after last session she tested positive for COVID. Discussed the symptoms that have been difficult to deal with, especially at work. Another stressor she is dealing with is that she has to get new homeowners insurance. Had one bout of depression about a week ago. States that she was feeling lonely but able to handle it. States that she is proud of herself and how she has dealt with stress that last couple of weeks. Discussed incident with chiropractor and has decided not to see her. This is different for her because in the past she would just "cave" and not say anything. Discussed that she has been continuing to have problems with her family. States that parents want to move in with sister which patient will not allow. Discussed that she is at the point of wanting to take responsibility of their finances. Discussed the mantra of "when it happens then I'll deal with it".     Treatment plan:  Target symptoms: anxiety   Why chosen therapy is appropriate versus another modality: relevant to diagnosis, evidence based practice  Outcome monitoring methods: self-report, observation  Therapeutic intervention type: insight oriented psychotherapy, supportive psychotherapy    Risk parameters:  Patient reports no suicidal ideation  Patient reports no homicidal ideation  Patient reports no self-injurious behavior  Patient reports no violent behavior    Verbal deficits: None    Patient's response to intervention:  The patient's response to intervention is accepting.    Progress toward " goals and other mental status changes:  The patient's progress toward goals is good.    Diagnosis:     ICD-10-CM ICD-9-CM   1. Generalized anxiety disorder  F41.1 300.02         Plan:  individual psychotherapy    Return to clinic: 2 weeks, 1 month    Length of Service (minutes): 45

## 2022-10-03 ENCOUNTER — PATIENT MESSAGE (OUTPATIENT)
Dept: PSYCHIATRY | Facility: CLINIC | Age: 47
End: 2022-10-03
Payer: COMMERCIAL

## 2022-10-03 ENCOUNTER — OFFICE VISIT (OUTPATIENT)
Dept: PSYCHIATRY | Facility: CLINIC | Age: 47
End: 2022-10-03
Payer: COMMERCIAL

## 2022-10-03 DIAGNOSIS — F41.1 GENERALIZED ANXIETY DISORDER: Primary | ICD-10-CM

## 2022-10-03 PROCEDURE — 90834 PR PSYCHOTHERAPY W/PATIENT, 45 MIN: ICD-10-PCS | Mod: 95,S$GLB,, | Performed by: SOCIAL WORKER

## 2022-10-03 PROCEDURE — 99999 PR PBB SHADOW E&M-EST. PATIENT-LVL I: CPT | Mod: PBBFAC,,, | Performed by: SOCIAL WORKER

## 2022-10-03 PROCEDURE — 99999 PR PBB SHADOW E&M-EST. PATIENT-LVL I: ICD-10-PCS | Mod: PBBFAC,,, | Performed by: SOCIAL WORKER

## 2022-10-03 PROCEDURE — 90834 PSYTX W PT 45 MINUTES: CPT | Mod: 95,S$GLB,, | Performed by: SOCIAL WORKER

## 2022-10-03 NOTE — PROGRESS NOTES
"  Individual Psychotherapy (PhD/LCSW)    10/3/2022    Site:  Telemed         Therapeutic Intervention: Met with patient.  Outpatient - Insight oriented psychotherapy 45 min - CPT code 18351 and Outpatient - Supportive psychotherapy 45 min - CPT Code 77416    Chief complaint/reason for encounter: anxiety     Interval history and content of current session: Patient returned for follow up psychotherapy. Patient reports that she is "not bad". 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 States that it is a lot and trying to not let it get the best of her. Is in the process of getting homeowners insurance. States that this is a stressor because it puts a financial strain on her. Is set on getting skin surgery soon so trying to not stress about money. Also has gone back to strict diet. Has also decided that she is no longer going to dance unless for precious or friend events. States that she is also thinking about other things that she wants to cut back on to reduce stress. Doesn't feel anxious about the changes because she is excited to learn about the new person. Mother and niece came in and it was weird at times. Discussed that anxiety has been well controlled. States that she does notice that she does stress eat when anxious. Discussed the process of intuitive eating and how to incorporate mindfulness.    For Next Time:    Treatment plan:  Target symptoms: anxiety   Why chosen therapy is appropriate versus another modality: relevant to diagnosis, evidence based practice  Outcome monitoring methods: self-report, observation  Therapeutic intervention type: insight oriented psychotherapy, supportive psychotherapy    Risk parameters:  Patient reports no suicidal ideation  Patient reports no homicidal ideation  Patient reports no self-injurious behavior  Patient reports no violent behavior    Verbal deficits: None    Patient's response to intervention:  The patient's response to " intervention is accepting.    Progress toward goals and other mental status changes:  The patient's progress toward goals is good.    Diagnosis:     ICD-10-CM ICD-9-CM   1. Generalized anxiety disorder  F41.1 300.02     Plan:  individual psychotherapy    Return to clinic: 2 weeks, 1 month    Length of Service (minutes): 45

## 2022-10-27 ENCOUNTER — OFFICE VISIT (OUTPATIENT)
Dept: PSYCHIATRY | Facility: CLINIC | Age: 47
End: 2022-10-27
Payer: COMMERCIAL

## 2022-10-27 DIAGNOSIS — F41.1 GENERALIZED ANXIETY DISORDER: Primary | ICD-10-CM

## 2022-10-27 PROCEDURE — 99999 PR PBB SHADOW E&M-EST. PATIENT-LVL I: CPT | Mod: PBBFAC,,, | Performed by: SOCIAL WORKER

## 2022-10-27 PROCEDURE — 99999 PR PBB SHADOW E&M-EST. PATIENT-LVL I: ICD-10-PCS | Mod: PBBFAC,,, | Performed by: SOCIAL WORKER

## 2022-10-27 PROCEDURE — 90834 PR PSYCHOTHERAPY W/PATIENT, 45 MIN: ICD-10-PCS | Mod: 95,,, | Performed by: SOCIAL WORKER

## 2022-10-27 PROCEDURE — 90834 PSYTX W PT 45 MINUTES: CPT | Mod: 95,,, | Performed by: SOCIAL WORKER

## 2022-10-27 NOTE — PROGRESS NOTES
"  Individual Psychotherapy (PhD/LCSW)    10/27/2022    Site:  Telemed         Therapeutic Intervention: Met with patient.  Outpatient - Insight oriented psychotherapy 45 min - CPT code 06262 and Outpatient - Supportive psychotherapy 45 min - CPT Code 02187    Chief complaint/reason for encounter: anxiety     Interval history and content of current session: Patient returned for follow up psychotherapy. Patient reports that she is "okay". States that she recently had a stray cat birth kittens and has been dealing with that. This has everything else on hold. States that work has been fairly busy. Finished her report and that has allowed work to slow down. Has been doing things around that house to make it feel more homely. Discussed incident with matthew friend and halloween plans. States that he made plans to hang out with other friend and told her that this is unacceptable. Discussed that she doesn't know if she can be all in with relationship because of the time that he broke up with her. States that she is giving him this one chance. States that she knows that she can be without him so if it happens again she is going to leave. Feels like it was a growing moment for relationship.     For Next Time:    Treatment plan:  Target symptoms: anxiety   Why chosen therapy is appropriate versus another modality: relevant to diagnosis, evidence based practice  Outcome monitoring methods: self-report, observation  Therapeutic intervention type: insight oriented psychotherapy, supportive psychotherapy    Risk parameters:  Patient reports no suicidal ideation  Patient reports no homicidal ideation  Patient reports no self-injurious behavior  Patient reports no violent behavior    Verbal deficits: None    Patient's response to intervention:  The patient's response to intervention is accepting.    Progress toward goals and other mental status changes:  The patient's progress toward goals is good.    Diagnosis:     ICD-10-CM ICD-9-CM "   1. Generalized anxiety disorder  F41.1 300.02       Plan:  individual psychotherapy    Return to clinic: 2 weeks, 1 month    Length of Service (minutes): 45

## 2022-11-15 ENCOUNTER — HOSPITAL ENCOUNTER (OUTPATIENT)
Dept: RADIOLOGY | Facility: HOSPITAL | Age: 47
Discharge: HOME | End: 2022-11-15
Attending: FAMILY MEDICINE
Payer: COMMERCIAL

## 2022-11-15 ENCOUNTER — OFFICE VISIT (OUTPATIENT)
Dept: FAMILY MEDICINE | Facility: CLINIC | Age: 47
End: 2022-11-15
Payer: COMMERCIAL

## 2022-11-15 ENCOUNTER — PATIENT MESSAGE (OUTPATIENT)
Dept: FAMILY MEDICINE | Facility: CLINIC | Age: 47
End: 2022-11-15
Payer: COMMERCIAL

## 2022-11-15 DIAGNOSIS — S49.91XD INJURY OF RIGHT SHOULDER, SUBSEQUENT ENCOUNTER: Primary | ICD-10-CM

## 2022-11-15 DIAGNOSIS — F51.01 PRIMARY INSOMNIA: ICD-10-CM

## 2022-11-15 DIAGNOSIS — S49.91XD INJURY OF RIGHT SHOULDER, SUBSEQUENT ENCOUNTER: ICD-10-CM

## 2022-11-15 PROCEDURE — 73030 X-RAY EXAM OF SHOULDER: CPT | Mod: 26,RT,, | Performed by: RADIOLOGY

## 2022-11-15 PROCEDURE — 73030 XR SHOULDER COMPLETE 2 OR MORE VIEWS RIGHT: ICD-10-PCS | Mod: 26,RT,, | Performed by: RADIOLOGY

## 2022-11-15 PROCEDURE — 73030 X-RAY EXAM OF SHOULDER: CPT | Mod: TC,FY,RT

## 2022-11-15 PROCEDURE — 99214 OFFICE O/P EST MOD 30 MIN: CPT | Mod: 95,,, | Performed by: FAMILY MEDICINE

## 2022-11-15 PROCEDURE — 99214 PR OFFICE/OUTPT VISIT, EST, LEVL IV, 30-39 MIN: ICD-10-PCS | Mod: 95,,, | Performed by: FAMILY MEDICINE

## 2022-11-15 RX ORDER — TIZANIDINE 4 MG/1
4 TABLET ORAL EVERY 8 HOURS PRN
Qty: 30 TABLET | Refills: 0 | Status: SHIPPED | OUTPATIENT
Start: 2022-11-15 | End: 2022-11-25

## 2022-11-15 NOTE — PROGRESS NOTES
Telemedicine Office Visit    Leia Shultz    1975  [id number]    Subjective     The patient location is: home   The chief complaint leading to consultation is: shoulder pain   Visit type: Virtual visit with synchronous audio and video  Total time spent with patient: 15 minutes   Each patient to whom he or she provides medical services by telemedicine is:  (1) informed of the relationship between the physician and patient and the respective role of any other health care provider with respect to management of the patient; and (2) notified that he or she may decline to receive medical services by telemedicine and may withdraw from such care at any time.    Leia is a 47 y.o. female who presents through telemedicine for:     Right shoulder pain - Patient has limited motion - injury occurred 6 days ago. Patient was walking 60-70 # dog when dog suddenly leaped for a possum pulling patient forward and down. She was dragged with outreach arms / shoulder. She now has pain from her neck to her shoulder. She has not tried any antiinflammatories for pain. She has limited range of motion and unable to lift arm overhead and has difficulty with reaching towards or away from an object. No cuts or bruises. Dog is hers.       Objective     Answers submitted by the patient for this visit:  Review of Systems Questionnaire (Submitted on 11/15/2022)  activity change: No  unexpected weight change: No  rhinorrhea: No  trouble swallowing: No  visual disturbance: No  chest tightness: No  polyuria: No  difficulty urinating: No  menstrual problem: No  joint swelling: No  arthralgias: Yes  confusion: No  dysphoric mood: No    Review of Systems   Constitutional:  Negative for chills and fever.   HENT:  Negative for congestion and hearing loss.    Eyes:  Negative for blurred vision and discharge.   Respiratory:  Negative for cough and wheezing.    Cardiovascular:  Negative for chest pain and palpitations.   Gastrointestinal:  Negative for  abdominal pain, blood in stool, constipation, diarrhea, heartburn, nausea and vomiting.   Genitourinary:  Negative for dysuria and hematuria.   Musculoskeletal:  Negative for myalgias and neck pain.   Skin:  Negative for itching and rash.   Neurological:  Negative for dizziness, weakness and headaches.   Endo/Heme/Allergies:  Negative for polydipsia.   Psychiatric/Behavioral:  Negative for depression.      There were no vitals taken for this visit.  Physical Exam  Constitutional:       Appearance: She is well-developed.   HENT:      Head: Normocephalic and atraumatic.      Right Ear: External ear normal.      Left Ear: External ear normal.   Eyes:      Conjunctiva/sclera: Conjunctivae normal.   Pulmonary:      Effort: Pulmonary effort is normal.   Musculoskeletal:      Cervical back: Normal range of motion.      Comments: Limited range of motion of right side    Skin:     Coloration: Skin is not pale.   Neurological:      Mental Status: She is alert and oriented to person, place, and time.   Psychiatric:         Behavior: Behavior normal.         Thought Content: Thought content normal.         Judgment: Judgment normal.         Plan     Problem List Items Addressed This Visit    None  Visit Diagnoses       Injury of right shoulder, subsequent encounter    -  Primary    Relevant Medications    tiZANidine (ZANAFLEX) 4 MG tablet  RICE   Patient has ibuprofen at home   Recommend to take daily       Other Relevant Orders    Ambulatory referral/consult to Orthopedics    X-ray Shoulder 2 or More Views Right    Primary insomnia       The current medical regimen is effective;  continue present plan and medications.              Follow up if symptoms worsen or fail to improve, for if symptoms worsen.

## 2022-11-16 ENCOUNTER — OFFICE VISIT (OUTPATIENT)
Dept: PSYCHIATRY | Facility: CLINIC | Age: 47
End: 2022-11-16
Payer: COMMERCIAL

## 2022-11-16 DIAGNOSIS — F41.1 GENERALIZED ANXIETY DISORDER: Primary | ICD-10-CM

## 2022-11-16 PROCEDURE — 90834 PSYTX W PT 45 MINUTES: CPT | Mod: 95,,, | Performed by: SOCIAL WORKER

## 2022-11-16 PROCEDURE — 99999 PR PBB SHADOW E&M-EST. PATIENT-LVL I: CPT | Mod: PBBFAC,,, | Performed by: SOCIAL WORKER

## 2022-11-16 PROCEDURE — 99999 PR PBB SHADOW E&M-EST. PATIENT-LVL I: ICD-10-PCS | Mod: PBBFAC,,, | Performed by: SOCIAL WORKER

## 2022-11-16 PROCEDURE — 90834 PR PSYCHOTHERAPY W/PATIENT, 45 MIN: ICD-10-PCS | Mod: 95,,, | Performed by: SOCIAL WORKER

## 2022-11-16 NOTE — PROGRESS NOTES
"  Individual Psychotherapy (PhD/LCSW)    11/16/2022    Site:  Telemed         Therapeutic Intervention: Met with patient.  Outpatient - Insight oriented psychotherapy 45 min - CPT code 64454 and Outpatient - Supportive psychotherapy 45 min - CPT Code 13456    Chief complaint/reason for encounter: anxiety     Interval history and content of current session: Patient returned for follow up psychotherapy. Patient reports that she is "okay". Earlier in the weekend dog hanked on her lead and got her shoulder hurt. Still has the kittens. Hoping to weaning this weekend so that she can give them to her friend. States that another stressor is her step father. States that mother had step father committed and now is in outpatient therapy. States that her main anxiety is the kittens. Worries about them if they don't eat enough. Discussed different ways to be proactive with some of her anxiety. Reports that things with boyfriend are going well. States that she feels like a completely different person in relationship than before. Things that would have bothered her in the past are less bothersome now. Discussed the two having better communication an understanding of boundaries.     For Next Time:    Treatment plan:  Target symptoms: anxiety   Why chosen therapy is appropriate versus another modality: relevant to diagnosis, evidence based practice  Outcome monitoring methods: self-report, observation  Therapeutic intervention type: insight oriented psychotherapy, supportive psychotherapy    Risk parameters:  Patient reports no suicidal ideation  Patient reports no homicidal ideation  Patient reports no self-injurious behavior  Patient reports no violent behavior    Verbal deficits: None    Patient's response to intervention:  The patient's response to intervention is accepting.    Progress toward goals and other mental status changes:  The patient's progress toward goals is good.    Diagnosis:     ICD-10-CM ICD-9-CM   1. " Generalized anxiety disorder  F41.1 300.02         Plan:  individual psychotherapy    Return to clinic: 2 weeks, 1 month    Length of Service (minutes): 45

## 2022-11-28 ENCOUNTER — OFFICE VISIT (OUTPATIENT)
Dept: ORTHOPEDICS | Facility: CLINIC | Age: 47
End: 2022-11-28
Payer: COMMERCIAL

## 2022-11-28 DIAGNOSIS — S49.91XD INJURY OF RIGHT SHOULDER, SUBSEQUENT ENCOUNTER: ICD-10-CM

## 2022-11-28 PROCEDURE — 99999 PR PBB SHADOW E&M-EST. PATIENT-LVL III: CPT | Mod: PBBFAC,,, | Performed by: ORTHOPAEDIC SURGERY

## 2022-11-28 PROCEDURE — 99204 OFFICE O/P NEW MOD 45 MIN: CPT | Mod: S$GLB,,, | Performed by: ORTHOPAEDIC SURGERY

## 2022-11-28 PROCEDURE — 99204 PR OFFICE/OUTPT VISIT, NEW, LEVL IV, 45-59 MIN: ICD-10-PCS | Mod: S$GLB,,, | Performed by: ORTHOPAEDIC SURGERY

## 2022-11-28 PROCEDURE — 99999 PR PBB SHADOW E&M-EST. PATIENT-LVL III: ICD-10-PCS | Mod: PBBFAC,,, | Performed by: ORTHOPAEDIC SURGERY

## 2022-11-28 RX ORDER — MELOXICAM 7.5 MG/1
7.5 TABLET ORAL DAILY
Qty: 30 TABLET | Refills: 0 | Status: SHIPPED | OUTPATIENT
Start: 2022-11-28 | End: 2022-12-27 | Stop reason: SDUPTHER

## 2022-11-28 NOTE — PROGRESS NOTES
Assessment: 47 y.o. female with right rotator cuff strain     I explained my diagnostic impression and the reasoning behind it in detail, using layman's terms.       Plan:   - - Mobic 7.5 mg PO QD x 2 weeks then PRN. The patient was advised that NSAID-type medications have important potential side effects: gastrointestinal irritation, GI bleeding, cardiac effects and renal injuries. Take the medication with food and to stop and call the office for any GI upset, vomiting, abdominal pain or black/bloody stools. The patient expresses understanding of these issues and questions were answered.  - if no improvement in 2 weeks will consider MRI  - Return to clinic in 8 weeks. Return sooner if symptoms worsen or fail to improve.    All questions were answered in detail. The patient is in full agreement with the treatment plan and will proceed accordingly.    Chief Complaint   Patient presents with    Right Shoulder - Injury, Pain       Initial visit (11/28/22): Leia Shultz is a 47 y.o. female who presents today complaining of right shoulder pain  Duration of symptoms:  about 3 weeks   Trauma or new activity: yes - was walking her large dog - dog chased after a possum and pulled her. She fell down and was dragged a little  Has had pain since then but it is improving   Localizes to trapezius with radiation to subdeltoid fossa and lateral arm. Did have pain over scapula but this has resolved  Pain is intermittent  Aggravating factors: reaching overhead, internal rotation, hurts to use her mouse at her desk, has to put it lower on her leg  Relieving factors:  rest   Night pain is present and is disruptive to sleep  Radicular symptoms: no numbness, weakness   Prior treatment:  prescription NSAIDs with improvement in pain.     Pain does interfere with sleep and activities of daily living .    This patient was seen in consultation at the request of Dr. Irma Hope    This is the extent of the patient's complaints at this time.      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  Patient is alert, awake and oriented to time, place and person. Mood and affect are appropriate.  Patient does not appear to be in any distress, denies any constitutional symptoms and appears stated age.   HEENT: Pupils are equal and round, sclera are not injected. External examination of ears and nose reveals no abnormalities. Cranial nerves II-X are grossly intact  Skin: no rashes, abrasions or open wounds on the affected extremity   Resp: No respiratory distress or audible wheezing   CV: 2+  pulses, all extremities warm and well perfused   Right Shoulder    Shoulder Range of Motion    Right     Left   (Active/Passive)       Forward Elevation     165/165             165/165  External rotation (arm at side)  50/50             50/50   Internal rotation behind the back  Hip             L5     Range of motion is not painful     Neer's positive  Hawkin's positive    Francis's positive  Drop arm negative  Belly press negative      Cuff Strength     Right     Left   Supraspinatus        5-/5    5/5  Infraspinatus     5/5    5/5  Subscapularis     5/5    5/5    Deltoid testing            5/5    5/5    Elbow examination demonstrates no tenderness to palpation and has normal range of motion.   No NV changes     Imaging: 3 views of the right shoulder:  positive for degenerative changes of the AC joint. The humeral head is well centered on the AP and axillary views.  No sclerosis or calcification at the rotator cuff insertion on the greater tuberosity. There is not  significant degenerative change of the glenohumeral joint or posterior subluxation of the humeral head. No acute changes or fracture.      I personally reviewed and interpreted the patient's imaging obtained today in clinic     A note notifying Dr. Irma Hope of my findings was sent via the electronic medical record     This note was created by combination of typed  and M-Modal dictation. Transcription and phonetic errors may be present.  If there are any questions, please contact me.      Current Outpatient Medications:     MULTIVITAMIN ORAL, Take by mouth once daily., Disp: , Rfl:     traZODone (DESYREL) 100 MG tablet, TAKE 1 TABLET BY MOUTH EVERY EVENING., Disp: 90 tablet, Rfl: 1    EPINEPHrine (EPIPEN 2-KEVIN) 0.3 mg/0.3 mL AtIn, Inject 0.3 mLs (0.3 mg total) into the muscle once. for 1 dose, Disp: 2 each, Rfl: 0    meloxicam (MOBIC) 7.5 MG tablet, Take 1 tablet (7.5 mg total) by mouth once daily., Disp: 30 tablet, Rfl: 0    Past Medical History:   Diagnosis Date    Anemia     Arthritis     Heart murmur        Active Problem List with Overview Notes    Diagnosis Date Noted    Poor posture 01/08/2020    Weakness of trunk musculature 01/08/2020    Low folate 02/12/2018    History of gastric restrictive surgery 11/21/2017    Vitamin D deficiency 11/21/2017    Vitamin B 12 deficiency 11/21/2017       Past Surgical History:   Procedure Laterality Date    DILATION AND CURETTAGE OF UTERUS  2002    gastric sleeve      LAPAROSCOPIC GASTRIC BANDING      ROOT CANAL      2    WISDOM TOOTH EXTRACTION      all 4       Social History     Socioeconomic History    Marital status:    Tobacco Use    Smoking status: Never    Smokeless tobacco: Never   Substance and Sexual Activity    Alcohol use: Yes     Comment: once a week    Drug use: No    Sexual activity: Yes     Partners: Male     Birth control/protection: I.U.D.     Comment: 11/21/17 -vasectomy     Social Determinants of Health     Financial Resource  Strain: Low Risk     Difficulty of Paying Living Expenses: Not hard at all   Food Insecurity: No Food Insecurity    Worried About Running Out of Food in the Last Year: Never true    Ran Out of Food in the Last Year: Never true   Transportation Needs: No Transportation Needs    Lack of Transportation (Medical): No    Lack of Transportation (Non-Medical): No   Physical Activity: Insufficiently Active    Days of Exercise per Week: 3 days    Minutes of Exercise per Session: 40 min   Stress: Stress Concern Present    Feeling of Stress : To some extent   Social Connections: Unknown    Frequency of Communication with Friends and Family: More than three times a week    Frequency of Social Gatherings with Friends and Family: Twice a week    Active Member of Clubs or Organizations: No    Attends Club or Organization Meetings: Never    Marital Status:    Housing Stability: Low Risk     Unable to Pay for Housing in the Last Year: No    Number of Places Lived in the Last Year: 1    Unstable Housing in the Last Year: No

## 2022-12-05 ENCOUNTER — OFFICE VISIT (OUTPATIENT)
Dept: PSYCHIATRY | Facility: CLINIC | Age: 47
End: 2022-12-05
Payer: COMMERCIAL

## 2022-12-05 DIAGNOSIS — F41.1 GENERALIZED ANXIETY DISORDER: Primary | ICD-10-CM

## 2022-12-05 PROCEDURE — 90834 PSYTX W PT 45 MINUTES: CPT | Mod: 95,,, | Performed by: SOCIAL WORKER

## 2022-12-05 PROCEDURE — 90834 PR PSYCHOTHERAPY W/PATIENT, 45 MIN: ICD-10-PCS | Mod: 95,,, | Performed by: SOCIAL WORKER

## 2022-12-05 NOTE — PROGRESS NOTES
"The patient location is: [place]  The chief complaint leading to consultation is: anxiety    Visit type: audiovisual    Face to Face time with patient: 45  60 minutes of total time spent on the encounter, which includes face to face time and non-face to face time preparing to see the patient (eg, review of tests), Obtaining and/or reviewing separately obtained history, Documenting clinical information in the electronic or other health record, Independently interpreting results (not separately reported) and communicating results to the patient/family/caregiver, or Care coordination (not separately reported).     Each patient to whom he or she provides medical services by telemedicine is:  (1) informed of the relationship between the physician and patient and the respective role of any other health care provider with respect to management of the patient; and (2) notified that he or she may decline to receive medical services by telemedicine and may withdraw from such care at any time.    Notes:     Individual Psychotherapy (PhD/LCSW)    2022    Site:  Telemed         Therapeutic Intervention: Met with patient.  Outpatient - Insight oriented psychotherapy 45 min - CPT code 84116 and Outpatient - Supportive psychotherapy 45 min - CPT Code 18851    Chief complaint/reason for encounter: anxiety     Interval history and content of current session: Patient returned for follow up psychotherapy. Patient reports that she is "okay". States that for the last couple of weeks she has been feeling "mell". States that since she had COVID things have been "blah". Reports that she still has the one kitten. Had to have surgery and seems to be healing well. Thinks that he will have to have another surgery. States that Thanksgiving was "horrible". Friend  of a heart attack. The next day was kitten surgery and she was worried that he wouldn't make it. Then got another message from ex that one of the dogs . States that friend " was the one that she would talk to animals about and couldn't when she found out about dog's death. Feels like a failure. Trying so hard at things but doesn't feel like they are working out. Discussed trying to think of things differently in order to not feel like a failure. Discussed trying to think about things in a more neutral way. Discussed that schedule is still off due to kitten and the care that he needs. Discussed that work has been a little stressful because there was massive layoffs.     For Next Time:    Treatment plan:  Target symptoms: anxiety   Why chosen therapy is appropriate versus another modality: relevant to diagnosis, evidence based practice  Outcome monitoring methods: self-report, observation  Therapeutic intervention type: insight oriented psychotherapy, supportive psychotherapy    Risk parameters:  Patient reports no suicidal ideation  Patient reports no homicidal ideation  Patient reports no self-injurious behavior  Patient reports no violent behavior    Verbal deficits: None    Patient's response to intervention:  The patient's response to intervention is accepting.    Progress toward goals and other mental status changes:  The patient's progress toward goals is good.    Diagnosis:     ICD-10-CM ICD-9-CM   1. Generalized anxiety disorder  F41.1 300.02       Plan:  individual psychotherapy    Return to clinic: 2 weeks, 1 month    Length of Service (minutes): 45

## 2022-12-21 ENCOUNTER — OFFICE VISIT (OUTPATIENT)
Dept: PSYCHIATRY | Facility: CLINIC | Age: 47
End: 2022-12-21
Payer: COMMERCIAL

## 2022-12-21 ENCOUNTER — PATIENT MESSAGE (OUTPATIENT)
Dept: PSYCHIATRY | Facility: CLINIC | Age: 47
End: 2022-12-21
Payer: COMMERCIAL

## 2022-12-21 DIAGNOSIS — F41.1 GENERALIZED ANXIETY DISORDER: Primary | ICD-10-CM

## 2022-12-21 PROCEDURE — 90834 PSYTX W PT 45 MINUTES: CPT | Mod: 95,,, | Performed by: SOCIAL WORKER

## 2022-12-21 PROCEDURE — 90834 PR PSYCHOTHERAPY W/PATIENT, 45 MIN: ICD-10-PCS | Mod: 95,,, | Performed by: SOCIAL WORKER

## 2022-12-21 NOTE — PROGRESS NOTES
"The patient location is: [place]  The chief complaint leading to consultation is: anxiety    Visit type: audiovisual    Face to Face time with patient: 45  60 minutes of total time spent on the encounter, which includes face to face time and non-face to face time preparing to see the patient (eg, review of tests), Obtaining and/or reviewing separately obtained history, Documenting clinical information in the electronic or other health record, Independently interpreting results (not separately reported) and communicating results to the patient/family/caregiver, or Care coordination (not separately reported).     Each patient to whom he or she provides medical services by telemedicine is:  (1) informed of the relationship between the physician and patient and the respective role of any other health care provider with respect to management of the patient; and (2) notified that he or she may decline to receive medical services by telemedicine and may withdraw from such care at any time.    Notes:     Individual Psychotherapy (PhD/LCSW)    12/21/2022    Site:  Telemed         Therapeutic Intervention: Met with patient.  Outpatient - Insight oriented psychotherapy 45 min - CPT code 78856 and Outpatient - Supportive psychotherapy 45 min - CPT Code 36679    Chief complaint/reason for encounter: anxiety     Interval history and content of current session: Patient returned for follow up psychotherapy. Patient reports that she is "mell". Reports that she doesn't feel like she has time or motivation to do anything. House is cluttered and it makes her overwhelmed. States that she did do another list with smaller tasks. Discussed that she is hopeful that next week with time off from work she will be able to do things around the house. Discussed the fluctuation of mood and how she could be having a more low mood time.     For Next Time:    Treatment plan:  Target symptoms: anxiety   Why chosen therapy is appropriate versus " another modality: relevant to diagnosis, evidence based practice  Outcome monitoring methods: self-report, observation  Therapeutic intervention type: insight oriented psychotherapy, supportive psychotherapy    Risk parameters:  Patient reports no suicidal ideation  Patient reports no homicidal ideation  Patient reports no self-injurious behavior  Patient reports no violent behavior    Verbal deficits: None    Patient's response to intervention:  The patient's response to intervention is accepting.    Progress toward goals and other mental status changes:  The patient's progress toward goals is good.    Diagnosis:     ICD-10-CM ICD-9-CM   1. Generalized anxiety disorder  F41.1 300.02     Plan:  individual psychotherapy    Return to clinic: 2 weeks, 1 month    Length of Service (minutes): 45

## 2023-01-11 DIAGNOSIS — Z12.31 OTHER SCREENING MAMMOGRAM: ICD-10-CM

## 2023-01-13 ENCOUNTER — HOSPITAL ENCOUNTER (OUTPATIENT)
Dept: RADIOLOGY | Facility: HOSPITAL | Age: 48
Discharge: HOME | End: 2023-01-13
Attending: FAMILY MEDICINE
Payer: COMMERCIAL

## 2023-01-13 ENCOUNTER — OFFICE VISIT (OUTPATIENT)
Dept: PSYCHIATRY | Facility: CLINIC | Age: 48
End: 2023-01-13
Payer: COMMERCIAL

## 2023-01-13 DIAGNOSIS — Z12.31 OTHER SCREENING MAMMOGRAM: ICD-10-CM

## 2023-01-13 DIAGNOSIS — F41.1 GENERALIZED ANXIETY DISORDER: Primary | ICD-10-CM

## 2023-01-13 PROCEDURE — 77067 MAMMO DIGITAL SCREENING BILAT WITH TOMO: ICD-10-PCS | Mod: 26,,, | Performed by: RADIOLOGY

## 2023-01-13 PROCEDURE — 77063 MAMMO DIGITAL SCREENING BILAT WITH TOMO: ICD-10-PCS | Mod: 26,,, | Performed by: RADIOLOGY

## 2023-01-13 PROCEDURE — 77067 SCR MAMMO BI INCL CAD: CPT | Mod: 26,,, | Performed by: RADIOLOGY

## 2023-01-13 PROCEDURE — 77067 SCR MAMMO BI INCL CAD: CPT | Mod: TC

## 2023-01-13 PROCEDURE — 77063 BREAST TOMOSYNTHESIS BI: CPT | Mod: 26,,, | Performed by: RADIOLOGY

## 2023-01-13 PROCEDURE — 90834 PR PSYCHOTHERAPY W/PATIENT, 45 MIN: ICD-10-PCS | Mod: 95,,, | Performed by: SOCIAL WORKER

## 2023-01-13 PROCEDURE — 90834 PSYTX W PT 45 MINUTES: CPT | Mod: 95,,, | Performed by: SOCIAL WORKER

## 2023-01-13 NOTE — PROGRESS NOTES
"The patient location is: [place]  The chief complaint leading to consultation is: anxiety    Visit type: audiovisual    Face to Face time with patient: 45  60 minutes of total time spent on the encounter, which includes face to face time and non-face to face time preparing to see the patient (eg, review of tests), Obtaining and/or reviewing separately obtained history, Documenting clinical information in the electronic or other health record, Independently interpreting results (not separately reported) and communicating results to the patient/family/caregiver, or Care coordination (not separately reported).     Each patient to whom he or she provides medical services by telemedicine is:  (1) informed of the relationship between the physician and patient and the respective role of any other health care provider with respect to management of the patient; and (2) notified that he or she may decline to receive medical services by telemedicine and may withdraw from such care at any time.    Notes:     Individual Psychotherapy (PhD/LCSW)    1/13/2023    Site:  Telemed         Therapeutic Intervention: Met with patient.  Outpatient - Insight oriented psychotherapy 45 min - CPT code 89220 and Outpatient - Supportive psychotherapy 45 min - CPT Code 21904    Chief complaint/reason for encounter: anxiety     Interval history and content of current session: Patient returned for follow up psychotherapy. Patient reports that she is "okay". Feels like she is coming out of the depression. States that she is able to sleep through the night because kitten is able to get up and do things on his own. States that she took cat in for another procedure and has a large bill. This puts her surgery off until next year. States that she has some motivation to do things around the house. States that things are going well with her and boyfriend. Reports that she feels like she has been more emotionally stable. States that this year she wants " to focus on personal growth. States that drinking had started to get bad again. States that when friend  on giving started drinking again. Then everything with the kitten made it more helpful to drink. States that she recently [facility] to help with the cravings that she has been having. Has recognized that drinking and emotional eating go together. Discussed that she feels shameful about drinking and hasn't told anyone. Discussed goals for the next couple of years.     For Next Time:    Treatment plan:  Target symptoms: anxiety   Why chosen therapy is appropriate versus another modality: relevant to diagnosis, evidence based practice  Outcome monitoring methods: self-report, observation  Therapeutic intervention type: insight oriented psychotherapy, supportive psychotherapy    Risk parameters:  Patient reports no suicidal ideation  Patient reports no homicidal ideation  Patient reports no self-injurious behavior  Patient reports no violent behavior    Verbal deficits: None    Patient's response to intervention:  The patient's response to intervention is accepting.    Progress toward goals and other mental status changes:  The patient's progress toward goals is good.    Diagnosis:     ICD-10-CM ICD-9-CM   1. Generalized anxiety disorder  F41.1 300.02       Plan:  individual psychotherapy    Return to clinic: 2 weeks, 1 month    Length of Service (minutes): 45

## 2023-01-27 ENCOUNTER — OFFICE VISIT (OUTPATIENT)
Dept: PSYCHIATRY | Facility: CLINIC | Age: 48
End: 2023-01-27
Payer: COMMERCIAL

## 2023-01-27 DIAGNOSIS — F41.1 GENERALIZED ANXIETY DISORDER: Primary | ICD-10-CM

## 2023-01-27 PROCEDURE — 90834 PR PSYCHOTHERAPY W/PATIENT, 45 MIN: ICD-10-PCS | Mod: 95,,, | Performed by: SOCIAL WORKER

## 2023-01-27 PROCEDURE — 90834 PSYTX W PT 45 MINUTES: CPT | Mod: 95,,, | Performed by: SOCIAL WORKER

## 2023-01-27 NOTE — PROGRESS NOTES
The patient location is: [place]  The chief complaint leading to consultation is: anxiety    Visit type: audiovisual    Face to Face time with patient: 45  60 minutes of total time spent on the encounter, which includes face to face time and non-face to face time preparing to see the patient (eg, review of tests), Obtaining and/or reviewing separately obtained history, Documenting clinical information in the electronic or other health record, Independently interpreting results (not separately reported) and communicating results to the patient/family/caregiver, or Care coordination (not separately reported).     Each patient to whom he or she provides medical services by telemedicine is:  (1) informed of the relationship between the physician and patient and the respective role of any other health care provider with respect to management of the patient; and (2) notified that he or she may decline to receive medical services by telemedicine and may withdraw from such care at any time.    Notes:     Individual Psychotherapy (PhD/LCSW)    1/27/2023    Site:  Telemed         Therapeutic Intervention: Met with patient.  Outpatient - Insight oriented psychotherapy 45 min - CPT code 93686 and Outpatient - Supportive psychotherapy 45 min - CPT Code 00206    Chief complaint/reason for encounter: anxiety     Interval history and content of current session: Patient returned for follow up psychotherapy. Patient reports for an urgent slot. States that she is experiencing increased anxiety and stress. States that she was watching a show with matthew friend and made a snappy comment. This was upsetting to him but then didn't talk to her for over a day. Was starting to have physical anxiety symptoms. Matthew friend eventually called and anxiety has decreased. Discussed that silent treatment in relationships are not appropriate in adult conversations. She did their normal morning text but didn't hear from him. Attempted to call but didn't  answer. States that matthew friend did not attempt to contact patient in the afternoon. Recently started on Naltrexone for cravings. States that she tried campral but it didn't work. States that she picked up new prescription and hasn't had any cravings. Discussed goals and integral theory.     For Next Time:    Treatment plan:  Target symptoms: anxiety   Why chosen therapy is appropriate versus another modality: relevant to diagnosis, evidence based practice  Outcome monitoring methods: self-report, observation  Therapeutic intervention type: insight oriented psychotherapy, supportive psychotherapy    Risk parameters:  Patient reports no suicidal ideation  Patient reports no homicidal ideation  Patient reports no self-injurious behavior  Patient reports no violent behavior    Verbal deficits: None    Patient's response to intervention:  The patient's response to intervention is accepting.    Progress toward goals and other mental status changes:  The patient's progress toward goals is good.    Diagnosis:     ICD-10-CM ICD-9-CM   1. Generalized anxiety disorder  F41.1 300.02       Plan:  individual psychotherapy    Return to clinic: 2 weeks, 1 month    Length of Service (minutes): 45

## 2023-01-30 ENCOUNTER — OFFICE VISIT (OUTPATIENT)
Dept: ORTHOPEDICS | Facility: CLINIC | Age: 48
End: 2023-01-30
Payer: COMMERCIAL

## 2023-01-30 DIAGNOSIS — S49.91XD INJURY OF RIGHT SHOULDER, SUBSEQUENT ENCOUNTER: Primary | ICD-10-CM

## 2023-01-30 PROCEDURE — 99213 PR OFFICE/OUTPT VISIT, EST, LEVL III, 20-29 MIN: ICD-10-PCS | Mod: S$GLB,,, | Performed by: ORTHOPAEDIC SURGERY

## 2023-01-30 PROCEDURE — 99999 PR PBB SHADOW E&M-EST. PATIENT-LVL III: CPT | Mod: PBBFAC,,, | Performed by: ORTHOPAEDIC SURGERY

## 2023-01-30 PROCEDURE — 99213 OFFICE O/P EST LOW 20 MIN: CPT | Mod: S$GLB,,, | Performed by: ORTHOPAEDIC SURGERY

## 2023-01-30 PROCEDURE — 99999 PR PBB SHADOW E&M-EST. PATIENT-LVL III: ICD-10-PCS | Mod: PBBFAC,,, | Performed by: ORTHOPAEDIC SURGERY

## 2023-01-30 RX ORDER — NALTREXONE HYDROCHLORIDE 50 MG/1
50 TABLET, FILM COATED ORAL
COMMUNITY
Start: 2023-01-23 | End: 2024-01-24

## 2023-01-30 RX ORDER — ACAMPROSATE CALCIUM 333 MG/1
666 TABLET, DELAYED RELEASE ORAL 3 TIMES DAILY
COMMUNITY
Start: 2023-01-09 | End: 2024-01-24

## 2023-01-30 NOTE — PROGRESS NOTES
"Assessment: 47 y.o. female with right rotator cuff strain     I explained my diagnostic impression and the reasoning behind it in detail, using layman's terms.       Plan:   - PT referral to [facility]   - Return to clinic in 8 weeks. Return sooner if symptoms worsen or fail to improve.    All questions were answered in detail. The patient is in full agreement with the treatment plan and will proceed accordingly.    Chief Complaint   Patient presents with    Right Shoulder - Injury, Pain       Initial visit (11/28/22): Leia Shultz is a 47 y.o. female who presents today complaining of right shoulder pain  Duration of symptoms:  about 3 weeks   Trauma or new activity: yes - was walking her large dog - dog chased after a possum and pulled her. She fell down and was dragged a little  Has had pain since then but it is improving   Localizes to trapezius with radiation to subdeltoid fossa and lateral arm. Did have pain over scapula but this has resolved  Pain is intermittent  Aggravating factors: reaching overhead, internal rotation, hurts to use her mouse at her desk, has to put it lower on her leg  Relieving factors:  rest   Night pain is present and is disruptive to sleep  Radicular symptoms: no numbness, weakness   Prior treatment:  prescription NSAIDs with improvement in pain.     Pain does interfere with sleep and activities of daily living .    1/30/23  Here for follow up of right shoulder pain   Pain has improved a lot, still has some   Still bothers her at night  She now describes it as "annoying"  Localized mostly to subdeltoid fossa with radiation down her back  Pain with abduction, getting dressed. Still having some pain with using her mouse if her arm is too high     This is the extent of the patient's complaints at this time.     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 Mood and affect are appropriate.  Patient does not appear to be in any distress, denies any constitutional symptoms and appears stated age.   HEENT: Pupils are equal and round, sclera are not injected. External examination of ears and nose reveals no abnormalities. Cranial nerves II-X are grossly intact  Skin: no rashes, abrasions or open wounds on the affected extremity   Resp: No respiratory distress or audible wheezing   CV: 2+  pulses, all extremities warm and well perfused   Right Shoulder    Shoulder Range of Motion    Right     Left   (Active/Passive)       Forward Elevation     165/165            165/165  External rotation (arm at side)  50/50             50/50   Internal rotation behind the back  Hip             L5     Range of motion is not painful     Neer's positive  Hawkin's positive    Francis's positive  Drop arm negative  Belly press negative      Cuff Strength     Right     Left   Supraspinatus        5-/5    5/5  Infraspinatus     5/5    5/5  Subscapularis     5/5    5/5    Deltoid testing            5/5    5/5    Elbow examination demonstrates no tenderness to palpation and has normal range of motion.   No NV changes     Imaging: 3 views of the right shoulder:  no new     This note was created by combination of typed  and M-Modal dictation. Transcription and phonetic errors may be present.  If there are any questions, please contact me.      Current Outpatient Medications:     acamprosate (CAMPRAL) 333 mg tablet, Take 666 mg by mouth 3 (three) times daily., Disp: , Rfl:     MULTIVITAMIN ORAL, Take by mouth once daily., Disp: , Rfl:     naltrexone (DEPADE) 50 mg tablet, Take 50 mg by mouth., Disp: , Rfl:      traZODone (DESYREL) 100 MG tablet, TAKE 1 TABLET BY MOUTH EVERY EVENING., Disp: 90 tablet, Rfl: 1    EPINEPHrine (EPIPEN 2-KEVIN) 0.3 mg/0.3 mL AtIn, Inject 0.3 mLs (0.3 mg total) into the muscle once. for 1 dose, Disp: 2 each, Rfl: 0    meloxicam (MOBIC) 7.5 MG tablet, TAKE 1 TABLET BY MOUTH EVERY DAY, Disp: 30 tablet, Rfl: 0    Past Medical History:   Diagnosis Date    Anemia     Arthritis     Heart murmur        Active Problem List with Overview Notes    Diagnosis Date Noted    Poor posture 01/08/2020    Weakness of trunk musculature 01/08/2020    Low folate 02/12/2018    History of gastric restrictive surgery 11/21/2017    Vitamin D deficiency 11/21/2017    Vitamin B 12 deficiency 11/21/2017       Past Surgical History:   Procedure Laterality Date    DILATION AND CURETTAGE OF UTERUS  2002    gastric sleeve      LAPAROSCOPIC GASTRIC BANDING      ROOT CANAL      2    WISDOM TOOTH EXTRACTION      all 4       Social History     Socioeconomic History    Marital status:    Tobacco Use    Smoking status: Never    Smokeless tobacco: Never   Substance and Sexual Activity    Alcohol use: Yes     Comment: once a week    Drug use: No    Sexual activity: Yes     Partners: Male     Birth control/protection: I.U.D.     Comment: 11/21/17 -vasectomy     Social Determinants of Health     Financial Resource Strain: Low Risk     Difficulty of Paying Living Expenses: Not very hard   Food Insecurity: No Food Insecurity    Worried About Running Out of Food in the Last Year: Never true    Ran Out of Food in the Last Year: Never true   Transportation Needs: No Transportation Needs    Lack of Transportation (Medical): No    Lack of Transportation (Non-Medical): No   Physical Activity: Sufficiently Active    Days of Exercise per Week: 5 days    Minutes of Exercise per Session: 30 min   Stress: Stress Concern Present    Feeling of Stress : To some extent   Social Connections: Unknown    Frequency of Communication with Friends  and Family: More than three times a week    Frequency of Social Gatherings with Friends and Family: Once a week    Active Member of Clubs or Organizations: No    Attends Club or Organization Meetings: Never    Marital Status:    Housing Stability: Low Risk     Unable to Pay for Housing in the Last Year: No    Number of Places Lived in the Last Year: 1    Unstable Housing in the Last Year: No

## 2023-02-03 ENCOUNTER — TELEPHONE (OUTPATIENT)
Dept: FAMILY MEDICINE | Facility: CLINIC | Age: 48
End: 2023-02-03
Payer: COMMERCIAL

## 2023-02-06 ENCOUNTER — LAB VISIT (OUTPATIENT)
Dept: LAB | Facility: HOSPITAL | Age: 48
End: 2023-02-06
Attending: FAMILY MEDICINE
Payer: COMMERCIAL

## 2023-02-06 ENCOUNTER — OFFICE VISIT (OUTPATIENT)
Dept: OBSTETRICS AND GYNECOLOGY | Facility: CLINIC | Age: 48
End: 2023-02-06
Payer: COMMERCIAL

## 2023-02-06 ENCOUNTER — OFFICE VISIT (OUTPATIENT)
Dept: FAMILY MEDICINE | Facility: CLINIC | Age: 48
End: 2023-02-06
Payer: COMMERCIAL

## 2023-02-06 VITALS
HEART RATE: 71 BPM | DIASTOLIC BLOOD PRESSURE: 74 MMHG | OXYGEN SATURATION: 97 % | SYSTOLIC BLOOD PRESSURE: 134 MMHG | RESPIRATION RATE: 18 BRPM | WEIGHT: 193.88 LBS | BODY MASS INDEX: 34.35 KG/M2 | HEIGHT: 63 IN | TEMPERATURE: 97 F

## 2023-02-06 VITALS
DIASTOLIC BLOOD PRESSURE: 72 MMHG | WEIGHT: 194.13 LBS | SYSTOLIC BLOOD PRESSURE: 130 MMHG | BODY MASS INDEX: 34.4 KG/M2 | HEIGHT: 63 IN

## 2023-02-06 DIAGNOSIS — E66.09 CLASS 1 OBESITY DUE TO EXCESS CALORIES WITHOUT SERIOUS COMORBIDITY WITH BODY MASS INDEX (BMI) OF 34.0 TO 34.9 IN ADULT: ICD-10-CM

## 2023-02-06 DIAGNOSIS — Z01.419 WELL WOMAN EXAM WITH ROUTINE GYNECOLOGICAL EXAM: Primary | ICD-10-CM

## 2023-02-06 DIAGNOSIS — N95.1 PERIMENOPAUSE: ICD-10-CM

## 2023-02-06 DIAGNOSIS — Z00.00 WELL WOMAN EXAM WITHOUT GYNECOLOGICAL EXAM: ICD-10-CM

## 2023-02-06 DIAGNOSIS — Z00.00 WELL WOMAN EXAM WITHOUT GYNECOLOGICAL EXAM: Primary | ICD-10-CM

## 2023-02-06 DIAGNOSIS — F51.01 PRIMARY INSOMNIA: ICD-10-CM

## 2023-02-06 DIAGNOSIS — T78.40XA ALLERGIC REACTION, INITIAL ENCOUNTER: ICD-10-CM

## 2023-02-06 PROBLEM — E66.811 CLASS 1 OBESITY DUE TO EXCESS CALORIES WITHOUT SERIOUS COMORBIDITY WITH BODY MASS INDEX (BMI) OF 34.0 TO 34.9 IN ADULT: Status: ACTIVE | Noted: 2023-02-06

## 2023-02-06 LAB
ALBUMIN SERPL BCP-MCNC: 3.7 G/DL (ref 3.5–5.2)
ALP SERPL-CCNC: 60 U/L (ref 55–135)
ALT SERPL W/O P-5'-P-CCNC: 12 U/L (ref 10–44)
ANION GAP SERPL CALC-SCNC: 8 MMOL/L (ref 8–16)
AST SERPL-CCNC: 18 U/L (ref 10–40)
BASOPHILS # BLD AUTO: 0.08 K/UL (ref 0–0.2)
BASOPHILS NFR BLD: 1.4 % (ref 0–1.9)
BILIRUB SERPL-MCNC: 0.4 MG/DL (ref 0.1–1)
BUN SERPL-MCNC: 9 MG/DL (ref 6–20)
CALCIUM SERPL-MCNC: 9.4 MG/DL (ref 8.7–10.5)
CHLORIDE SERPL-SCNC: 103 MMOL/L (ref 95–110)
CHOLEST SERPL-MCNC: 180 MG/DL (ref 120–199)
CHOLEST/HDLC SERPL: 2.7 {RATIO} (ref 2–5)
CO2 SERPL-SCNC: 25 MMOL/L (ref 23–29)
CREAT SERPL-MCNC: 0.7 MG/DL (ref 0.5–1.4)
DIFFERENTIAL METHOD: ABNORMAL
EOSINOPHIL # BLD AUTO: 0.3 K/UL (ref 0–0.5)
EOSINOPHIL NFR BLD: 5.6 % (ref 0–8)
ERYTHROCYTE [DISTWIDTH] IN BLOOD BY AUTOMATED COUNT: 11.8 % (ref 11.5–14.5)
EST. GFR  (NO RACE VARIABLE): >60 ML/MIN/1.73 M^2
GLUCOSE SERPL-MCNC: 78 MG/DL (ref 70–110)
HCT VFR BLD AUTO: 41.8 % (ref 37–48.5)
HDLC SERPL-MCNC: 67 MG/DL (ref 40–75)
HDLC SERPL: 37.2 % (ref 20–50)
HGB BLD-MCNC: 13.6 G/DL (ref 12–16)
IMM GRANULOCYTES # BLD AUTO: 0.04 K/UL (ref 0–0.04)
IMM GRANULOCYTES NFR BLD AUTO: 0.7 % (ref 0–0.5)
LDLC SERPL CALC-MCNC: 104.2 MG/DL (ref 63–159)
LYMPHOCYTES # BLD AUTO: 1.8 K/UL (ref 1–4.8)
LYMPHOCYTES NFR BLD: 31.7 % (ref 18–48)
MCH RBC QN AUTO: 31.3 PG (ref 27–31)
MCHC RBC AUTO-ENTMCNC: 32.5 G/DL (ref 32–36)
MCV RBC AUTO: 96 FL (ref 82–98)
MONOCYTES # BLD AUTO: 0.5 K/UL (ref 0.3–1)
MONOCYTES NFR BLD: 8.5 % (ref 4–15)
NEUTROPHILS # BLD AUTO: 3 K/UL (ref 1.8–7.7)
NEUTROPHILS NFR BLD: 52.1 % (ref 38–73)
NONHDLC SERPL-MCNC: 113 MG/DL
NRBC BLD-RTO: 0 /100 WBC
PLATELET # BLD AUTO: 346 K/UL (ref 150–450)
PMV BLD AUTO: 9 FL (ref 9.2–12.9)
POTASSIUM SERPL-SCNC: 4.6 MMOL/L (ref 3.5–5.1)
PROT SERPL-MCNC: 6.7 G/DL (ref 6–8.4)
RBC # BLD AUTO: 4.35 M/UL (ref 4–5.4)
SODIUM SERPL-SCNC: 136 MMOL/L (ref 136–145)
TRIGL SERPL-MCNC: 44 MG/DL (ref 30–150)
TSH SERPL DL<=0.005 MIU/L-ACNC: 1.92 UIU/ML (ref 0.4–4)
WBC # BLD AUTO: 5.75 K/UL (ref 3.9–12.7)

## 2023-02-06 PROCEDURE — 85025 COMPLETE CBC W/AUTO DIFF WBC: CPT | Performed by: FAMILY MEDICINE

## 2023-02-06 PROCEDURE — 84443 ASSAY THYROID STIM HORMONE: CPT | Performed by: FAMILY MEDICINE

## 2023-02-06 PROCEDURE — 99999 PR PBB SHADOW E&M-EST. PATIENT-LVL III: ICD-10-PCS | Mod: PBBFAC,,, | Performed by: OBSTETRICS & GYNECOLOGY

## 2023-02-06 PROCEDURE — 36415 COLL VENOUS BLD VENIPUNCTURE: CPT | Mod: PO | Performed by: FAMILY MEDICINE

## 2023-02-06 PROCEDURE — 80061 LIPID PANEL: CPT | Performed by: FAMILY MEDICINE

## 2023-02-06 PROCEDURE — 99396 PREV VISIT EST AGE 40-64: CPT | Mod: S$GLB,,, | Performed by: OBSTETRICS & GYNECOLOGY

## 2023-02-06 PROCEDURE — 80053 COMPREHEN METABOLIC PANEL: CPT | Performed by: FAMILY MEDICINE

## 2023-02-06 PROCEDURE — 99396 PR PREVENTIVE VISIT,EST,40-64: ICD-10-PCS | Mod: S$GLB,,, | Performed by: FAMILY MEDICINE

## 2023-02-06 PROCEDURE — 99999 PR PBB SHADOW E&M-EST. PATIENT-LVL IV: ICD-10-PCS | Mod: PBBFAC,,, | Performed by: FAMILY MEDICINE

## 2023-02-06 PROCEDURE — 99999 PR PBB SHADOW E&M-EST. PATIENT-LVL III: CPT | Mod: PBBFAC,,, | Performed by: OBSTETRICS & GYNECOLOGY

## 2023-02-06 PROCEDURE — 99396 PR PREVENTIVE VISIT,EST,40-64: ICD-10-PCS | Mod: S$GLB,,, | Performed by: OBSTETRICS & GYNECOLOGY

## 2023-02-06 PROCEDURE — 99396 PREV VISIT EST AGE 40-64: CPT | Mod: S$GLB,,, | Performed by: FAMILY MEDICINE

## 2023-02-06 PROCEDURE — 99999 PR PBB SHADOW E&M-EST. PATIENT-LVL IV: CPT | Mod: PBBFAC,,, | Performed by: FAMILY MEDICINE

## 2023-02-06 RX ORDER — TRAZODONE HYDROCHLORIDE 100 MG/1
100 TABLET ORAL NIGHTLY
Qty: 90 TABLET | Refills: 1 | Status: SHIPPED | OUTPATIENT
Start: 2023-02-06 | End: 2023-12-29 | Stop reason: SDUPTHER

## 2023-02-06 RX ORDER — EPINEPHRINE 0.3 MG/.3ML
1 INJECTION SUBCUTANEOUS ONCE
Qty: 2 EACH | Refills: 0 | Status: SHIPPED | OUTPATIENT
Start: 2023-02-06 | End: 2024-01-24

## 2023-02-06 NOTE — PROGRESS NOTES
"Well Woman VISIT      CHIEF COMPLAINT  Chief Complaint   Patient presents with    Annual Exam       HPI  Leia Shultz is a 47 y.o. female who presents for physical.     Social Factors  Tobacco use: No  Ready to Quit: No  Alcohol: Yes on occasion  Intimate partner violence screening  "Do you feel safe in your current relationship?" single  "Have you ever been in a relationship in which your partner frightened you or hurt you?" No  Living Will/POA: No  Regular Exercise: Yes     Depression  Over the past two weeks, have you felt down, depressed, or hopeless? No  Over the past two weeks, have you felt little interest or pleasure in doing things? No    Reproductive Health  Followed by Dr. Cl Chacon    CHD, HTN, DM2  CHD Risk Factors: none  Women 45 years and older should be screened for dyslipidemia if at increased risk of CHD  Women 20 to 45 years of age should be screened for dyslipidemia if at increased risk of CHD  Asymptomatic adults with sustained blood pressure greater than 135/80 mm Hg (treated or untreated) should be screened for type 2 diabetes mellitus    Estimated body mass index is 34.35 kg/m² as calculated from the following:    Height as of this encounter: 5' 3" (1.6 m).    Weight as of this encounter: 87.9 kg (193 lb 14.3 oz).      Screening  Mammogram needed: ordered  Colonoscopy needed: n/a  Osteoporosis screen needed: n/a     Women 50 to 74 years of age should be screened for breast cancer with mammography biennially.  Women should be screened for cervical cancer with Pap tests beginning at 21 years of age. Low-risk women should receive Pap testing every three years. Co-testing for human papillomavirus is an option beginning at 30 years of age, and can extend the screening interval to five years. Cervical cancer screening should be discontinued at 65 years of age or after total hysterectomy if the woman has a benign gynecologic history  Adults 50 to 75 years of age should be screened for " "colorectal cancer with an FOBT annually, sigmoidoscopy every five years with an FOBT every three years, or colonoscopy every 10 years.  Women 65 years and older should be screened for osteoporosis. Women younger than 65 years should be screened if the risk of fracture is greater than or equal to that of a 65-year-old white woman without additional risk factors.      Immunizations  delayed    ALLERGIES and MEDS were verified.   PMHx, PSHx, FHx, SOCIALHx were updated as pertinent.    REVIEW OF SYSTEMS  Review of Systems   Constitutional: Negative.    HENT: Negative for hearing loss.    Eyes: Negative for discharge.   Respiratory: Negative for wheezing.    Cardiovascular: Negative for chest pain and palpitations.   Gastrointestinal: Negative for blood in stool, constipation, diarrhea and vomiting.   Genitourinary: Negative for dysuria and hematuria.   Musculoskeletal: Negative for neck pain.   Skin: Negative.    Neurological: Negative for weakness and headaches.   Endo/Heme/Allergies: Negative for polydipsia.         PHYSICAL EXAM  VITAL SIGNS: /74   Pulse 71   Temp 97 °F (36.1 °C) (Oral)   Resp 18   Ht 5' 3" (1.6 m)   Wt 87.9 kg (193 lb 14.3 oz)   LMP 02/01/2023 (Exact Date)   SpO2 97%   BMI 34.35 kg/m²   GEN: Well developed, Well nourished, No acute distress.  HENT: Normocephalic, Atraumatic, Bilateral external ears normal, Nose normal, Oropharynx moist, No oral exudates.   Eyes: PERRL, EOMI, Conjunctiva normal, No discharge.   Neck: Supple, No tenderness.  Lymphatic: No cervical or supraclavicular lymphadenopathy noted.   Cardiovascular: Normal heart rate, Normal rhythm, No murmurs, No rubs, No gallops.   Thorax & Lungs: Normal breath sounds, No respiratory distress, No wheezing.  Abdomen: Soft, No tenderness, Bowel sounds normal.  Breast: deferred  Genital: deferred   Skin: Warm, Dry, No erythema, No rash.   Extremities: No edema, No tenderness.       ASSESSMENT/PLAN    Leia was seen today for " annual exam.    Diagnoses and all orders for this visit:    Well woman exam without gynecological exam  -     CBC Auto Differential; Future  -     Comprehensive Metabolic Panel; Future  -     Lipid Panel; Future  -     Urinalysis; Future  -     TSH; Future    Class 1 obesity due to excess calories without serious comorbidity with body mass index (BMI) of 34.0 to 34.9 in adult    Allergic reaction, initial encounter  -     EPINEPHrine (EPIPEN 2-KEVIN) 0.3 mg/0.3 mL AtIn; Inject 0.3 mLs (0.3 mg total) into the muscle once. for 1 dose    Primary insomnia  -     traZODone (DESYREL) 100 MG tablet; Take 1 tablet (100 mg total) by mouth every evening.           FOLLOW UP: 6 months or sooner if needed    Meliton Gaviria MD

## 2023-02-06 NOTE — PROGRESS NOTES
"[facility]  Ambulatory Clinic  Obstetrics & Gynecology    Visit Date:  2023    Chief Complaint:  Annual GYN exam    History of Present Illness:      Leia Shultz is a 47 y.o.  here for a gynecologic exam.  Pt is perimenopausal, menses are becoming irregular, ~10 months gap between recent menses.  Patient's last menstrual period was 2023 (exact date).  Pt current method of family planning is copper IUD and reports no problems with this method.    Last pap ~2020 was benign.  Pt denies active sexually transmitted infections.  Last mammo ~2023 was benign.  Pt is a non-smoker.  Pt denies vaginal discharge, dysmenorrhea, dyspareunia, pelvic pain, bloating, early satiety, unintentional weight loss, breast mass/skin changes, incontinence, GI or urinary complaints.    Otherwise, the pt is in her usual state of health.    Past History:  Gynecologic history as noted above.    Review of Systems:      GENERAL:  No fever, fatigue, excessive weight gain or loss  HEENT:  No headaches, hearing changes, visual disturbance  RESPIRATORY:  No cough, shortness of breath  CARDIOVASCULAR:  No chest pain, heart palpitations, leg swelling  BREAST:  No lump, pain, nipple discharge, skin changes  GASTROINTESTINAL:  No nausea, vomiting, constipation, diarrhea, abd pain, rectal bleeding   GENITOURINARY:  See HPI  ENDOCRINE:  No heat or cold intolerance  HEMATOLOGIC:  No easy bruisability or bleeding   LYMPHATICS:  No enlarged nodes  MUSCULOSKELETAL:  No acute joint pain or swelling  SKIN:  No rash, lesions, jaundice  NEUROLOGIC:  No dizziness, weakness, syncope  PSYCHIATRIC:  No significant mood changes, homicidal/suicidal ideations, abuse    Physical Exam:     /72   Ht 5' 3" (1.6 m)   Wt 88 kg (194 lb 1.8 oz)   LMP 2023 (Exact Date) Comment: pt has IUD  BMI 34.39 kg/m²   Pulse 50's, Resp rate 14     GENERAL:  No acute distress, well-nourished  HEENT:  Atraumatic, anicteric, moist " mucus membranes. Neck supple w/o masses.  BREAST:  Symmetric, nontender, no obvious masses, adenopathy, skin changes or nipple discharge.  LUNGS:  Clear normal respiratory effort  HEART:  Regular rate and rhythm, no murmurs, gallops, or rubs  ABDOMEN:  Soft, non-tender, non-distended, normoactive bowel sounds, no obvious organomegaly  EXT:  Symmetric w/o cramping, claudication, or edema. +2 distal pulses.  SKIN:  No rashes or bruising  PSYCH:  Mood and affect appropriate  NEURO:  Grossly intact bilaterally    GENITOURINARY:    VULVAR:  Female external genitalia w/o obvious lesions. Female hair distribution. Normal urethral meatus. No gross lymphadenopathy.    VAGINA:  Normal vaginal mucosa. Good support. No obvious lesion. No discharge.  CERVIX:  No cervical motion tenderness, discharge, or obvious lesions. IUD string visualized ~3 cm from Os.  UTERUS:  Small, non-tender, normal contour  ADNEXA:  No masses, non-tender    RECTAL:  Deferred. No obvious external lesions    Chaperone present for exam.    Lab Results   Component Value Date    TSH 1.921 2023     Assessment:     47 y.o.  with copper IUD:    Well woman gynecologic exam  Perimenopause  IUD check    Plan:    A gynecologic health assessment was performed with age appropriate counseling.  Cervical cancer screening - pap up to date.  Screening mammogram up to date.  Discussed the physiologic changes associated with jose manuel-menopause including various treatment options.  Discussed HRT, non-estrogen, alternative therapies, and lifestyle modifications for menopausal symptoms.  Pt will continue with OTC menopausal therapy.  Continue Copper IUD.  Risks, benefits, and alternatives to Copper reviewed.  Self string check.    Encourage healthy lifestyle modifications, monthly self breast exams, and colonoscopy.  F/u with PCP for health maintenance.  Return 1 year for gynecologic exam or sooner as needed.    All questions answered, pt voiced understanding.         Cl Chacon MD

## 2023-02-15 ENCOUNTER — OFFICE VISIT (OUTPATIENT)
Dept: PSYCHIATRY | Facility: CLINIC | Age: 48
End: 2023-02-15
Payer: COMMERCIAL

## 2023-02-15 DIAGNOSIS — F41.1 GENERALIZED ANXIETY DISORDER: Primary | ICD-10-CM

## 2023-02-15 PROCEDURE — 90834 PSYTX W PT 45 MINUTES: CPT | Mod: 95,,, | Performed by: SOCIAL WORKER

## 2023-02-15 PROCEDURE — 90834 PR PSYCHOTHERAPY W/PATIENT, 45 MIN: ICD-10-PCS | Mod: 95,,, | Performed by: SOCIAL WORKER

## 2023-02-15 NOTE — PROGRESS NOTES
"The patient location is: [place]  The chief complaint leading to consultation is: anxiety    Visit type: audiovisual    Face to Face time with patient: 45  60 minutes of total time spent on the encounter, which includes face to face time and non-face to face time preparing to see the patient (eg, review of tests), Obtaining and/or reviewing separately obtained history, Documenting clinical information in the electronic or other health record, Independently interpreting results (not separately reported) and communicating results to the patient/family/caregiver, or Care coordination (not separately reported).     Each patient to whom he or she provides medical services by telemedicine is:  (1) informed of the relationship between the physician and patient and the respective role of any other health care provider with respect to management of the patient; and (2) notified that he or she may decline to receive medical services by telemedicine and may withdraw from such care at any time.    Notes:     Individual Psychotherapy (PhD/LCSW)    2/15/2023    Site:  Telemed         Therapeutic Intervention: Met with patient.  Outpatient - Insight oriented psychotherapy 45 min - CPT code 48101 and Outpatient - Supportive psychotherapy 45 min - CPT Code 53129    Chief complaint/reason for encounter: anxiety     Interval history and content of current session: Patient returned for follow up psychotherapy. Patient states that she is "pretty good". States that one of her continued stressors is the kitten. States that there is a surgery that he can get when he is older but has to get him there. Another stressor is her relationship. States that after last session they were good for a couple of days and then all of the sudden he was back to answering her in one word sentences. Could tell something was wrong and text him that for whatever she did she was sorry. Later was told that he doesn't know if there is a "way forward for us". " States that he went down a rabbit hole with patient's new kitten and that they don't spend enough time together. Felt like this blind sided her because he made it seem like everything is her fault. Starting to ask herself what is she getting out of relationship. Discussed that she is evaluating what she is getting out of the relationship. Discussed looking into what she and others are putting into relationships, including intimate and friendship relationships. Discussed not saying sorry as much when she isn't sorry about things.     For Next Time:    Treatment plan:  Target symptoms: anxiety   Why chosen therapy is appropriate versus another modality: relevant to diagnosis, evidence based practice  Outcome monitoring methods: self-report, observation  Therapeutic intervention type: insight oriented psychotherapy, supportive psychotherapy    Risk parameters:  Patient reports no suicidal ideation  Patient reports no homicidal ideation  Patient reports no self-injurious behavior  Patient reports no violent behavior    Verbal deficits: None    Patient's response to intervention:  The patient's response to intervention is accepting.    Progress toward goals and other mental status changes:  The patient's progress toward goals is good.    Diagnosis:     ICD-10-CM ICD-9-CM   1. Generalized anxiety disorder  F41.1 300.02         Plan:  individual psychotherapy    Return to clinic: 2 weeks, 1 month    Length of Service (minutes): 45

## 2023-03-14 ENCOUNTER — OFFICE VISIT (OUTPATIENT)
Dept: PSYCHIATRY | Facility: CLINIC | Age: 48
End: 2023-03-14
Payer: COMMERCIAL

## 2023-03-14 DIAGNOSIS — F41.1 GENERALIZED ANXIETY DISORDER: Primary | ICD-10-CM

## 2023-03-14 PROCEDURE — 90834 PSYTX W PT 45 MINUTES: CPT | Mod: 95,,, | Performed by: SOCIAL WORKER

## 2023-03-14 PROCEDURE — 90834 PR PSYCHOTHERAPY W/PATIENT, 45 MIN: ICD-10-PCS | Mod: 95,,, | Performed by: SOCIAL WORKER

## 2023-03-14 NOTE — PROGRESS NOTES
"The patient location is: [place]  The chief complaint leading to consultation is: anxiety    Visit type: audiovisual    Face to Face time with patient: 45  60 minutes of total time spent on the encounter, which includes face to face time and non-face to face time preparing to see the patient (eg, review of tests), Obtaining and/or reviewing separately obtained history, Documenting clinical information in the electronic or other health record, Independently interpreting results (not separately reported) and communicating results to the patient/family/caregiver, or Care coordination (not separately reported).     Each patient to whom he or she provides medical services by telemedicine is:  (1) informed of the relationship between the physician and patient and the respective role of any other health care provider with respect to management of the patient; and (2) notified that he or she may decline to receive medical services by telemedicine and may withdraw from such care at any time.    Notes:     Individual Psychotherapy (PhD/LCSW)    3/14/2023    Site:  Telemed         Therapeutic Intervention: Met with patient.  Outpatient - Insight oriented psychotherapy 45 min - CPT code 13555 and Outpatient - Supportive psychotherapy 45 min - CPT Code 59209    Chief complaint/reason for encounter: anxiety     Interval history and content of current session: Patient returned for follow up psychotherapy. Patient states that she is "not great". States that she got another fraud alert on her credit. This is upsetting because this is the 30+ over the last couple years. States that she is thinking about getting a new social security number so that she doesn't have to deal with these problems in the future. States that she has started a class on finance and it has helped her feel a little less stressed about it. States that after last session kitten had to be put in ICU due to not being able to poop. States that mood has been okay. " States that she is easily irritable. Thinks that it is because lack of sleep. Also not feeling like socializing much.     For Next Time:    Treatment plan:  Target symptoms: anxiety   Why chosen therapy is appropriate versus another modality: relevant to diagnosis, evidence based practice  Outcome monitoring methods: self-report, observation  Therapeutic intervention type: insight oriented psychotherapy, supportive psychotherapy    Risk parameters:  Patient reports no suicidal ideation  Patient reports no homicidal ideation  Patient reports no self-injurious behavior  Patient reports no violent behavior    Verbal deficits: None    Patient's response to intervention:  The patient's response to intervention is accepting.    Progress toward goals and other mental status changes:  The patient's progress toward goals is good.    Diagnosis:     ICD-10-CM ICD-9-CM   1. Generalized anxiety disorder  F41.1 300.02       Plan:  individual psychotherapy    Return to clinic: 2 weeks, 1 month    Length of Service (minutes): 45

## 2023-03-22 ENCOUNTER — PATIENT MESSAGE (OUTPATIENT)
Dept: ADMINISTRATIVE | Facility: HOSPITAL | Age: 48
End: 2023-03-22
Payer: COMMERCIAL

## 2023-03-22 DIAGNOSIS — Z12.11 SCREENING FOR COLON CANCER: ICD-10-CM

## 2023-04-17 LAB — HEMOCCULT STL QL IA: NEGATIVE

## 2023-04-18 ENCOUNTER — PATIENT MESSAGE (OUTPATIENT)
Dept: PSYCHIATRY | Facility: CLINIC | Age: 48
End: 2023-04-18
Payer: COMMERCIAL

## 2023-04-18 ENCOUNTER — OFFICE VISIT (OUTPATIENT)
Dept: PSYCHIATRY | Facility: CLINIC | Age: 48
End: 2023-04-18
Payer: COMMERCIAL

## 2023-04-18 DIAGNOSIS — F41.1 GENERALIZED ANXIETY DISORDER: Primary | ICD-10-CM

## 2023-04-18 PROCEDURE — 90785 PR INTERACTIVE COMPLEXITY: ICD-10-PCS | Mod: 95,,, | Performed by: SOCIAL WORKER

## 2023-04-18 PROCEDURE — 90834 PR PSYCHOTHERAPY W/PATIENT, 45 MIN: ICD-10-PCS | Mod: 95,,, | Performed by: SOCIAL WORKER

## 2023-04-18 PROCEDURE — 90785 PSYTX COMPLEX INTERACTIVE: CPT | Mod: 95,,, | Performed by: SOCIAL WORKER

## 2023-04-18 PROCEDURE — 90834 PSYTX W PT 45 MINUTES: CPT | Mod: 95,,, | Performed by: SOCIAL WORKER

## 2023-04-18 NOTE — PROGRESS NOTES
"The patient location is: [place]  The chief complaint leading to consultation is: anxiety    Visit type: audiovisual    Face to Face time with patient: 45  60 minutes of total time spent on the encounter, which includes face to face time and non-face to face time preparing to see the patient (eg, review of tests), Obtaining and/or reviewing separately obtained history, Documenting clinical information in the electronic or other health record, Independently interpreting results (not separately reported) and communicating results to the patient/family/caregiver, or Care coordination (not separately reported).     Each patient to whom he or she provides medical services by telemedicine is:  (1) informed of the relationship between the physician and patient and the respective role of any other health care provider with respect to management of the patient; and (2) notified that he or she may decline to receive medical services by telemedicine and may withdraw from such care at any time.    Notes:     Individual Psychotherapy (PhD/LCSW)    4/18/2023    Site:  Telemed         Therapeutic Intervention: Met with patient.  Outpatient - Insight oriented psychotherapy 45 min - CPT code 85205 and Outpatient - Supportive psychotherapy 45 min - CPT Code 04696    Chief complaint/reason for encounter: anxiety     Interval history and content of current session: Patient returned for follow up psychotherapy. Patient states that she is "not great". States that she has been having problems with the kitten. States that the cat had to have emergency surgery. States that she is still going to treatment for alcohol abuse. States that she is getting a prescription for cravings. Started to have more anxiety symptoms and was started on a low dose of Zoloft. States that she went out with some friends and when she went to call boyfriend got anxious that he was ignoring her. States that she struggled to calm herself down. Discussed different " "skills not working and that was upsetting. Discussed other skills, like cold shower or cold compress, to help with "shocking" her brain back into a regular thought process. States that she also recently got additional job duties at work. Discussed therapy toolbox and ladder to help with skills. Worksheets sent via [employer].     For Next Time:    Treatment plan:  Target symptoms: anxiety   Why chosen therapy is appropriate versus another modality: relevant to diagnosis, evidence based practice  Outcome monitoring methods: self-report, observation  Therapeutic intervention type: insight oriented psychotherapy, supportive psychotherapy    Risk parameters:  Patient reports no suicidal ideation  Patient reports no homicidal ideation  Patient reports no self-injurious behavior  Patient reports no violent behavior    Verbal deficits: None    Patient's response to intervention:  The patient's response to intervention is accepting.    Progress toward goals and other mental status changes:  The patient's progress toward goals is good.    Diagnosis:   No diagnosis found.      Plan:  individual psychotherapy    Return to clinic: 2 weeks, 1 month    Length of Service (minutes): 45                          "

## 2023-05-30 ENCOUNTER — PATIENT MESSAGE (OUTPATIENT)
Dept: PSYCHIATRY | Facility: CLINIC | Age: 48
End: 2023-05-30
Payer: COMMERCIAL

## 2023-08-23 ENCOUNTER — PATIENT MESSAGE (OUTPATIENT)
Dept: FAMILY MEDICINE | Facility: CLINIC | Age: 48
End: 2023-08-23
Payer: COMMERCIAL

## 2023-08-23 ENCOUNTER — PATIENT MESSAGE (OUTPATIENT)
Dept: FAMILY MEDICINE | Facility: CLINIC | Age: 48
End: 2023-08-23

## 2023-08-23 ENCOUNTER — OFFICE VISIT (OUTPATIENT)
Dept: FAMILY MEDICINE | Facility: CLINIC | Age: 48
End: 2023-08-23
Payer: COMMERCIAL

## 2023-08-23 DIAGNOSIS — M54.50 ACUTE LOW BACK PAIN WITHOUT SCIATICA, UNSPECIFIED BACK PAIN LATERALITY: Primary | ICD-10-CM

## 2023-08-23 PROCEDURE — 99213 PR OFFICE/OUTPT VISIT, EST, LEVL III, 20-29 MIN: ICD-10-PCS | Mod: 95,,, | Performed by: FAMILY MEDICINE

## 2023-08-23 PROCEDURE — 99213 OFFICE O/P EST LOW 20 MIN: CPT | Mod: 95,,, | Performed by: FAMILY MEDICINE

## 2023-08-23 RX ORDER — METHOCARBAMOL 500 MG/1
1000 TABLET, FILM COATED ORAL EVERY 8 HOURS PRN
Qty: 45 TABLET | Refills: 0 | Status: SHIPPED | OUTPATIENT
Start: 2023-08-23 | End: 2023-09-07

## 2023-08-23 RX ORDER — SERTRALINE HYDROCHLORIDE 100 MG/1
100 TABLET, FILM COATED ORAL DAILY
COMMUNITY
Start: 2023-08-19

## 2023-08-23 NOTE — PROGRESS NOTES
Patient Name: Leia Shultz    : 1975  MRN: [id number]    The patient location is: [place], [place]  The chief complaint leading to consultation is: back pain    Visit type: audiovisual    Face to Face time with patient: 15 minutes  20 minutes of total time spent on the encounter, which includes face to face time and non-face to face time preparing to see the patient (eg, review of tests), Obtaining and/or reviewing separately obtained history, Documenting clinical information in the electronic or other health record, Independently interpreting results (not separately reported) and communicating results to the patient/family/caregiver, or Care coordination (not separately reported).         Each patient to whom he or she provides medical services by telemedicine is:  (1) informed of the relationship between the physician and patient and the respective role of any other health care provider with respect to management of the patient; and (2) notified that he or she may decline to receive medical services by telemedicine and may withdraw from such care at any time.    Notes:     Subjective:     Patient ID: Leia is a 48 y.o. female    Chief Complaint:  Back Pain    Back Pain  This is a new problem. The current episode started today. The problem occurs constantly. The problem is unchanged. The pain is present in the lumbar spine and costovertebral angle. The quality of the pain is described as aching. The pain does not radiate. The pain is at a severity of 3/10. The pain is mild. The pain is The same all the time. Stiffness is present In the morning. Pertinent negatives include no abdominal pain, bladder incontinence, bowel incontinence, chest pain, dysuria, fever, headaches, leg pain, numbness, paresis, paresthesias, pelvic pain, perianal numbness, tingling, weakness or weight loss. The treatment provided no relief.        Review of Systems   Constitutional:  Negative for fever and weight loss.   Cardiovascular:   Negative for chest pain.   Gastrointestinal:  Negative for abdominal pain and bowel incontinence.   Genitourinary:  Negative for bladder incontinence, dysuria, hematuria and pelvic pain.   Musculoskeletal:  Positive for back pain. Negative for leg pain.   Neurological:  Negative for tingling, weakness, numbness, headaches and paresthesias.        Objective:   There were no vitals taken for this visit.    Physical Exam  Pulmonary:      Effort: Pulmonary effort is normal.   Neurological:      Mental Status: She is alert.   Psychiatric:         Mood and Affect: Mood normal.         Behavior: Behavior normal.        Assessment        ICD-10-CM ICD-9-CM   1. Acute low back pain without sciatica, unspecified back pain laterality  M54.50 724.2         Plan:     1. Acute low back pain without sciatica, unspecified back pain laterality  -     methocarbamoL (ROBAXIN) 500 MG Tab; Take 2 tablets (1,000 mg total) by mouth every 8 (eight) hours as needed (low back pain).  Dispense: 45 tablet; Refill: 0    Advised course of NSAID and muscle relaxer.    Patient reports that she is meloxicam at.  She got the medication and she currently has meloxicam 7.5 milligrams.  I advised her she can take 15 milligrams daily along with methocarbamol.  Discussed side effects from medications.  Discussed using warm compresses/heating pad.  Discussed stretching.  Re-evaluate if no improvement in next 2 to 3 weeks.        -Sadi Capps Jr., MD, AAHIVS      This office note has been dictated.  This dictation has been generated using M-Modal Fluency Direct dictation; some phonetic errors may occur.         There are no Patient Instructions on file for this visit.          No follow-ups on file.           Answers submitted by the patient for this visit:  Back Pain Questionnaire (Submitted on 8/23/2023)  Chief Complaint: Back pain  genital pain: No

## 2023-09-12 ENCOUNTER — TELEPHONE (OUTPATIENT)
Dept: FAMILY MEDICINE | Facility: CLINIC | Age: 48
End: 2023-09-12
Payer: COMMERCIAL

## 2023-09-12 NOTE — TELEPHONE ENCOUNTER
Attempted to contact patient. Left a voice mail to call clinic back. Reschedule 02/07/23. Provider is out of clinic.

## 2023-10-23 ENCOUNTER — OFFICE VISIT (OUTPATIENT)
Dept: FAMILY MEDICINE | Facility: CLINIC | Age: 48
End: 2023-10-23
Payer: COMMERCIAL

## 2023-10-23 VITALS
RESPIRATION RATE: 16 BRPM | HEIGHT: 63 IN | WEIGHT: 186.06 LBS | HEART RATE: 85 BPM | TEMPERATURE: 98 F | BODY MASS INDEX: 32.97 KG/M2 | SYSTOLIC BLOOD PRESSURE: 118 MMHG | DIASTOLIC BLOOD PRESSURE: 82 MMHG | OXYGEN SATURATION: 98 %

## 2023-10-23 DIAGNOSIS — R06.09 DYSPNEA ON EXERTION: ICD-10-CM

## 2023-10-23 DIAGNOSIS — R00.2 PALPITATIONS: Primary | ICD-10-CM

## 2023-10-23 DIAGNOSIS — Z98.84 HISTORY OF GASTRIC RESTRICTIVE SURGERY: ICD-10-CM

## 2023-10-23 PROCEDURE — 99999 PR PBB SHADOW E&M-EST. PATIENT-LVL IV: CPT | Mod: PBBFAC,,, | Performed by: NURSE PRACTITIONER

## 2023-10-23 PROCEDURE — 93005 ELECTROCARDIOGRAM TRACING: CPT | Mod: S$GLB,,, | Performed by: NURSE PRACTITIONER

## 2023-10-23 PROCEDURE — 93010 ELECTROCARDIOGRAM REPORT: CPT | Mod: S$GLB,,, | Performed by: INTERNAL MEDICINE

## 2023-10-23 PROCEDURE — 93010 EKG 12-LEAD: ICD-10-PCS | Mod: S$GLB,,, | Performed by: INTERNAL MEDICINE

## 2023-10-23 PROCEDURE — 99214 OFFICE O/P EST MOD 30 MIN: CPT | Mod: S$GLB,,, | Performed by: NURSE PRACTITIONER

## 2023-10-23 PROCEDURE — 99999 PR PBB SHADOW E&M-EST. PATIENT-LVL IV: ICD-10-PCS | Mod: PBBFAC,,, | Performed by: NURSE PRACTITIONER

## 2023-10-23 PROCEDURE — 99214 PR OFFICE/OUTPT VISIT, EST, LEVL IV, 30-39 MIN: ICD-10-PCS | Mod: S$GLB,,, | Performed by: NURSE PRACTITIONER

## 2023-10-23 PROCEDURE — 93005 EKG 12-LEAD: ICD-10-PCS | Mod: S$GLB,,, | Performed by: NURSE PRACTITIONER

## 2023-10-23 NOTE — PROGRESS NOTES
Routine Office Visit    Patient Name: Leia Shultz    : 1975  MRN: [id number]    Chief Complaint:  Lightheadedness, shortness of breath, palpitations    Subjective:  Leia is a 48 y.o. female who presents today for:    Lightheadedness, shortness of breath, palpitations - patient who is known to me with a history of gastric sleeve in , anxiety reports today for evaluation.  In the last 10 days she has been having numerous issues with minimal exertion including lightheadedness, palpitations, and dizziness.  Yesterday she felt like she was going to pass out but did not have any changes in her consciousness.  She has also noticed that her bilateral upper arms have felt weak.  She has some tingling in the ring fingers of each hand but no other paresthesias.  She has also had a headache underneath the bilateral eyes in the same timeframe which has pretty much been constant.  This does not feel like her usual migraines and she denies any vision changes or light sensitivity with this current headache.  She denies any personal history of cardiac issues but does report that 1 of her half-sisters recently was diagnosed with prolonged QT.  Since the gastric sleeve she reports having some excess skin on her legs but denies any leg swelling.  She reports her anxiety is well-controlled and does not feel like this is necessarily contributing to the symptoms.  She states that she is usually very active, however recently in the last 2 weeks she has had to limit her activities because of the shortness of breath.    Past Medical History  Past Medical History:   Diagnosis Date    Anemia     Arthritis     Heart murmur        Family History  Family History   Problem Relation Age of Onset    Clotting disorder Mother     Hypotension Mother     Arthritis Mother     Depression Mother     Skin cancer Father     Cancer Father         Skin Cancer    Crohn's disease Sister     Cancer Brother          2018 from rare form of  spinal cancer    Early death Brother         Cancer at 39    No Known Problems Maternal Grandmother     No Known Problems Maternal Grandfather     No Known Problems Paternal Grandmother     No Known Problems Paternal Grandfather     Alcohol abuse Maternal Uncle          of alcohol-related liver failure    Early death Maternal Uncle     Arthritis Sister         Dean       Current Medications  Current Outpatient Medications on File Prior to Visit   Medication Sig Dispense Refill    acamprosate (CAMPRAL) 333 mg tablet Take 666 mg by mouth 3 (three) times daily.      MULTIVITAMIN ORAL Take by mouth once daily.      naltrexone (DEPADE) 50 mg tablet Take 50 mg by mouth.      sertraline (ZOLOFT) 100 MG tablet Take 100 mg by mouth once daily.      traZODone (DESYREL) 100 MG tablet Take 1 tablet (100 mg total) by mouth every evening. 90 tablet 1    EPINEPHrine (EPIPEN 2-KEVIN) 0.3 mg/0.3 mL AtIn Inject 0.3 mLs (0.3 mg total) into the muscle once. for 1 dose 2 each 0     No current facility-administered medications on file prior to visit.       Allergies   Review of patient's allergies indicates:   Allergen Reactions    Fire ant Anxiety, Blisters, Dermatitis, Hives, Itching, Rash and Swelling    Pollen extracts Dermatitis, Hives and Rash    Sulfa (sulfonamide antibiotics) Nausea Only       Review of Systems (Pertinent positives)  Review of Systems   Constitutional:  Negative for chills, diaphoresis, fever, malaise/fatigue and weight loss.   HENT:  Negative for ear discharge, ear pain, hearing loss and tinnitus.    Eyes:  Negative for discharge.   Respiratory:  Positive for shortness of breath. Negative for cough, hemoptysis, sputum production and wheezing.    Cardiovascular:  Positive for palpitations. Negative for chest pain, orthopnea, claudication, leg swelling and PND.   Gastrointestinal:  Negative for blood in stool, constipation, diarrhea and vomiting.   Genitourinary:  Negative for dysuria and hematuria.  "  Musculoskeletal:  Negative for neck pain.   Skin: Negative.    Neurological:  Positive for dizziness, tingling, weakness and headaches. Negative for seizures and loss of consciousness.   Endo/Heme/Allergies:  Negative for polydipsia.   Psychiatric/Behavioral: Negative.         /82 (BP Location: Left arm, Patient Position: Sitting, BP Method: Large (Manual))   Pulse 85   Temp 98 °F (36.7 °C) (Oral)   Resp 16   Ht 5' 3" (1.6 m)   Wt 84.4 kg (186 lb 1.1 oz)   SpO2 98%   BMI 32.96 kg/m²     Physical Exam  Vitals reviewed.   Constitutional:       General: She is not in acute distress.     Appearance: Normal appearance. She is not ill-appearing, toxic-appearing or diaphoretic.   HENT:      Head: Normocephalic and atraumatic.   Cardiovascular:      Rate and Rhythm: Normal rate and regular rhythm.      Pulses: Normal pulses.      Heart sounds: Normal heart sounds.      Comments: Clinically euvolemic no JVD no lower extremity swelling  Pulmonary:      Effort: Pulmonary effort is normal. No respiratory distress.      Breath sounds: Normal breath sounds. No wheezing.   Abdominal:      General: Bowel sounds are normal. There is no distension.      Palpations: Abdomen is soft.      Tenderness: There is no abdominal tenderness.   Musculoskeletal:         General: No swelling, tenderness or deformity. Normal range of motion.   Skin:     General: Skin is warm and dry.      Capillary Refill: Capillary refill takes less than 2 seconds.   Neurological:      General: No focal deficit present.      Mental Status: She is alert and oriented to person, place, and time.   Psychiatric:         Mood and Affect: Mood normal.         Behavior: Behavior normal.          Assessment/Plan:  Leia Shultz is a 48 y.o. female who presents today for :    Leia was seen today for palpitations and dizziness.    Diagnoses and all orders for this visit:    Palpitations  -     CBC W/ AUTO DIFFERENTIAL; Future  -     COMPREHENSIVE " METABOLIC PANEL; Future  -     TSH; Future  -     B-TYPE NATRIURETIC PEPTIDE; Future  -     IN OFFICE EKG 12-LEAD (to [facility])  -     Ambulatory referral/consult to Cardiology; Future    Dyspnea on exertion  -     CBC W/ AUTO DIFFERENTIAL; Future  -     COMPREHENSIVE METABOLIC PANEL; Future  -     TSH; Future  -     B-TYPE NATRIURETIC PEPTIDE; Future  -     IN OFFICE EKG 12-LEAD (to [facility])  -     Ambulatory referral/consult to Cardiology; Future    History of gastric restrictive surgery  -     VITAMIN B12; Future  -     FOLATE; Future    EKG done in office shows normal sinus rhythm with sinus arrhythmia.  Normal examination today.  Discussed potential causes including cardiac disease, neurologic disease as cause of weakness, etc.  No need for stat head imaging today.  Will check labs to evaluate symptoms.  Will consult Cardiology for palpitations.  Can consider referral to Neurology and/or head imaging if dizziness/weakness persists.  All questions answered.        This office note has been dictated.  This dictation has been generated using M-Modal Fluency Direct dictation; some phonetic errors may occur.     Answers submitted by the patient for this visit:  Review of Systems Questionnaire (Submitted on 10/23/2023)  activity change: Yes  unexpected weight change: No  rhinorrhea: No  trouble swallowing: No  visual disturbance: No  chest tightness: No  polyuria: No  difficulty urinating: No  menstrual problem: No  joint swelling: No  arthralgias: No  confusion: No  dysphoric mood: No

## 2023-10-24 ENCOUNTER — LAB VISIT (OUTPATIENT)
Dept: LAB | Facility: HOSPITAL | Age: 48
End: 2023-10-24
Attending: NURSE PRACTITIONER
Payer: COMMERCIAL

## 2023-10-24 DIAGNOSIS — R06.09 DYSPNEA ON EXERTION: ICD-10-CM

## 2023-10-24 DIAGNOSIS — Z98.84 HISTORY OF GASTRIC RESTRICTIVE SURGERY: ICD-10-CM

## 2023-10-24 DIAGNOSIS — R00.2 PALPITATIONS: ICD-10-CM

## 2023-10-24 LAB
ALBUMIN SERPL BCP-MCNC: 4 G/DL (ref 3.5–5.2)
ALP SERPL-CCNC: 55 U/L (ref 55–135)
ALT SERPL W/O P-5'-P-CCNC: 14 U/L (ref 10–44)
ANION GAP SERPL CALC-SCNC: 6 MMOL/L (ref 8–16)
AST SERPL-CCNC: 17 U/L (ref 10–40)
BASOPHILS # BLD AUTO: 0.08 K/UL (ref 0–0.2)
BASOPHILS NFR BLD: 1.4 % (ref 0–1.9)
BILIRUB SERPL-MCNC: 0.5 MG/DL (ref 0.1–1)
BNP SERPL-MCNC: 18 PG/ML (ref 0–99)
BUN SERPL-MCNC: 12 MG/DL (ref 6–20)
CALCIUM SERPL-MCNC: 9.5 MG/DL (ref 8.7–10.5)
CHLORIDE SERPL-SCNC: 104 MMOL/L (ref 95–110)
CO2 SERPL-SCNC: 27 MMOL/L (ref 23–29)
CREAT SERPL-MCNC: 0.8 MG/DL (ref 0.5–1.4)
DIFFERENTIAL METHOD: NORMAL
EOSINOPHIL # BLD AUTO: 0.2 K/UL (ref 0–0.5)
EOSINOPHIL NFR BLD: 3.9 % (ref 0–8)
ERYTHROCYTE [DISTWIDTH] IN BLOOD BY AUTOMATED COUNT: 11.9 % (ref 11.5–14.5)
EST. GFR  (NO RACE VARIABLE): >60 ML/MIN/1.73 M^2
GLUCOSE SERPL-MCNC: 86 MG/DL (ref 70–110)
HCT VFR BLD AUTO: 40.1 % (ref 37–48.5)
HGB BLD-MCNC: 13 G/DL (ref 12–16)
IMM GRANULOCYTES # BLD AUTO: 0.02 K/UL (ref 0–0.04)
IMM GRANULOCYTES NFR BLD AUTO: 0.3 % (ref 0–0.5)
LYMPHOCYTES # BLD AUTO: 2.2 K/UL (ref 1–4.8)
LYMPHOCYTES NFR BLD: 37.6 % (ref 18–48)
MCH RBC QN AUTO: 30.6 PG (ref 27–31)
MCHC RBC AUTO-ENTMCNC: 32.4 G/DL (ref 32–36)
MCV RBC AUTO: 94 FL (ref 82–98)
MONOCYTES # BLD AUTO: 0.4 K/UL (ref 0.3–1)
MONOCYTES NFR BLD: 6.7 % (ref 4–15)
NEUTROPHILS # BLD AUTO: 2.9 K/UL (ref 1.8–7.7)
NEUTROPHILS NFR BLD: 50.1 % (ref 38–73)
NRBC BLD-RTO: 0 /100 WBC
PLATELET # BLD AUTO: 339 K/UL (ref 150–450)
PMV BLD AUTO: 9.3 FL (ref 9.2–12.9)
POTASSIUM SERPL-SCNC: 4.6 MMOL/L (ref 3.5–5.1)
PROT SERPL-MCNC: 6.8 G/DL (ref 6–8.4)
RBC # BLD AUTO: 4.25 M/UL (ref 4–5.4)
SODIUM SERPL-SCNC: 137 MMOL/L (ref 136–145)
TSH SERPL DL<=0.005 MIU/L-ACNC: 1.27 UIU/ML (ref 0.4–4)
WBC # BLD AUTO: 5.83 K/UL (ref 3.9–12.7)

## 2023-10-24 PROCEDURE — 83880 ASSAY OF NATRIURETIC PEPTIDE: CPT | Performed by: NURSE PRACTITIONER

## 2023-10-24 PROCEDURE — 36415 COLL VENOUS BLD VENIPUNCTURE: CPT | Mod: PN | Performed by: NURSE PRACTITIONER

## 2023-10-24 PROCEDURE — 80053 COMPREHEN METABOLIC PANEL: CPT | Performed by: NURSE PRACTITIONER

## 2023-10-24 PROCEDURE — 85025 COMPLETE CBC W/AUTO DIFF WBC: CPT | Performed by: NURSE PRACTITIONER

## 2023-10-24 PROCEDURE — 82607 VITAMIN B-12: CPT | Performed by: NURSE PRACTITIONER

## 2023-10-24 PROCEDURE — 82746 ASSAY OF FOLIC ACID SERUM: CPT | Performed by: NURSE PRACTITIONER

## 2023-10-24 PROCEDURE — 84443 ASSAY THYROID STIM HORMONE: CPT | Performed by: NURSE PRACTITIONER

## 2023-10-25 ENCOUNTER — PATIENT MESSAGE (OUTPATIENT)
Dept: FAMILY MEDICINE | Facility: CLINIC | Age: 48
End: 2023-10-25
Payer: COMMERCIAL

## 2023-10-25 LAB
FOLATE SERPL-MCNC: 8.9 NG/ML (ref 4–24)
VIT B12 SERPL-MCNC: 824 PG/ML (ref 210–950)

## 2023-10-26 ENCOUNTER — PATIENT MESSAGE (OUTPATIENT)
Dept: FAMILY MEDICINE | Facility: CLINIC | Age: 48
End: 2023-10-26
Payer: COMMERCIAL

## 2023-10-30 ENCOUNTER — OFFICE VISIT (OUTPATIENT)
Dept: CARDIOLOGY | Facility: CLINIC | Age: 48
End: 2023-10-30
Payer: COMMERCIAL

## 2023-10-30 VITALS
BODY MASS INDEX: 32.54 KG/M2 | SYSTOLIC BLOOD PRESSURE: 130 MMHG | HEIGHT: 63 IN | OXYGEN SATURATION: 100 % | HEART RATE: 79 BPM | RESPIRATION RATE: 18 BRPM | WEIGHT: 183.63 LBS | DIASTOLIC BLOOD PRESSURE: 76 MMHG

## 2023-10-30 DIAGNOSIS — R06.09 DYSPNEA ON EXERTION: ICD-10-CM

## 2023-10-30 DIAGNOSIS — Z90.3 H/O GASTRIC SLEEVE: ICD-10-CM

## 2023-10-30 DIAGNOSIS — R07.9 CHEST PAIN, UNSPECIFIED TYPE: Primary | ICD-10-CM

## 2023-10-30 DIAGNOSIS — E66.9 NON MORBID OBESITY, UNSPECIFIED OBESITY TYPE: ICD-10-CM

## 2023-10-30 DIAGNOSIS — R00.2 PALPITATIONS: ICD-10-CM

## 2023-10-30 PROCEDURE — 99999 PR PBB SHADOW E&M-EST. PATIENT-LVL V: CPT | Mod: PBBFAC,,, | Performed by: INTERNAL MEDICINE

## 2023-10-30 PROCEDURE — 99999 PR PBB SHADOW E&M-EST. PATIENT-LVL V: ICD-10-PCS | Mod: PBBFAC,,, | Performed by: INTERNAL MEDICINE

## 2023-10-30 PROCEDURE — 99204 OFFICE O/P NEW MOD 45 MIN: CPT | Mod: S$GLB,,, | Performed by: INTERNAL MEDICINE

## 2023-10-30 PROCEDURE — 99204 PR OFFICE/OUTPT VISIT, NEW, LEVL IV, 45-59 MIN: ICD-10-PCS | Mod: S$GLB,,, | Performed by: INTERNAL MEDICINE

## 2023-10-30 NOTE — PROGRESS NOTES
CARDIOVASCULAR CONSULTATION    REASON FOR CONSULT:   Leia Shultz is a 48 y.o. female who presents for TORRES/palps/CP.    PCP: Page  Req: LINN Lockwood  HISTORY OF PRESENT ILLNESS:   The patient is a very pleasant 48-year-old woman with no prior cardiac history who comes in with subacute dyspnea on exertion, palpitations, and chest pain.  She tells me that she is usually quite physically active, but over the past several weeks, she was unable to climb up a flight of stairs without being exhausted.  She usually is unable to sleep, but has been sleeping for 12 hours a night.  In the last several days, all these symptoms have abated.  She does describe some palpitations and dizziness without bonnie syncope.  There has been no PND, orthopnea, melena, hematuria, or claudication symptoms.      Family history is notable for the absence premature CAD or sudden cardiac death amongst first-degree relatives.  The patient's sister does have a history of long QT.      The patient denies tobacco use, alcohol excess, or illicit drug use.    CARDIOVASCULAR HISTORY:   none    PAST MEDICAL HISTORY:     Past Medical History:   Diagnosis Date    Anemia     Arthritis     Heart murmur        PAST SURGICAL HISTORY:     Past Surgical History:   Procedure Laterality Date    COSMETIC SURGERY  October 2016    excess skin removal on arms and stomach    DILATION AND CURETTAGE OF UTERUS  2002    gastric sleeve      LAPAROSCOPIC GASTRIC BANDING      ROOT CANAL      2    WISDOM TOOTH EXTRACTION      all 4       ALLERGIES AND MEDICATION:     Review of patient's allergies indicates:   Allergen Reactions    Fire ant Anxiety, Blisters, Dermatitis, Hives, Itching, Rash and Swelling    Pollen extracts Dermatitis, Hives and Rash    Sulfa (sulfonamide antibiotics) Nausea Only        Medication List            Accurate as of October 30, 2023 11:15 AM. If you have any questions, ask your nurse or doctor.                CONTINUE taking these medications       acamprosate 333 mg tablet  Commonly known as: CAMPRAL     EPINEPHrine 0.3 mg/0.3 mL Atin  Commonly known as: EPIPEN 2-KEVIN  Inject 0.3 mLs (0.3 mg total) into the muscle once. for 1 dose     MULTIVITAMIN ORAL     naltrexone 50 mg tablet  Commonly known as: DEPADE     sertraline 100 MG tablet  Commonly known as: ZOLOFT     traZODone 100 MG tablet  Commonly known as: DESYREL  Take 1 tablet (100 mg total) by mouth every evening.              SOCIAL HISTORY:     Social History     Socioeconomic History    Marital status:     Number of children: 0   Tobacco Use    Smoking status: Never     Passive exposure: Never    Smokeless tobacco: Never   Substance and Sexual Activity    Alcohol use: Not Currently    Drug use: Never    Sexual activity: Not Currently     Partners: Male     Birth control/protection: I.U.D.     Comment: 11/21/17 -vasectomy     Social Determinants of Health     Financial Resource Strain: Low Risk  (10/24/2023)    Overall Financial Resource Strain (CARDIA)     Difficulty of Paying Living Expenses: Not hard at all   Food Insecurity: No Food Insecurity (10/24/2023)    Hunger Vital Sign     Worried About Running Out of Food in the Last Year: Never true     Ran Out of Food in the Last Year: Never true   Transportation Needs: No Transportation Needs (10/24/2023)    PRAPARE - Transportation     Lack of Transportation (Medical): No     Lack of Transportation (Non-Medical): No   Physical Activity: Insufficiently Active (10/24/2023)    Exercise Vital Sign     Days of Exercise per Week: 3 days     Minutes of Exercise per Session: 30 min   Stress: No Stress Concern Present (10/24/2023)    [employer] - Occupational Stress Questionnaire     Feeling of Stress : Only a little   Recent Concern: Stress - Stress Concern Present (10/23/2023)    [employer] - Occupational Stress Questionnaire     Feeling of Stress : To some extent   Social  Connections: Unknown (10/24/2023)    Social Connection and Isolation Panel [NHANES]     Frequency of Communication with Friends and Family: More than three times a week     Frequency of Social Gatherings with Friends and Family: Three times a week     Active Member of Clubs or Organizations: No     Attends Club or Organization Meetings: Never     Marital Status:    Housing Stability: Low Risk  (10/24/2023)    Housing Stability Vital Sign     Unable to Pay for Housing in the Last Year: No     Number of Places Lived in the Last Year: 1     Unstable Housing in the Last Year: No       FAMILY HISTORY:     Family History   Problem Relation Age of Onset    Clotting disorder Mother     Hypotension Mother     Arthritis Mother     Depression Mother     Skin cancer Father     Cancer Father         Skin Cancer    No Known Problems Sister     Irregular heart beat Sister         prolonged QT    Crohn's disease Sister     Arthritis Sister         Letnaunchyn    Cancer Brother          2018 from rare form of spinal cancer    Early death Brother         Cancer at 39    No Known Problems Maternal Grandmother     No Known Problems Maternal Grandfather     No Known Problems Paternal Grandmother     No Known Problems Paternal Grandfather     Alcohol abuse Maternal Uncle          of alcohol-related liver failure    Early death Maternal Uncle        REVIEW OF SYSTEMS:   Review of Systems   Constitutional:  Positive for malaise/fatigue. Negative for chills, diaphoresis and fever.   HENT:  Negative for nosebleeds.    Eyes:  Negative for blurred vision, double vision and photophobia.   Respiratory:  Positive for shortness of breath. Negative for hemoptysis and wheezing.    Cardiovascular:  Positive for chest pain and palpitations. Negative for orthopnea, claudication, leg swelling and PND.   Gastrointestinal:  Negative for abdominal pain, blood in stool, heartburn, melena, nausea and vomiting.   Genitourinary:  Negative  "for flank pain and hematuria.   Musculoskeletal:  Negative for falls, myalgias and neck pain.   Skin:  Negative for rash.   Neurological:  Negative for dizziness, seizures, loss of consciousness, weakness and headaches.   Endo/Heme/Allergies:  Negative for polydipsia. Does not bruise/bleed easily.   Psychiatric/Behavioral:  Negative for depression and memory loss. The patient is not nervous/anxious.        PHYSICAL EXAM:     Vitals:    10/30/23 1107   BP: 130/76   Pulse: 79   Resp: 18    Body mass index is 32.53 kg/m².  Weight: 83.3 kg (183 lb 10.3 oz)   Height: 5' 3" (160 cm)     Physical Exam  Vitals reviewed.   Constitutional:       General: She is not in acute distress.     Appearance: She is well-developed. She is obese. She is not ill-appearing, toxic-appearing or diaphoretic.   HENT:      Head: Normocephalic and atraumatic.   Eyes:      General: No scleral icterus.     Extraocular Movements: Extraocular movements intact.      Conjunctiva/sclera: Conjunctivae normal.      Pupils: Pupils are equal, round, and reactive to light.   Neck:      Thyroid: No thyromegaly.      Vascular: Normal carotid pulses. No carotid bruit or JVD.      Trachea: Trachea normal.   Cardiovascular:      Rate and Rhythm: Normal rate and regular rhythm.      Pulses:           Carotid pulses are 2+ on the right side and 2+ on the left side.     Heart sounds: S1 normal and S2 normal. No murmur heard.     No friction rub. No gallop.   Pulmonary:      Effort: Pulmonary effort is normal. No respiratory distress.      Breath sounds: Normal breath sounds. No stridor. No wheezing, rhonchi or rales.   Chest:      Chest wall: No tenderness.   Abdominal:      General: There is no distension.      Palpations: Abdomen is soft.   Musculoskeletal:         General: No swelling or tenderness. Normal range of motion.      Cervical back: Normal range of motion and neck supple. No edema or rigidity.      Right lower leg: No edema.      Left lower leg: No " edema.   Feet:      Right foot:      Skin integrity: No ulcer.      Left foot:      Skin integrity: No ulcer.   Skin:     General: Skin is warm and dry.      Coloration: Skin is not jaundiced.   Neurological:      General: No focal deficit present.      Mental Status: She is alert and oriented to person, place, and time.      Cranial Nerves: No cranial nerve deficit.   Psychiatric:         Mood and Affect: Mood normal.         Speech: Speech normal.         Behavior: Behavior normal. Behavior is cooperative.         DATA:   EKG: (personally reviewed tracing)  10/23/23 SR 79    Laboratory:  CBC:  Recent Labs   Lab 03/17/22  0740 02/06/23  0839 10/24/23  1042   WBC 6.51 5.75 5.83   Hemoglobin 12.7 13.6 13.0   Hematocrit 39.1 41.8 40.1   Platelets 322 346 339       CHEMISTRIES:  Recent Labs   Lab 11/23/20  1009 11/24/21  0745 03/17/22  0740 02/06/23  0839 10/24/23  1042   Glucose 88 86 87 78 86   Sodium 138 138 138 136 137   Potassium 4.5 4.3 4.2 4.6 4.6   BUN 12 13 13 9 12   Creatinine 0.7 0.8 0.7 0.7 0.8   eGFR if non African American >60 >60 >60  --   --    eGFR  --   --   --  >60.0 >60   Calcium 8.7 9.1 9.1 9.4 9.5       CARDIAC BIOMARKERS:        COAGS:  Recent Labs   Lab 03/17/22  0740   INR 0.9       LIPIDS/LFTS:  Recent Labs   Lab 11/23/20  1009 11/24/21  0745 03/17/22  0740 02/06/23  0839 10/24/23  1042   Cholesterol 165 192  --  180  --    Triglycerides 48 45  --  44  --    HDL 65 63  --  67  --    LDL Cholesterol 90.4 120.0  --  104.2  --    Non-HDL Cholesterol 100 129  --  113  --    AST 16 19 18 18 17   ALT 11 11 14 12 14     Lab Results   Component Value Date    TSH 1.275 10/24/2023     The 10-year ASCVD risk score (Radha HERNANDEZ, et al., 2019) is: 0.7%    Values used to calculate the score:      Age: 48 years      Sex: Female      Is Non- : No      Diabetic: No      Tobacco smoker: No      Systolic Blood Pressure: 130 mmHg      Is BP treated: No      HDL Cholesterol: 67 mg/dL       Total Cholesterol: 180 mg/dL      Cardiovascular Testing:  Ordered  ETT  Echo  Holter    ASSESSMENT:   # TORRES/palps/CP, ?viral syndrome as sxs have abated  # BMI 33, hx gastric sleeve    PLAN:   ETT  Echo  Holter  Diet/exercise/weight loss  RTC prn of if above testing abnl      Daniel Montgomery MD, FACC

## 2023-11-02 ENCOUNTER — PATIENT MESSAGE (OUTPATIENT)
Dept: CARDIOLOGY | Facility: CLINIC | Age: 48
End: 2023-11-02
Payer: COMMERCIAL

## 2023-11-06 ENCOUNTER — TELEPHONE (OUTPATIENT)
Dept: FAMILY MEDICINE | Facility: CLINIC | Age: 48
End: 2023-11-06
Payer: COMMERCIAL

## 2023-11-06 NOTE — TELEPHONE ENCOUNTER
Please contact patient.  Patient has an upcoming appointment with me.  Please notify patient that she will be able to establish care at this appointment, however we will not be able to discuss weight loss medication.  Please notify her that this is a separate appointment that has to be scheduled at a later date.  She is new to me for weight loss   precipitous delivery Patient requests all Lab, Cardiology, and Radiology Results on their Discharge Instructions precipitous delivery/retained placenta

## 2023-11-09 ENCOUNTER — HOSPITAL ENCOUNTER (OUTPATIENT)
Dept: CARDIOLOGY | Facility: HOSPITAL | Age: 48
Discharge: HOME | End: 2023-11-09
Attending: INTERNAL MEDICINE
Payer: COMMERCIAL

## 2023-11-09 VITALS
HEIGHT: 63 IN | BODY MASS INDEX: 32.43 KG/M2 | BODY MASS INDEX: 32.43 KG/M2 | WEIGHT: 183 LBS | WEIGHT: 183 LBS | HEIGHT: 63 IN

## 2023-11-09 DIAGNOSIS — R00.2 PALPITATIONS: ICD-10-CM

## 2023-11-09 DIAGNOSIS — R06.09 DYSPNEA ON EXERTION: ICD-10-CM

## 2023-11-09 DIAGNOSIS — R07.9 CHEST PAIN, UNSPECIFIED TYPE: ICD-10-CM

## 2023-11-09 LAB
ASCENDING AORTA: 2.99 CM
AV INDEX (PROSTH): 0.67
AV MEAN GRADIENT: 4 MMHG
AV PEAK GRADIENT: 6 MMHG
AV VALVE AREA BY VELOCITY RATIO: 1.89 CM²
AV VALVE AREA: 1.88 CM²
AV VELOCITY RATIO: 0.67
BSA FOR ECHO PROCEDURE: 1.92 M2
CV ECHO LV RWT: 0.36 CM
CV STRESS BASE HR: 68 BPM
DIASTOLIC BLOOD PRESSURE: 73 MMHG
DOP CALC AO PEAK VEL: 1.23 M/S
DOP CALC AO VTI: 27.1 CM
DOP CALC LVOT AREA: 2.8 CM2
DOP CALC LVOT DIAMETER: 1.89 CM
DOP CALC LVOT PEAK VEL: 0.83 M/S
DOP CALC LVOT STROKE VOLUME: 51.03 CM3
DOP CALC MV VTI: 24.1 CM
DOP CALCLVOT PEAK VEL VTI: 18.2 CM
E WAVE DECELERATION TIME: 196.91 MSEC
E/A RATIO: 1.14
E/E' RATIO: 5.47 M/S
ECHO LV POSTERIOR WALL: 0.79 CM (ref 0.6–1.1)
FRACTIONAL SHORTENING: 26 % (ref 28–44)
INTERVENTRICULAR SEPTUM: 0.78 CM (ref 0.6–1.1)
IVC DIAMETER: 1.8 CM
LA MAJOR: 4.82 CM
LA MINOR: 4.56 CM
LA WIDTH: 3.5 CM
LEFT ATRIUM SIZE: 3.51 CM
LEFT ATRIUM VOLUME INDEX: 26.3 ML/M2
LEFT ATRIUM VOLUME: 48.94 CM3
LEFT INTERNAL DIMENSION IN SYSTOLE: 3.25 CM (ref 2.1–4)
LEFT VENTRICLE DIASTOLIC VOLUME INDEX: 46.39 ML/M2
LEFT VENTRICLE DIASTOLIC VOLUME: 86.29 ML
LEFT VENTRICLE MASS INDEX: 57 G/M2
LEFT VENTRICLE SYSTOLIC VOLUME INDEX: 22.9 ML/M2
LEFT VENTRICLE SYSTOLIC VOLUME: 42.67 ML
LEFT VENTRICULAR INTERNAL DIMENSION IN DIASTOLE: 4.37 CM (ref 3.5–6)
LEFT VENTRICULAR MASS: 105.54 G
LV LATERAL E/E' RATIO: 4.56 M/S
LV SEPTAL E/E' RATIO: 6.83 M/S
LVOT MG: 1.37 MMHG
LVOT MV: 0.54 CM/S
MV MEAN GRADIENT: 1 MMHG
MV PEAK A VEL: 0.72 M/S
MV PEAK E VEL: 0.82 M/S
MV PEAK GRADIENT: 3 MMHG
MV STENOSIS PRESSURE HALF TIME: 57.1 MS
MV VALVE AREA BY CONTINUITY EQUATION: 2.12 CM2
MV VALVE AREA P 1/2 METHOD: 3.85 CM2
OHS CV CPX 1 MINUTE RECOVERY HEART RATE: 104 BPM
OHS CV CPX 85 PERCENT MAX PREDICTED HEART RATE MALE: 146
OHS CV CPX ESTIMATED METS: 8
OHS CV CPX MAX PREDICTED HEART RATE: 172
OHS CV CPX PATIENT IS FEMALE: 1
OHS CV CPX PATIENT IS MALE: 0
OHS CV CPX PEAK DIASTOLIC BLOOD PRESSURE: 64 MMHG
OHS CV CPX PEAK HEAR RATE: 155 BPM
OHS CV CPX PEAK RATE PRESSURE PRODUCT: NORMAL
OHS CV CPX PEAK SYSTOLIC BLOOD PRESSURE: 132 MMHG
OHS CV CPX PERCENT MAX PREDICTED HEART RATE ACHIEVED: 95
OHS CV CPX RATE PRESSURE PRODUCT PRESENTING: 6460
PV PEAK GRADIENT: 7 MMHG
PV PEAK VELOCITY: 1.33 M/S
RA MAJOR: 4.1 CM
RA PRESSURE ESTIMATED: 3 MMHG
RA WIDTH: 3 CM
RIGHT VENTRICULAR END-DIASTOLIC DIMENSION: 3.09 CM
RV TISSUE DOPPLER FREE WALL SYSTOLIC VELOCITY 1 (APICAL 4 CHAMBER VIEW): 12.15 CM/S
SINUS: 2.34 CM
STJ: 1.85 CM
STRESS ECHO POST EXERCISE DUR MIN: 6 MINUTES
STRESS ECHO POST EXERCISE DUR SEC: 38 SECONDS
SYSTOLIC BLOOD PRESSURE: 95 MMHG
TDI LATERAL: 0.18 M/S
TDI SEPTAL: 0.12 M/S
TDI: 0.15 M/S
TRICUSPID ANNULAR PLANE SYSTOLIC EXCURSION: 2.51 CM
Z-SCORE OF LEFT VENTRICULAR DIMENSION IN END DIASTOLE: -1.69
Z-SCORE OF LEFT VENTRICULAR DIMENSION IN END SYSTOLE: 0.14

## 2023-11-09 PROCEDURE — 93018 EXERCISE STRESS - EKG (CUPID ONLY): ICD-10-PCS | Mod: ,,, | Performed by: INTERNAL MEDICINE

## 2023-11-09 PROCEDURE — 93227 XTRNL ECG REC<48 HR R&I: CPT | Mod: ,,, | Performed by: INTERNAL MEDICINE

## 2023-11-09 PROCEDURE — 93306 TTE W/DOPPLER COMPLETE: CPT | Mod: 26,,, | Performed by: INTERNAL MEDICINE

## 2023-11-09 PROCEDURE — 93016 CV STRESS TEST SUPVJ ONLY: CPT | Mod: ,,, | Performed by: INTERNAL MEDICINE

## 2023-11-09 PROCEDURE — 93016 EXERCISE STRESS - EKG (CUPID ONLY): ICD-10-PCS | Mod: ,,, | Performed by: INTERNAL MEDICINE

## 2023-11-09 PROCEDURE — 93306 ECHO (CUPID ONLY): ICD-10-PCS | Mod: 26,,, | Performed by: INTERNAL MEDICINE

## 2023-11-09 PROCEDURE — 93017 CV STRESS TEST TRACING ONLY: CPT

## 2023-11-09 PROCEDURE — 93306 TTE W/DOPPLER COMPLETE: CPT

## 2023-11-09 PROCEDURE — 93226 XTRNL ECG REC<48 HR SCAN A/R: CPT

## 2023-11-09 PROCEDURE — 93018 CV STRESS TEST I&R ONLY: CPT | Mod: ,,, | Performed by: INTERNAL MEDICINE

## 2023-11-09 PROCEDURE — 93227 HOLTER MONITOR - 24 HOUR (CUPID ONLY): ICD-10-PCS | Mod: ,,, | Performed by: INTERNAL MEDICINE

## 2023-11-09 NOTE — TELEPHONE ENCOUNTER
Please contact patient again to provide her with the message below so that she will be aware for her visit tomorrow.

## 2023-11-10 ENCOUNTER — OFFICE VISIT (OUTPATIENT)
Dept: FAMILY MEDICINE | Facility: CLINIC | Age: 48
End: 2023-11-10
Payer: COMMERCIAL

## 2023-11-10 VITALS
DIASTOLIC BLOOD PRESSURE: 68 MMHG | HEIGHT: 63 IN | HEART RATE: 81 BPM | SYSTOLIC BLOOD PRESSURE: 104 MMHG | OXYGEN SATURATION: 97 % | WEIGHT: 183.19 LBS | BODY MASS INDEX: 32.46 KG/M2 | TEMPERATURE: 98 F

## 2023-11-10 DIAGNOSIS — Z76.89 ENCOUNTER TO ESTABLISH CARE: Primary | ICD-10-CM

## 2023-11-10 LAB
OHS CV EVENT MONITOR DAY: 1
OHS CV HOLTER LENGTH DECIMAL HOURS: 48
OHS CV HOLTER LENGTH HOURS: 24
OHS CV HOLTER LENGTH MINUTES: 0
OHS CV HOLTER SINUS AVERAGE HR: 75
OHS CV HOLTER SINUS MAX HR: 145
OHS CV HOLTER SINUS MIN HR: 45

## 2023-11-10 PROCEDURE — 99999 PR PBB SHADOW E&M-EST. PATIENT-LVL IV: ICD-10-PCS | Mod: PBBFAC,,, | Performed by: FAMILY MEDICINE

## 2023-11-10 PROCEDURE — 99214 PR OFFICE/OUTPT VISIT, EST, LEVL IV, 30-39 MIN: ICD-10-PCS | Mod: S$GLB,,, | Performed by: FAMILY MEDICINE

## 2023-11-10 PROCEDURE — 99999 PR PBB SHADOW E&M-EST. PATIENT-LVL IV: CPT | Mod: PBBFAC,,, | Performed by: FAMILY MEDICINE

## 2023-11-10 PROCEDURE — 99214 OFFICE O/P EST MOD 30 MIN: CPT | Mod: S$GLB,,, | Performed by: FAMILY MEDICINE

## 2023-11-20 ENCOUNTER — PATIENT MESSAGE (OUTPATIENT)
Dept: FAMILY MEDICINE | Facility: CLINIC | Age: 48
End: 2023-11-20
Payer: COMMERCIAL

## 2023-12-29 DIAGNOSIS — F51.01 PRIMARY INSOMNIA: ICD-10-CM

## 2023-12-29 NOTE — TELEPHONE ENCOUNTER
No care due was identified.  [facility] Embedded Care Due Messages. Reference number: [id number].   12/29/2023 12:00:11 PM CST

## 2023-12-29 NOTE — TELEPHONE ENCOUNTER
Refill Routing Note   Medication(s) are not appropriate for processing by [facility] for the following reason(s):        No active prescription written by provider    [facility] action(s):  Defer               Appointments  past 12m or future 3m with PCP    Date Provider   Last Visit   11/10/2023 Robyn Guerra MD   Next Visit   4/17/2024 Robyn Guerra MD   ED visits in past 90 days: 0        Note composed:12:32 PM 12/29/2023

## 2024-01-02 RX ORDER — TRAZODONE HYDROCHLORIDE 100 MG/1
100 TABLET ORAL NIGHTLY
Qty: 90 TABLET | Refills: 3 | Status: SHIPPED | OUTPATIENT
Start: 2024-01-02

## 2024-01-18 ENCOUNTER — HOSPITAL ENCOUNTER (OUTPATIENT)
Dept: RADIOLOGY | Facility: HOSPITAL | Age: 49
Discharge: HOME | End: 2024-01-18
Attending: FAMILY MEDICINE
Payer: COMMERCIAL

## 2024-01-18 DIAGNOSIS — Z12.31 ENCOUNTER FOR SCREENING MAMMOGRAM FOR BREAST CANCER: ICD-10-CM

## 2024-01-18 PROCEDURE — 77067 SCR MAMMO BI INCL CAD: CPT | Mod: 26,,, | Performed by: RADIOLOGY

## 2024-01-18 PROCEDURE — 77063 BREAST TOMOSYNTHESIS BI: CPT | Mod: 26,,, | Performed by: RADIOLOGY

## 2024-01-18 PROCEDURE — 77067 SCR MAMMO BI INCL CAD: CPT | Mod: TC

## 2024-01-23 ENCOUNTER — LAB VISIT (OUTPATIENT)
Dept: LAB | Facility: HOSPITAL | Age: 49
End: 2024-01-23
Payer: COMMERCIAL

## 2024-01-23 ENCOUNTER — OFFICE VISIT (OUTPATIENT)
Dept: FAMILY MEDICINE | Facility: CLINIC | Age: 49
End: 2024-01-23
Payer: COMMERCIAL

## 2024-01-23 VITALS
DIASTOLIC BLOOD PRESSURE: 76 MMHG | HEIGHT: 63 IN | BODY MASS INDEX: 31.97 KG/M2 | HEART RATE: 80 BPM | TEMPERATURE: 98 F | SYSTOLIC BLOOD PRESSURE: 110 MMHG | OXYGEN SATURATION: 99 % | WEIGHT: 180.44 LBS

## 2024-01-23 DIAGNOSIS — I89.0 LYMPHEDEMA: Chronic | ICD-10-CM

## 2024-01-23 DIAGNOSIS — M79.89 LEG SWELLING: ICD-10-CM

## 2024-01-23 DIAGNOSIS — Z00.00 ANNUAL PHYSICAL EXAM: Primary | ICD-10-CM

## 2024-01-23 DIAGNOSIS — Z00.00 ANNUAL PHYSICAL EXAM: ICD-10-CM

## 2024-01-23 LAB
ALBUMIN SERPL BCP-MCNC: 4 G/DL (ref 3.5–5.2)
ALP SERPL-CCNC: 67 U/L (ref 55–135)
ALT SERPL W/O P-5'-P-CCNC: 20 U/L (ref 10–44)
ANION GAP SERPL CALC-SCNC: 8 MMOL/L (ref 8–16)
AST SERPL-CCNC: 28 U/L (ref 10–40)
BASOPHILS # BLD AUTO: 0.06 K/UL (ref 0–0.2)
BASOPHILS NFR BLD: 0.9 % (ref 0–1.9)
BILIRUB SERPL-MCNC: 0.6 MG/DL (ref 0.1–1)
BUN SERPL-MCNC: 17 MG/DL (ref 6–20)
CALCIUM SERPL-MCNC: 9.4 MG/DL (ref 8.7–10.5)
CHLORIDE SERPL-SCNC: 104 MMOL/L (ref 95–110)
CHOLEST SERPL-MCNC: 213 MG/DL (ref 120–199)
CHOLEST/HDLC SERPL: 2.9 {RATIO} (ref 2–5)
CO2 SERPL-SCNC: 26 MMOL/L (ref 23–29)
CREAT SERPL-MCNC: 0.8 MG/DL (ref 0.5–1.4)
DIFFERENTIAL METHOD BLD: ABNORMAL
EOSINOPHIL # BLD AUTO: 0.2 K/UL (ref 0–0.5)
EOSINOPHIL NFR BLD: 3.2 % (ref 0–8)
ERYTHROCYTE [DISTWIDTH] IN BLOOD BY AUTOMATED COUNT: 12.6 % (ref 11.5–14.5)
EST. GFR  (NO RACE VARIABLE): >60 ML/MIN/1.73 M^2
ESTIMATED AVG GLUCOSE: 85 MG/DL (ref 68–131)
GLUCOSE SERPL-MCNC: 76 MG/DL (ref 70–110)
HBA1C MFR BLD: 4.6 % (ref 4–5.6)
HCT VFR BLD AUTO: 43.9 % (ref 37–48.5)
HDLC SERPL-MCNC: 73 MG/DL (ref 40–75)
HDLC SERPL: 34.3 % (ref 20–50)
HGB BLD-MCNC: 14.4 G/DL (ref 12–16)
IMM GRANULOCYTES # BLD AUTO: 0.03 K/UL (ref 0–0.04)
IMM GRANULOCYTES NFR BLD AUTO: 0.4 % (ref 0–0.5)
LDLC SERPL CALC-MCNC: 129.6 MG/DL (ref 63–159)
LYMPHOCYTES # BLD AUTO: 2.5 K/UL (ref 1–4.8)
LYMPHOCYTES NFR BLD: 36.1 % (ref 18–48)
MCH RBC QN AUTO: 32.2 PG (ref 27–31)
MCHC RBC AUTO-ENTMCNC: 32.8 G/DL (ref 32–36)
MCV RBC AUTO: 98 FL (ref 82–98)
MONOCYTES # BLD AUTO: 0.5 K/UL (ref 0.3–1)
MONOCYTES NFR BLD: 6.6 % (ref 4–15)
NEUTROPHILS # BLD AUTO: 3.7 K/UL (ref 1.8–7.7)
NEUTROPHILS NFR BLD: 52.8 % (ref 38–73)
NONHDLC SERPL-MCNC: 140 MG/DL
NRBC BLD-RTO: 0 /100 WBC
PLATELET # BLD AUTO: 391 K/UL (ref 150–450)
PMV BLD AUTO: 9.1 FL (ref 9.2–12.9)
POTASSIUM SERPL-SCNC: 4.3 MMOL/L (ref 3.5–5.1)
PROT SERPL-MCNC: 7.3 G/DL (ref 6–8.4)
RBC # BLD AUTO: 4.47 M/UL (ref 4–5.4)
SODIUM SERPL-SCNC: 138 MMOL/L (ref 136–145)
TRIGL SERPL-MCNC: 52 MG/DL (ref 30–150)
WBC # BLD AUTO: 6.95 K/UL (ref 3.9–12.7)

## 2024-01-23 PROCEDURE — 36415 COLL VENOUS BLD VENIPUNCTURE: CPT | Mod: PO

## 2024-01-23 PROCEDURE — 99396 PREV VISIT EST AGE 40-64: CPT | Mod: S$GLB,,,

## 2024-01-23 PROCEDURE — 83036 HEMOGLOBIN GLYCOSYLATED A1C: CPT

## 2024-01-23 PROCEDURE — 85025 COMPLETE CBC W/AUTO DIFF WBC: CPT

## 2024-01-23 PROCEDURE — 99999 PR PBB SHADOW E&M-EST. PATIENT-LVL III: CPT | Mod: PBBFAC,,,

## 2024-01-23 PROCEDURE — 80053 COMPREHEN METABOLIC PANEL: CPT

## 2024-01-23 PROCEDURE — 80061 LIPID PANEL: CPT

## 2024-01-23 NOTE — PROGRESS NOTES
HPI     Chief Complaint:  Chief Complaint   Patient presents with    Annual Exam       Leia Shultz is a 48 y.o. female with multiple medical diagnoses as listed in the medical history and problem list that presents for annual exam.  Pt is not known to me with her last appointment 11/10/2023.      HPI  Pt presents for annual exam.  Concerned with leg swelling and pain. Requesting referral to specialist for further evaluation. Had gastric sleeve in 2014 with       Assessment & Plan     Problem List Items Addressed This Visit          Other    Lymphedema (Chronic)  Bilateral leg swelling and pain behind the knees. Currently using compression stockings and compression machine which helps manage swelling. Requesting referral to specialist for further evaluation and treatment. Will refer to vascular.     Other Visit Diagnoses       Annual physical exam    -  Primary  Counseled on age appropriate medical preventative services including age appropriate cancer screenings, age appropriate eye and dental exams, over all nutritional health, need for a consistent exercise regimen, and an over all push towards maintaining a vigorous and active lifestyle.  Counseled on age appropriate vaccines and discussed upcoming health care needs based on age/gender. Discussed good sleep hygiene and stress management.  Will order routine fasting labs.    Relevant Orders    Hemoglobin A1C    Lipid Panel    Comprehensive Metabolic Panel    CBC Auto Differential    Leg swelling      Bilateral leg swelling and pain behind the knees. Currently using compression stockings and compression machine which helps manage swelling. Requesting referral to specialist for further evaluation and treatment. Will refer to vascular.    Relevant Orders    Ambulatory referral/consult to Vascular Surgery              --------------------------------------------      Health Maintenance:  Health Maintenance         Date Due Completion Date    HIV Screening Never  "done ---    Hemoglobin A1c (Diabetic Prevention Screening) 11/23/2023 11/23/2020    Mammogram 01/18/2025 1/18/2024    Colorectal Cancer Screening 06/03/2025 4/7/2023    Cervical Cancer Screening 12/17/2025 12/17/2020    Lipid Panel 02/06/2028 2/6/2023    TETANUS VACCINE 12/06/2029 12/6/2019            Health maintenance reviewed, Lipid panel ordered, and A1c ordered    Follow Up:  No follow-ups on file.    Exam     Review of Systems:  (as noted above)  Review of Systems   Constitutional:  Negative for fever.   HENT:  Negative for trouble swallowing.    Eyes:  Negative for visual disturbance.   Respiratory:  Negative for chest tightness and shortness of breath.    Cardiovascular:  Negative for chest pain.   Gastrointestinal:  Negative for blood in stool.   Musculoskeletal:  Positive for arthralgias and joint swelling.       Physical Exam:   Physical Exam  Constitutional:       General: She is not in acute distress.     Appearance: She is obese. She is not ill-appearing or diaphoretic.   HENT:      Head: Normocephalic and atraumatic.   Cardiovascular:      Rate and Rhythm: Normal rate and regular rhythm.      Heart sounds: No murmur heard.     No friction rub. No gallop.   Pulmonary:      Effort: No respiratory distress.   Chest:      Chest wall: No tenderness.   Musculoskeletal:      Cervical back: No rigidity.      Right lower leg: Swelling and tenderness present. 3+ Edema present.      Left lower leg: Swelling present. 3+ Edema present.   Neurological:      Mental Status: She is alert and oriented to person, place, and time.       Vitals:    01/23/24 0825   BP: 110/76   Pulse: 80   Temp: 97.9 °F (36.6 °C)   TempSrc: Oral   SpO2: 99%   Weight: 81.8 kg (180 lb 7.1 oz)   Height: 5' 3" (1.6 m)      Body mass index is 31.96 kg/m².        History     Past Medical History:  Past Medical History:   Diagnosis Date    Anemia     Arthritis     Heart murmur        Past Surgical History:  Past Surgical History:   Procedure " Laterality Date    COSMETIC SURGERY  October 2016    excess skin removal on arms and stomach    DILATION AND CURETTAGE OF UTERUS  2002    gastric sleeve      LAPAROSCOPIC GASTRIC BANDING      ROOT CANAL      2    WISDOM TOOTH EXTRACTION      all 4       Social History:  Social History     Socioeconomic History    Marital status:     Number of children: 0   Tobacco Use    Smoking status: Never     Passive exposure: Never    Smokeless tobacco: Never   Substance and Sexual Activity    Alcohol use: Not Currently    Drug use: Never    Sexual activity: Not Currently     Partners: Male     Birth control/protection: I.U.D.     Comment: 11/21/17 -vasectomy     Social Determinants of Health     Financial Resource Strain: Low Risk  (1/23/2024)    Overall Financial Resource Strain (CARDIA)     Difficulty of Paying Living Expenses: Not very hard   Food Insecurity: No Food Insecurity (1/23/2024)    Hunger Vital Sign     Worried About Running Out of Food in the Last Year: Never true     Ran Out of Food in the Last Year: Never true   Transportation Needs: No Transportation Needs (1/23/2024)    PRAPARE - Transportation     Lack of Transportation (Medical): No     Lack of Transportation (Non-Medical): No   Physical Activity: Sufficiently Active (1/23/2024)    Exercise Vital Sign     Days of Exercise per Week: 4 days     Minutes of Exercise per Session: 60 min   Stress: No Stress Concern Present (1/23/2024)    [employer] - Occupational Stress Questionnaire     Feeling of Stress : Only a little   Recent Concern: Stress - Stress Concern Present (11/3/2023)    [employer] - Occupational Stress Questionnaire     Feeling of Stress : To some extent   Social Connections: Unknown (1/23/2024)    Social Connection and Isolation Panel [NHANES]     Frequency of Communication with Friends and Family: More than three times a week     Frequency of Social Gatherings with  Friends and Family: Three times a week     Active Member of Clubs or Organizations: No     Attends Club or Organization Meetings: Never     Marital Status:    Housing Stability: Low Risk  (2024)    Housing Stability Vital Sign     Unable to Pay for Housing in the Last Year: No     Number of Places Lived in the Last Year: 1     Unstable Housing in the Last Year: No       Family History:  Family History   Problem Relation Age of Onset    Clotting disorder Mother     Hypotension Mother     Arthritis Mother     Depression Mother     Skin cancer Father     Cancer Father         Skin Cancer    No Known Problems Sister     Irregular heart beat Sister         prolonged QT    Crohn's disease Sister     Arthritis Sister         Letnaunchyn    Cancer Brother          2018 from rare form of spinal cancer    Early death Brother         Cancer at 39    No Known Problems Maternal Grandmother     No Known Problems Maternal Grandfather     No Known Problems Paternal Grandmother     No Known Problems Paternal Grandfather     Alcohol abuse Maternal Uncle          of alcohol-related liver failure    Early death Maternal Uncle        Allergies and Medications: (updated and reviewed)  Review of patient's allergies indicates:   Allergen Reactions    Fire ant Anxiety, Blisters, Dermatitis, Hives, Itching, Rash and Swelling    Pollen extracts Dermatitis, Hives and Rash    Sulfa (sulfonamide antibiotics) Nausea Only     Current Outpatient Medications   Medication Sig Dispense Refill    MULTIVITAMIN ORAL Take by mouth once daily.      sertraline (ZOLOFT) 100 MG tablet Take 100 mg by mouth once daily.      traZODone (DESYREL) 100 MG tablet Take 1 tablet (100 mg total) by mouth every evening. 90 tablet 3    acamprosate (CAMPRAL) 333 mg tablet Take 666 mg by mouth 3 (three) times daily.      EPINEPHrine (EPIPEN 2-KEVIN) 0.3 mg/0.3 mL AtIn Inject 0.3 mLs (0.3 mg total) into the muscle once. for 1 dose 2 each 0     naltrexone (DEPADE) 50 mg tablet Take 50 mg by mouth.       No current facility-administered medications for this visit.       Patient Care Team:  Robyn Guerra MD as PCP - General (Family Medicine)  Lindsey East LPN as Care Coordinator         - The patient is given an After Visit Summary that lists all medications with directions, allergies, education, orders placed during this encounter and follow-up instructions.      - I have reviewed the patient's medical information including past medical, family, and social history sections including the medications and allergies.      - We discussed the patient's current medications.     This note was created by combination of typed  and MModal dictation.  Transcription errors may be present.  If there are any questions, please contact me.       Debby Meng NP

## 2024-01-24 ENCOUNTER — OFFICE VISIT (OUTPATIENT)
Dept: CARDIOLOGY | Facility: CLINIC | Age: 49
End: 2024-01-24
Payer: COMMERCIAL

## 2024-01-24 VITALS
HEART RATE: 75 BPM | BODY MASS INDEX: 32.11 KG/M2 | HEIGHT: 63 IN | WEIGHT: 181.19 LBS | DIASTOLIC BLOOD PRESSURE: 76 MMHG | SYSTOLIC BLOOD PRESSURE: 105 MMHG

## 2024-01-24 DIAGNOSIS — M54.16 LUMBAR RADICULOPATHY: ICD-10-CM

## 2024-01-24 DIAGNOSIS — M79.89 LEG SWELLING: ICD-10-CM

## 2024-01-24 DIAGNOSIS — M54.41 CHRONIC BILATERAL LOW BACK PAIN WITH BILATERAL SCIATICA: ICD-10-CM

## 2024-01-24 DIAGNOSIS — R60.0 LIPEDEMA OF LOWER EXTREMITY: ICD-10-CM

## 2024-01-24 DIAGNOSIS — M54.42 CHRONIC BILATERAL LOW BACK PAIN WITH BILATERAL SCIATICA: ICD-10-CM

## 2024-01-24 DIAGNOSIS — G89.29 CHRONIC BILATERAL LOW BACK PAIN WITH BILATERAL SCIATICA: ICD-10-CM

## 2024-01-24 DIAGNOSIS — I89.0 LYMPHEDEMA: Primary | Chronic | ICD-10-CM

## 2024-01-24 DIAGNOSIS — R60.0 BILATERAL LOWER EXTREMITY EDEMA: ICD-10-CM

## 2024-01-24 PROCEDURE — 99205 OFFICE O/P NEW HI 60 MIN: CPT | Mod: S$GLB,,, | Performed by: INTERNAL MEDICINE

## 2024-01-24 PROCEDURE — 99999 PR PBB SHADOW E&M-EST. PATIENT-LVL V: CPT | Mod: PBBFAC,,, | Performed by: INTERNAL MEDICINE

## 2024-01-24 RX ORDER — BUMETANIDE 1 MG/1
0.5 TABLET ORAL DAILY
Qty: 15 TABLET | Refills: 11 | Status: CANCELLED | OUTPATIENT
Start: 2024-01-24 | End: 2025-01-23

## 2024-01-24 RX ORDER — SEMAGLUTIDE 0.25 MG/.5ML
INJECTION, SOLUTION SUBCUTANEOUS
COMMUNITY
Start: 2024-01-23 | End: 2024-04-15

## 2024-01-24 NOTE — PROGRESS NOTES
[facility]    CC:   Chief Complaint   Patient presents with    Leg Swelling       Patient ID: Leia Shultz is a 48 y.o. female with a past medical history of Raynaud's disease, Gastric sleeve surgery (2014), Obesity, never smoker presents for an initial appointment. Pertinent history/events are as follows:    Patient kindly referred by Ms. Debby Meng for evaluation of both leg swelling    HPI  Today Ms. Leia Shultz presents with swelling of both legs for many years.  She also reports she bruise easily, she experiences pain in her left knee, right thigh region.  She reports she has family history positive for lymphedema. She wears compression stockings daily.  She also reports of using some devices for edema in her legs bought online.  She walks 10,000 steps daily, follows keto diet.  She claimed during her childhood her she had some fracture in back, compound # L1.    Past Medical History:   Diagnosis Date    Anemia     Arthritis     Heart murmur      Past Surgical History:   Procedure Laterality Date    COSMETIC SURGERY  October 2016    excess skin removal on arms and stomach    DILATION AND CURETTAGE OF UTERUS  2002    gastric sleeve      LAPAROSCOPIC GASTRIC BANDING      ROOT CANAL      2    WISDOM TOOTH EXTRACTION      all 4     Social History     Socioeconomic History    Marital status:     Number of children: 0   Tobacco Use    Smoking status: Never     Passive exposure: Never    Smokeless tobacco: Never   Substance and Sexual Activity    Alcohol use: Not Currently    Drug use: Never    Sexual activity: Not Currently     Partners: Male     Birth control/protection: I.U.D.     Comment: 11/21/17 -vasectomy     Social Determinants of Health     Financial Resource Strain: Low Risk  (1/23/2024)    Overall Financial Resource Strain (CARDIA)     Difficulty of Paying Living Expenses: Not very hard   Food Insecurity: No Food Insecurity (1/23/2024)    Hunger Vital Sign     Worried  About Running Out of Food in the Last Year: Never true     Ran Out of Food in the Last Year: Never true   Transportation Needs: No Transportation Needs (2024)    PRAPARE - Transportation     Lack of Transportation (Medical): No     Lack of Transportation (Non-Medical): No   Physical Activity: Sufficiently Active (2024)    Exercise Vital Sign     Days of Exercise per Week: 4 days     Minutes of Exercise per Session: 60 min   Stress: No Stress Concern Present (2024)    [employer] - Occupational Stress Questionnaire     Feeling of Stress : Only a little   Recent Concern: Stress - Stress Concern Present (11/3/2023)    [employer] - Occupational Stress Questionnaire     Feeling of Stress : To some extent   Social Connections: Unknown (2024)    Social Connection and Isolation Panel [NHANES]     Frequency of Communication with Friends and Family: More than three times a week     Frequency of Social Gatherings with Friends and Family: Three times a week     Active Member of Clubs or Organizations: No     Attends Club or Organization Meetings: Never     Marital Status:    Housing Stability: Low Risk  (2024)    Housing Stability Vital Sign     Unable to Pay for Housing in the Last Year: No     Number of Places Lived in the Last Year: 1     Unstable Housing in the Last Year: No     Family History   Problem Relation Age of Onset    Clotting disorder Mother     Hypotension Mother     Arthritis Mother     Depression Mother     Skin cancer Father     Cancer Father         Skin Cancer    No Known Problems Sister     Irregular heart beat Sister         prolonged QT    Crohn's disease Sister     Arthritis Sister         Letnaunchyn    Cancer Brother          2018 from rare form of spinal cancer    Early death Brother         Cancer at 39    No Known Problems Maternal Grandmother     No Known Problems Maternal Grandfather     No Known  "Problems Paternal Grandmother     No Known Problems Paternal Grandfather     Alcohol abuse Maternal Uncle          of alcohol-related liver failure    Early death Maternal Uncle        Review of patient's allergies indicates:   Allergen Reactions    Fire ant Anxiety, Blisters, Dermatitis, Hives, Itching, Rash and Swelling    Pollen extracts Dermatitis, Hives and Rash    Sulfa (sulfonamide antibiotics) Nausea Only       Medication List with Changes/Refills   Current Medications    EPINEPHRINE (EPIPEN 2-KEVIN) 0.3 MG/0.3 ML ATIN    Inject 0.3 mLs (0.3 mg total) into the muscle once. for 1 dose    MULTIVITAMIN ORAL    Take by mouth once daily.    SERTRALINE (ZOLOFT) 100 MG TABLET    Take 100 mg by mouth once daily. As needed.    TRAZODONE (DESYREL) 100 MG TABLET    Take 1 tablet (100 mg total) by mouth every evening.    WEGOVY 0.25 MG/0.5 ML PNIJ    SMARTSI Pre-Filled Pen Syringe SUB-Q Once a Week   Discontinued Medications    ACAMPROSATE (CAMPRAL) 333 MG TABLET    Take 666 mg by mouth 3 (three) times daily.    NALTREXONE (DEPADE) 50 MG TABLET    Take 50 mg by mouth.       Review of Systems  Constitution: Denies chills, fever, and sweats.  HENT: Denies headaches or blurry vision.  Cardiovascular: Denies chest pain or irregular heart beat.  Respiratory: Denies cough or shortness of breath.  Gastrointestinal: Denies abdominal pain, nausea, or vomiting.  Musculoskeletal: Positive for both legs muscle cramps, both leg swelling  Neurological: Denies dizziness or focal weakness.  Psychiatric/Behavioral: Normal mental status.  Hematologic/Lymphatic: Denies bleeding problem or easy bruising/bleeding.  Skin: Denies rash or suspicious lesions    Physical Examination  /76   Pulse 75   Ht 5' 3" (1.6 m)   Wt 82.2 kg (181 lb 3.5 oz)   LMP 12/15/2023   BMI 32.10 kg/m²     Constitutional: No acute distress, conversant  HEENT: Sclera anicteric, Pupils equal, round and reactive to light, extraocular motions intact, " Oropharynx clear  Neck: No JVD, no carotid bruits  Cardiovascular: regular rate and rhythm, no murmur, rubs or gallops, normal S1/S2  Pulmonary: Clear to auscultation bilaterally  Abdominal: Abdomen soft, nontender, nondistended, positive bowel sounds  Extremities: both lower extremity prominent Varicose veins,  both lower extremity edema, changes consistent with lymphedema and lipedema  Pulses:  Carotid pulses are 2+ on the right side, and 2+ on the left side.  Radial pulses are 2+ on the right side, and 2+ on the left side.   Femoral pulses are 2+ on the right side, and 2+ on the left side.  Popliteal pulses are 2+ on the right side, and 2+ on the left side.   Dorsalis pedis pulses are 2+ on the right side, and 2+ on the left side.   Posterior tibial pulses are 2+ on the right side, and 2+ on the left side.    Skin: No ecchymosis, erythema, or ulcers  Psych: Alert and oriented x 3, appropriate affect  Neuro: CNII-XII intact, no focal deficits    Labs:  Most Recent Data  CBC:   Lab Results   Component Value Date    WBC 6.95 01/23/2024    HGB 14.4 01/23/2024    HCT 43.9 01/23/2024     01/23/2024    MCV 98 01/23/2024    RDW 12.6 01/23/2024     BMP:   Lab Results   Component Value Date     01/23/2024    K 4.3 01/23/2024     01/23/2024    CO2 26 01/23/2024    BUN 17 01/23/2024    CREATININE 0.8 01/23/2024    GLU 76 01/23/2024    CALCIUM 9.4 01/23/2024    MG 2.1 02/12/2018     LFTS;   Lab Results   Component Value Date    PROT 7.3 01/23/2024    ALBUMIN 4.0 01/23/2024    BILITOT 0.6 01/23/2024    AST 28 01/23/2024    ALKPHOS 67 01/23/2024    ALT 20 01/23/2024     COAGS:   Lab Results   Component Value Date    INR 0.9 03/17/2022     FLP:   Lab Results   Component Value Date    CHOL 213 (H) 01/23/2024    HDL 73 01/23/2024    LDLCALC 129.6 01/23/2024    TRIG 52 01/23/2024    CHOLHDL 34.3 01/23/2024     CARDIAC:   Lab Results   Component Value Date    BNP 18 10/24/2023       Imaging:    EKG:    I have  personally reviewed these images and echo data    Assessment/Plan:  Leia Shultz is a 48 y.o. female with a past medical history of Raynaud's disease, Gastric sleeve surgery (2014), Obesity, never smoker presents for an initial appointment.    BL LE Edema: Likely due to Venous Insufficiency/ Lymphedema/Lipedema. Check BLE Venous Ultrasound and Ankle brachial Index. Refer to lymphedema clinic.  Recommend wearing graduated compression hose.  Limit sodium intake to 2000 mg daily.  Limit volume intake to 1.5 L daily.  Elevate legs when resting. Start Bumex 0.5 mg daily and check CMP in 1 week. Check MRI Lumbar Spine and refer to Neurosurgery to evaluate any neurological cause for her pain in lower extremity given her history of compound fracture of lumbar spine in her childhood.     Obesity: Encouraged diet, exercise and lifestyle modification for weight loss. Taking wegovy.     3. ASCVD risk score is: 0.5%  on 1/23/2024.    Follow up in 3 months    Total duration of face to face visit time 30 minutes.  Total time spent counseling greater than fifty percent of total visit time.  Counseling included discussion regarding imaging findings, diagnosis, possibilities, treatment options, risks and benefits.  The patient had many questions regarding the options and long-term effects.    Patient seen and discussed with Dr. Kirby. Further recommendations per the attending addendum.    Jr Mcclain MD  PGY IV - Vascular Medicine Fellow   [facility]

## 2024-01-25 ENCOUNTER — PATIENT MESSAGE (OUTPATIENT)
Dept: CARDIOLOGY | Facility: CLINIC | Age: 49
End: 2024-01-25
Payer: COMMERCIAL

## 2024-01-25 DIAGNOSIS — R60.0 BILATERAL LOWER EXTREMITY EDEMA: Primary | ICD-10-CM

## 2024-01-25 RX ORDER — BUMETANIDE 1 MG/1
0.5 TABLET ORAL DAILY
Qty: 15 TABLET | Refills: 11 | Status: SHIPPED | OUTPATIENT
Start: 2024-01-25 | End: 2025-01-24

## 2024-01-25 NOTE — PATIENT INSTRUCTIONS
Assessment/Plan:  Leia Shultz is a 48 y.o. female with a past medical history of Raynaud's disease, Gastric sleeve surgery (2014), Obesity, never smoker presents for an initial appointment.    BL LE Edema: Likely due to Venous Insufficiency/ Lymphedema/Lipedema. Check BLE Venous Ultrasound and Ankle brachial Index. Refer to lymphedema clinic.  Recommend wearing graduated compression hose.  Limit sodium intake to 2000 mg daily.  Limit volume intake to 1.5 L daily.  Elevate legs when resting. Start Bumex 0.5 mg daily and check CMP in 1 week. Check MRI Lumbar Spine and refer to Neurosurgery to evaluate any neurological cause for her pain in lower extremity given her history of compound fracture of lumbar spine in her childhood.     Obesity: Encouraged diet, exercise and lifestyle modification for weight loss. Taking wegovy.     3. ASCVD risk score is: 0.5%  on 1/23/2024.    Follow up in 3 months

## 2024-01-29 ENCOUNTER — HOSPITAL ENCOUNTER (OUTPATIENT)
Dept: RADIOLOGY | Facility: HOSPITAL | Age: 49
Discharge: HOME | End: 2024-01-29
Attending: STUDENT IN AN ORGANIZED HEALTH CARE EDUCATION/TRAINING PROGRAM
Payer: COMMERCIAL

## 2024-01-29 DIAGNOSIS — M79.89 LEG SWELLING: ICD-10-CM

## 2024-01-29 PROCEDURE — 93970 EXTREMITY STUDY: CPT | Mod: TC

## 2024-01-29 PROCEDURE — 93970 EXTREMITY STUDY: CPT | Mod: 26,,, | Performed by: RADIOLOGY

## 2024-01-30 ENCOUNTER — OFFICE VISIT (OUTPATIENT)
Dept: OBSTETRICS AND GYNECOLOGY | Facility: CLINIC | Age: 49
End: 2024-01-30
Payer: COMMERCIAL

## 2024-01-30 VITALS — WEIGHT: 177.81 LBS | DIASTOLIC BLOOD PRESSURE: 80 MMHG | SYSTOLIC BLOOD PRESSURE: 112 MMHG | BODY MASS INDEX: 31.5 KG/M2

## 2024-01-30 DIAGNOSIS — Z01.419 WELL WOMAN EXAM WITH ROUTINE GYNECOLOGICAL EXAM: Primary | ICD-10-CM

## 2024-01-30 DIAGNOSIS — Z12.4 CERVICAL CANCER SCREENING: ICD-10-CM

## 2024-01-30 DIAGNOSIS — Z12.11 COLON CANCER SCREENING: ICD-10-CM

## 2024-01-30 DIAGNOSIS — Z30.431 IUD CHECK UP: ICD-10-CM

## 2024-01-30 PROCEDURE — 99396 PREV VISIT EST AGE 40-64: CPT | Mod: S$GLB,,, | Performed by: OBSTETRICS & GYNECOLOGY

## 2024-01-30 PROCEDURE — 87624 HPV HI-RISK TYP POOLED RSLT: CPT | Performed by: OBSTETRICS & GYNECOLOGY

## 2024-01-30 PROCEDURE — 99999 PR PBB SHADOW E&M-EST. PATIENT-LVL III: CPT | Mod: PBBFAC,,, | Performed by: OBSTETRICS & GYNECOLOGY

## 2024-01-30 PROCEDURE — 88175 CYTOPATH C/V AUTO FLUID REDO: CPT | Performed by: OBSTETRICS & GYNECOLOGY

## 2024-01-30 NOTE — PROGRESS NOTES
[facility]  Ambulatory Clinic  Obstetrics & Gynecology    Visit Date:  2024    Chief Complaint:  Annual GYN exam    History of Present Illness:      Leia Shultz is a 48 y.o.  here for a gynecologic exam.  Pt has no major complaints today.  Pt is perimenopausal with increasing vasomotor sxs.  Pt current method of family planning is copper IUD, and reports no problems with this method.  Last pap was benign.  Last mammo ~2024 was benign.  Pt denies abnormal vaginal bleeding, vaginal discharge, dysmenorrhea, dyspareunia, pelvic pain, bloating, early satiety, unintentional weight loss, breast mass/skin changes, GI or urinary complaints.      Past History:  Gynecologic history as noted above.    Review of Systems:      GENERAL:  No fever, fatigue, excessive weight gain or loss  HEENT:  No headaches, hearing changes, visual disturbance  RESPIRATORY:  No cough, shortness of breath  CARDIOVASCULAR:  No chest pain, heart palpitations, leg swelling  BREAST:  No lump, pain, nipple discharge, skin changes  GASTROINTESTINAL:  No nausea, vomiting, constipation, diarrhea, abd pain, rectal bleeding   GENITOURINARY:  See HPI  ENDOCRINE:  No heat or cold intolerance  HEMATOLOGIC:  No easy bruisability or bleeding   LYMPHATICS:  No enlarged nodes  MUSCULOSKELETAL:  No acute joint pain. Chronic lymphedema in bilateral lower leg improved from prior visit, seeing vascular.  SKIN:  No rash, lesions, jaundice  NEUROLOGIC:  No dizziness, weakness, syncope  PSYCHIATRIC:  No significant mood changes, homicidal/suicidal ideations, abuse    Physical Exam:     /80   Wt 80.7 kg (177 lb 12.8 oz)   LMP 12/15/2023   BMI 31.50 kg/m²   Pulse 60's, Resp rate 12     GENERAL:  No acute distress, well-nourished  HEENT:  Atraumatic, anicteric, moist mucus membranes. Neck supple w/o masses.  BREAST:  Symmetric, nontender, no obvious masses, adenopathy, skin changes or nipple discharge.  LUNGS:  Clear normal  respiratory effort  HEART:  Regular rate and rhythm  ABDOMEN:  Soft, non-tender, non-distended, normoactive bowel sounds, no obvious organomegaly  EXT:  Chronic lymphedema in bilateral lower leg improved from prior visit. Symmetric w/o cramping, claudication. +2 distal pulses.  SKIN:  No rashes or bruising  PSYCH:  Mood and affect appropriate  NEURO:  Grossly intact bilaterally    GENITOURINARY:    VULVAR:  Female external genitalia w/o obvious lesions. Female hair distribution. Normal urethral meatus. No gross lymphadenopathy.    VAGINA:  Normal vaginal mucosa. Good support. No obvious lesion. No discharge.  IUD string present.  CERVIX:  No cervical motion tenderness, discharge, or obvious lesions.   UTERUS:  Small, non-tender, normal contour  ADNEXA:  No masses, non-tender    RECTAL:  Deferred. No obvious external lesions    Chaperone present for exam.    Assessment:     48 y.o.  with copper IUD:    Well woman gynecologic exam  IUD check    Plan:    A gynecologic health assessment was performed with age appropriate counseling.  Cervical cancer screening - pap obtained.  Screening mammogram up to date.  Refer for screening colonoscopy.  Continue Copper IUD.  Risks, benefits, and alternatives to Copper reviewed.  Copper IUD will need to be removed 10 yrs from insertion date.  Self string check.    Encourage healthy lifestyle modifications, monthly self breast exams.  F/u with PCP for health maintenance.  Return 1 year for gynecologic exam or sooner as needed.    All questions answered, pt voiced understanding.        Cl Chacon MD

## 2024-02-01 ENCOUNTER — LAB VISIT (OUTPATIENT)
Dept: LAB | Facility: HOSPITAL | Age: 49
End: 2024-02-01
Attending: STUDENT IN AN ORGANIZED HEALTH CARE EDUCATION/TRAINING PROGRAM
Payer: COMMERCIAL

## 2024-02-01 ENCOUNTER — PATIENT MESSAGE (OUTPATIENT)
Dept: OBSTETRICS AND GYNECOLOGY | Facility: CLINIC | Age: 49
End: 2024-02-01
Payer: COMMERCIAL

## 2024-02-01 DIAGNOSIS — R60.0 BILATERAL LOWER EXTREMITY EDEMA: ICD-10-CM

## 2024-02-01 LAB
ALBUMIN SERPL BCP-MCNC: 3.7 G/DL (ref 3.5–5.2)
ALP SERPL-CCNC: 58 U/L (ref 55–135)
ALT SERPL W/O P-5'-P-CCNC: 13 U/L (ref 10–44)
ANION GAP SERPL CALC-SCNC: 5 MMOL/L (ref 8–16)
AST SERPL-CCNC: 20 U/L (ref 10–40)
BILIRUB SERPL-MCNC: 0.7 MG/DL (ref 0.1–1)
BUN SERPL-MCNC: 13 MG/DL (ref 6–20)
CALCIUM SERPL-MCNC: 9 MG/DL (ref 8.7–10.5)
CHLORIDE SERPL-SCNC: 105 MMOL/L (ref 95–110)
CO2 SERPL-SCNC: 28 MMOL/L (ref 23–29)
CREAT SERPL-MCNC: 0.7 MG/DL (ref 0.5–1.4)
EST. GFR  (NO RACE VARIABLE): >60 ML/MIN/1.73 M^2
GLUCOSE SERPL-MCNC: 80 MG/DL (ref 70–110)
POTASSIUM SERPL-SCNC: 4.1 MMOL/L (ref 3.5–5.1)
PROT SERPL-MCNC: 6.8 G/DL (ref 6–8.4)
SODIUM SERPL-SCNC: 138 MMOL/L (ref 136–145)

## 2024-02-01 PROCEDURE — 36415 COLL VENOUS BLD VENIPUNCTURE: CPT | Performed by: STUDENT IN AN ORGANIZED HEALTH CARE EDUCATION/TRAINING PROGRAM

## 2024-02-01 PROCEDURE — 80053 COMPREHEN METABOLIC PANEL: CPT | Performed by: STUDENT IN AN ORGANIZED HEALTH CARE EDUCATION/TRAINING PROGRAM

## 2024-02-02 ENCOUNTER — PATIENT MESSAGE (OUTPATIENT)
Dept: FAMILY MEDICINE | Facility: CLINIC | Age: 49
End: 2024-02-02
Payer: COMMERCIAL

## 2024-02-02 ENCOUNTER — LAB VISIT (OUTPATIENT)
Dept: LAB | Facility: HOSPITAL | Age: 49
End: 2024-02-02
Payer: COMMERCIAL

## 2024-02-02 DIAGNOSIS — R53.83 FATIGUE, UNSPECIFIED TYPE: Primary | ICD-10-CM

## 2024-02-02 DIAGNOSIS — R53.83 FATIGUE, UNSPECIFIED TYPE: ICD-10-CM

## 2024-02-02 LAB
IRON SERPL-MCNC: 114 UG/DL (ref 30–160)
SATURATED IRON: 30 % (ref 20–50)
TOTAL IRON BINDING CAPACITY: 379 UG/DL (ref 250–450)
TRANSFERRIN SERPL-MCNC: 256 MG/DL (ref 200–375)

## 2024-02-02 PROCEDURE — 36415 COLL VENOUS BLD VENIPUNCTURE: CPT

## 2024-02-02 PROCEDURE — 84403 ASSAY OF TOTAL TESTOSTERONE: CPT

## 2024-02-02 PROCEDURE — 83540 ASSAY OF IRON: CPT

## 2024-02-02 PROCEDURE — 82672 ASSAY OF ESTROGEN: CPT

## 2024-02-02 NOTE — PROGRESS NOTES
Lymphedema Pump Progress:  [x] Order, clinical notes, and face sheet faxed to [employer] for home pneumatic compression.  [] Reached out to patient for follow up. Wish to inquire about symptoms of lymphedema and if conservative therapy has been working.  [] Updated progress note sent to [employer].  [] Patient pneumatic compression pump approved and shipped by [employer].

## 2024-02-03 LAB
FINAL PATHOLOGIC DIAGNOSIS: NORMAL
Lab: NORMAL
TESTOST SERPL-MCNC: 38 NG/DL (ref 5–73)

## 2024-02-07 LAB — ESTROGEN SERPL-MCNC: 408 PG/ML

## 2024-02-07 NOTE — PROGRESS NOTES
[facility]  Neurosurgery    SUBJECTIVE:     History of Present Illness:  Leia Shultz is a 48 y.o. female with lymphedema, vitamin deficiency, and chronic back pain who presents with left knee pain. Left knee pain is located behind her knee and has been present for some time. Pain increased in severity recently without a known trauma or inciting event. She suffered a fracture in her lumbar spine as a child and reports multiple disc herniations over the years. Lumbar MRI was ordered by cardiology and she was referred to neurosurgery for review.     Endorses chronic thoracic back pain, unchanged. Denies numbness and weakness of BLE. Denies pain radiating from her back to her knee.   .  Recent BLE ultrasound revealed a 2.7cm popliteal cyst behind her left knee. She believes this is likely the source of her pain. It has not yet been evaluated by orthopedics.     (Not in a hospital admission)      Review of patient's allergies indicates:   Allergen Reactions    Fire ant Anxiety, Blisters, Dermatitis, Hives, Itching, Rash and Swelling    Pollen extracts Dermatitis, Hives and Rash    Sulfa (sulfonamide antibiotics) Nausea Only       Past Medical History:   Diagnosis Date    Anemia     Arthritis     Heart murmur     Lipedema     Lymphedema      Past Surgical History:   Procedure Laterality Date    COSMETIC SURGERY  2016    excess skin removal on arms and stomach    DILATION AND CURETTAGE OF UTERUS      gastric sleeve      LAPAROSCOPIC GASTRIC BANDING      ROOT CANAL      2    WISDOM TOOTH EXTRACTION      all 4     Family History   Problem Relation Age of Onset    Clotting disorder Mother     Hypotension Mother     Arthritis Mother     Depression Mother     Skin cancer Father     Cancer Father         Skin Cancer    No Known Problems Sister     Irregular heart beat Sister         prolonged QT    Crohn's disease Sister     Arthritis Sister         Letnaunchyn    Cancer Brother          2018  "from rare form of spinal cancer    Early death Brother         Cancer at 39    No Known Problems Maternal Grandmother     No Known Problems Maternal Grandfather     No Known Problems Paternal Grandmother     No Known Problems Paternal Grandfather     Alcohol abuse Maternal Uncle          of alcohol-related liver failure    Early death Maternal Uncle      Social History     Tobacco Use    Smoking status: Never     Passive exposure: Never    Smokeless tobacco: Never   Substance Use Topics    Alcohol use: Yes     Comment: rare    Drug use: Never        Review of Systems:  As noted in HPI    OBJECTIVE:     Vital Signs (Most Recent):  Vitals:    02/15/24 1122   BP: 118/66   Pulse: 75   SpO2: 100%   Weight: 79.7 kg (175 lb 11.3 oz)   Height: 5' 3" (1.6 m)   PainSc:   4   PainLoc: Leg         Physical Exam:  General: well developed, well nourished, no distress  Head: normocephalic, atraumatic  Neurologic: Alert and oriented. Thought content appropriate  GCS: Motor: 6/Verbal: 5/Eyes: 4 GCS Total: 15  Language: No aphasia  Speech: No dysarthria  Cranial nerves: face symmetric, tongue midline, CN II-XII grossly intact.   Eyes: pupils equal, round, reactive to light with accommodation, EOMI.   Pulmonary: normal respirations, not labored, no accessory muscles used    Sensory: intact to light touch throughout thoracic spine and BLE    Motor Strength: Moves all extremities spontaneously with good tone.  Full strength upper and lower extremities. No abnormal movements seen.     Strength  Iliopsoas Quadriceps Knee  Flexion Tibialis  anterior Gastro- cnemius eversion inversion   Lower: R 5/5 5/5 5/5 5/5 5/5 5/5 5/5    L 5/5 5/5 5/5 5/5 5/5 5/5 5/5     Vilchis: absent  Clonus: absent    Gait: normal    Midline Bony Tenderness: positive in mid thoracic spine   Paraspinous muscle tenderness: negative       Diagnostic Results:  I have personally reviewed imaging and agree with the findings.       MRI lumbar spine, " 2/14/24:  Broad-based disc bulge, facet degenerative change and ligamentum flavum thickening which contribute to moderate central canal narrowing L2 2-L3 and L3-L4 and mild-moderate central canal narrowing L4-L5.   Moderate disc herniation at T11-12 causing mild-moderate central stenosis     BLE ultrasound 1/29/2024  No evidence of deep venous thrombosis in either lower extremity.     Findings suggestive of left popliteal fossa cyst measuring up to 2.7 cm.    ASSESSMENT/PLAN:     Leia Shultz is a 48 y.o. female who presents with acutely worsened left knee pain. She is intact on exam. No significant nerve compression on lumbar MRI. There is contact of the left L5 nerve root in the lateral recess at L4-5. There is also a disc herniation at T11-12 causing mild-moderate central stenosis. Advised patient of s/s of myelopathy that could suggest progression of the thoracic disc.     Refer to orthopedics for evaluation of popliteal cyst noted on recent ultrasound.   Lumbar MRI reviewed. No neurosurgical intervention indicated at this time.   FU prn       Please feel free to call with any further questions        Florence Franco PA-C  [facility]  Department of Neurosurgery  [phone]    Disclaimer: This note was dictated by speech recognition. Minor errors in transcription may be present.  Please call with any questions.

## 2024-02-08 ENCOUNTER — HOSPITAL ENCOUNTER (OUTPATIENT)
Dept: CARDIOLOGY | Facility: HOSPITAL | Age: 49
Discharge: HOME | End: 2024-02-08
Attending: STUDENT IN AN ORGANIZED HEALTH CARE EDUCATION/TRAINING PROGRAM
Payer: COMMERCIAL

## 2024-02-08 DIAGNOSIS — M79.89 LEG SWELLING: ICD-10-CM

## 2024-02-08 LAB
LEFT ABI: 1.23
LEFT ARM BP: 103 MMHG
LEFT POSTERIOR TIBIAL: 141 MMHG
RIGHT ABI: 1.17
RIGHT ARM BP: 115 MMHG
RIGHT DORSALIS PEDIS: 126 MMHG
RIGHT POSTERIOR TIBIAL: 134 MMHG

## 2024-02-08 PROCEDURE — 93922 UPR/L XTREMITY ART 2 LEVELS: CPT | Mod: PO

## 2024-02-08 PROCEDURE — 93922 UPR/L XTREMITY ART 2 LEVELS: CPT | Mod: 26,,, | Performed by: INTERNAL MEDICINE

## 2024-02-14 ENCOUNTER — HOSPITAL ENCOUNTER (OUTPATIENT)
Dept: RADIOLOGY | Facility: HOSPITAL | Age: 49
Discharge: HOME | End: 2024-02-14
Attending: STUDENT IN AN ORGANIZED HEALTH CARE EDUCATION/TRAINING PROGRAM
Payer: COMMERCIAL

## 2024-02-14 DIAGNOSIS — M54.16 LUMBAR RADICULOPATHY: ICD-10-CM

## 2024-02-14 PROCEDURE — 72148 MRI LUMBAR SPINE W/O DYE: CPT | Mod: TC

## 2024-02-14 PROCEDURE — 72148 MRI LUMBAR SPINE W/O DYE: CPT | Mod: 26,,, | Performed by: RADIOLOGY

## 2024-02-15 ENCOUNTER — OFFICE VISIT (OUTPATIENT)
Dept: NEUROSURGERY | Facility: CLINIC | Age: 49
End: 2024-02-15
Payer: COMMERCIAL

## 2024-02-15 VITALS
HEIGHT: 63 IN | DIASTOLIC BLOOD PRESSURE: 66 MMHG | SYSTOLIC BLOOD PRESSURE: 118 MMHG | WEIGHT: 175.69 LBS | BODY MASS INDEX: 31.13 KG/M2 | HEART RATE: 75 BPM | OXYGEN SATURATION: 100 %

## 2024-02-15 DIAGNOSIS — M54.42 CHRONIC BILATERAL LOW BACK PAIN WITH BILATERAL SCIATICA: ICD-10-CM

## 2024-02-15 DIAGNOSIS — M25.562 LEFT KNEE PAIN, UNSPECIFIED CHRONICITY: Primary | ICD-10-CM

## 2024-02-15 DIAGNOSIS — G89.29 CHRONIC PAIN OF LEFT KNEE: Primary | ICD-10-CM

## 2024-02-15 DIAGNOSIS — G89.29 CHRONIC BILATERAL LOW BACK PAIN WITH BILATERAL SCIATICA: ICD-10-CM

## 2024-02-15 DIAGNOSIS — M54.16 LUMBAR RADICULOPATHY: ICD-10-CM

## 2024-02-15 DIAGNOSIS — M25.562 CHRONIC PAIN OF LEFT KNEE: Primary | ICD-10-CM

## 2024-02-15 DIAGNOSIS — M54.41 CHRONIC BILATERAL LOW BACK PAIN WITH BILATERAL SCIATICA: ICD-10-CM

## 2024-02-15 DIAGNOSIS — M71.22 POPLITEAL CYST, LEFT: ICD-10-CM

## 2024-02-15 PROCEDURE — 99204 OFFICE O/P NEW MOD 45 MIN: CPT | Mod: S$GLB,,, | Performed by: PHYSICIAN ASSISTANT

## 2024-02-16 ENCOUNTER — APPOINTMENT (OUTPATIENT)
Dept: RADIOLOGY | Facility: HOSPITAL | Age: 49
End: 2024-02-16
Attending: ORTHOPAEDIC SURGERY
Payer: COMMERCIAL

## 2024-02-16 DIAGNOSIS — M25.562 LEFT KNEE PAIN, UNSPECIFIED CHRONICITY: ICD-10-CM

## 2024-02-16 PROCEDURE — 73562 X-RAY EXAM OF KNEE 3: CPT | Mod: TC,FY,PN,LT

## 2024-02-16 PROCEDURE — 73562 X-RAY EXAM OF KNEE 3: CPT | Mod: 26,LT,, | Performed by: STUDENT IN AN ORGANIZED HEALTH CARE EDUCATION/TRAINING PROGRAM

## 2024-02-19 ENCOUNTER — OFFICE VISIT (OUTPATIENT)
Dept: ORTHOPEDICS | Facility: CLINIC | Age: 49
End: 2024-02-19
Payer: COMMERCIAL

## 2024-02-19 DIAGNOSIS — M25.562 CHRONIC PAIN OF LEFT KNEE: ICD-10-CM

## 2024-02-19 DIAGNOSIS — G89.29 CHRONIC PAIN OF LEFT KNEE: ICD-10-CM

## 2024-02-19 DIAGNOSIS — M71.22 POPLITEAL CYST, LEFT: ICD-10-CM

## 2024-02-19 PROCEDURE — 99999 PR PBB SHADOW E&M-EST. PATIENT-LVL III: CPT | Mod: PBBFAC,,, | Performed by: ORTHOPAEDIC SURGERY

## 2024-02-19 PROCEDURE — 99213 OFFICE O/P EST LOW 20 MIN: CPT | Mod: S$GLB,,, | Performed by: ORTHOPAEDIC SURGERY

## 2024-02-19 RX ORDER — MELOXICAM 15 MG/1
15 TABLET ORAL DAILY
Qty: 30 TABLET | Refills: 0 | Status: SHIPPED | OUTPATIENT
Start: 2024-02-19 | End: 2024-04-15

## 2024-02-19 NOTE — PROGRESS NOTES
Assessment: 49 y.o. female with Left knee osteoarthritis     We discussed the findings on xray and clinical exam as well as the natural history of arthritis. We discussed non operative and operative treatment options including injections, viscosupplementation, physical therapy and PO NSAIDs.  She would like to start with non-operative treatment in the form of medications.     Plan:   - - Mobic 7.5 mg PO QD x 2 weeks then PRN. The patient was advised that NSAID-type medications have important potential side effects: gastrointestinal irritation, GI bleeding, cardiac effects and renal injuries. Take the medication with food and to stop and call the office for any GI upset, vomiting, abdominal pain or black/bloody stools. The patient expresses understanding of these issues and questions were answered. Discussed risks with history of gastric sleeve, recommended short term use and use of H2 blocker while taking NSAIDs. If she needs to take PO NSAIDs consistently for longer than 1 -2 weeks I would recommend proceeding with injection  - Return to clinic PRN    All questions were answered in detail. The patient is in full agreement with the treatment plan and will proceed accordingly.    A note notifying Florence Franco of my findings was sent via the electronic medical record     Chief Complaint   Patient presents with    Left Knee - Pain       This patient was seen in consultation at the request of Florence Franco     [facility] (02/19/2024): Leia Shultz is a 49 y.o. female who presents today complaining of left knee pain  Duration of symptoms:  about 1 month   No trauma   Worse with weight bearing activity - standing, gardening, flexion (particularly with weight bearing)  Swelling, pain  Initially was intermittent, now is constant   Relieving factors: elevation, keeping leg straight  Sometimes uses a cane  Wearing a velcro knee brace which is somewhat helpful  Associated symptoms:  swelling and popping.    Pain  does interfere with activities of daily living .  Had an ultrasound - showed a bakers cyst     This is the extent of the patient's complaints at this time.      Review of patient's allergies indicates:   Allergen Reactions    Fire ant Anxiety, Blisters, Dermatitis, Hives, Itching, Rash and Swelling    Pollen extracts Dermatitis, Hives and Rash    Sulfa (sulfonamide antibiotics) Nausea Only       Current Outpatient Medications:     bumetanide (BUMEX) 1 MG tablet, Take 0.5 tablets (0.5 mg total) by mouth once daily., Disp: 15 tablet, Rfl: 11    MULTIVITAMIN ORAL, Take by mouth once daily., Disp: , Rfl:     sertraline (ZOLOFT) 100 MG tablet, Take 100 mg by mouth once daily. As needed., Disp: , Rfl:     traZODone (DESYREL) 100 MG tablet, Take 1 tablet (100 mg total) by mouth every evening., Disp: 90 tablet, Rfl: 3    WEGOVY 0.25 mg/0.5 mL PnIj, SMARTSI Pre-Filled Pen Syringe SUB-Q Once a Week, Disp: , Rfl:     EPINEPHrine (EPIPEN 2-KEVIN) 0.3 mg/0.3 mL AtIn, Inject 0.3 mLs (0.3 mg total) into the muscle once. for 1 dose, Disp: 2 each, Rfl: 0    semaglutide, weight loss, (WEGOVY) 0.5 mg/0.5 mL PnIj, INJECT 0.5MG INTO THE SKIN ONCE WEEKLY (Patient not taking: Reported on 2024), Disp: 2 mL, Rfl: 0    semaglutide, weight loss, (WEGOVY) 0.5 mg/0.5 mL PnIj, INJECT 0.5MG INTO THE SKIN ONCE WEEKLY (Patient not taking: Reported on 2024), Disp: 2 mL, Rfl: 0    semaglutide, weight loss, (WEGOVY) 1 mg/0.5 mL PnIj, INJECT 1MG INTO THE SKIN ONCE WEEKLY (Patient not taking: Reported on 2024), Disp: 2 mL, Rfl: 0    Past Medical History:   Diagnosis Date    Anemia     Arthritis     Heart murmur     Lipedema     Lymphedema        Patient Active Problem List   Diagnosis    History of gastric restrictive surgery    Vitamin D deficiency    Vitamin B 12 deficiency    Low folate    Poor posture    Weakness of trunk musculature    Class 1 obesity due to excess calories without serious comorbidity with body mass index (BMI) of  34.0 to 34.9 in adult    Lymphedema    Bilateral lower extremity edema    Lipedema of lower extremity    Chronic bilateral low back pain with bilateral sciatica       Past Surgical History:   Procedure Laterality Date    COSMETIC SURGERY  2016    excess skin removal on arms and stomach    DILATION AND CURETTAGE OF UTERUS      gastric sleeve      LAPAROSCOPIC GASTRIC BANDING      ROOT CANAL      2    WISDOM TOOTH EXTRACTION      all 4       Social History     Tobacco Use    Smoking status: Never     Passive exposure: Never    Smokeless tobacco: Never   Substance Use Topics    Alcohol use: Yes     Comment: rare    Drug use: Never       Family History   Problem Relation Age of Onset    Clotting disorder Mother     Hypotension Mother     Arthritis Mother     Depression Mother     Skin cancer Father     Cancer Father         Skin Cancer    No Known Problems Sister     Irregular heart beat Sister         prolonged QT    Crohn's disease Sister     Arthritis Sister         Letnaunchyn    Cancer Brother          2018 from rare form of spinal cancer    Early death Brother         Cancer at 39    No Known Problems Maternal Grandmother     No Known Problems Maternal Grandfather     No Known Problems Paternal Grandmother     No Known Problems Paternal Grandfather     Alcohol abuse Maternal Uncle          of alcohol-related liver failure    Early death Maternal Uncle      Physical Exam:   Vitals:    24 0746   PainSc:   3   PainLoc: Knee     General: Patient is alert, awake and oriented to time, place and person. Mood and affect are appropriate.  Patient does not appear to be in any distress, denies any constitutional symptoms and appears stated age.   HEENT: Pupils are equal and round, sclera are not injected. External examination of ears and nose reveals no abnormalities. Cranial nerves II-X are grossly intact  Skin: no rashes, abrasions or open wounds on the affected extremity   Resp: No  respiratory distress or audible wheezing   CV: 2+  pulses, all extremities warm and well perfused   Left knee(s)  Localizes pain over LJL and popliteal fossa   Mild effusion   No soft tissue swelling or erythema   Active ROM: 5 - 120  Passive ROM: 5 - 120  no varus/valgus deformity   Tender to palpation over lateral joint line  and popliteal fossa  good  quadricep muscle tone and bulk; no atrophy compared to contralateral extremity   normal (0 - 2 mm) Anterior drawer   normal (0 - 2 mm) posterior drawer   negative valgus instability   negative varus instability   Normal patellar tracking   ltsi s/s/sp/dp/t   + ehl/fhl/ta/gs  2 + DP      Imaging:  3 views left knee:  moderate tricompartmental OA with subchondral sclerosis, joint space narrowing, osteophyte formation and a large posterior loose body    I personally reviewed and interpreted the patient's imaging obtained today in clinic     This note was created by combination of typed  and M-Modal dictation. Transcription and phonetic errors may be present.  If there are any questions, please contact me.      Current Outpatient Medications:     bumetanide (BUMEX) 1 MG tablet, Take 0.5 tablets (0.5 mg total) by mouth once daily., Disp: 15 tablet, Rfl: 11    MULTIVITAMIN ORAL, Take by mouth once daily., Disp: , Rfl:     sertraline (ZOLOFT) 100 MG tablet, Take 100 mg by mouth once daily. As needed., Disp: , Rfl:     traZODone (DESYREL) 100 MG tablet, Take 1 tablet (100 mg total) by mouth every evening., Disp: 90 tablet, Rfl: 3    WEGOVY 0.25 mg/0.5 mL PnIj, SMARTSI Pre-Filled Pen Syringe SUB-Q Once a Week, Disp: , Rfl:     EPINEPHrine (EPIPEN 2-KEVIN) 0.3 mg/0.3 mL AtIn, Inject 0.3 mLs (0.3 mg total) into the muscle once. for 1 dose, Disp: 2 each, Rfl: 0    semaglutide, weight loss, (WEGOVY) 0.5 mg/0.5 mL PnIj, INJECT 0.5MG INTO THE SKIN ONCE WEEKLY (Patient not taking: Reported on 2024), Disp: 2 mL, Rfl: 0    semaglutide, weight loss, (WEGOVY) 0.5 mg/0.5  mL PnIj, INJECT 0.5MG INTO THE SKIN ONCE WEEKLY (Patient not taking: Reported on 2/19/2024), Disp: 2 mL, Rfl: 0    semaglutide, weight loss, (WEGOVY) 1 mg/0.5 mL PnIj, INJECT 1MG INTO THE SKIN ONCE WEEKLY (Patient not taking: Reported on 2/19/2024), Disp: 2 mL, Rfl: 0

## 2024-02-21 ENCOUNTER — CLINICAL SUPPORT (OUTPATIENT)
Dept: REHABILITATION | Facility: HOSPITAL | Age: 49
End: 2024-02-21
Payer: COMMERCIAL

## 2024-02-21 DIAGNOSIS — R60.0 BILATERAL LOWER EXTREMITY EDEMA: ICD-10-CM

## 2024-02-21 DIAGNOSIS — M79.89 LEG SWELLING: ICD-10-CM

## 2024-02-21 DIAGNOSIS — R60.9 LIPEDEMA: Primary | ICD-10-CM

## 2024-02-21 DIAGNOSIS — I89.0 LYMPHEDEMA: Chronic | ICD-10-CM

## 2024-02-21 PROCEDURE — 97165 OT EVAL LOW COMPLEX 30 MIN: CPT

## 2024-02-21 PROCEDURE — 97535 SELF CARE MNGMENT TRAINING: CPT

## 2024-02-26 PROBLEM — R60.9 LIPEDEMA: Chronic | Status: ACTIVE | Noted: 2024-01-24

## 2024-02-26 PROBLEM — R60.9 LIPEDEMA: Status: ACTIVE | Noted: 2024-01-24

## 2024-02-26 NOTE — PLAN OF CARE
"[facility]  Occupational Therapy Initial Evaluation  Lymphedema      Name: Leia Shultz  Clinic Number: [id number]    Therapy Diagnosis:   Encounter Diagnoses   Name Primary?    Leg swelling     Lymphedema     Bilateral lower extremity edema     Lipedema Yes     Physician: Jr Mcclain MD    Physician Orders: Eval and Treat  Medical Diagnosis:   M79.89 (ICD-10-CM) - Other specified soft tissue disorders   I89.0 (ICD-10-CM) - Lymphedema, not elsewhere classified   R60.0 (ICD-10-CM) - Localized edema     Evaluation Date: 2/21/2024  Insurance Authorization Period Expiration: 12/31/2024  Plan of Care Certification Period: 5/15/2024  Visit # / Visits authorized: 1 / 1  FOTO: see media, 1 / 3    Precautions:  Standard    Time In:5:30  Time Out: 6:15  Total Appointment Time (timed & untimed codes): 45 minutes    Subjective      Date of onset: puberty  History of current condition - Leia reports: she has always had "larger" legs. First noticed swelling in BLE around puberty but was not diagnosed with lipo-lymphedema until last month. Her aunt also has lymphedema and was diagnosed last year. She lost 160 pounds but no significant change in size of BLE. Symptoms include tenderness to touch, easy bruising, and swelling.      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   Recent falls: no    Current Functional Status   Home/Living environment: lives alone          Limitation of Functional Status as follows:   ADLs/IADLs:     - Feeding: none    - " Bathing: none    - Dressing/Grooming: none    - Home Management: none    - Driving: none      Patient's Goals for Therapy: none stated    Past Medical History/Physical Systems Review:   Leia Shultz  has a past medical history of Anemia, Arthritis, Heart murmur, Lipedema, and Lymphedema.    Leia Shultz  has a past surgical history that includes Dilation and curettage of uterus (2002); Laparoscopic gastric banding; gastric sleeve; [place] tooth extraction; Root canal; and Cosmetic surgery (October 2016).    Leia has a current medication list which includes the following prescription(s): bumetanide, epinephrine, meloxicam, multivitamin, wegovy, wegovy, wegovy, sertraline, trazodone, and wegovy.    Review of patient's allergies indicates:   Allergen Reactions    Fire ant Anxiety, Blisters, Dermatitis, Hives, Itching, Rash and Swelling    Pollen extracts Dermatitis, Hives and Rash    Sulfa (sulfonamide antibiotics) Nausea Only        Pt denies: CHF, CKD, DVT, CA, infection, severe PAD      Objective      Patient received from waiting room.   Mental status: alert, oriented to person, place, and time  Appearance: Well groomed  Behavior:  calm and cooperative  Attention Span and Concentration:  Normal    Affected Areas: RLE and LLE  Refill: Day   Stemmer Sign: negative  Shape: increased girth across LE's, cuffing at ankles  Tissue Texture: spongy  Skin Integrity: intact  Sensation: intact  Circulation: intact      LE Girth Measurements: taken in supine  [facility] RIGHT LE  2/21/2024 LEFT LE  2/21/2024   SBP 50.5 cm 51.0 cm   10 below SBP 48.0 cm 47.0 cm   20 below SBP 47.0 cm 46.5 cm   30 below SBP 38.5 cm 38.5 cm   35 below SBP 31.0 cm 33.0 cm   Ankle 25.0 cm 26.0 cm   Forefoot 22.0 cm 22.0 cm       Picture of BLE in supine; taken via Epic Haiku on therapist's cell phone with verbal consent from patient:      Limitation/Restriction for FOTO Lower Quadrant Edema Survey    Therapist reviewed FOTO scores for Leia  STACI Shultz on 2/21/2024.   FOTO documents entered into [employer] - see Media section.    Limitation Score: see media         Treatment      Total Treatment time (time-based codes) separate from Evaluation: 10 minutes    Leia received the treatments listed below:        Self-care/home management techniques to improve functional performance for 10 minutes, including:    Pt was educated in potential compression needs.  Demo of products including socks, garments, and Inelastic Velcro wraps.   Discussed cost/coverage and authorization per insurance with Durable Medical Equipment(DME) provider.  Compression require orders from referring provider and coverage or purchase of products from DME or self order.      Discussed wear schedule, don/doff, wash and management of products.  Size and compression class and AM/PM needs.    Consideration for Edema Wear as form of temporary compression use until securing compression needs.   Consideration for shorts/tights or capris style compression to compliment lower leg compression to support size/shape of LE.   Product information provided.   Vendor list provided.    Informed insurance coverage of compression is per DME provider and typically Medicare and Medicare group plans may not cover cost beyond pair of standard sized knee high garments.   Commitment to attendance as well as commitment to securing compression needs is critical to edema management.     Patient Education and Home Exercises      Education provided:   1. Educated on definition of lymphedema.  2. Explained the Complete Decongestive Therapy protocol in depth  3. Educated on Phase 1 and 2 of protocol.  4. Reviewed treatment frequency and likely duration of weeks  5.Contraindications for treatment.  6. Plan of care and goals.  7. Educated on home management protocols.   8. Role of OT, goals for OT, scheduling/cancellations, insurance limitations.  9. Provided lymphedema educational pamphlet        Assessment      Leia patience  a 49 y.o. female referred to outpatient Occupational Therapy with a medical diagnosis of lipo-lymphedema. This patient presents with stage 1 lymphedema due to lipedema/CVI.  Patient's deficits include swelling of the BLE, increased pain, increased stiffness in the BLE, as well as difficulty performing ADLs , and placing the pt at higher risk of infection. Therapist's recommendation includes elevation, exercise, manual lymphatic drainage, pneumatic compression pump, multi-layer bandaging, and compression garments for 8 weeks to reduce swelling. Leia would benefit from complete decongestive therapy to reduce size of BLE, reduce risk of wounds and poor skin integrity as well as education to self-maintain reduction with use of compression garments.    Pt presents with lipo-lymphedema. Discussed benefit of trying complete decongestive therapy to address lymphedema component. Educated on benefits of conservative lipedema treatment, however, lipedema tissue cannot be reversed conservatively. Provided product information for Bioflect leggings. Pneumatic pump referral made by MD. Pt agreeable to POC.     Anticipated barriers to occupational therapy: none    Plan of care discussed with patient: Yes  Patient's spiritual, cultural and educational needs considered and patient is agreeable to the plan of care and goals as stated below:     Medical Necessity is demonstrated by the following  Occupational Profile/History  Co-morbidities and personal factors that may impact the plan of care [x] LOW: Brief chart review  [] MODERATE: Expanded chart review   [] HIGH: Extensive chart review    Moderate / High Support Documentation: N/A     Examination  Performance deficits relating to physical, cognitive or psychosocial skills that result in activity limitations and/or participation restrictions  [x] LOW: addressing 1-3 Performance deficits  [] MODERATE: 3-5 Performance deficits  [] HIGH: 5+ Performance deficits (please support  below)    Moderate / High Support Documentation:    Physical:  Edema  Pain    Cognitive:  No Deficits    Psychosocial:    No Deficits     Treatment Options [] LOW: Limited options  [x] MODERATE: Several options  [] HIGH: Multiple options      Decision Making/ Complexity Score: low       The following goals were discussed with the patient and patient is in agreement with them as to be addressed in the treatment plan.     Goals:     Short Term Goals: (4 weeks)  Goals: Progressing / MET   1. Patient will demonstrate 100% knowledge of lymphedema precautions and signs of infection to allow for reduced lymphedema risk, infection risk, and/or exacerbation of condition.  NOT MET   2. Patient will tolerate daily activities wearing multilayered bandaging to allow for lymphatic drainage support, skin elasticity, and reduction in shape and size of limb.  NOT MET   3. Patient and/or caregiver will order/obtain appropriate compression garments to maintain lymphatic and venous support.  NOT MET   4. Patient will show decreased total girth measurement in BLE by up to 1 cm to allow for lower extremity symmetry, shoe and clothing choice, and ability to apply needed compression. NOT MET     Long Term Goals: (8 weeks)  Goals: Progressing / MET   1. Patient and/or caregiver to denis/doff compression garment independently and with daily compliance to assist in lymphedema management, skin elasticity, and tissue density NOT MET   2. Patient and/or caregiver will be independent in use of pneumatic compression pump or self manual lymphatic drainage techniques to areas within reach to enhance lymphatic drainage and skin condition.  NOT MET   3. Patient to be independent and compliant with home exercise program to allow for increased function in affected limb. NOT MET   4. Patient will show decreased total girth measurement in BLE by up to 3 cm  to allow for lower extremity symmetry, shoe and clothing choice, and ability to apply needed  compression daily. NOT MET        Plan     Plan of Care Certification: 2/21/2024 to 5/15/2024.     Therapy Track: COMPLETE DECONGESTIVE THERAPY  Interventions: manual lymphatic drainage, multi-layer bandaging, compression garments, therapeutic exercise, self bandaging and manual lymphatic drainage training, home exercise programming.      Outpatient Occupational Therapy 2 times weekly for 8 weeks to include the following interventions: Manual therapy/joint mobilizations, Therapeutic exercises/activities., and Edema Control.    Celestina Marinelli, OT, CLWT      I CERTIFY THE NEED FOR THESE SERVICES FURNISHED UNDER THIS PLAN OF TREATMENT AND WHILE UNDER MY CARE   Physician's comments:     Physician's Signature: ___________________________________________________

## 2024-02-28 ENCOUNTER — CLINICAL SUPPORT (OUTPATIENT)
Dept: REHABILITATION | Facility: HOSPITAL | Age: 49
End: 2024-02-28
Payer: COMMERCIAL

## 2024-02-28 DIAGNOSIS — I89.0 LYMPHEDEMA: Chronic | ICD-10-CM

## 2024-02-28 DIAGNOSIS — R60.9 LIPEDEMA: Primary | Chronic | ICD-10-CM

## 2024-02-28 PROCEDURE — 97140 MANUAL THERAPY 1/> REGIONS: CPT

## 2024-02-28 NOTE — PROGRESS NOTES
[facility]  Occupational Therapy Treatment Note  Lymphedema    Date: 2/28/2024  Name: Leia Shultz  Clinic Number: [id number]    Therapy Diagnosis:   Encounter Diagnoses   Name Primary?    Lipedema Yes    Lymphedema      Physician: Jr Mcclain MD    Physician Orders: Eval and Treat  Medical Diagnosis:   M79.89 (ICD-10-CM) - Other specified soft tissue disorders   I89.0 (ICD-10-CM) - Lymphedema, not elsewhere classified   R60.0 (ICD-10-CM) - Localized edema      Evaluation Date: 2/21/2024  Insurance Authorization Period Expiration: 12/31/2024  Plan of Care Certification Period: 5/15/2024  Visit # / Visits authorized: 1 / 20  FOTO: see media, 1 / 3     Precautions:  Standard    Time In: 4:30  Time Out: 5:25  Total Billable Time: 55 minutes    SUBJECTIVE     Pt reports: no concerns.  Leia reported wearing compression outside of therapy visits: Yes - compression pantyhose   She was compliant with home exercise program given last session.   Response to previous treatment:none  Functional change: none    Pain: 1/10  Location: shins, ankles      OBJECTIVE     Pt arrived wearing compression sleeves on BUE, in NAD.    Compression garment status: owns compression for BUE and BLE.  Compression Rx status: not requested.  Pneumatic pump status: referred by MD.    Objective Measures updated at progress report unless specified.    Treatment     Leia received the treatments listed below:     Manual therapy techniques were applied for 55 minutes, including:     MANUAL LYMPHATIC DRAINAGE (MLD):    While supine with LEs elevated stimulation  along GI region, B inguinal regions, drainage of entire BLE duane lower leg, ankle, and foot with return proximally,  Use of Aquaphor due to dryness.   Educated in self massage to abdominal areas, B inguinal areas, thigh, and remaining LE within reach.       MULTILAYERED BANDAGING:    Issued supplies and bandaged BLE   Use of Cavilon moisturizer for dry  skin  Cotton stockinet: size 9  Cellona padding from dorsum of foot to knee,   2-8cm and 2-10cm Rosidal K rolls foot to distal knee      Pt to leave intact 24 hrs as tolerated, discontinue with any problems, return rolled bandages next session. Wash and wear schedules confirmed.       Patient Education and Home Exercises      Education provided:   - Progress towards goals     Written Home Exercises Provided:  NOT TODAY .  Exercises were reviewed and Leia was able to demonstrate them prior to the end of the session.  Leia demonstrated good  understanding of the HEP provided. See EMR under Patient Instructions for exercises provided during therapy sessions.       Assessment     Initiated bandaging on BLE. Plan to assess if increased girth is swelling vs lipedema tissue and can be reduced.    Pt would continue to benefit from skilled OT.      Leia is progressing well towards her goals and there are no updates to goals at this time. Pt prognosis is Good.     Pt will continue to benefit from skilled outpatient occupational therapy to address the deficits listed in the problem list on initial evaluation provide pt/family education and to maximize pt's level of independence in the home and community environment.     Pt's spiritual, cultural and educational needs considered and pt agreeable to plan of care and goals.    Anticipated barriers to occupational therapy: none    Goals:    Short Term Goals: (4 weeks)  Goals: Progressing / MET   1. Patient will demonstrate 100% knowledge of lymphedema precautions and signs of infection to allow for reduced lymphedema risk, infection risk, and/or exacerbation of condition.  NOT MET   2. Patient will tolerate daily activities wearing multilayered bandaging to allow for lymphatic drainage support, skin elasticity, and reduction in shape and size of limb.  NOT MET   3. Patient and/or caregiver will order/obtain appropriate compression garments to maintain lymphatic and venous  support.  NOT MET   4. Patient will show decreased total girth measurement in BLE by up to 1 cm to allow for lower extremity symmetry, shoe and clothing choice, and ability to apply needed compression. NOT MET      Long Term Goals: (8 weeks)  Goals: Progressing / MET   1. Patient and/or caregiver to denis/doff compression garment independently and with daily compliance to assist in lymphedema management, skin elasticity, and tissue density NOT MET   2. Patient and/or caregiver will be independent in use of pneumatic compression pump or self manual lymphatic drainage techniques to areas within reach to enhance lymphatic drainage and skin condition.  NOT MET   3. Patient to be independent and compliant with home exercise program to allow for increased function in affected limb. NOT MET   4. Patient will show decreased total girth measurement in BLE by up to 3 cm  to allow for lower extremity symmetry, shoe and clothing choice, and ability to apply needed compression daily. NOT MET          PLAN     Updates/Grading for next session: none    Celestina Marinelli, OT, CLWT

## 2024-03-05 ENCOUNTER — CLINICAL SUPPORT (OUTPATIENT)
Dept: REHABILITATION | Facility: HOSPITAL | Age: 49
End: 2024-03-05
Payer: COMMERCIAL

## 2024-03-05 DIAGNOSIS — I89.0 LYMPHEDEMA: Primary | Chronic | ICD-10-CM

## 2024-03-05 DIAGNOSIS — R60.9 LIPEDEMA: Chronic | ICD-10-CM

## 2024-03-05 PROCEDURE — 97140 MANUAL THERAPY 1/> REGIONS: CPT

## 2024-03-05 NOTE — PROGRESS NOTES
[facility]  Occupational Therapy Treatment Note  Lymphedema    Date: 3/5/2024  Name: Leia Shultz  Clinic Number: [id number]    Therapy Diagnosis:   Encounter Diagnoses   Name Primary?    Lymphedema Yes    Lipedema        Physician: Jr Mcclain MD    Physician Orders: Eval and Treat  Medical Diagnosis:   M79.89 (ICD-10-CM) - Other specified soft tissue disorders   I89.0 (ICD-10-CM) - Lymphedema, not elsewhere classified   R60.0 (ICD-10-CM) - Localized edema      Evaluation Date: 2/21/2024  Insurance Authorization Period Expiration: 12/31/2024  Plan of Care Certification Period: 5/15/2024  Visit # / Visits authorized: 2 / 20  FOTO: see media, 1 / 3     Precautions:  Standard    Time In: 11:00  Time Out: 12:00  Total Billable Time: 60 minutes    SUBJECTIVE     Pt reports: tolerated bandages > 48 hours, swelling progressed to thighs.  Leia reported wearing compression outside of therapy visits: Yes - compression pantyhose   She was compliant with home exercise program given last session.   Response to previous treatment:none  Functional change: none    Pain: 2/10  Location: knees, calves       OBJECTIVE     Pt arrived wearing compression sleeves on BUE, in NAD.    Compression garment status: owns compression for BUE and BLE.  Compression Rx status: not requested.  Pneumatic pump status: referred by MD.    Objective Measures updated at progress report unless specified.    Treatment     Leia received the treatments listed below:     Manual therapy techniques were applied for 60 minutes, including:     MANUAL LYMPHATIC DRAINAGE (MLD):    While supine with LEs elevated stimulation  along GI region, B inguinal regions, drainage of entire BLE duane lower leg, ankle, and foot with return proximally,  Use of Aquaphor due to dryness.   Educated in self massage to abdominal areas, B inguinal areas, thigh, and remaining LE within reach.       MULTILAYERED BANDAGING:    Issued supplies and  bandaged BLE   Use of Cavilon moisturizer for dry skin  Cotton stockinet: size 9  Cellona padding from dorsum of foot to upper thigh.  2-8cm  2-10cm 2-12 cm Rosidal K rolls foot to upper thigh.      Pt to leave intact 24-48 hrs as tolerated, discontinue with any problems, return rolled bandages next session. Wash and wear schedules confirmed.       Patient Education and Home Exercises      Education provided:   - Progress towards goals     Written Home Exercises Provided:  NOT TODAY .  Exercises were reviewed and Leia was able to demonstrate them prior to the end of the session.  Leia demonstrated good  understanding of the HEP provided. See EMR under Patient Instructions for exercises provided during therapy sessions.       Assessment     Swelling migrated to thighs therefore bandaged to upper thigh this session. Pt reports improvement in swelling/pain with bandages.     Pt would continue to benefit from skilled OT.      Leia is progressing well towards her goals and there are no updates to goals at this time. Pt prognosis is Good.     Pt will continue to benefit from skilled outpatient occupational therapy to address the deficits listed in the problem list on initial evaluation provide pt/family education and to maximize pt's level of independence in the home and community environment.     Pt's spiritual, cultural and educational needs considered and pt agreeable to plan of care and goals.    Anticipated barriers to occupational therapy: none    Goals:    Short Term Goals: (4 weeks)  Goals: Progressing / MET   1. Patient will demonstrate 100% knowledge of lymphedema precautions and signs of infection to allow for reduced lymphedema risk, infection risk, and/or exacerbation of condition.  NOT MET   2. Patient will tolerate daily activities wearing multilayered bandaging to allow for lymphatic drainage support, skin elasticity, and reduction in shape and size of limb.  NOT MET   3. Patient and/or caregiver  will order/obtain appropriate compression garments to maintain lymphatic and venous support.  NOT MET   4. Patient will show decreased total girth measurement in BLE by up to 1 cm to allow for lower extremity symmetry, shoe and clothing choice, and ability to apply needed compression. NOT MET      Long Term Goals: (8 weeks)  Goals: Progressing / MET   1. Patient and/or caregiver to denis/doff compression garment independently and with daily compliance to assist in lymphedema management, skin elasticity, and tissue density NOT MET   2. Patient and/or caregiver will be independent in use of pneumatic compression pump or self manual lymphatic drainage techniques to areas within reach to enhance lymphatic drainage and skin condition.  NOT MET   3. Patient to be independent and compliant with home exercise program to allow for increased function in affected limb. NOT MET   4. Patient will show decreased total girth measurement in BLE by up to 3 cm  to allow for lower extremity symmetry, shoe and clothing choice, and ability to apply needed compression daily. NOT MET          PLAN     Updates/Grading for next session: none    Celestina Marinelli, OT, CLWT

## 2024-03-07 ENCOUNTER — CLINICAL SUPPORT (OUTPATIENT)
Dept: REHABILITATION | Facility: HOSPITAL | Age: 49
End: 2024-03-07
Payer: COMMERCIAL

## 2024-03-07 DIAGNOSIS — I89.0 LYMPHEDEMA: Primary | Chronic | ICD-10-CM

## 2024-03-07 DIAGNOSIS — R60.9 LIPEDEMA: Chronic | ICD-10-CM

## 2024-03-07 PROCEDURE — 97140 MANUAL THERAPY 1/> REGIONS: CPT

## 2024-03-07 NOTE — PROGRESS NOTES
[facility]  Occupational Therapy Treatment Note  Lymphedema    Date: 3/7/2024  Name: Leia Shultz  Clinic Number: [id number]    Therapy Diagnosis:   Encounter Diagnoses   Name Primary?    Lymphedema Yes    Lipedema        Physician: Jr Mcclain MD    Physician Orders: Eval and Treat  Medical Diagnosis:   M79.89 (ICD-10-CM) - Other specified soft tissue disorders   I89.0 (ICD-10-CM) - Lymphedema, not elsewhere classified   R60.0 (ICD-10-CM) - Localized edema      Evaluation Date: 2/21/2024  Insurance Authorization Period Expiration: 12/31/2024  Plan of Care Certification Period: 5/15/2024  Visit # / Visits authorized: 3 / 20  FOTO: see media, 1 / 3     Precautions:  Standard    Time In: 12:35  Time Out: 1:33  Total Billable Time: 58 minutes    SUBJECTIVE     Pt reports: tolerated bandages well. Edema improving.   Leia reported wearing compression outside of therapy visits: Yes - compression pantyhose   She was compliant with home exercise program given last session.   Response to previous treatment:none  Functional change: none    Pain: 2/10  Location: knees, calves       OBJECTIVE     Pt arrived wearing compression sleeves on BUE, in NAD.    Compression garment status: owns compression for BUE and BLE.  Compression Rx status: not requested.  Pneumatic pump status: referred by MD.      LE Girth Measurements: taken in supine  LANDMARK RIGHT LE  2/21/2024 LEFT LE  2/21/2024 RIGHT LE  3/7/2024 LEFT LE  3/7/2024   SBP 50.5 cm 51.0 cm - -   10 below SBP 48.0 cm 47.0 cm 45.5 cm 46.0 cm   20 below SBP 47.0 cm 46.5 cm 46.0 cm 44.0 cm   30 below SBP 38.5 cm 38.5 cm 39.0 cm 38.0 cm   35 below SBP 31.0 cm 33.0 cm 32.0 cm 31.5 cm   Ankle 25.0 cm 26.0 cm 25.0 cm 25.0 cm   Forefoot 22.0 cm 22.0 cm 21.5 cm 21.0 cm         Treatment     Leia received the treatments listed below:     Manual therapy techniques were applied for 55 minutes, including:     MANUAL LYMPHATIC DRAINAGE (MLD):    While  supine with LEs elevated stimulation  along GI region, B inguinal regions, drainage of entire BLE duane lower leg, ankle, and foot with return proximally,  Use of Aquaphor due to dryness.   Educated in self massage to abdominal areas, B inguinal areas, thigh, and remaining LE within reach.       MULTILAYERED BANDAGING:    Issued supplies and bandaged BLE   Use of Cavilon moisturizer for dry skin  Cotton stockinet: size 9  Cellona padding from dorsum of foot to upper thigh.  2-8cm  2-10cm  Rosidal K rolls foot to below knee  Edema wear size large donned to thighs      Pt to leave intact 24-48 hrs as tolerated, discontinue with any problems, return rolled bandages next session. Wash and wear schedules confirmed.       Patient Education and Home Exercises      Education provided:   - Progress towards goals     Written Home Exercises Provided:  NOT TODAY .  Exercises were reviewed and Leia was able to demonstrate them prior to the end of the session.  Leia demonstrated good  understanding of the HEP provided. See EMR under Patient Instructions for exercises provided during therapy sessions.       Assessment     BLE reducing as per updated measurements!    Pt would continue to benefit from skilled OT.      Leia is progressing well towards her goals and there are no updates to goals at this time. Pt prognosis is Good.     Pt will continue to benefit from skilled outpatient occupational therapy to address the deficits listed in the problem list on initial evaluation provide pt/family education and to maximize pt's level of independence in the home and community environment.     Pt's spiritual, cultural and educational needs considered and pt agreeable to plan of care and goals.    Anticipated barriers to occupational therapy: none    Goals:    Short Term Goals: (4 weeks)  Goals: Progressing / MET   1. Patient will demonstrate 100% knowledge of lymphedema precautions and signs of infection to allow for reduced  lymphedema risk, infection risk, and/or exacerbation of condition.  NOT MET   2. Patient will tolerate daily activities wearing multilayered bandaging to allow for lymphatic drainage support, skin elasticity, and reduction in shape and size of limb.  NOT MET   3. Patient and/or caregiver will order/obtain appropriate compression garments to maintain lymphatic and venous support.  NOT MET   4. Patient will show decreased total girth measurement in BLE by up to 1 cm to allow for lower extremity symmetry, shoe and clothing choice, and ability to apply needed compression. NOT MET      Long Term Goals: (8 weeks)  Goals: Progressing / MET   1. Patient and/or caregiver to denis/doff compression garment independently and with daily compliance to assist in lymphedema management, skin elasticity, and tissue density NOT MET   2. Patient and/or caregiver will be independent in use of pneumatic compression pump or self manual lymphatic drainage techniques to areas within reach to enhance lymphatic drainage and skin condition.  NOT MET   3. Patient to be independent and compliant with home exercise program to allow for increased function in affected limb. NOT MET   4. Patient will show decreased total girth measurement in BLE by up to 3 cm  to allow for lower extremity symmetry, shoe and clothing choice, and ability to apply needed compression daily. NOT MET          PLAN     Updates/Grading for next session: none    Celestina Marinelli, OT, CLWT

## 2024-03-11 ENCOUNTER — CLINICAL SUPPORT (OUTPATIENT)
Dept: REHABILITATION | Facility: HOSPITAL | Age: 49
End: 2024-03-11
Payer: COMMERCIAL

## 2024-03-11 DIAGNOSIS — R60.9 LIPEDEMA: Chronic | ICD-10-CM

## 2024-03-11 DIAGNOSIS — I89.0 LYMPHEDEMA: Primary | Chronic | ICD-10-CM

## 2024-03-11 PROCEDURE — 97140 MANUAL THERAPY 1/> REGIONS: CPT

## 2024-03-11 NOTE — PROGRESS NOTES
[facility]  Occupational Therapy Treatment Note  Lymphedema    Date: 3/11/2024  Name: Leia Shultz  Clinic Number: [id number]    Therapy Diagnosis:   Encounter Diagnoses   Name Primary?    Lymphedema Yes    Lipedema        Physician: Jr Mcclain MD    Physician Orders: Eval and Treat  Medical Diagnosis:   M79.89 (ICD-10-CM) - Other specified soft tissue disorders   I89.0 (ICD-10-CM) - Lymphedema, not elsewhere classified   R60.0 (ICD-10-CM) - Localized edema      Evaluation Date: 2/21/2024  Insurance Authorization Period Expiration: 12/31/2024  Plan of Care Certification Period: 5/15/2024  Visit # / Visits authorized: 4 / 20  FOTO: see media, 1 / 3     Precautions:  Standard    Time In: 2:32  Time Out: 3:27  Total Billable Time: 55 minutes    SUBJECTIVE     Pt reports: biker short 20-30 mm hg garment arrived, fits well.   Leia reported wearing compression outside of therapy visits: Yes - compression pantyhose   She was compliant with home exercise program given last session.   Response to previous treatment:none  Functional change: none    Pain: 2/10  Location: knees, calves       OBJECTIVE     Pt arrived wearing compression, in NAD.    Compression garment status: owns compression for BUE and BLE.  Compression Rx status: not requested.  Pneumatic pump status: referred by MD.      LE Girth Measurements: taken in supine  LANDMARK RIGHT LE  2/21/2024 LEFT LE  2/21/2024 RIGHT LE  3/7/2024 LEFT LE  3/7/2024   SBP 50.5 cm 51.0 cm - -   10 below SBP 48.0 cm 47.0 cm 45.5 cm 46.0 cm   20 below SBP 47.0 cm 46.5 cm 46.0 cm 44.0 cm   30 below SBP 38.5 cm 38.5 cm 39.0 cm 38.0 cm   35 below SBP 31.0 cm 33.0 cm 32.0 cm 31.5 cm   Ankle 25.0 cm 26.0 cm 25.0 cm 25.0 cm   Forefoot 22.0 cm 22.0 cm 21.5 cm 21.0 cm         Treatment     Leia received the treatments listed below:     Manual therapy techniques were applied for 55 minutes, including:     MANUAL LYMPHATIC DRAINAGE (MLD):    While supine  with LEs elevated stimulation  along GI region, B inguinal regions, drainage of entire BLE duane lower leg, ankle, and foot with return proximally,  Use of Aquaphor due to dryness.   Educated in self massage to abdominal areas, B inguinal areas, thigh, and remaining LE within reach.       MULTILAYERED BANDAGING:    Issued supplies and bandaged BLE   Use of Cavilon moisturizer for dry skin  Cotton stockinet: size 9  Cellona padding from dorsum of foot to upper thigh.  2-8cm  2-10cm  Rosidal K rolls foot to below knee    Biker shorts donned after bandaging.       Pt to leave intact 24-48 hrs as tolerated, discontinue with any problems, return rolled bandages next session. Wash and wear schedules confirmed.       Patient Education and Home Exercises      Education provided:   - Progress towards goals     Written Home Exercises Provided:  NOT TODAY .  Exercises were reviewed and Leia was able to demonstrate them prior to the end of the session.  Leia demonstrated good  understanding of the HEP provided. See EMR under Patient Instructions for exercises provided during therapy sessions.       Assessment     Edema continues to reduce. Continue POC as above.     Pt would continue to benefit from skilled OT.      Leia is progressing well towards her goals and there are no updates to goals at this time. Pt prognosis is Good.     Pt will continue to benefit from skilled outpatient occupational therapy to address the deficits listed in the problem list on initial evaluation provide pt/family education and to maximize pt's level of independence in the home and community environment.     Pt's spiritual, cultural and educational needs considered and pt agreeable to plan of care and goals.    Anticipated barriers to occupational therapy: none    Goals:    Short Term Goals: (4 weeks)  Goals: Progressing / MET   1. Patient will demonstrate 100% knowledge of lymphedema precautions and signs of infection to allow for reduced  lymphedema risk, infection risk, and/or exacerbation of condition.  NOT MET   2. Patient will tolerate daily activities wearing multilayered bandaging to allow for lymphatic drainage support, skin elasticity, and reduction in shape and size of limb.  NOT MET   3. Patient and/or caregiver will order/obtain appropriate compression garments to maintain lymphatic and venous support.  NOT MET   4. Patient will show decreased total girth measurement in BLE by up to 1 cm to allow for lower extremity symmetry, shoe and clothing choice, and ability to apply needed compression. NOT MET      Long Term Goals: (8 weeks)  Goals: Progressing / MET   1. Patient and/or caregiver to denis/doff compression garment independently and with daily compliance to assist in lymphedema management, skin elasticity, and tissue density NOT MET   2. Patient and/or caregiver will be independent in use of pneumatic compression pump or self manual lymphatic drainage techniques to areas within reach to enhance lymphatic drainage and skin condition.  NOT MET   3. Patient to be independent and compliant with home exercise program to allow for increased function in affected limb. NOT MET   4. Patient will show decreased total girth measurement in BLE by up to 3 cm  to allow for lower extremity symmetry, shoe and clothing choice, and ability to apply needed compression daily. NOT MET          PLAN     Updates/Grading for next session: none    Celestina Marinelli, OT, CLWT

## 2024-03-14 ENCOUNTER — CLINICAL SUPPORT (OUTPATIENT)
Dept: REHABILITATION | Facility: HOSPITAL | Age: 49
End: 2024-03-14
Payer: COMMERCIAL

## 2024-03-14 DIAGNOSIS — I89.0 LYMPHEDEMA: Primary | Chronic | ICD-10-CM

## 2024-03-14 DIAGNOSIS — R60.9 LIPEDEMA: Chronic | ICD-10-CM

## 2024-03-14 PROCEDURE — 97140 MANUAL THERAPY 1/> REGIONS: CPT

## 2024-03-14 NOTE — PROGRESS NOTES
[facility]  Occupational Therapy Treatment Note  Lymphedema    Date: 3/14/2024  Name: Leia Shultz  Clinic Number: [id number]    Therapy Diagnosis:   Encounter Diagnoses   Name Primary?    Lymphedema Yes    Lipedema        Physician: Jr Mcclain MD    Physician Orders: Eval and Treat  Medical Diagnosis:   M79.89 (ICD-10-CM) - Other specified soft tissue disorders   I89.0 (ICD-10-CM) - Lymphedema, not elsewhere classified   R60.0 (ICD-10-CM) - Localized edema      Evaluation Date: 2/21/2024  Insurance Authorization Period Expiration: 12/31/2024  Plan of Care Certification Period: 5/15/2024  Visit # / Visits authorized: 5 / 20  FOTO: see media, 2 / 3     Precautions:  Standard    Time In: 10:02  Time Out: 10:55  Total Billable Time: 53 minutes    SUBJECTIVE     Pt reports: biker shorts / edema wear cutting into back of knees. Ordered Bioflect capris that are scheduled to arrive today.  Leia reported wearing compression outside of therapy visits: Yes - compression pantyhose   She was compliant with home exercise program given last session.   Response to previous treatment:none  Functional change: none    Pain: 2/10  Location: knees, calves       OBJECTIVE     Pt arrived out of compression, in NAD.    Compression garment status: owns compression for BUE and BLE.  Compression Rx status: not requested.  Pneumatic pump status: referred by MD.      LE Girth Measurements: taken in supine  LANDMARK RIGHT LE  2/21/2024 LEFT LE  2/21/2024 RIGHT LE  3/7/2024 LEFT LE  3/7/2024   SBP 50.5 cm 51.0 cm - -   10 below SBP 48.0 cm 47.0 cm 45.5 cm 46.0 cm   20 below SBP 47.0 cm 46.5 cm 46.0 cm 44.0 cm   30 below SBP 38.5 cm 38.5 cm 39.0 cm 38.0 cm   35 below SBP 31.0 cm 33.0 cm 32.0 cm 31.5 cm   Ankle 25.0 cm 26.0 cm 25.0 cm 25.0 cm   Forefoot 22.0 cm 22.0 cm 21.5 cm 21.0 cm         Treatment     Leia received the treatments listed below:     Manual therapy techniques were applied for 53 minutes,  including:     MANUAL LYMPHATIC DRAINAGE (MLD):    While supine with LEs elevated stimulation  along GI region, B inguinal regions, drainage of entire BLE duane lower leg, ankle, and foot with return proximally,  Use of Aquaphor due to dryness.   Educated in self massage to abdominal areas, B inguinal areas, thigh, and remaining LE within reach.       MULTILAYERED BANDAGING:    Issued supplies and bandaged BLE   Use of Cavilon moisturizer for dry skin  Cotton stockinet: size 9  Cellona padding from dorsum of foot to upper thigh.  2-8cm  2-10cm  Rosidal K rolls foot to below knee        Pt to leave intact 24-48 hrs as tolerated, discontinue with any problems, return rolled bandages next session. Wash and wear schedules confirmed.       Patient Education and Home Exercises      Education provided:   - Progress towards goals     Written Home Exercises Provided:  NOT TODAY .  Exercises were reviewed and Leia was able to demonstrate them prior to the end of the session.  Leia demonstrated good  understanding of the HEP provided. See EMR under Patient Instructions for exercises provided during therapy sessions.       Assessment     BLE appear smaller. Plan to re-measure next week and continue COMPLETE DECONGESTIVE THERAPY as long as measurements are reducing.     Pt would continue to benefit from skilled OT.      Leia is progressing well towards her goals and there are no updates to goals at this time. Pt prognosis is Good.     Pt will continue to benefit from skilled outpatient occupational therapy to address the deficits listed in the problem list on initial evaluation provide pt/family education and to maximize pt's level of independence in the home and community environment.     Pt's spiritual, cultural and educational needs considered and pt agreeable to plan of care and goals.    Anticipated barriers to occupational therapy: none    Goals:    Short Term Goals: (4 weeks)  Goals: Progressing / MET   1. Patient  will demonstrate 100% knowledge of lymphedema precautions and signs of infection to allow for reduced lymphedema risk, infection risk, and/or exacerbation of condition.  NOT MET   2. Patient will tolerate daily activities wearing multilayered bandaging to allow for lymphatic drainage support, skin elasticity, and reduction in shape and size of limb.  NOT MET   3. Patient and/or caregiver will order/obtain appropriate compression garments to maintain lymphatic and venous support.  NOT MET   4. Patient will show decreased total girth measurement in BLE by up to 1 cm to allow for lower extremity symmetry, shoe and clothing choice, and ability to apply needed compression. NOT MET      Long Term Goals: (8 weeks)  Goals: Progressing / MET   1. Patient and/or caregiver to denis/doff compression garment independently and with daily compliance to assist in lymphedema management, skin elasticity, and tissue density NOT MET   2. Patient and/or caregiver will be independent in use of pneumatic compression pump or self manual lymphatic drainage techniques to areas within reach to enhance lymphatic drainage and skin condition.  NOT MET   3. Patient to be independent and compliant with home exercise program to allow for increased function in affected limb. NOT MET   4. Patient will show decreased total girth measurement in BLE by up to 3 cm  to allow for lower extremity symmetry, shoe and clothing choice, and ability to apply needed compression daily. NOT MET          PLAN     Updates/Grading for next session: none    Celestina Marinelli, OT, CLWT

## 2024-03-18 ENCOUNTER — CLINICAL SUPPORT (OUTPATIENT)
Dept: REHABILITATION | Facility: HOSPITAL | Age: 49
End: 2024-03-18
Payer: COMMERCIAL

## 2024-03-18 DIAGNOSIS — I89.0 LYMPHEDEMA: Primary | Chronic | ICD-10-CM

## 2024-03-18 DIAGNOSIS — R60.9 LIPEDEMA: Chronic | ICD-10-CM

## 2024-03-18 PROCEDURE — 97140 MANUAL THERAPY 1/> REGIONS: CPT

## 2024-03-18 NOTE — PROGRESS NOTES
[facility]  Occupational Therapy Progress Note  Lymphedema    Date: 3/18/2024  Name: Leia Shultz  Clinic Number: [id number]    Therapy Diagnosis:   Encounter Diagnoses   Name Primary?    Lymphedema Yes    Lipedema          Physician: Jr Mcclain MD    Physician Orders: Eval and Treat  Medical Diagnosis:   M79.89 (ICD-10-CM) - Other specified soft tissue disorders   I89.0 (ICD-10-CM) - Lymphedema, not elsewhere classified   R60.0 (ICD-10-CM) - Localized edema      Evaluation Date: 2/21/2024  Insurance Authorization Period Expiration: 12/31/2024  Plan of Care Certification Period: 5/15/2024  Visit # / Visits authorized: 6 / 20  FOTO: see media, 2 / 3     Precautions:  Standard    Time In: 10:12  Time Out: 11:00  Total Billable Time: 48 minutes    SUBJECTIVE     Pt reports: bioflect capris comfortable. Legs more tender today.  Leia reported wearing compression outside of therapy visits: Yes - compression pantyhose   She was compliant with home exercise program given last session.   Response to previous treatment:none  Functional change: none    Pain: 2/10  Location: knees, calves       OBJECTIVE     Pt arrived out of compression, in NAD.    Compression garment status: owns compression for BUE and BLE.  Compression Rx status: not requested.  Pneumatic pump status: referred by MD.      LE Girth Measurements: taken in supine  LANDMARK RIGHT LE  2/21/2024 LEFT LE  2/21/2024 RIGHT LE  3/7/2024 LEFT LE  3/7/2024 RIGHT LE  3/18/2024 LEFT LE  3/18/2024   SBP 50.5 cm 51.0 cm - - - -   10 below SBP 48.0 cm 47.0 cm 45.5 cm 46.0 cm 46.5 cm 45.0 cm   20 below SBP 47.0 cm 46.5 cm 46.0 cm 44.0 cm 46.0 cm 47.0 cm   30 below SBP 38.5 cm 38.5 cm 39.0 cm 38.0 cm 39.5 cm 41.0 cm   35 below SBP 31.0 cm 33.0 cm 32.0 cm 31.5 cm 32.0 cm 34.0 cm   Ankle 25.0 cm 26.0 cm 25.0 cm 25.0 cm 25.0 cm 25.0 cm   Forefoot 22.0 cm 22.0 cm 21.5 cm 21.0 cm 21.0 cm 22.0 cm         Treatment     Leia received the  treatments listed below:     Manual therapy techniques were applied for 48 minutes, including:     MANUAL LYMPHATIC DRAINAGE (MLD):    While supine with LEs elevated stimulation  along GI region, B inguinal regions, drainage of entire BLE duane lower leg, ankle, and foot with return proximally,  Use of Aquaphor due to dryness.   Educated in self massage to abdominal areas, B inguinal areas, thigh, and remaining LE within reach.       MULTILAYERED BANDAGING:    Issued supplies and bandaged BLE   Use of Cavilon moisturizer for dry skin  Cotton stockinet: size 9  Cellona padding from dorsum of foot to upper thigh.  2-8cm  2-10cm  Rosidal K rolls foot to below knee        Pt to leave intact 24-48 hrs as tolerated, discontinue with any problems, return rolled bandages next session. Wash and wear schedules confirmed.       Patient Education and Home Exercises      Education provided:   - Progress towards goals     Written Home Exercises Provided:  NOT TODAY .  Exercises were reviewed and Leia was able to demonstrate them prior to the end of the session.  Leia demonstrated good  understanding of the HEP provided. See EMR under Patient Instructions for exercises provided during therapy sessions.       Assessment     No significant changes as per updated measurements. Plan to re-measure Thursday after removing bandages.     Pt would continue to benefit from skilled OT.      Leia is progressing well towards her goals and there are no updates to goals at this time. Pt prognosis is Good.     Pt will continue to benefit from skilled outpatient occupational therapy to address the deficits listed in the problem list on initial evaluation provide pt/family education and to maximize pt's level of independence in the home and community environment.     Pt's spiritual, cultural and educational needs considered and pt agreeable to plan of care and goals.    Anticipated barriers to occupational therapy: none    Goals:    Short  Term Goals: (4 weeks)  Goals: Progressing / MET   1. Patient will demonstrate 100% knowledge of lymphedema precautions and signs of infection to allow for reduced lymphedema risk, infection risk, and/or exacerbation of condition.  NOT MET   2. Patient will tolerate daily activities wearing multilayered bandaging to allow for lymphatic drainage support, skin elasticity, and reduction in shape and size of limb.  NOT MET   3. Patient and/or caregiver will order/obtain appropriate compression garments to maintain lymphatic and venous support.  NOT MET   4. Patient will show decreased total girth measurement in BLE by up to 1 cm to allow for lower extremity symmetry, shoe and clothing choice, and ability to apply needed compression. NOT MET      Long Term Goals: (8 weeks)  Goals: Progressing / MET   1. Patient and/or caregiver to denis/doff compression garment independently and with daily compliance to assist in lymphedema management, skin elasticity, and tissue density NOT MET   2. Patient and/or caregiver will be independent in use of pneumatic compression pump or self manual lymphatic drainage techniques to areas within reach to enhance lymphatic drainage and skin condition.  NOT MET   3. Patient to be independent and compliant with home exercise program to allow for increased function in affected limb. NOT MET   4. Patient will show decreased total girth measurement in BLE by up to 3 cm  to allow for lower extremity symmetry, shoe and clothing choice, and ability to apply needed compression daily. NOT MET          PLAN     Updates/Grading for next session: none    Celestina Marinelli, OT, CLWT

## 2024-03-19 ENCOUNTER — PATIENT MESSAGE (OUTPATIENT)
Dept: REHABILITATION | Facility: HOSPITAL | Age: 49
End: 2024-03-19
Payer: COMMERCIAL

## 2024-03-21 ENCOUNTER — CLINICAL SUPPORT (OUTPATIENT)
Dept: REHABILITATION | Facility: HOSPITAL | Age: 49
End: 2024-03-21
Payer: COMMERCIAL

## 2024-03-21 DIAGNOSIS — R60.9 LIPEDEMA: Primary | Chronic | ICD-10-CM

## 2024-03-21 DIAGNOSIS — I89.0 LYMPHEDEMA: Chronic | ICD-10-CM

## 2024-03-21 PROCEDURE — 97140 MANUAL THERAPY 1/> REGIONS: CPT

## 2024-03-21 NOTE — PROGRESS NOTES
[facility]  Occupational Therapy Progress Note  Lymphedema    Date: 3/21/2024  Name: Leia Shultz  Clinic Number: [id number]    Therapy Diagnosis:   Encounter Diagnoses   Name Primary?    Lipedema Yes    Lymphedema          Physician: Jr Mcclain MD    Physician Orders: Eval and Treat  Medical Diagnosis:   M79.89 (ICD-10-CM) - Other specified soft tissue disorders   I89.0 (ICD-10-CM) - Lymphedema, not elsewhere classified   R60.0 (ICD-10-CM) - Localized edema      Evaluation Date: 2/21/2024  Insurance Authorization Period Expiration: 12/31/2024  Plan of Care Certification Period: 5/15/2024  Visit # / Visits authorized: 6 / 20  FOTO: see media, 2 / 3     Precautions:  Standard    Time In: 11:00  Time Out: 11:50  Total Billable Time: 50 minutes    SUBJECTIVE     Pt reports: swelling in lower legs when bandages loosen.   Leia reported wearing compression outside of therapy visits: Yes - bioflect capris, arm sleeves  She was compliant with home exercise program given last session.   Response to previous treatment:none  Functional change: none    Pain: 2/10  Location: knees, calves       OBJECTIVE     Pt arrived wearing multi-layer compression and bifolect capris, in NAD.    Compression garment status: owns compression for BUE and BLE.  Compression Rx status: not requested.  Pneumatic pump status: referred by MD.      LE Girth Measurements: taken in supine  LANDMARK RIGHT LE  2/21/2024 LEFT LE  2/21/2024 RIGHT LE  3/7/2024 LEFT LE  3/7/2024 RIGHT LE  3/18/2024 LEFT LE  3/18/2024   SBP 50.5 cm 51.0 cm - - - -   10 below SBP 48.0 cm 47.0 cm 45.5 cm 46.0 cm 46.5 cm 45.0 cm   20 below SBP 47.0 cm 46.5 cm 46.0 cm 44.0 cm 46.0 cm 47.0 cm   30 below SBP 38.5 cm 38.5 cm 39.0 cm 38.0 cm 39.5 cm 41.0 cm   35 below SBP 31.0 cm 33.0 cm 32.0 cm 31.5 cm 32.0 cm 34.0 cm   Ankle 25.0 cm 26.0 cm 25.0 cm 25.0 cm 25.0 cm 25.0 cm   Forefoot 22.0 cm 22.0 cm 21.5 cm 21.0 cm 21.0 cm 22.0 cm       LANDMARK  RIGHT LE  3/21/2024 LEFT LE  3/21/2024   SBP - -   10 below SBP 45.0 cm 45.5 cm   20 below SBP 46.0 cm 45.0 cm   30 below SBP 40.0 cm 40.5 cm   35 below SBP 33.0 cm 35.0 cm   Ankle 24.5 cm 26.0 cm   Forefoot 21.0 cm 21.0 cm       Treatment     Leia received the treatments listed below:     Manual therapy techniques were applied for 50 minutes, including:     MANUAL LYMPHATIC DRAINAGE (MLD):    While supine with LEs elevated stimulation  along GI region, B inguinal regions, drainage of entire BLE duane lower leg, ankle, and foot with return proximally,  Use of Aquaphor due to dryness.   Educated in self massage to abdominal areas, B inguinal areas, thigh, and remaining LE within reach.       MULTILAYERED BANDAGING:    Issued supplies and bandaged BLE   Use of Cavilon moisturizer for dry skin  Cotton stockinet: size 9  Cellona padding from dorsum of foot to upper thigh.  2-8cm  2-10cm  Rosidal K rolls foot to below knee        Pt to leave intact 24-48 hrs as tolerated, discontinue with any problems, return rolled bandages next session. Wash and wear schedules confirmed.       Patient Education and Home Exercises      Education provided:   - Progress towards goals     Written Home Exercises Provided:  NOT TODAY .  Exercises were reviewed and Leia was able to demonstrate them prior to the end of the session.  Leia demonstrated good  understanding of the HEP provided. See EMR under Patient Instructions for exercises provided during therapy sessions.       Assessment     Girth measurements reduced today. Plan to re-measure weekly and continue cdt until measurements reach a plateau.     Pt has tried and failed 4 weeks of compression, elevation, and exercise. A home pump is recommended.     Pt would continue to benefit from skilled OT.      Leia is progressing well towards her goals and there are no updates to goals at this time. Pt prognosis is Good.     Pt will continue to benefit from skilled outpatient  occupational therapy to address the deficits listed in the problem list on initial evaluation provide pt/family education and to maximize pt's level of independence in the home and community environment.     Pt's spiritual, cultural and educational needs considered and pt agreeable to plan of care and goals.    Anticipated barriers to occupational therapy: none    Goals:    Short Term Goals: (4 weeks)  Goals: Progressing / MET   1. Patient will demonstrate 100% knowledge of lymphedema precautions and signs of infection to allow for reduced lymphedema risk, infection risk, and/or exacerbation of condition.  NOT MET   2. Patient will tolerate daily activities wearing multilayered bandaging to allow for lymphatic drainage support, skin elasticity, and reduction in shape and size of limb.  NOT MET   3. Patient and/or caregiver will order/obtain appropriate compression garments to maintain lymphatic and venous support.  NOT MET   4. Patient will show decreased total girth measurement in BLE by up to 1 cm to allow for lower extremity symmetry, shoe and clothing choice, and ability to apply needed compression. NOT MET      Long Term Goals: (8 weeks)  Goals: Progressing / MET   1. Patient and/or caregiver to denis/doff compression garment independently and with daily compliance to assist in lymphedema management, skin elasticity, and tissue density NOT MET   2. Patient and/or caregiver will be independent in use of pneumatic compression pump or self manual lymphatic drainage techniques to areas within reach to enhance lymphatic drainage and skin condition.  NOT MET   3. Patient to be independent and compliant with home exercise program to allow for increased function in affected limb. NOT MET   4. Patient will show decreased total girth measurement in BLE by up to 3 cm  to allow for lower extremity symmetry, shoe and clothing choice, and ability to apply needed compression daily. NOT MET          PLAN     Updates/Grading for  next session: none    Celestina Marinelli, OT, CLWT

## 2024-03-26 ENCOUNTER — CLINICAL SUPPORT (OUTPATIENT)
Dept: REHABILITATION | Facility: HOSPITAL | Age: 49
End: 2024-03-26
Payer: COMMERCIAL

## 2024-03-26 DIAGNOSIS — I89.0 LYMPHEDEMA: Primary | Chronic | ICD-10-CM

## 2024-03-26 DIAGNOSIS — R60.9 LIPEDEMA: Chronic | ICD-10-CM

## 2024-03-26 PROCEDURE — 97140 MANUAL THERAPY 1/> REGIONS: CPT

## 2024-03-26 NOTE — PROGRESS NOTES
[facility]  Occupational Therapy Treatment Note  Lymphedema    Date: 3/26/2024  Name: Leia Shultz  Clinic Number: [id number]    Therapy Diagnosis:   Encounter Diagnoses   Name Primary?    Lymphedema Yes    Lipedema          Physician: Jr Mcclain MD    Physician Orders: Eval and Treat  Medical Diagnosis:   M79.89 (ICD-10-CM) - Other specified soft tissue disorders   I89.0 (ICD-10-CM) - Lymphedema, not elsewhere classified   R60.0 (ICD-10-CM) - Localized edema      Evaluation Date: 2/21/2024  Insurance Authorization Period Expiration: 12/31/2024  Plan of Care Certification Period: 5/15/2024  Visit # / Visits authorized: 8 / 20  FOTO: see media, 2 / 3     Precautions:  Standard    Time In: 1:00  Time Out: 1:50  Total Billable Time: 50 minutes (pt had to leave early)    SUBJECTIVE     Pt reports: new Bifolect leggings arrived. Bandages loosen after ~2 days. Noticed less swelling after home pump and dry brushing.   Leia reported wearing compression outside of therapy visits: Yes - bioflect leggings, arm sleeves  She was compliant with home exercise program given last session.   Response to previous treatment:none  Functional change: none    Pain: 1/10  Location: knees, calves       OBJECTIVE     Pt arrived out of compression, in NAD.    Compression garment status: owns compression for BUE and BLE.  Compression Rx status: not requested.  Pneumatic pump status: referred by MD.      LE Girth Measurements: taken in supine  LANDMARK RIGHT LE  2/21/2024 LEFT LE  2/21/2024 RIGHT LE  3/7/2024 LEFT LE  3/7/2024 RIGHT LE  3/18/2024 LEFT LE  3/18/2024   SBP 50.5 cm 51.0 cm - - - -   10 below SBP 48.0 cm 47.0 cm 45.5 cm 46.0 cm 46.5 cm 45.0 cm   20 below SBP 47.0 cm 46.5 cm 46.0 cm 44.0 cm 46.0 cm 47.0 cm   30 below SBP 38.5 cm 38.5 cm 39.0 cm 38.0 cm 39.5 cm 41.0 cm   35 below SBP 31.0 cm 33.0 cm 32.0 cm 31.5 cm 32.0 cm 34.0 cm   Ankle 25.0 cm 26.0 cm 25.0 cm 25.0 cm 25.0 cm 25.0 cm   Forefoot  22.0 cm 22.0 cm 21.5 cm 21.0 cm 21.0 cm 22.0 cm       LANDMARK RIGHT LE  3/21/2024 LEFT LE  3/21/2024 RIGHT LE  3/27/2024 LEFT LE  3/27/2024   SBP - - - -   10 below SBP 45.0 cm 45.5 cm 44.0 cm 46.0 cm   20 below SBP 46.0 cm 45.0 cm 46.0 cm 47.0 cm   30 below SBP 40.0 cm 40.5 cm 39.5 cm 40.0 cm   35 below SBP 33.0 cm 35.0 cm 33.5 cm 35.0 cm   Ankle 24.5 cm 26.0 cm 26.0 cm 26.0 cm   Forefoot 21.0 cm 21.0 cm 21.5 cm 22.5 cm       Treatment     Leia received the treatments listed below:     Manual therapy techniques were applied for 50 minutes, including:     MANUAL LYMPHATIC DRAINAGE (MLD):    While supine with LEs elevated stimulation  along GI region, B inguinal regions, drainage of entire BLE duane lower leg, ankle, and foot with return proximally,  Use of Aquaphor due to dryness.   Educated in self massage to abdominal areas, B inguinal areas, thigh, and remaining LE within reach.       Bifoflect leggings donned at end of session.      Pt to leave intact 24-48 hrs as tolerated, discontinue with any problems, return rolled bandages next session. Wash and wear schedules confirmed.       Patient Education and Home Exercises      Education provided:   - Progress towards goals     Written Home Exercises Provided:  NOT TODAY .  Exercises were reviewed and Leia was able to demonstrate them prior to the end of the session.  eLia demonstrated good  understanding of the HEP provided. See EMR under Patient Instructions for exercises provided during therapy sessions.       Assessment     No significant reductions as per updated measurements. Plan to re-measure next visit and assess containment in Bioflect leggings.     Pt would continue to benefit from skilled OT.      Leia is progressing well towards her goals and there are no updates to goals at this time. Pt prognosis is Good.     Pt will continue to benefit from skilled outpatient occupational therapy to address the deficits listed in the problem list on initial  evaluation provide pt/family education and to maximize pt's level of independence in the home and community environment.     Pt's spiritual, cultural and educational needs considered and pt agreeable to plan of care and goals.    Anticipated barriers to occupational therapy: none    Goals:    Short Term Goals: (4 weeks)  Goals: Progressing / MET   1. Patient will demonstrate 100% knowledge of lymphedema precautions and signs of infection to allow for reduced lymphedema risk, infection risk, and/or exacerbation of condition.  NOT MET   2. Patient will tolerate daily activities wearing multilayered bandaging to allow for lymphatic drainage support, skin elasticity, and reduction in shape and size of limb.  NOT MET   3. Patient and/or caregiver will order/obtain appropriate compression garments to maintain lymphatic and venous support.  NOT MET   4. Patient will show decreased total girth measurement in BLE by up to 1 cm to allow for lower extremity symmetry, shoe and clothing choice, and ability to apply needed compression. NOT MET      Long Term Goals: (8 weeks)  Goals: Progressing / MET   1. Patient and/or caregiver to denis/doff compression garment independently and with daily compliance to assist in lymphedema management, skin elasticity, and tissue density NOT MET   2. Patient and/or caregiver will be independent in use of pneumatic compression pump or self manual lymphatic drainage techniques to areas within reach to enhance lymphatic drainage and skin condition.  NOT MET   3. Patient to be independent and compliant with home exercise program to allow for increased function in affected limb. NOT MET   4. Patient will show decreased total girth measurement in BLE by up to 3 cm  to allow for lower extremity symmetry, shoe and clothing choice, and ability to apply needed compression daily. NOT MET          PLAN     Updates/Grading for next session: none    Celestina Marinelli, OT, CLWT

## 2024-03-28 ENCOUNTER — CLINICAL SUPPORT (OUTPATIENT)
Dept: REHABILITATION | Facility: HOSPITAL | Age: 49
End: 2024-03-28
Payer: COMMERCIAL

## 2024-03-28 DIAGNOSIS — I89.0 LYMPHEDEMA: Primary | Chronic | ICD-10-CM

## 2024-03-28 DIAGNOSIS — R60.9 LIPEDEMA: Chronic | ICD-10-CM

## 2024-03-28 PROCEDURE — 97535 SELF CARE MNGMENT TRAINING: CPT

## 2024-03-28 PROCEDURE — 97016 VASOPNEUMATIC DEVICE THERAPY: CPT

## 2024-03-28 NOTE — PROGRESS NOTES
[facility]  Occupational Therapy Treatment Note  Lymphedema    Date: 3/28/2024  Name: Leia Shultz  Clinic Number: [id number]    Therapy Diagnosis:   Encounter Diagnoses   Name Primary?    Lymphedema Yes    Lipedema          Physician: Jr Mcclain MD    Physician Orders: Eval and Treat  Medical Diagnosis:   M79.89 (ICD-10-CM) - Other specified soft tissue disorders   I89.0 (ICD-10-CM) - Lymphedema, not elsewhere classified   R60.0 (ICD-10-CM) - Localized edema      Evaluation Date: 2/21/2024  Insurance Authorization Period Expiration: 12/31/2024  Plan of Care Certification Period: 5/15/2024  Visit # / Visits authorized: 9 / 20  FOTO: see media, 2 / 3     Precautions:  Standard    Time In: 10:05  Time Out: 10:52  Total Billable Time: 47 minutes     SUBJECTIVE     Pt reports: Bioflect leggings fit comfortably.   Leia reported wearing compression outside of therapy visits: Yes - bioflect leggings, arm sleeves  She was compliant with home exercise program given last session.   Response to previous treatment:none  Functional change: none    Pain: 1/10  Location: knees, calves       OBJECTIVE     Pt arrived in compression, in NAD.    Compression garment status: owns compression for BUE and BLE.  Compression Rx status: not requested.  Pneumatic pump status: referred by MD.      LE Girth Measurements: taken in supine  LANDMARK RIGHT LE  2/21/2024 LEFT LE  2/21/2024 RIGHT LE  3/7/2024 LEFT LE  3/7/2024 RIGHT LE  3/18/2024 LEFT LE  3/18/2024   SBP 50.5 cm 51.0 cm - - - -   10 below SBP 48.0 cm 47.0 cm 45.5 cm 46.0 cm 46.5 cm 45.0 cm   20 below SBP 47.0 cm 46.5 cm 46.0 cm 44.0 cm 46.0 cm 47.0 cm   30 below SBP 38.5 cm 38.5 cm 39.0 cm 38.0 cm 39.5 cm 41.0 cm   35 below SBP 31.0 cm 33.0 cm 32.0 cm 31.5 cm 32.0 cm 34.0 cm   Ankle 25.0 cm 26.0 cm 25.0 cm 25.0 cm 25.0 cm 25.0 cm   Forefoot 22.0 cm 22.0 cm 21.5 cm 21.0 cm 21.0 cm 22.0 cm       LANDMARK RIGHT LE  3/21/2024 LEFT LE  3/21/2024 RIGHT  LE  3/26/2024 LEFT LE  3/26/2024 RIGHT LE  3/28/2024 LEFT LE  3/28/2024   SBP - - - - - -   10 below SBP 45.0 cm 45.5 cm 44.0 cm 46.0 cm 45.0 cm 46.0 cm   20 below SBP 46.0 cm 45.0 cm 46.0 cm 47.0 cm 46.0 cm 46.5 cm   30 below SBP 40.0 cm 40.5 cm 39.5 cm 40.0 cm 38.5 cm 40.5 cm   35 below SBP 33.0 cm 35.0 cm 33.5 cm 35.0 cm 31.5 cm 33.5 cm   Ankle 24.5 cm 26.0 cm 26.0 cm 26.0 cm 25.0 cm 26.0 cm   Forefoot 21.0 cm 21.0 cm 21.5 cm 22.5 cm 21.5 cm 22.0 cm       Treatment     Leia received the treatments listed below:     Self-care/home management techniques were applied for 10 minutes, including:     SEQUENTIAL COMPRESSION PUMP: full leg sleeve applied to B LE  VES 5200 with default setting with distal pressures starting at 45mmHg entire LE simultaneous to education    Educated pt on  -BLE well contained in Bioflect leggings. No further indication for bandaging given plateau in girth reductions.  -plan to see weekly until pneumatic pump arrives to ensure optimal home management.    Bifoflect leggings donned at end of session.          Patient Education and Home Exercises      Education provided:   - Progress towards goals     Written Home Exercises Provided:  NOT TODAY .  Exercises were reviewed and Leia was able to demonstrate them prior to the end of the session.  Leia demonstrated good  understanding of the HEP provided. See EMR under Patient Instructions for exercises provided during therapy sessions.       Assessment     BLE well contained in Bioflects. Plan to see once per week until pump arrives for optimal maintenance.    Pt would continue to benefit from skilled OT.      Leia is progressing well towards her goals and there are no updates to goals at this time. Pt prognosis is Good.     Pt will continue to benefit from skilled outpatient occupational therapy to address the deficits listed in the problem list on initial evaluation provide pt/family education and to maximize pt's level of independence  in the home and community environment.     Pt's spiritual, cultural and educational needs considered and pt agreeable to plan of care and goals.    Anticipated barriers to occupational therapy: none    Goals:    Short Term Goals: (4 weeks)  Goals: Progressing / MET   1. Patient will demonstrate 100% knowledge of lymphedema precautions and signs of infection to allow for reduced lymphedema risk, infection risk, and/or exacerbation of condition.  NOT MET   2. Patient will tolerate daily activities wearing multilayered bandaging to allow for lymphatic drainage support, skin elasticity, and reduction in shape and size of limb.  NOT MET   3. Patient and/or caregiver will order/obtain appropriate compression garments to maintain lymphatic and venous support.  NOT MET   4. Patient will show decreased total girth measurement in BLE by up to 1 cm to allow for lower extremity symmetry, shoe and clothing choice, and ability to apply needed compression. NOT MET      Long Term Goals: (8 weeks)  Goals: Progressing / MET   1. Patient and/or caregiver to denis/doff compression garment independently and with daily compliance to assist in lymphedema management, skin elasticity, and tissue density NOT MET   2. Patient and/or caregiver will be independent in use of pneumatic compression pump or self manual lymphatic drainage techniques to areas within reach to enhance lymphatic drainage and skin condition.  NOT MET   3. Patient to be independent and compliant with home exercise program to allow for increased function in affected limb. NOT MET   4. Patient will show decreased total girth measurement in BLE by up to 3 cm  to allow for lower extremity symmetry, shoe and clothing choice, and ability to apply needed compression daily. NOT MET          PLAN     Updates/Grading for next session: none    Celestina Marinelli, OT, CLWT

## 2024-04-04 ENCOUNTER — CLINICAL SUPPORT (OUTPATIENT)
Dept: REHABILITATION | Facility: HOSPITAL | Age: 49
End: 2024-04-04
Payer: COMMERCIAL

## 2024-04-04 DIAGNOSIS — R60.9 LIPEDEMA: Chronic | ICD-10-CM

## 2024-04-04 DIAGNOSIS — I89.0 LYMPHEDEMA: Primary | Chronic | ICD-10-CM

## 2024-04-04 PROCEDURE — 97140 MANUAL THERAPY 1/> REGIONS: CPT

## 2024-04-04 NOTE — PROGRESS NOTES
[facility]  Occupational Therapy Treatment Note  Lymphedema    Date: 4/4/2024  Name: Leia Shultz  Clinic Number: [id number]    Therapy Diagnosis:   Encounter Diagnoses   Name Primary?    Lymphedema Yes    Lipedema          Physician: Jr Mcclain MD    Physician Orders: Eval and Treat  Medical Diagnosis:   M79.89 (ICD-10-CM) - Other specified soft tissue disorders   I89.0 (ICD-10-CM) - Lymphedema, not elsewhere classified   R60.0 (ICD-10-CM) - Localized edema      Evaluation Date: 2/21/2024  Insurance Authorization Period Expiration: 12/31/2024  Plan of Care Certification Period: 5/15/2024  Visit # / Visits authorized: 10 / 20  FOTO: see media, 2 / 3     Precautions:  Standard    Time In: 10:05  Time Out: 10:45  Total Billable Time: 40 minutes (pt had to leave early)    SUBJECTIVE     Pt reports: Bioflect leggings do not provide enough compression- experienced swelling and pain in calves. Switched back to 20-30 mm hg leggings and symptoms significantly improved.     Leia reported wearing compression outside of therapy visits: Yes - bioflect leggings, arm sleeves  She was compliant with home exercise program given last session.   Response to previous treatment:none  Functional change: none    Pain: 1/10  Location: knees, calves       OBJECTIVE     Pt arrived in compression, in NAD.    Compression garment status: owns compression for BUE and BLE.  Compression Rx status: not requested.  Pneumatic pump status: referred by MD.      LE Girth Measurements: taken in supine  LANDMARK RIGHT LE  2/21/2024 LEFT LE  2/21/2024 RIGHT LE  3/7/2024 LEFT LE  3/7/2024 RIGHT LE  3/18/2024 LEFT LE  3/18/2024   SBP 50.5 cm 51.0 cm - - - -   10 below SBP 48.0 cm 47.0 cm 45.5 cm 46.0 cm 46.5 cm 45.0 cm   20 below SBP 47.0 cm 46.5 cm 46.0 cm 44.0 cm 46.0 cm 47.0 cm   30 below SBP 38.5 cm 38.5 cm 39.0 cm 38.0 cm 39.5 cm 41.0 cm   35 below SBP 31.0 cm 33.0 cm 32.0 cm 31.5 cm 32.0 cm 34.0 cm   Ankle 25.0 cm  26.0 cm 25.0 cm 25.0 cm 25.0 cm 25.0 cm   Forefoot 22.0 cm 22.0 cm 21.5 cm 21.0 cm 21.0 cm 22.0 cm       LANDMARK RIGHT LE  3/21/2024 LEFT LE  3/21/2024 RIGHT LE  3/26/2024 LEFT LE  3/26/2024 RIGHT LE  3/28/2024 LEFT LE  3/28/2024   SBP - - - - - -   10 below SBP 45.0 cm 45.5 cm 44.0 cm 46.0 cm 45.0 cm 46.0 cm   20 below SBP 46.0 cm 45.0 cm 46.0 cm 47.0 cm 46.0 cm 46.5 cm   30 below SBP 40.0 cm 40.5 cm 39.5 cm 40.0 cm 38.5 cm 40.5 cm   35 below SBP 33.0 cm 35.0 cm 33.5 cm 35.0 cm 31.5 cm 33.5 cm   Ankle 24.5 cm 26.0 cm 26.0 cm 26.0 cm 25.0 cm 26.0 cm   Forefoot 21.0 cm 21.0 cm 21.5 cm 22.5 cm 21.5 cm 22.0 cm     LANDMARK RIGHT LE  4/4/2024 LEFT LE  4/4/2024   SBP - -   10 below SBP 46.0 cm 45.0 cm   20 below SBP 45.5 cm 46.0 cm   30 below SBP 38.0 cm 40.0 cm   35 below SBP 30.5 cm 33.5 cm   Ankle 25.0 cm 26.0 cm   Forefoot 21.5 cm 22.0 cm       Treatment     Leia received the treatments listed below:     Manual therapy techniques were applied for 40 minutes, including:     MANUAL LYMPHATIC DRAINAGE (MLD):    While supine with LEs elevated stimulation  along GI region, B inguinal regions, drainage of entire BLE duane lower leg, ankle, and foot with return proximally.    Compression leggings donned at end of session.        Patient Education and Home Exercises      Education provided:   - Progress towards goals     Written Home Exercises Provided:  NOT TODAY .  Exercises were reviewed and Leia was able to demonstrate them prior to the end of the session.  Leia demonstrated good  understanding of the HEP provided. See EMR under Patient Instructions for exercises provided during therapy sessions.       Assessment     BLE refilled in Bioflects over the weekend but well contained with 20-30 mm hg leggings. Measurements stable therefore pt is ready for discharge next session as maximum therapeutic benefit has been achieved.     Pt would continue to benefit from skilled OT.      Leia is progressing well towards  her goals and there are no updates to goals at this time. Pt prognosis is Good.     Pt will continue to benefit from skilled outpatient occupational therapy to address the deficits listed in the problem list on initial evaluation provide pt/family education and to maximize pt's level of independence in the home and community environment.     Pt's spiritual, cultural and educational needs considered and pt agreeable to plan of care and goals.    Anticipated barriers to occupational therapy: none    Goals:    Short Term Goals: (4 weeks)  Goals: Progressing / MET   1. Patient will demonstrate 100% knowledge of lymphedema precautions and signs of infection to allow for reduced lymphedema risk, infection risk, and/or exacerbation of condition.  NOT MET   2. Patient will tolerate daily activities wearing multilayered bandaging to allow for lymphatic drainage support, skin elasticity, and reduction in shape and size of limb.  NOT MET   3. Patient and/or caregiver will order/obtain appropriate compression garments to maintain lymphatic and venous support.  NOT MET   4. Patient will show decreased total girth measurement in BLE by up to 1 cm to allow for lower extremity symmetry, shoe and clothing choice, and ability to apply needed compression. NOT MET      Long Term Goals: (8 weeks)  Goals: Progressing / MET   1. Patient and/or caregiver to denis/doff compression garment independently and with daily compliance to assist in lymphedema management, skin elasticity, and tissue density NOT MET   2. Patient and/or caregiver will be independent in use of pneumatic compression pump or self manual lymphatic drainage techniques to areas within reach to enhance lymphatic drainage and skin condition.  NOT MET   3. Patient to be independent and compliant with home exercise program to allow for increased function in affected limb. NOT MET   4. Patient will show decreased total girth measurement in BLE by up to 3 cm  to allow for lower  extremity symmetry, shoe and clothing choice, and ability to apply needed compression daily. NOT MET          PLAN     Updates/Grading for next session: none    Celestina Marinelli, OT, CLWT

## 2024-04-13 ENCOUNTER — PATIENT MESSAGE (OUTPATIENT)
Dept: REHABILITATION | Facility: HOSPITAL | Age: 49
End: 2024-04-13
Payer: COMMERCIAL

## 2024-04-15 ENCOUNTER — OFFICE VISIT (OUTPATIENT)
Dept: FAMILY MEDICINE | Facility: CLINIC | Age: 49
End: 2024-04-15
Payer: COMMERCIAL

## 2024-04-15 DIAGNOSIS — U07.1 COVID-19: Primary | ICD-10-CM

## 2024-04-15 PROCEDURE — 99213 OFFICE O/P EST LOW 20 MIN: CPT | Mod: 95,,, | Performed by: NURSE PRACTITIONER

## 2024-04-15 RX ORDER — PROMETHAZINE HYDROCHLORIDE AND DEXTROMETHORPHAN HYDROBROMIDE 6.25; 15 MG/5ML; MG/5ML
5 SYRUP ORAL NIGHTLY PRN
Qty: 118 ML | Refills: 0 | Status: SHIPPED | OUTPATIENT
Start: 2024-04-15 | End: 2024-05-07 | Stop reason: ALTCHOICE

## 2024-04-15 RX ORDER — BENZONATATE 200 MG/1
200 CAPSULE ORAL 3 TIMES DAILY PRN
Qty: 30 CAPSULE | Refills: 0 | Status: SHIPPED | OUTPATIENT
Start: 2024-04-15 | End: 2024-04-25

## 2024-04-15 NOTE — PROGRESS NOTES
The patient location is: [place]  The chief complaint leading to consultation is:  COVID    Visit type: audiovisual    Face to Face time with patient: 9 minutes  15 minutes of total time spent on the encounter, which includes face to face time and non-face to face time preparing to see the patient (eg, review of tests), Obtaining and/or reviewing separately obtained history, Documenting clinical information in the electronic or other health record, Independently interpreting results (not separately reported) and communicating results to the patient/family/caregiver, or Care coordination (not separately reported).         Each patient to whom he or she provides medical services by telemedicine is:  (1) informed of the relationship between the physician and patient and the respective role of any other health care provider with respect to management of the patient; and (2) notified that he or she may decline to receive medical services by telemedicine and may withdraw from such care at any time.    Notes:   Routine Office Visit    Patient Name: Leia Shultz    : 1975  MRN: [id number]    Chief Complaint:  COVID    Subjective:  Leia is a 49 y.o. female who presents today for:    COVID - patient who is known to me with the below documented medical history reports today for evaluation.  Four days ago was at a conference and started having symptoms of sore throat, headache, body aches, and productive cough.  Reports that the coughing seems to be worse in the morning and at night.  Three days ago tested positive for COVID via a home test.  Denies any fevers or chills or shortness a breath.  Has taken NyQuil for the symptoms with only modest benefit.    Past Medical History  Past Medical History:   Diagnosis Date    Anemia     Arthritis     Heart murmur     Lipedema     Lymphedema        Family History  Family History   Problem Relation Name Age of Onset    Clotting disorder Mother Shefali Mccrary      Hypotension Mother Shefali Mccrary     Arthritis Mother Shefali Mccrary     Depression Mother Shefali Mccrary     Skin cancer Father Daniel Bitting     Cancer Father Daniel Novoa         Skin Cancer    No Known Problems Sister      Irregular heart beat Sister Gisselle         prolonged QT    Crohn's disease Sister Gisselle     Arthritis Sister Gisselle Mcwilliamsunchyn    Cancer Brother Byron Novoa          2018 from rare form of spinal cancer    Early death Brother Byron Novoa         Cancer at 39    No Known Problems Maternal Grandmother      No Known Problems Maternal Grandfather      No Known Problems Paternal Grandmother      No Known Problems Paternal Grandfather      Alcohol abuse Maternal Uncle Tim          of alcohol-related liver failure    Early death Maternal Uncle Tim        Current Medications  Current Outpatient Medications on File Prior to Visit   Medication Sig Dispense Refill    bumetanide (BUMEX) 1 MG tablet Take 0.5 tablets (0.5 mg total) by mouth once daily. 15 tablet 11    semaglutide, weight loss, (WEGOVY) 2.4 mg/0.75 mL PnIj Inject 2.4mg under the skin every 7 days 3 mL 1    sertraline (ZOLOFT) 100 MG tablet Take 100 mg by mouth once daily. As needed.      traZODone (DESYREL) 100 MG tablet Take 1 tablet (100 mg total) by mouth every evening. 90 tablet 3    EPINEPHrine (EPIPEN 2-KEVIN) 0.3 mg/0.3 mL AtIn Inject 0.3 mLs (0.3 mg total) into the muscle once. for 1 dose 2 each 0    MULTIVITAMIN ORAL Take by mouth once daily.      [DISCONTINUED] meloxicam (MOBIC) 15 MG tablet Take 1 tablet (15 mg total) by mouth once daily. 30 tablet 0    [DISCONTINUED] semaglutide, weight loss, (WEGOVY) 0.5 mg/0.5 mL PnIj INJECT 0.5MG INTO THE SKIN ONCE WEEKLY (Patient not taking: Reported on 2024) 2 mL 0    [DISCONTINUED] semaglutide, weight loss, (WEGOVY) 0.5 mg/0.5 mL PnIj INJECT 0.5MG INTO THE SKIN ONCE WEEKLY (Patient not taking: Reported on 2024) 2 mL 0    [DISCONTINUED]  semaglutide, weight loss, (WEGOVY) 1 mg/0.5 mL PnIj INJECT 1MG INTO THE SKIN ONCE WEEKLY (Patient not taking: Reported on 2024) 2 mL 0    [DISCONTINUED] semaglutide, weight loss, (WEGOVY) 1.7 mg/0.75 mL PnIj Inject 1.7 mg under the skin every 7 days 3 mL 0    [DISCONTINUED] WEGOVY 0.25 mg/0.5 mL PnIj SMARTSI Pre-Filled Pen Syringe SUB-Q Once a Week       No current facility-administered medications on file prior to visit.       Allergies   Review of patient's allergies indicates:   Allergen Reactions    Fire ant Anxiety, Blisters, Dermatitis, Hives, Itching, Rash and Swelling    Pollen extracts Dermatitis, Hives and Rash    Sulfa (sulfonamide antibiotics) Nausea Only       Review of Systems (Pertinent positives)  Review of Systems   Constitutional:  Negative for chills and fever.   HENT:  Positive for congestion and sore throat. Negative for ear pain, hearing loss and tinnitus.    Respiratory:  Positive for cough and sputum production. Negative for hemoptysis, shortness of breath and wheezing.    Cardiovascular: Negative.    Gastrointestinal: Negative.    Genitourinary: Negative.    Musculoskeletal:  Positive for myalgias.   Skin: Negative.        There were no vitals taken for this visit.    Physical Exam  Vitals reviewed.   Constitutional:       General: She is not in acute distress.     Appearance: Normal appearance. She is not ill-appearing, toxic-appearing or diaphoretic.   HENT:      Head: Normocephalic and atraumatic.   Pulmonary:      Effort: Pulmonary effort is normal. No respiratory distress.   Neurological:      Mental Status: She is alert and oriented to person, place, and time.   Psychiatric:         Mood and Affect: Mood normal.         Behavior: Behavior normal.          Assessment/Plan:  Leia Shultz is a 49 y.o. female who presents today for :    Diagnoses and all orders for this visit:    COVID-19  -     nirmatrelvir-ritonavir 300 mg (150 mg x 2)-100 mg copackaged tablets (EUA); Take 3  tablets by mouth 2 (two) times daily for 5 days. Each dose contains 2 nirmatrelvir (pink tablets) and 1 ritonavir (white tablet). Take all 3 tablets together  -     benzonatate (TESSALON) 200 MG capsule; Take 1 capsule (200 mg total) by mouth 3 (three) times daily as needed for Cough.  -     promethazine-dextromethorphan (PROMETHAZINE-DM) 6.25-15 mg/5 mL Syrp; Take 5 mLs by mouth nightly as needed (cough).    COVID risk score - 1.  Patient is within treatment window for antivirals.  Paxlovid sent.  P.r.n.'s sent for cough during the day and at nighttime.  Home care measures discussed.  COVID isolation guidelines discussed.  Follow up as needed.        This office note has been dictated.  This dictation has been generated using M-Modal Fluency Direct dictation; some phonetic errors may occur.

## 2024-05-01 ENCOUNTER — CLINICAL SUPPORT (OUTPATIENT)
Dept: REHABILITATION | Facility: HOSPITAL | Age: 49
End: 2024-05-01
Payer: COMMERCIAL

## 2024-05-01 DIAGNOSIS — I89.0 LYMPHEDEMA: Primary | Chronic | ICD-10-CM

## 2024-05-01 DIAGNOSIS — R60.9 LIPEDEMA: Chronic | ICD-10-CM

## 2024-05-01 PROCEDURE — 97535 SELF CARE MNGMENT TRAINING: CPT

## 2024-05-01 NOTE — PROGRESS NOTES
[facility]  Occupational Therapy Discharge Note  Lymphedema    Date: 5/1/2024  Name: Leia Shultz  Clinic Number: [id number]    Therapy Diagnosis:   Encounter Diagnoses   Name Primary?    Lymphedema Yes    Lipedema          Physician: Jr Mcclain MD    Physician Orders: Eval and Treat  Medical Diagnosis:   M79.89 (ICD-10-CM) - Other specified soft tissue disorders   I89.0 (ICD-10-CM) - Lymphedema, not elsewhere classified   R60.0 (ICD-10-CM) - Localized edema      Evaluation Date: 2/21/2024  Insurance Authorization Period Expiration: 12/31/2024  Plan of Care Certification Period: 5/15/2024  Visit # / Visits authorized: 11 / 20  FOTO: see media, 3 / 3     Precautions:  Standard    Time In: 3:30  Time Out: 3:55  Total Billable Time: 25 minutes     SUBJECTIVE     Pt reports: Edema status well contained since last session. She has not seen her legs this small in many years. She has been wearing Bioflect leggings at night, CzSalus leggings during the day. Pneumatic pump arrived that she uses daily.     Leia reported wearing compression outside of therapy visits: Yes - bioflect leggings,CzSalus leggings.  She was compliant with home exercise program given last session.   Response to previous treatment:none  Functional change: none    Pain: 1/10  Location: knees, calves       OBJECTIVE     Pt arrived in compression, in NAD.    Compression garment status: owns compression for BUE and BLE.  Compression Rx status: not requested.  Pneumatic pump status: referred by MD.      LE Girth Measurements: taken in supine  LANDMARK RIGHT LE  2/21/2024 LEFT LE  2/21/2024 RIGHT LE  3/7/2024 LEFT LE  3/7/2024 RIGHT LE  3/18/2024 LEFT LE  3/18/2024   SBP 50.5 cm 51.0 cm - - - -   10 below SBP 48.0 cm 47.0 cm 45.5 cm 46.0 cm 46.5 cm 45.0 cm   20 below SBP 47.0 cm 46.5 cm 46.0 cm 44.0 cm 46.0 cm 47.0 cm   30 below SBP 38.5 cm 38.5 cm 39.0 cm 38.0 cm 39.5 cm 41.0 cm   35 below SBP 31.0 cm 33.0 cm 32.0 cm  31.5 cm 32.0 cm 34.0 cm   Ankle 25.0 cm 26.0 cm 25.0 cm 25.0 cm 25.0 cm 25.0 cm   Forefoot 22.0 cm 22.0 cm 21.5 cm 21.0 cm 21.0 cm 22.0 cm       LANDMARK RIGHT LE  3/21/2024 LEFT LE  3/21/2024 RIGHT LE  3/26/2024 LEFT LE  3/26/2024 RIGHT LE  3/28/2024 LEFT LE  3/28/2024   SBP - - - - - -   10 below SBP 45.0 cm 45.5 cm 44.0 cm 46.0 cm 45.0 cm 46.0 cm   20 below SBP 46.0 cm 45.0 cm 46.0 cm 47.0 cm 46.0 cm 46.5 cm   30 below SBP 40.0 cm 40.5 cm 39.5 cm 40.0 cm 38.5 cm 40.5 cm   35 below SBP 33.0 cm 35.0 cm 33.5 cm 35.0 cm 31.5 cm 33.5 cm   Ankle 24.5 cm 26.0 cm 26.0 cm 26.0 cm 25.0 cm 26.0 cm   Forefoot 21.0 cm 21.0 cm 21.5 cm 22.5 cm 21.5 cm 22.0 cm     LANDMARK RIGHT LE  4/4/2024 LEFT LE  4/4/2024   SBP - -   10 below SBP 46.0 cm 45.0 cm   20 below SBP 45.5 cm 46.0 cm   30 below SBP 38.0 cm 40.0 cm   35 below SBP 30.5 cm 33.5 cm   Ankle 25.0 cm 26.0 cm   Forefoot 21.5 cm 22.0 cm       Treatment     Leia received the treatments listed below:     Self-care/home management techniques were applied for 25 minutes, including:     Educated pt on:  -home management techniques: continue compression regimen, use pump daily, continue medications/fluid intake as per MD.  -monitor for s/s of infection or worsening lymphedema  -seek medical advise for lipedema surgery.  -seek new referral if lymphedema status worsens in the future.         Patient Education and Home Exercises      Education provided:   - Progress towards goals     Written Home Exercises Provided:  NOT TODAY .  Exercises were reviewed and Leia was able to demonstrate them prior to the end of the session.  Leia demonstrated good  understanding of the HEP provided. See EMR under Patient Instructions for exercises provided during therapy sessions.       Assessment     BLE well contained since last visit with compression leggings and pneumatic pump. She is ready for discharge to home management. Pt to seek medical advise as she is interested in lipedema  surgery. All questions and concerns addressed.     Pt's spiritual, cultural and educational needs considered and pt agreeable to plan of care and goals.    Anticipated barriers to occupational therapy: none    Goals:    Short Term Goals: (4 weeks)  Goals: Progressing / MET   1. Patient will demonstrate 100% knowledge of lymphedema precautions and signs of infection to allow for reduced lymphedema risk, infection risk, and/or exacerbation of condition.  NOT MET   2. Patient will tolerate daily activities wearing multilayered bandaging to allow for lymphatic drainage support, skin elasticity, and reduction in shape and size of limb.  NOT MET   3. Patient and/or caregiver will order/obtain appropriate compression garments to maintain lymphatic and venous support.  NOT MET   4. Patient will show decreased total girth measurement in BLE by up to 1 cm to allow for lower extremity symmetry, shoe and clothing choice, and ability to apply needed compression. NOT MET      Long Term Goals: (8 weeks)  Goals: Progressing / MET   1. Patient and/or caregiver to denis/doff compression garment independently and with daily compliance to assist in lymphedema management, skin elasticity, and tissue density NOT MET   2. Patient and/or caregiver will be independent in use of pneumatic compression pump or self manual lymphatic drainage techniques to areas within reach to enhance lymphatic drainage and skin condition.  NOT MET   3. Patient to be independent and compliant with home exercise program to allow for increased function in affected limb. NOT MET   4. Patient will show decreased total girth measurement in BLE by up to 3 cm  to allow for lower extremity symmetry, shoe and clothing choice, and ability to apply needed compression daily. NOT MET          PLAN     Discharge.     Celestina Marinelli, OT, CLWT

## 2024-05-07 ENCOUNTER — OFFICE VISIT (OUTPATIENT)
Dept: CARDIOLOGY | Facility: CLINIC | Age: 49
End: 2024-05-07
Payer: COMMERCIAL

## 2024-05-07 VITALS
HEIGHT: 62 IN | DIASTOLIC BLOOD PRESSURE: 84 MMHG | WEIGHT: 157.44 LBS | SYSTOLIC BLOOD PRESSURE: 106 MMHG | BODY MASS INDEX: 28.97 KG/M2 | HEART RATE: 99 BPM

## 2024-05-07 DIAGNOSIS — R60.9 LIPEDEMA: Chronic | ICD-10-CM

## 2024-05-07 DIAGNOSIS — E66.3 OVERWEIGHT (BMI 25.0-29.9): ICD-10-CM

## 2024-05-07 DIAGNOSIS — R60.0 BILATERAL LOWER EXTREMITY EDEMA: ICD-10-CM

## 2024-05-07 DIAGNOSIS — I89.0 LYMPHEDEMA: Primary | Chronic | ICD-10-CM

## 2024-05-07 PROCEDURE — 99999 PR PBB SHADOW E&M-EST. PATIENT-LVL III: CPT | Mod: PBBFAC,,, | Performed by: INTERNAL MEDICINE

## 2024-05-07 PROCEDURE — 99215 OFFICE O/P EST HI 40 MIN: CPT | Mod: S$GLB,,, | Performed by: INTERNAL MEDICINE

## 2024-05-07 RX ORDER — ONDANSETRON HYDROCHLORIDE 8 MG/1
8 TABLET, FILM COATED ORAL
COMMUNITY
Start: 2023-12-18

## 2024-05-07 NOTE — PROGRESS NOTES
[facility]      Chief Complaint   Patient presents with    Lymphedema       Patient ID: Leia Shultz is a 49 y.o. female with BLE lymphedema/lipedema, venous insufficiency, Raynaud's disease, Gastric sleeve surgery (2014), Obesity, never smoker presents for a follow up appointment. Pertinent history/events are as follows:    Patient kindly referred by Ms. Debby Meng for evaluation of both leg swelling.    -At our initial clinic visit on 1/24/2024, Ms. Shultz reported swelling of both legs for many years.  She also reports she bruise easily, she experiences pain in her left knee, right thigh region.  She reports she has family history positive for lymphedema. She wears compression stockings daily.  She also reports of using some devices for edema in her legs bought online.  She walks 10,000 steps daily, follows keto diet.  She claimed during her childhood her she had some fracture in back, compound # L1.  Plan:   BL LE Edema: Likely due to Venous Insufficiency/ Lymphedema/Lipedema. Check BLE Venous Ultrasound and Ankle brachial Index. Refer to lymphedema clinic.  Recommend wearing graduated compression hose.  Limit sodium intake to 2000 mg daily.  Limit volume intake to 1.5 L daily.  Elevate legs when resting. Start Bumex 0.5 mg daily and check CMP in 1 week. Check MRI Lumbar Spine and refer to Neurosurgery to evaluate any neurological cause for her pain in lower extremity given her history of compound fracture of lumbar spine in her childhood.   Obesity: Encouraged diet, exercise and lifestyle modification for weight loss. Taking wegovy.   ASCVD risk score is: 0.5%  on 1/23/2024.    HPI:  Ms. Shultz reports significant improvement in BLE edema.  She has completed lymphedema clinic and continue to perform techniques at home.  She was evaluated by Neurosurgery for lumbar disc disease.  MRI Lumbar Spine on 2/14/2024 revealed broad-based disc bulge, facet degenerative change and ligamentum  flavum thickening which contribute to moderate central canal narrowing L2 2-L3 and L3-L4 and mild-moderate central canal narrowing L4-L5.  WILMAN Study on 2/8/2024 demonstrated normal resting ABIs bilaterally.  BLE Venous Ultrasound on 1/29/2024 revealed no evidence of deep venous thrombosis in either lower extremity. Findings suggestive of left popliteal fossa cyst measuring up to 2.7 cm.    Past Medical History:   Diagnosis Date    Anemia     Arthritis     Heart murmur     Lipedema     Lymphedema      Past Surgical History:   Procedure Laterality Date    COSMETIC SURGERY  October 2016    excess skin removal on arms and stomach    DILATION AND CURETTAGE OF UTERUS  2002    gastric sleeve      LAPAROSCOPIC GASTRIC BANDING      ROOT CANAL      2    WISDOM TOOTH EXTRACTION      all 4     Social History     Socioeconomic History    Marital status:     Number of children: 0   Tobacco Use    Smoking status: Never     Passive exposure: Never    Smokeless tobacco: Never   Substance and Sexual Activity    Alcohol use: Yes     Comment: rare    Drug use: Never    Sexual activity: Not Currently     Partners: Male     Birth control/protection: I.U.D.     Comment: 11/21/17 -vasectomy     Social Determinants of Health     Financial Resource Strain: Low Risk  (4/29/2024)    Received from [facility]    Overall Financial Resource Strain (CARDIA)     Difficulty of Paying Living Expenses: Not very hard   Food Insecurity: No Food Insecurity (4/29/2024)    Received from [facility]    Hunger Vital Sign     Worried About Running Out of Food in the Last Year: Never true     Ran Out of Food in the Last Year: Never true   Transportation Needs: No Transportation Needs (4/29/2024)    Received from [facility]    PRAPARE - Transportation     Lack of Transportation (Medical): No     Lack of Transportation (Non-Medical): No   Physical Activity: Insufficiently Active (4/29/2024)    Received from [facility]    Exercise Vital Sign      Days of Exercise per Week: 3 days     Minutes of Exercise per Session: 30 min   Stress: Stress Concern Present (2024)    Received from [facility]    [facility] [place] [facility] [employer] - Occupational Stress Questionnaire     Feeling of Stress : Rather much   Housing Stability: Low Risk  (2024)    Housing Stability Vital Sign     Unable to Pay for Housing in the Last Year: No     Number of Places Lived in the Last Year: 1     Unstable Housing in the Last Year: No     Family History   Problem Relation Name Age of Onset    Clotting disorder Mother Shefali Mccrary     Hypotension Mother Shefali Mccrary     Arthritis Mother Shefali Mccrary     Depression Mother Shefali Mccrary     Skin cancer Father Daniel Bitting     Cancer Father Daniel Bitfidel         Skin Cancer    No Known Problems Sister      Irregular heart beat Sister Gisselle         prolonged QT    Crohn's disease Sister Gisselle     Arthritis Sister Gisselle         Letnaunchyn    Cancer Brother Byron Novoa          2018 from rare form of spinal cancer    Early death Brother Byron Novoa         Cancer at 39    No Known Problems Maternal Grandmother      No Known Problems Maternal Grandfather      No Known Problems Paternal Grandmother      No Known Problems Paternal Grandfather      Alcohol abuse Maternal Uncle Tim          of alcohol-related liver failure    Early death Maternal Uncle Tim        Review of patient's allergies indicates:   Allergen Reactions    Fire ant Anxiety, Blisters, Dermatitis, Hives, Itching, Rash and Swelling    Pollen extracts Dermatitis, Hives and Rash    Sulfa (sulfonamide antibiotics) Nausea Only       Medication List with Changes/Refills   Current Medications    BUMETANIDE (BUMEX) 1 MG TABLET    Take 0.5 tablets (0.5 mg total) by mouth once daily.    EPINEPHRINE (EPIPEN 2-KEVIN) 0.3 MG/0.3 ML ATIN    Inject 0.3 mLs (0.3 mg total) into the muscle once. for 1 dose    MULTIVITAMIN ORAL    Take by  "mouth once daily.    ONDANSETRON (ZOFRAN) 8 MG TABLET    Take 8 mg by mouth as needed for Nausea.    SEMAGLUTIDE, WEIGHT LOSS, (WEGOVY) 2.4 MG/0.75 ML PNIJ    Inject 2.4 mg into the skin once a week    SERTRALINE (ZOLOFT) 100 MG TABLET    Take 100 mg by mouth once daily. As needed.    TRAZODONE (DESYREL) 100 MG TABLET    Take 1 tablet (100 mg total) by mouth every evening.   Discontinued Medications    PROMETHAZINE-DEXTROMETHORPHAN (PROMETHAZINE-DM) 6.25-15 MG/5 ML SYRP    Take 5 mLs by mouth nightly as needed (cough).    SEMAGLUTIDE, WEIGHT LOSS, (WEGOVY) 2.4 MG/0.75 ML PNIJ    Inject 2.4mg under the skin every 7 days       Review of Systems  Constitution: Denies chills, fever, and sweats.  HENT: Denies headaches or blurry vision.  Cardiovascular: Denies chest pain or irregular heart beat.  Respiratory: Denies cough or shortness of breath.  Gastrointestinal: Denies abdominal pain, nausea, or vomiting.  Musculoskeletal: Positive for both legs muscle cramps, both leg swelling  Neurological: Denies dizziness or focal weakness.  Psychiatric/Behavioral: Normal mental status.  Hematologic/Lymphatic: Denies bleeding problem or easy bruising/bleeding.  Skin: Denies rash or suspicious lesions    Physical Examination  /84   Pulse 99   Ht 5' 2" (1.575 m)   Wt 71.4 kg (157 lb 6.5 oz)   BMI 28.79 kg/m²     Constitutional: No acute distress, conversant  HEENT: Sclera anicteric, Pupils equal, round and reactive to light, extraocular motions intact, Oropharynx clear  Neck: No JVD, no carotid bruits  Cardiovascular: regular rate and rhythm, no murmur, rubs or gallops, normal S1/S2  Pulmonary: Clear to auscultation bilaterally  Abdominal: Abdomen soft, nontender, nondistended, positive bowel sounds  Extremities: both lower extremity prominent Varicose veins,  both lower extremity edema, changes consistent with lymphedema and lipedema  Pulses:  Carotid pulses are 2+ on the right side, and 2+ on the left side.  Radial pulses " are 2+ on the right side, and 2+ on the left side.   Femoral pulses are 2+ on the right side, and 2+ on the left side.  Popliteal pulses are 2+ on the right side, and 2+ on the left side.   Dorsalis pedis pulses are 2+ on the right side, and 2+ on the left side.   Posterior tibial pulses are 2+ on the right side, and 2+ on the left side.    Skin: No ecchymosis, erythema, or ulcers  Psych: Alert and oriented x 3, appropriate affect  Neuro: CNII-XII intact, no focal deficits    Labs:  Most Recent Data  CBC:   Lab Results   Component Value Date    WBC 6.95 01/23/2024    HGB 14.4 01/23/2024    HCT 43.9 01/23/2024     01/23/2024    MCV 98 01/23/2024    RDW 12.6 01/23/2024     BMP:   Lab Results   Component Value Date     02/01/2024    K 4.1 02/01/2024     02/01/2024    CO2 28 02/01/2024    BUN 13 02/01/2024    CREATININE 0.7 02/01/2024    GLU 80 02/01/2024    CALCIUM 9.0 02/01/2024    MG 2.1 02/12/2018     LFTS;   Lab Results   Component Value Date    PROT 6.8 02/01/2024    ALBUMIN 3.7 02/01/2024    BILITOT 0.7 02/01/2024    AST 20 02/01/2024    ALKPHOS 58 02/01/2024    ALT 13 02/01/2024     COAGS:   Lab Results   Component Value Date    INR 0.9 03/17/2022     FLP:   Lab Results   Component Value Date    CHOL 213 (H) 01/23/2024    HDL 73 01/23/2024    LDLCALC 129.6 01/23/2024    TRIG 52 01/23/2024    CHOLHDL 34.3 01/23/2024     CARDIAC:   Lab Results   Component Value Date    BNP 18 10/24/2023       Imaging:    EKG 10/23/2023:  Normal sinus rhythm    MRI Lumbar Spine 2/14/2024:  Broad-based disc bulge, facet degenerative change and ligamentum flavum thickening which contribute to moderate central canal narrowing L2 2-L3 and L3-L4 and mild-moderate central canal narrowing L4-L5     WILMAN Study 2/8/2024:    Normal resting ABIs bilaterally.    PVR waveforms are moderately dampened at the low thigh level bilaterally, and ankle level bilaterally.    BLE Venous Ultrasound 1/29/2024:  No evidence of deep venous  thrombosis in either lower extremity.   Findings suggestive of left popliteal fossa cyst measuring up to 2.7 cm.    Assessment/Plan:  Leia Shultz is a 49 y.o. female with BLE lymphedema/lipedema, venous insufficiency, Raynaud's disease, Gastric sleeve surgery (2014), Obesity, never smoker presents for a follow up appointment.    BL LE Edema- Due to venous Insufficiency/ Lymphedema/Lipedema. Now significantly improved.   Continue to perform lymphedema clinic techniques at home.  Continue graduated compression hose.  Limit sodium intake to 2000 mg daily.  Limit volume intake to 1.5 L daily.  Elevate legs when resting. Continue Bumex 0.5 mg daily.     2. Lumbar Disc Disease- Follow up with Neurosurgery.      3. Obesity- Continue exercise and lifestyle modification for weight loss. Taking wegovy.     4. ASCVD risk score is: 0.5%  on 1/23/2024.    Follow up in 6 months    Total duration of face to face visit time 30 minutes.  Total time spent counseling greater than fifty percent of total visit time.  Counseling included discussion regarding imaging findings, diagnosis, possibilities, treatment options, risks and benefits.  The patient had many questions regarding the options and long-term effects.    Heriberto Kirby MD, PhD  Interventional Cardiology

## 2024-05-07 NOTE — PATIENT INSTRUCTIONS
Assessment/Plan:  Leia Shultz is a 49 y.o. female with BLE lymphedema/lipedema, venous insufficiency, Raynaud's disease, Gastric sleeve surgery (2014), Obesity, never smoker presents for a follow up appointment.    BL LE Edema- Due to venous Insufficiency/ Lymphedema/Lipedema. Now significantly improved.   Continue to perform lymphedema clinic techniques at home.  Continue graduated compression hose.  Limit sodium intake to 2000 mg daily.  Limit volume intake to 1.5 L daily.  Elevate legs when resting. Continue Bumex 0.5 mg daily.     2. Lumbar Disc Disease- Follow up with Neurosurgery.      3. Obesity- Continue exercise and lifestyle modification for weight loss. Taking wegovy.     4. ASCVD risk score is: 0.5%  on 1/23/2024.    Follow up in 6 months

## 2024-05-15 NOTE — PROGRESS NOTES
Assessment & Plan  Problem List Items Addressed This Visit    None  Visit Diagnoses       Encounter to establish care    -  Primary        Patient is stable.  Assess and addressed all modifiable risk factors.  Continue with appropriate management to prevent complications.  Care established       Health Maintenance reviewed.    Follow-up: No follow-ups on file.    ______________________________________________________________________    Chief Complaint  Chief Complaint   Patient presents with    Establish Care       HPI  Leia Shultz is a 48 y.o. female with multiple medical diagnoses as listed in the medical history and problem list that presents for establish care.  Pt is new to me.   Patient denies any new symptoms including chest pain, SOB, blurry vision, N/V, diarrhea.  History of back fracture.  She quit drinking this year.  Naltrexone- currently gets it from [facility].     Does wear compression for lymphedema   PAST MEDICAL HISTORY:  Past Medical History:   Diagnosis Date    Anemia     Arthritis     Heart murmur        PAST SURGICAL HISTORY:  Past Surgical History:   Procedure Laterality Date    COSMETIC SURGERY  October 2016    excess skin removal on arms and stomach    DILATION AND CURETTAGE OF UTERUS  2002    gastric sleeve      LAPAROSCOPIC GASTRIC BANDING      ROOT CANAL      2    WISDOM TOOTH EXTRACTION      all 4       SOCIAL HISTORY:  Social History     Socioeconomic History    Marital status:     Number of children: 0   Tobacco Use    Smoking status: Never     Passive exposure: Never    Smokeless tobacco: Never   Substance and Sexual Activity    Alcohol use: Not Currently    Drug use: Never    Sexual activity: Not Currently     Partners: Male     Birth control/protection: I.U.D.     Comment: 11/21/17 -vasectomy     Social Determinants of Health     Financial Resource Strain: Low Risk  (11/3/2023)    Overall Financial Resource Strain (CARDIA)     Difficulty of Paying Living  Expenses: Not hard at all   Food Insecurity: No Food Insecurity (11/3/2023)    Hunger Vital Sign     Worried About Running Out of Food in the Last Year: Never true     Ran Out of Food in the Last Year: Never true   Transportation Needs: No Transportation Needs (11/3/2023)    PRAPARE - Transportation     Lack of Transportation (Medical): No     Lack of Transportation (Non-Medical): No   Physical Activity: Sufficiently Active (11/3/2023)    Exercise Vital Sign     Days of Exercise per Week: 5 days     Minutes of Exercise per Session: 30 min   Recent Concern: Physical Activity - Insufficiently Active (10/24/2023)    Exercise Vital Sign     Days of Exercise per Week: 3 days     Minutes of Exercise per Session: 30 min   Stress: Stress Concern Present (11/3/2023)    [employer] [facility] [employer] - Occupational Stress Questionnaire     Feeling of Stress : To some extent   Social Connections: Unknown (11/3/2023)    Social Connection and Isolation Panel [NHANES]     Frequency of Communication with Friends and Family: More than three times a week     Frequency of Social Gatherings with Friends and Family: More than three times a week     Active Member of Clubs or Organizations: Yes     Attends Club or Organization Meetings: Never     Marital Status:    Housing Stability: Low Risk  (11/3/2023)    Housing Stability Vital Sign     Unable to Pay for Housing in the Last Year: No     Number of Places Lived in the Last Year: 1     Unstable Housing in the Last Year: No       FAMILY HISTORY:  Family History   Problem Relation Age of Onset    Clotting disorder Mother     Hypotension Mother     Arthritis Mother     Depression Mother     Skin cancer Father     Cancer Father         Skin Cancer    No Known Problems Sister     Irregular heart beat Sister         prolonged QT    Crohn's disease Sister     Arthritis Sister         Letnaunchyn    Cancer Brother          2018 from rare form of spinal cancer    Early  death Brother         Cancer at 39    No Known Problems Maternal Grandmother     No Known Problems Maternal Grandfather     No Known Problems Paternal Grandmother     No Known Problems Paternal Grandfather     Alcohol abuse Maternal Uncle          of alcohol-related liver failure    Early death Maternal Uncle        ALLERGIES AND MEDICATIONS: updated and reviewed.  Review of patient's allergies indicates:   Allergen Reactions    Fire ant Anxiety, Blisters, Dermatitis, Hives, Itching, Rash and Swelling    Pollen extracts Dermatitis, Hives and Rash    Sulfa (sulfonamide antibiotics) Nausea Only     Current Outpatient Medications   Medication Sig Dispense Refill    EPINEPHrine (EPIPEN 2-KEVIN) 0.3 mg/0.3 mL AtIn Inject 0.3 mLs (0.3 mg total) into the muscle once. for 1 dose 2 each 0    MULTIVITAMIN ORAL Take by mouth once daily.      naltrexone (DEPADE) 50 mg tablet Take 50 mg by mouth.      sertraline (ZOLOFT) 100 MG tablet Take 100 mg by mouth once daily.      traZODone (DESYREL) 100 MG tablet Take 1 tablet (100 mg total) by mouth every evening. 90 tablet 1    acamprosate (CAMPRAL) 333 mg tablet Take 666 mg by mouth 3 (three) times daily.       No current facility-administered medications for this visit.         ROS  Review of Systems   Constitutional:  Negative for activity change, appetite change, fatigue, fever and unexpected weight change.   HENT: Negative.  Negative for ear discharge, ear pain, rhinorrhea and sore throat.    Eyes: Negative.    Respiratory:  Negative for apnea, cough, chest tightness, shortness of breath and wheezing.    Cardiovascular:  Negative for chest pain, palpitations and leg swelling.   Gastrointestinal:  Negative for abdominal distention, abdominal pain, constipation, diarrhea and vomiting.   Endocrine: Negative for cold intolerance, heat intolerance, polydipsia and polyuria.   Genitourinary:  Negative for decreased urine volume and urgency.   Musculoskeletal: Negative.    Skin:   "Negative for rash.   Neurological:  Negative for dizziness and headaches.   Hematological:  Does not bruise/bleed easily.   Psychiatric/Behavioral:  Negative for agitation, sleep disturbance and suicidal ideas.            Physical Exam  Vitals:    11/10/23 0859   BP: 104/68   Pulse: 81   Temp: 98 °F (36.7 °C)   TempSrc: Oral   SpO2: 97%   Weight: 83.1 kg (183 lb 3.2 oz)   Height: 5' 3" (1.6 m)    Body mass index is 32.45 kg/m².  Weight: 83.1 kg (183 lb 3.2 oz)   Height: 5' 3" (160 cm)   Physical Exam  Vitals reviewed.   Constitutional:       Appearance: Normal appearance. She is well-developed.   HENT:      Head: Normocephalic and atraumatic.      Right Ear: External ear normal.      Left Ear: External ear normal.      Nose: Nose normal.      Mouth/Throat:      Mouth: Mucous membranes are moist.      Pharynx: Oropharynx is clear.   Eyes:      Extraocular Movements: Extraocular movements intact.      Conjunctiva/sclera: Conjunctivae normal.      Pupils: Pupils are equal, round, and reactive to light.   Cardiovascular:      Rate and Rhythm: Normal rate and regular rhythm.      Heart sounds: Normal heart sounds.   Pulmonary:      Effort: Pulmonary effort is normal.      Breath sounds: Normal breath sounds.   Skin:     General: Skin is warm and dry.   Neurological:      Mental Status: She is alert and oriented to person, place, and time.         Health Maintenance         Date Due Completion Date    HIV Screening Never done ---    Mammogram 01/13/2024 1/13/2023    Hemoglobin A1c (Diabetic Prevention Screening) 11/23/2023 11/23/2020    Colorectal Cancer Screening 06/03/2025 4/7/2023    Cervical Cancer Screening 12/17/2025 12/17/2020    Lipid Panel 02/06/2028 2/6/2023    TETANUS VACCINE 12/06/2029 12/6/2019                Patient note was created using [email or web address].  Any errors in syntax or even information may not have been identified and edited on initial review prior to signing this note.  " 83

## 2024-05-21 ENCOUNTER — TELEPHONE (OUTPATIENT)
Dept: FAMILY MEDICINE | Facility: CLINIC | Age: 49
End: 2024-05-21
Payer: COMMERCIAL

## 2024-05-21 NOTE — TELEPHONE ENCOUNTER
----- Message from Ciera Murdock sent at 5/21/2024  2:28 PM CDT -----  Regarding: call back  Name of caller: Pt       What is the requesting detail: pt requesting a call back.Please advise       Can the clinic reply by [facility]:       What number to call back: [phone]

## 2024-05-21 NOTE — TELEPHONE ENCOUNTER
Spoke with patient informed that visit tomorrow with NP Meng is scheduled and medication may be filled at the deceration of provider.

## 2024-05-22 ENCOUNTER — OFFICE VISIT (OUTPATIENT)
Dept: FAMILY MEDICINE | Facility: CLINIC | Age: 49
End: 2024-05-22
Payer: COMMERCIAL

## 2024-05-22 DIAGNOSIS — F51.01 PRIMARY INSOMNIA: ICD-10-CM

## 2024-05-22 DIAGNOSIS — F41.9 ANXIETY: Primary | ICD-10-CM
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
## 2024-05-22 PROCEDURE — 99213 OFFICE O/P EST LOW 20 MIN: CPT | Mod: 95,,,

## 2024-05-22 RX ORDER — SERTRALINE HYDROCHLORIDE 100 MG/1
150 TABLET, FILM COATED ORAL DAILY
Qty: 90 TABLET | Refills: 2 | Status: SHIPPED | OUTPATIENT
Start: 2024-05-22

## 2024-05-22 RX ORDER — TRAZODONE HYDROCHLORIDE 100 MG/1
150 TABLET ORAL NIGHTLY
Qty: 90 TABLET | Refills: 2 | Status: SHIPPED | OUTPATIENT
Start: 2024-05-22

## 2024-05-22 RX ORDER — PREDNISONE 20 MG/1
TABLET ORAL
Qty: 7 TABLET | Refills: 0 | Status: SHIPPED | OUTPATIENT
Start: 2024-05-22

## 2024-05-22 NOTE — PROGRESS NOTES
The patient location is:  Patient Home  The chief complaint leading to consultation is: as below  Visit type: Virtual visit with synchronous audio and video  Total time spent with patient: 20 minutes  Each patient to whom he or she provides medical services by telemedicine is:  (1) informed of the relationship between the physician and patient and the respective role of any other health care provider with respect to management of the patient; and (2) notified that she may decline to receive medical services by telemedicine and may withdraw from such care at any time.      HPI     Chief Complaint:  No chief complaint on file.      Leia Shultz is a 49 y.o. female with multiple medical diagnoses as listed in the medical history and problem list that presents for medication refill.  Pt is not new to me and is known to this clinic with her last appointment being 1/23/2024.      HPI  Pt presents for medication refill.  Zoloft helps manage anxiety, has been on it for several years. Therapy every other week for anxiety.  Takes 1-1.5 tabs of trazodone for insomnia.  Complains of insect bite that happened yesterday and redness is worsening.      Assessment & Plan     Problem List Items Addressed This Visit    None  Visit Diagnoses       Anxiety    -  Primary  Zoloft helps manage anxiety, has been on it for several years. Therapy every other week for anxiety. Will refill Zoloft.    Relevant Medications    sertraline (ZOLOFT) 100 MG tablet    Primary insomnia      Insomnia managed with Trazodone. Will refill Trazodone.    Relevant Medications    traZODone (DESYREL) 100 MG tablet    Insect bite, unspecified site, initial encounter      Insect bite that happened yesterday and redness is worsening. Continue OTC Benadryl. Will order Prednisone.    Relevant Medications    predniSONE (DELTASONE) 20 MG tablet            --------------------------------------------      Health Maintenance:  Health Maintenance         Date Due  Completion Date    HIV Screening Never done ---    Mammogram 01/18/2025 1/18/2024    Colorectal Cancer Screening 06/03/2025 4/7/2023    Hemoglobin A1c (Diabetic Prevention Screening) 01/23/2027 1/23/2024    Lipid Panel 01/23/2029 1/23/2024    Cervical Cancer Screening 01/30/2029 1/30/2024    TETANUS VACCINE 12/06/2029 12/6/2019            Health maintenance reviewed    Follow Up:  No follow-ups on file.    Exam     Review of Systems:  (as noted above)  Review of Systems   Constitutional:  Negative for activity change and unexpected weight change.   HENT:  Negative for hearing loss, rhinorrhea and trouble swallowing.    Eyes:  Negative for discharge and visual disturbance.   Respiratory:  Negative for chest tightness and wheezing.    Cardiovascular:  Negative for chest pain and palpitations.   Gastrointestinal:  Negative for blood in stool, constipation, diarrhea and vomiting.   Endocrine: Negative for polydipsia and polyuria.   Genitourinary:  Negative for difficulty urinating, dysuria, hematuria and menstrual problem.   Musculoskeletal:  Negative for arthralgias, joint swelling and neck pain.   Skin:  Positive for rash.   Neurological:  Negative for weakness and headaches.   Psychiatric/Behavioral:  Negative for confusion and dysphoric mood.        Physical Exam:   Physical Exam  Skin:     Findings: Erythema and rash present.      Comments: Redness and swelling to forearm (visualized over virtual)       There were no vitals filed for this visit.   There is no height or weight on file to calculate BMI.        History     Past Medical History:  Past Medical History:   Diagnosis Date    Anemia     Arthritis     Heart murmur     Lipedema     Lymphedema        Past Surgical History:  Past Surgical History:   Procedure Laterality Date    COSMETIC SURGERY  October 2016    excess skin removal on arms and stomach    DILATION AND CURETTAGE OF UTERUS  2002    gastric sleeve      LAPAROSCOPIC GASTRIC BANDING      ROOT CANAL       2    WISDOM TOOTH EXTRACTION      all 4       Social History:  Social History     Socioeconomic History    Marital status:     Number of children: 0   Tobacco Use    Smoking status: Never     Passive exposure: Never    Smokeless tobacco: Never   Substance and Sexual Activity    Alcohol use: Yes     Comment: rare    Drug use: Never    Sexual activity: Not Currently     Partners: Male     Birth control/protection: I.U.D.     Comment: 11/21/17 -vasectomy     Social Determinants of Health     Financial Resource Strain: Low Risk  (4/29/2024)    Received from [facility]    Overall Financial Resource Strain (CARDIA)     Difficulty of Paying Living Expenses: Not very hard   Food Insecurity: No Food Insecurity (4/29/2024)    Received from [facility]    Hunger Vital Sign     Worried About Running Out of Food in the Last Year: Never true     Ran Out of Food in the Last Year: Never true   Transportation Needs: No Transportation Needs (4/29/2024)    Received from [facility]    PRAPARE - Transportation     Lack of Transportation (Medical): No     Lack of Transportation (Non-Medical): No   Physical Activity: Insufficiently Active (4/29/2024)    Received from [facility]    Exercise Vital Sign     Days of Exercise per Week: 3 days     Minutes of Exercise per Session: 30 min   Stress: Stress Concern Present (4/29/2024)    Received from [facility]    [employer] - Occupational Stress Questionnaire     Feeling of Stress : Rather much   Housing Stability: Low Risk  (1/23/2024)    Housing Stability Vital Sign     Unable to Pay for Housing in the Last Year: No     Number of Places Lived in the Last Year: 1     Unstable Housing in the Last Year: No       Family History:  Family History   Problem Relation Name Age of Onset    Clotting disorder Mother Shefali Montanezben     Hypotension Mother Shefali Barclaygerard     Arthritis Mother Shefali Montanezben     Depression Mother Shefali Montanezben      Skin cancer Father Daniel Bitfidel     Cancer Father Daniel Novoa         Skin Cancer    No Known Problems Sister      Irregular heart beat Sister Gisselle         prolonged QT    Crohn's disease Sister Gisselle     Arthritis Sister Gisselle         Letnaunchyn    Cancer Brother Byron Novoa          2018 from rare form of spinal cancer    Early death Brother Byron Novoa         Cancer at 39    No Known Problems Maternal Grandmother      No Known Problems Maternal Grandfather      No Known Problems Paternal Grandmother      No Known Problems Paternal Grandfather      Alcohol abuse Maternal Uncle Tim          of alcohol-related liver failure    Early death Maternal Uncle Tim        Allergies and Medications: (updated and reviewed)  Review of patient's allergies indicates:   Allergen Reactions    Fire ant Anxiety, Blisters, Dermatitis, Hives, Itching, Rash and Swelling    Pollen extracts Dermatitis, Hives and Rash    Sulfa (sulfonamide antibiotics) Nausea Only     Current Outpatient Medications   Medication Sig Dispense Refill    bumetanide (BUMEX) 1 MG tablet Take 0.5 tablets (0.5 mg total) by mouth once daily. 15 tablet 11    EPINEPHrine (EPIPEN 2-KEVIN) 0.3 mg/0.3 mL AtIn Inject 0.3 mLs (0.3 mg total) into the muscle once. for 1 dose 2 each 0    MULTIVITAMIN ORAL Take by mouth once daily.      ondansetron (ZOFRAN) 8 MG tablet Take 8 mg by mouth as needed for Nausea.      predniSONE (DELTASONE) 20 MG tablet Take 3 tab day 1, take 2 tab day 2, take 1 tab day 3, take 0.5 tab day 4 and 5 7 tablet 0    semaglutide, weight loss, (WEGOVY) 2.4 mg/0.75 mL PnIj Inject 2.4 mg into the skin once a week 3 mL 3    sertraline (ZOLOFT) 100 MG tablet Take 1.5 tablets (150 mg total) by mouth once daily. As needed. 90 tablet 2    traZODone (DESYREL) 100 MG tablet Take 1.5 tablets (150 mg total) by mouth every evening. 90 tablet 2     No current facility-administered medications for this visit.       Patient Care  Team:  Robyn Guerra MD as PCP - General (Family Medicine)  Lindsey East LPN as Care Coordinator       - The patient was sent an After Visit Summary virtually that lists all medications with directions, allergies, education, orders placed during this encounter and follow-up instructions.      - I have reviewed the patient's medical information including past medical, family, and social history sections including the medications and allergies.      - We discussed the patient's current medications.     This note was created by combination of typed  and MModal dictation.  Transcription errors may be present.  If there are any questions, please contact me.  Debby Meng NP

## 2024-05-23 ENCOUNTER — OFFICE VISIT (OUTPATIENT)
Dept: FAMILY MEDICINE | Facility: CLINIC | Age: 49
End: 2024-05-23
Payer: COMMERCIAL

## 2024-05-23 VITALS
HEIGHT: 62 IN | BODY MASS INDEX: 28.9 KG/M2 | HEART RATE: 105 BPM | SYSTOLIC BLOOD PRESSURE: 110 MMHG | OXYGEN SATURATION: 99 % | TEMPERATURE: 98 F | DIASTOLIC BLOOD PRESSURE: 76 MMHG | WEIGHT: 157.06 LBS
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
## 2024-05-23 DIAGNOSIS — I89.0 LYMPHEDEMA: ICD-10-CM

## 2024-05-23 DIAGNOSIS — T78.40XA ALLERGIC REACTION, INITIAL ENCOUNTER: ICD-10-CM

## 2024-05-23 PROCEDURE — 99213 OFFICE O/P EST LOW 20 MIN: CPT | Mod: S$GLB,,,

## 2024-05-23 PROCEDURE — 99999 PR PBB SHADOW E&M-EST. PATIENT-LVL III: CPT | Mod: PBBFAC,,,

## 2024-05-23 RX ORDER — EPINEPHRINE 0.3 MG/.3ML
1 INJECTION SUBCUTANEOUS ONCE
Qty: 2 EACH | Refills: 0 | Status: SHIPPED | OUTPATIENT
Start: 2024-05-23 | End: 2024-05-23

## 2024-05-23 NOTE — PROGRESS NOTES
HPI     Chief Complaint:  Chief Complaint   Patient presents with    Insect Bite       Leia Shultz is a 49 y.o. female with multiple medical diagnoses as listed in the medical history and problem list that presents for   Chief Complaint   Patient presents with    Insect Bite    .     Patient is know to me with her last appointment with me on 5/22/2024.     HPI  Pt presents for insect bite.  This morning redness, swelling and pain were worse in left arm around insect bite. Rash has since improved. Has started prednisone today.      Assessment & Plan     Problem List Items Addressed This Visit          Other    Lymphedema (Chronic)  Stable. Managed with Bumex and compression. Followed by vascular.      Other Visit Diagnoses       Insect bite, unspecified site, initial encounter    -  Primary  Rash surrounding insect bite is improving. Continue Prednisone.     Allergic reaction, initial encounter      Epi pen for allergic reaction to fire ants. Will refill Epi pen.    Relevant Medications    EPINEPHrine (EPIPEN 2-KEVIN) 0.3 mg/0.3 mL AtIn              --------------------------------------------      Health Maintenance:  Health Maintenance         Date Due Completion Date    HIV Screening Never done ---    Mammogram 01/18/2025 1/18/2024    Colorectal Cancer Screening 06/03/2025 4/7/2023    Hemoglobin A1c (Diabetic Prevention Screening) 01/23/2027 1/23/2024    Lipid Panel 01/23/2029 1/23/2024    Cervical Cancer Screening 01/30/2029 1/30/2024    TETANUS VACCINE 12/06/2029 12/6/2019            Health maintenance reviewed    Follow Up:  No follow-ups on file.    Exam     Review of Systems:  (as noted above)  Review of Systems   Constitutional:  Negative for activity change and unexpected weight change.   HENT:  Negative for hearing loss, rhinorrhea and trouble swallowing.    Eyes:  Negative for discharge and visual disturbance.   Respiratory:  Negative for chest tightness and wheezing.    Cardiovascular:  Negative for  "chest pain and palpitations.   Gastrointestinal:  Negative for blood in stool, constipation, diarrhea and vomiting.   Endocrine: Negative for polydipsia and polyuria.   Genitourinary:  Negative for difficulty urinating, dysuria, hematuria and menstrual problem.   Musculoskeletal:  Negative for arthralgias, joint swelling and neck pain.   Skin:  Negative for color change, rash and wound.        Insect bite   Neurological:  Negative for weakness and headaches.   Psychiatric/Behavioral:  Negative for confusion and dysphoric mood.        Physical Exam:   Physical Exam  Vitals:    05/23/24 1452   BP: 110/76   BP Location: Right arm   Patient Position: Sitting   BP Method: Large (Manual)   Pulse: 105   Temp: 98.2 °F (36.8 °C)   TempSrc: Oral   SpO2: 99%   Weight: 71.2 kg (157 lb 1.2 oz)   Height: 5' 2" (1.575 m)      Body mass index is 28.73 kg/m².        History     Past Medical History:  Past Medical History:   Diagnosis Date    Anemia     Arthritis     Heart murmur     Lipedema     Lymphedema        Past Surgical History:  Past Surgical History:   Procedure Laterality Date    COSMETIC SURGERY  October 2016    excess skin removal on arms and stomach    DILATION AND CURETTAGE OF UTERUS  2002    gastric sleeve      LAPAROSCOPIC GASTRIC BANDING      ROOT CANAL      2    WISDOM TOOTH EXTRACTION      all 4       Social History:  Social History     Socioeconomic History    Marital status:     Number of children: 0   Tobacco Use    Smoking status: Never     Passive exposure: Never    Smokeless tobacco: Never   Substance and Sexual Activity    Alcohol use: Yes     Comment: rare    Drug use: Never    Sexual activity: Not Currently     Partners: Male     Birth control/protection: I.U.D.     Comment: 11/21/17 -vasectomy     Social Determinants of Health     Financial Resource Strain: Low Risk  (4/29/2024)    Received from [facility]    Overall Financial Resource Strain (CARDIA)     Difficulty of Paying Living " Expenses: Not very hard   Food Insecurity: No Food Insecurity (2024)    Received from [facility]    Hunger Vital Sign     Worried About Running Out of Food in the Last Year: Never true     Ran Out of Food in the Last Year: Never true   Transportation Needs: No Transportation Needs (2024)    Received from [facility]    PRAPARE - Transportation     Lack of Transportation (Medical): No     Lack of Transportation (Non-Medical): No   Physical Activity: Insufficiently Active (2024)    Received from [facility]    Exercise Vital Sign     Days of Exercise per Week: 3 days     Minutes of Exercise per Session: 30 min   Stress: Stress Concern Present (2024)    Received from [facility]    [employer] [place] [employer] - Occupational Stress Questionnaire     Feeling of Stress : Rather much   Housing Stability: Low Risk  (2024)    Housing Stability Vital Sign     Unable to Pay for Housing in the Last Year: No     Number of Places Lived in the Last Year: 1     Unstable Housing in the Last Year: No       Family History:  Family History   Problem Relation Name Age of Onset    Clotting disorder Mother Shefali Mccrary     Hypotension Mother Shefali Mccrary     Arthritis Mother Shefali Mccrary     Depression Mother Shefali Mccrary     Skin cancer Father Daniel Bitting     Cancer Father Daniel Novoa         Skin Cancer    No Known Problems Sister      Irregular heart beat Sister Gisselle         prolonged QT    Crohn's disease Sister Gisselle     Arthritis Sister Gisselle         Letnaunchyn    Cancer Brother Byron Novoa          2018 from rare form of spinal cancer    Early death Brother Byron Novoa         Cancer at 39    No Known Problems Maternal Grandmother      No Known Problems Maternal Grandfather      No Known Problems Paternal Grandmother      No Known Problems Paternal Grandfather      Alcohol abuse Maternal Uncle Tim          of alcohol-related liver failure     Early death Maternal Uncle Tim        Allergies and Medications: (updated and reviewed)  Review of patient's allergies indicates:   Allergen Reactions    Fire ant Anxiety, Blisters, Dermatitis, Hives, Itching, Rash and Swelling    Pollen extracts Dermatitis, Hives and Rash    Sulfa (sulfonamide antibiotics) Nausea Only     Current Outpatient Medications   Medication Sig Dispense Refill    bumetanide (BUMEX) 1 MG tablet Take 0.5 tablets (0.5 mg total) by mouth once daily. 15 tablet 11    MULTIVITAMIN ORAL Take by mouth once daily.      ondansetron (ZOFRAN) 8 MG tablet Take 8 mg by mouth as needed for Nausea.      predniSONE (DELTASONE) 20 MG tablet Take 3 tab day 1, take 2 tab day 2, take 1 tab day 3, take 0.5 tab day 4 and 5 7 tablet 0    semaglutide, weight loss, (WEGOVY) 2.4 mg/0.75 mL PnIj Inject 2.4 mg into the skin once a week 3 mL 3    sertraline (ZOLOFT) 100 MG tablet Take 1.5 tablets (150 mg total) by mouth once daily. As needed. 90 tablet 2    traZODone (DESYREL) 100 MG tablet Take 1.5 tablets (150 mg total) by mouth every evening. 90 tablet 2    EPINEPHrine (EPIPEN 2-KEVIN) 0.3 mg/0.3 mL AtIn Inject 0.3 mLs (0.3 mg total) into the muscle once. for 1 dose 2 each 0     No current facility-administered medications for this visit.       Patient Care Team:  Robyn Guerra MD as PCP - General (Family Medicine)  Lindsey East LPN as Care Coordinator         - The patient is given an After Visit Summary that lists all medications with directions, allergies, education, orders placed during this encounter and follow-up instructions.      - I have reviewed the patient's medical information including past medical, family, and social history sections including the medications and allergies.      - We discussed the patient's current medications.     This note was created by combination of typed  and MModal dictation.  Transcription errors may be present.  If there are any questions, please contact  me.       Debby Meng , NP

## 2024-06-03 ENCOUNTER — TELEPHONE (OUTPATIENT)
Dept: CARDIOLOGY | Facility: CLINIC | Age: 49
End: 2024-06-03
Payer: COMMERCIAL

## 2024-06-03 NOTE — TELEPHONE ENCOUNTER
Lymphedema Pump Progress:  [x] Order, clinical notes, and face sheet faxed to [email or web address] for home pneumatic compression.  [x] Reached out to patient for follow up. Wish to inquire about symptoms of lymphedema and if conservative therapy has been working.  [x] Updated progress note sent to [email or web address].  [x] Patient pneumatic compression pump approved and shipped by [email or web address].

## 2024-06-27 ENCOUNTER — OFFICE VISIT (OUTPATIENT)
Dept: FAMILY MEDICINE | Facility: CLINIC | Age: 49
End: 2024-06-27
Payer: COMMERCIAL

## 2024-06-27 DIAGNOSIS — I89.0 LYMPHEDEMA: ICD-10-CM

## 2024-06-27 DIAGNOSIS — M54.50 ACUTE LOW BACK PAIN WITHOUT SCIATICA, UNSPECIFIED BACK PAIN LATERALITY: ICD-10-CM

## 2024-06-27 DIAGNOSIS — Z91.018 FOOD ALLERGY: ICD-10-CM

## 2024-06-27 DIAGNOSIS — L30.4 INTERTRIGO: Primary | ICD-10-CM

## 2024-06-27 PROCEDURE — 99213 OFFICE O/P EST LOW 20 MIN: CPT | Mod: 95,,,

## 2024-06-27 RX ORDER — NAFTIFINE HYDROCHLORIDE 20 MG/G
1 CREAM TOPICAL DAILY
Qty: 45 G | Refills: 0 | Status: SHIPPED | OUTPATIENT
Start: 2024-06-27

## 2024-06-27 RX ORDER — KETOCONAZOLE 20 MG/G
CREAM TOPICAL DAILY
Qty: 60 G | Refills: 0 | Status: SHIPPED | OUTPATIENT
Start: 2024-06-27

## 2024-06-27 NOTE — PROGRESS NOTES
The patient location is:  Patient Home  The chief complaint leading to consultation is: as below  Visit type: Virtual visit with synchronous audio and video  Total time spent with patient: 20 minutes  Each patient to whom he or she provides medical services by telemedicine is:  (1) informed of the relationship between the physician and patient and the respective role of any other health care provider with respect to management of the patient; and (2) notified that she may decline to receive medical services by telemedicine and may withdraw from such care at any time.      HPI     Chief Complaint:  No chief complaint on file.      Leia Shultz is a 49 y.o. female with multiple medical diagnoses as listed in the medical history and problem list that presents for follow up.  Pt is not new to me and is known to this clinic with her last appointment being 5/23/2024.      Rash  This is a chronic problem. The current episode started 1 to 4 weeks ago. The problem has been gradually improving since onset. The affected locations include the groin, left buttock, left upper leg, right buttock and right upper leg. The rash is characterized by dryness, pain and redness. She was exposed to nothing. Associated symptoms include fatigue and joint pain. Pertinent negatives include no anorexia, congestion, cough, diarrhea, eye pain, facial edema, fever, nail changes, rhinorrhea, shortness of breath, sore throat or vomiting. Past treatments include analgesics and moisturizer. The treatment provided mild relief. Her past medical history is significant for allergies and varicella. There is no history of asthma or eczema.     Pt presents for follow up.  Recurrent friction rash to butt, abdomen and thighs due to excess skin.  Requesting new referral for lymphedema clinic for therapy. Has food allergies and would like to have allergy testing. Chronic back pain, interested in therapy to help manage pain.      Assessment & Plan      Problem List Items Addressed This Visit          Other    Lymphedema (Chronic)  Stable. Will refer to lymphedema clinic.    Relevant Orders    Ambulatory referral/consult to Physical/Occupational Therapy     Other Visit Diagnoses       Intertrigo    -  Primary  Rash to butt, thighs and lower abdomen related to excess skin. Will order ketoconazole and naftifine.      Relevant Medications    ketoconazole (NIZORAL) 2 % cream    naftifine 2 % Crea    Food allergy      Will refer to allergy for allergy testing.    Relevant Orders    Ambulatory referral/consult to Allergy    Acute low back pain without sciatica, unspecified back pain laterality  Chronic back pain managed with OTC medication. Will refer to healthy back program.          Relevant Orders    Ambulatory referral/consult to [facility]            --------------------------------------------      Health Maintenance:  Health Maintenance         Date Due Completion Date    HIV Screening Never done ---    Mammogram 01/18/2025 1/18/2024    Colorectal Cancer Screening 06/03/2025 4/7/2023    Hemoglobin A1c (Diabetic Prevention Screening) 01/23/2027 1/23/2024    Lipid Panel 01/23/2029 1/23/2024    Cervical Cancer Screening 01/30/2029 1/30/2024    TETANUS VACCINE 12/06/2029 12/6/2019            Health maintenance reviewed    Follow Up:  No follow-ups on file.    Exam     Review of Systems:  (as noted above)  Review of Systems   Constitutional:  Positive for fatigue. Negative for fever.   HENT:  Negative for congestion, rhinorrhea and sore throat.    Eyes:  Negative for pain.   Respiratory:  Negative for cough and shortness of breath.    Gastrointestinal:  Negative for anorexia, diarrhea and vomiting.   Musculoskeletal:  Positive for joint pain.   Skin:  Positive for rash. Negative for nail changes.       Physical Exam:   Physical Exam  There were no vitals filed for this visit.   There is no height or weight on file to calculate BMI.        History     Past  Medical History:  Past Medical History:   Diagnosis Date    Anemia     Arthritis     Heart murmur     Lipedema     Lymphedema        Past Surgical History:  Past Surgical History:   Procedure Laterality Date    COSMETIC SURGERY  October 2016    excess skin removal on arms and stomach    DILATION AND CURETTAGE OF UTERUS  2002    gastric sleeve      LAPAROSCOPIC GASTRIC BANDING      ROOT CANAL      2    WISDOM TOOTH EXTRACTION      all 4       Social History:  Social History     Socioeconomic History    Marital status:     Number of children: 0   Tobacco Use    Smoking status: Never     Passive exposure: Never    Smokeless tobacco: Never   Substance and Sexual Activity    Alcohol use: Yes     Comment: rare    Drug use: Never    Sexual activity: Not Currently     Partners: Male     Birth control/protection: I.U.D.     Comment: 11/21/17 -vasectomy     Social Determinants of Health     Financial Resource Strain: Low Risk  (4/29/2024)    Received from [facility]    Overall Financial Resource Strain (CARDIA)     Difficulty of Paying Living Expenses: Not very hard   Food Insecurity: No Food Insecurity (4/29/2024)    Received from [facility]    Hunger Vital Sign     Worried About Running Out of Food in the Last Year: Never true     Ran Out of Food in the Last Year: Never true   Transportation Needs: No Transportation Needs (4/29/2024)    Received from [facility]    PRAPARE - Transportation     Lack of Transportation (Medical): No     Lack of Transportation (Non-Medical): No   Physical Activity: Insufficiently Active (4/29/2024)    Received from [facility]    Exercise Vital Sign     Days of Exercise per Week: 3 days     Minutes of Exercise per Session: 30 min   Stress: Stress Concern Present (4/29/2024)    Received from [facility]    [employer] - Occupational Stress Questionnaire     Feeling of Stress : Rather much   Housing Stability: Low Risk  (1/23/2024)    Housing  Stability Vital Sign     Unable to Pay for Housing in the Last Year: No     Number of Places Lived in the Last Year: 1     Unstable Housing in the Last Year: No       Family History:  Family History   Problem Relation Name Age of Onset    Clotting disorder Mother Shefali Mccrary     Hypotension Mother Shefali Mccrary     Arthritis Mother Shefali Mccrary     Depression Mother Shefali Mccrary     Skin cancer Father Daniel Bitting     Cancer Father Daniel Bitfidel         Skin Cancer    No Known Problems Sister      Irregular heart beat Sister Gisselle         prolonged QT    Crohn's disease Sister Gisselle     Arthritis Sister Gisselle         Letnaunchyn    Cancer Brother Byron Novoa          2018 from rare form of spinal cancer    Early death Brother Byron Novoa         Cancer at 39    No Known Problems Maternal Grandmother      No Known Problems Maternal Grandfather      No Known Problems Paternal Grandmother      No Known Problems Paternal Grandfather      Alcohol abuse Maternal Uncle Tim          of alcohol-related liver failure    Early death Maternal Uncle Tim        Allergies and Medications: (updated and reviewed)  Review of patient's allergies indicates:   Allergen Reactions    Fire ant Anxiety, Blisters, Dermatitis, Hives, Itching, Rash and Swelling    Pollen extracts Dermatitis, Hives and Rash    Sulfa (sulfonamide antibiotics) Nausea Only     Current Outpatient Medications   Medication Sig Dispense Refill    bumetanide (BUMEX) 1 MG tablet Take 0.5 tablets (0.5 mg total) by mouth once daily. 15 tablet 11    EPINEPHrine (EPIPEN 2-KEVIN) 0.3 mg/0.3 mL AtIn Inject 0.3 mLs (0.3 mg total) into the muscle once. for 1 dose 2 each 0    ketoconazole (NIZORAL) 2 % cream Apply topically once daily. 60 g 0    MULTIVITAMIN ORAL Take by mouth once daily.      naftifine 2 % Crea Apply 1 Application topically once daily. 45 g 0    ondansetron (ZOFRAN) 8 MG tablet Take 8 mg by mouth as needed for Nausea.       predniSONE (DELTASONE) 20 MG tablet Take 3 tab day 1, take 2 tab day 2, take 1 tab day 3, take 0.5 tab day 4 and 5 7 tablet 0    semaglutide, weight loss, (WEGOVY) 2.4 mg/0.75 mL PnIj Inject 2.4 mg into the skin once a week 3 mL 3    sertraline (ZOLOFT) 100 MG tablet Take 1.5 tablets (150 mg total) by mouth once daily. As needed. 90 tablet 2    traZODone (DESYREL) 100 MG tablet Take 1.5 tablets (150 mg total) by mouth every evening. 90 tablet 2     No current facility-administered medications for this visit.       Patient Care Team:  Robyn Guerra MD as PCP - General (Family Medicine)  Lindsey East LPN as Care Coordinator       - The patient was sent an After Visit Summary virtually that lists all medications with directions, allergies, education, orders placed during this encounter and follow-up instructions.      - I have reviewed the patient's medical information including past medical, family, and social history sections including the medications and allergies.      - We discussed the patient's current medications.     This note was created by combination of typed  and MModal dictation.  Transcription errors may be present.  If there are any questions, please contact me.  Debby Meng NP

## 2024-07-01 ENCOUNTER — CLINICAL SUPPORT (OUTPATIENT)
Dept: REHABILITATION | Facility: HOSPITAL | Age: 49
End: 2024-07-01
Payer: COMMERCIAL

## 2024-07-01 DIAGNOSIS — I89.0 LYMPHEDEMA: ICD-10-CM

## 2024-07-01 DIAGNOSIS — R60.9 LIPEDEMA: Primary | Chronic | ICD-10-CM

## 2024-07-01 PROCEDURE — 97165 OT EVAL LOW COMPLEX 30 MIN: CPT

## 2024-07-01 PROCEDURE — 97535 SELF CARE MNGMENT TRAINING: CPT

## 2024-07-03 NOTE — PLAN OF CARE
[facility]  Occupational Therapy Initial Evaluation  Lymphedema      Name: Leia Shultz  Clinic Number: [id number]    Therapy Diagnosis:   Encounter Diagnoses   Name Primary?    Lymphedema     Lipedema Yes     Physician: Debby Meng NP    Physician Orders: Eval and Treat  Medical Diagnosis: I89.0 (ICD-10-CM) - Lymphedema, not elsewhere classified   Evaluation Date: 7/1/2024  Insurance Authorization Period Expiration: 12/31/2024  Plan of Care Certification Period: 9/22/2024  Visit # / Visits authorized: 1 / 1  FOTO: see media, 1 / 3    Precautions:  Standard    Time In: 2:28  Time Out: 3:15  Total Appointment Time (timed & untimed codes): 47 minutes    Subjective      Date of onset: multiple years ago  History of current condition - Leia reports: Swelling was well controlled since discharge until last week when she developed a rapid increase in swelling in LLE only. She denies trauma, injury, infection or other etiology. She bought smaller compression and new pump sleeves that improved swelling. She has also been experiencing a sharp/stabbing pain on b/l shins, however, pain is greater in RLE than LLE. She spoke with primary care nursing team for referral, no concerns for DVT. She has been more sedentary due to busy work schedule. Swelling is almost back to normal but wanted to check in to ensure optimization of lymphedema as she is planning for lipedema surgery.       Pain:  Functional Pain Scale Rating 0-10: 0/10, did not quantify     Occupation:  remote, full time      Functional Limitations/Social History:    Previous functional status includes: Independent with all ADLs.   Recent falls: none    Current Functional Status   Home/Living environment: lives alone         Limitation of Functional Status as follows:   ADLs/IADLs:     - Feeding: none    - Bathing: none    - Dressing/Grooming: none    - Home Management: none    - Driving: none    Patient's Goals for Therapy: control  swelling, optimize before lipedema surgery.    Past Medical History/Physical Systems Review:   Leia Shultz  has a past medical history of Anemia, Arthritis, Heart murmur, Lipedema, and Lymphedema.    Leia Shultz  has a past surgical history that includes Dilation and curettage of uterus (2002); Laparoscopic gastric banding; gastric sleeve; [place] tooth extraction; Root canal; and Cosmetic surgery (October 2016).    Leia has a current medication list which includes the following prescription(s): bumetanide, epinephrine, ketoconazole, multivitamin, naftifine, ondansetron, prednisone, wegovy, sertraline, and trazodone.    Review of patient's allergies indicates:   Allergen Reactions    Fire ant Anxiety, Blisters, Dermatitis, Hives, Itching, Rash and Swelling    Pollen extracts Dermatitis, Hives and Rash    Sulfa (sulfonamide antibiotics) Nausea Only        Pt denies: CHF,CKD, DVT, CA, infection, severe PAD  Pt admits medically managed/treated: N/A    Objective      Patient received from waiting room, in [facility], in [facility].  Mental status: alert, oriented to person, place, and time  Appearance: Well groomed  Behavior:  calm and cooperative  Attention Span and Concentration:  Normal    Affected Areas: RLE and LLE  Refill: Day   Stemmer Sign: negative  Shape: minimal assymmetry at time of exam  Tissue Texture: unremarkable  Skin Integrity: intact, varicose veins  Sensation: intact  Circulation: intact      LE Girth Measurements: taken in supine  LANDMARK RIGHT LE  7/1/24 LEFT LE  7/1/24   SBP - -   10 below SBP 42.0 cm 42.0 cm   20 below SBP 42.0 cm 42.5 cm   30 below SBP 34.5 cm 36.0 cm   35 below SBP 27.5 cm 29.0 cm   Ankle 23.5 cm 25.5 cm   Forefoot 20.5 cm 21.0 cm     Picture of BLE in supine; taken via [employer] Haiku on therapist's cell phone with verbal consent from patient:      Limitation/Restriction for FOTO Lower Quadrant Edema Survey    Therapist reviewed FOTO scores for Leia Shultz on 7/1/2024.    FOTO documents entered into [employer] - see Media section.    Limitation Score: see media          Treatment      Total Treatment time (time-based codes) separate from Evaluation: 15 minutes    Leia received the treatments listed below:      Self-care/home management techniques were completed for 15 minutes, including:    Educated pt on:  -optimal edema management while traveling  -lipedema surgery, recovery and therapy process      Patient Education and Home Exercises      Education provided:   1. Educated on definition of lymphedema.  2. Explained the Complete Decongestive Therapy protocol in depth  3. Educated on Phase 1 and 2 of protocol.  4. Reviewed treatment frequency and likely duration of weeks  5.Contraindications for treatment.  6. Plan of care and goals.  7. Educated on home management protocols.   8. Role of OT, goals for OT, scheduling/cancellations, insurance limitations.  9. Provided lymphedema educational pamphlet          Assessment      Leia is a 49 y.o. female referred to outpatient Occupational Therapy with a medical diagnosis of lymphedema. This patient presents with stage 1 lymphedema due to lipedema.  Patient's deficits include swelling of the BLE, increased pain, increased stiffness in the BLE, as well as difficulty performing ADLs , and placing the pt at higher risk of infection. Therapist's recommendation includes RTC in 2-3 weeks for reassessment to ensure resolution of swelling with home management techniques.     Anticipated barriers to occupational therapy: none    Plan of care discussed with patient: Yes  Patient's spiritual, cultural and educational needs considered and patient is agreeable to the plan of care and goals as stated below:     Medical Necessity is demonstrated by the following  Occupational Profile/History  Co-morbidities and personal factors that may impact the plan of care [x] LOW: Brief chart review  [] MODERATE: Expanded chart review   [] HIGH: Extensive chart  review    Moderate / High Support Documentation: N/A     Examination  Performance deficits relating to physical, cognitive or psychosocial skills that result in activity limitations and/or participation restrictions  [x] LOW: addressing 1-3 Performance deficits  [] MODERATE: 3-5 Performance deficits  [] HIGH: 5+ Performance deficits (please support below)    Moderate / High Support Documentation:    Physical:  Edema  Pain    Cognitive:  No Deficits    Psychosocial:    No Deficits     Treatment Options [] LOW: Limited options  [x] MODERATE: Several options  [] HIGH: Multiple options      Decision Making/ Complexity Score: low       The following goals were discussed with the patient and patient is in agreement with them as to be addressed in the treatment plan.     Goals:     Short Term Goals: (2 weeks)  Goals: Progressing / MET   1. Patient will demonstrate 100% knowledge of lymphedema precautions and signs of infection to allow for reduced lymphedema risk, infection risk, and/or exacerbation of condition.  NOT MET   2. Patient and/or caregiver will order/obtain appropriate compression garments to maintain lymphatic and venous support. NOT MET   3. Patient will tolerate daily activities wearing compression garments to allow for lymphatic drainage support, skin elasticity, and reduction in shape and size of limb.  NOT MET     Long Term Goals: (4 weeks)  Goals: Progressing / MET   1. Patient and/or caregiver to denis/doff compression garment independently and with daily compliance to assist in lymphedema management, skin elasticity, and tissue density NOT MET   2. Patient and/or caregiver will be independent in use of pneumatic compression pump or self manual lymphatic drainage techniques to areas within reach to enhance lymphatic drainage and skin condition.  NOT MET   3. Patient to be independent and compliant with home exercise program to allow for increased function in affected limb. NOT MET        Plan     Plan of  Care Certification: 7/1/2024 to 9/22/2024.     Therapy Track: MAINTENANCE AND PREVENTION   Interventions: compression garments, therapeutic exercise, self manual lymphatic drainage training, home exercise programming.    Outpatient Occupational Therapy 1 time in 2-3 weeks.    Celestina Marinelli, OT, CLWT      I CERTIFY THE NEED FOR THESE SERVICES FURNISHED UNDER THIS PLAN OF TREATMENT AND WHILE UNDER MY CARE   Physician's comments:     Physician's Signature: ___________________________________________________

## 2024-07-12 ENCOUNTER — OFFICE VISIT (OUTPATIENT)
Dept: ALLERGY | Facility: CLINIC | Age: 49
End: 2024-07-12
Payer: COMMERCIAL

## 2024-07-12 ENCOUNTER — PATIENT MESSAGE (OUTPATIENT)
Dept: ALLERGY | Facility: CLINIC | Age: 49
End: 2024-07-12

## 2024-07-12 VITALS — HEIGHT: 62 IN | WEIGHT: 153.69 LBS | BODY MASS INDEX: 28.28 KG/M2

## 2024-07-12 DIAGNOSIS — T78.1XXD ADVERSE FOOD REACTION, SUBSEQUENT ENCOUNTER: ICD-10-CM

## 2024-07-12 DIAGNOSIS — T63.421D FIRE ANT BITE, ACCIDENTAL OR UNINTENTIONAL, SUBSEQUENT ENCOUNTER: ICD-10-CM

## 2024-07-12 DIAGNOSIS — Z88.2 ALLERGY TO SULFA DRUGS: ICD-10-CM

## 2024-07-12 PROCEDURE — 99999 PR PBB SHADOW E&M-EST. PATIENT-LVL III: CPT | Mod: PBBFAC,,, | Performed by: STUDENT IN AN ORGANIZED HEALTH CARE EDUCATION/TRAINING PROGRAM

## 2024-07-12 RX ORDER — SERTRALINE HYDROCHLORIDE 50 MG/1
50 TABLET, FILM COATED ORAL
COMMUNITY
Start: 2024-06-16

## 2024-07-15 ENCOUNTER — LAB VISIT (OUTPATIENT)
Dept: LAB | Facility: HOSPITAL | Age: 49
End: 2024-07-15
Attending: STUDENT IN AN ORGANIZED HEALTH CARE EDUCATION/TRAINING PROGRAM
Payer: COMMERCIAL

## 2024-07-15 DIAGNOSIS — T63.421D FIRE ANT BITE, ACCIDENTAL OR UNINTENTIONAL, SUBSEQUENT ENCOUNTER: ICD-10-CM

## 2024-07-15 PROCEDURE — 36415 COLL VENOUS BLD VENIPUNCTURE: CPT | Performed by: STUDENT IN AN ORGANIZED HEALTH CARE EDUCATION/TRAINING PROGRAM

## 2024-07-15 PROCEDURE — 83520 IMMUNOASSAY QUANT NOS NONAB: CPT | Performed by: STUDENT IN AN ORGANIZED HEALTH CARE EDUCATION/TRAINING PROGRAM

## 2024-07-15 PROCEDURE — 86003 ALLG SPEC IGE CRUDE XTRC EA: CPT | Performed by: STUDENT IN AN ORGANIZED HEALTH CARE EDUCATION/TRAINING PROGRAM

## 2024-07-15 PROCEDURE — 86003 ALLG SPEC IGE CRUDE XTRC EA: CPT | Mod: 59 | Performed by: STUDENT IN AN ORGANIZED HEALTH CARE EDUCATION/TRAINING PROGRAM

## 2024-07-16 LAB — TRYPTASE LEVEL: 5.7 NG/ML

## 2024-07-17 ENCOUNTER — PATIENT MESSAGE (OUTPATIENT)
Dept: REHABILITATION | Facility: HOSPITAL | Age: 49
End: 2024-07-17
Payer: COMMERCIAL

## 2024-07-17 LAB
A ALTERNATA IGE QN: <0.1 KU/L
A FUMIGATUS IGE QN: <0.1 KU/L
BERMUDA GRASS IGE QN: <0.1 KU/L
CAT DANDER IGE QN: 0.24 KU/L
CEDAR IGE QN: <0.1 KU/L
D FARINAE IGE QN: 0.39 KU/L
D PTERONYSS IGE QN: 0.62 KU/L
DEPRECATED A ALTERNATA IGE RAST QL: NORMAL
DEPRECATED A FUMIGATUS IGE RAST QL: NORMAL
DEPRECATED BERMUDA GRASS IGE RAST QL: NORMAL
DEPRECATED CAT DANDER IGE RAST QL: ABNORMAL
DEPRECATED CEDAR IGE RAST QL: NORMAL
DEPRECATED D FARINAE IGE RAST QL: ABNORMAL
DEPRECATED D PTERONYSS IGE RAST QL: ABNORMAL
DEPRECATED DOG DANDER IGE RAST QL: NORMAL
DEPRECATED ELDER IGE RAST QL: NORMAL
DEPRECATED ENGL PLANTAIN IGE RAST QL: NORMAL
DEPRECATED PECAN/HICK TREE IGE RAST QL: NORMAL
DEPRECATED RED IMP FIRE ANT IGE RAST QL: ABNORMAL
DEPRECATED TIMOTHY IGE RAST QL: NORMAL
DEPRECATED WEST RAGWEED IGE RAST QL: NORMAL
DEPRECATED WHITE OAK IGE RAST QL: NORMAL
DOG DANDER IGE QN: <0.1 KU/L
ELDER IGE QN: <0.1 KU/L
ENGL PLANTAIN IGE QN: <0.1 KU/L
PECAN/HICK TREE IGE QN: <0.1 KU/L
RED IMP FIRE ANT IGE QN: 3.81 KU/L
TIMOTHY IGE QN: <0.1 KU/L
WEST RAGWEED IGE QN: <0.1 KU/L
WHITE OAK IGE QN: <0.1 KU/L

## 2024-07-22 ENCOUNTER — PATIENT MESSAGE (OUTPATIENT)
Dept: ALLERGY | Facility: CLINIC | Age: 49
End: 2024-07-22

## 2024-07-22 ENCOUNTER — TELEPHONE (OUTPATIENT)
Dept: ALLERGY | Facility: CLINIC | Age: 49
End: 2024-07-22
Payer: COMMERCIAL

## 2024-07-22 ENCOUNTER — OFFICE VISIT (OUTPATIENT)
Dept: ALLERGY | Facility: CLINIC | Age: 49
End: 2024-07-22
Payer: COMMERCIAL

## 2024-07-22 ENCOUNTER — OFFICE VISIT (OUTPATIENT)
Dept: CARDIOLOGY | Facility: CLINIC | Age: 49
End: 2024-07-22
Payer: COMMERCIAL

## 2024-07-22 VITALS — HEIGHT: 62 IN | BODY MASS INDEX: 27.34 KG/M2 | WEIGHT: 148.56 LBS

## 2024-07-22 VITALS
SYSTOLIC BLOOD PRESSURE: 95 MMHG | WEIGHT: 148.38 LBS | HEIGHT: 62 IN | BODY MASS INDEX: 27.3 KG/M2 | DIASTOLIC BLOOD PRESSURE: 75 MMHG | HEART RATE: 86 BPM

## 2024-07-22 DIAGNOSIS — T78.2XXD ANAPHYLAXIS, SUBSEQUENT ENCOUNTER: ICD-10-CM

## 2024-07-22 DIAGNOSIS — T63.421D FIRE ANT BITE, ACCIDENTAL OR UNINTENTIONAL, SUBSEQUENT ENCOUNTER: Primary | ICD-10-CM

## 2024-07-22 DIAGNOSIS — R60.0 BILATERAL LOWER EXTREMITY EDEMA: Primary | ICD-10-CM

## 2024-07-22 DIAGNOSIS — E66.3 OVERWEIGHT (BMI 25.0-29.9): ICD-10-CM

## 2024-07-22 DIAGNOSIS — T14.8XXA BRUISING: ICD-10-CM

## 2024-07-22 DIAGNOSIS — J30.9 CHRONIC ALLERGIC RHINITIS: ICD-10-CM

## 2024-07-22 DIAGNOSIS — R60.9 LIPEDEMA: Chronic | ICD-10-CM

## 2024-07-22 DIAGNOSIS — L98.7 LOOSE SKIN: ICD-10-CM

## 2024-07-22 DIAGNOSIS — I89.0 LYMPHEDEMA: Chronic | ICD-10-CM

## 2024-07-22 PROCEDURE — 99215 OFFICE O/P EST HI 40 MIN: CPT | Mod: S$GLB,,, | Performed by: STUDENT IN AN ORGANIZED HEALTH CARE EDUCATION/TRAINING PROGRAM

## 2024-07-22 PROCEDURE — 99215 OFFICE O/P EST HI 40 MIN: CPT | Mod: S$GLB,,, | Performed by: INTERNAL MEDICINE

## 2024-07-22 PROCEDURE — 99999 PR PBB SHADOW E&M-EST. PATIENT-LVL IV: CPT | Mod: PBBFAC,,, | Performed by: INTERNAL MEDICINE

## 2024-07-22 PROCEDURE — 99999 PR PBB SHADOW E&M-EST. PATIENT-LVL III: CPT | Mod: PBBFAC,,, | Performed by: STUDENT IN AN ORGANIZED HEALTH CARE EDUCATION/TRAINING PROGRAM

## 2024-07-22 RX ORDER — MONTELUKAST SODIUM 10 MG/1
10 TABLET ORAL DAILY
Qty: 5 TABLET | Refills: 0 | Status: SHIPPED | OUTPATIENT
Start: 2024-07-22 | End: 2024-07-27

## 2024-07-22 RX ORDER — PREDNISONE 20 MG/1
20 TABLET ORAL 2 TIMES DAILY
Qty: 10 TABLET | Refills: 0 | Status: SHIPPED | OUTPATIENT
Start: 2024-07-22 | End: 2024-07-27

## 2024-07-22 RX ORDER — FAMOTIDINE 20 MG/1
20 TABLET, FILM COATED ORAL 2 TIMES DAILY
Qty: 10 TABLET | Refills: 0 | Status: SHIPPED | OUTPATIENT
Start: 2024-07-22 | End: 2024-07-27

## 2024-07-22 RX ORDER — CETIRIZINE HYDROCHLORIDE 10 MG/1
10 TABLET ORAL 2 TIMES DAILY
Qty: 10 TABLET | Refills: 0 | Status: SHIPPED | OUTPATIENT
Start: 2024-07-22 | End: 2024-07-27

## 2024-07-22 RX ORDER — TRIAMCINOLONE ACETONIDE 1 MG/G
OINTMENT TOPICAL 2 TIMES DAILY
Qty: 80 G | Refills: 1 | Status: SHIPPED | OUTPATIENT
Start: 2024-07-22

## 2024-07-22 NOTE — TELEPHONE ENCOUNTER
----- Message from Ginny Barkley MD sent at 7/22/2024  3:51 PM CDT -----  Regarding: Fire ant rush 8/20 - request  Can we add her to 8/20 Rush, since she's venom?  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  Fully prepped today, ordering extracts.

## 2024-07-22 NOTE — PATIENT INSTRUCTIONS
Assessment/Plan:  Leia Shultz is a 49 y.o. female with BLE lymphedema/lipedema, venous insufficiency, Raynaud's disease, Gastric sleeve surgery (2014), Obesity, never smoker presents for a follow up appointment.    BLE Edema- Due to venous Insufficiency/ Lymphedema/Lipedema. Now significantly improved and stable.  Continue to perform lymphedema clinic techniques at home.  Continue graduated compression hose.  Limit sodium intake to 2000 mg daily.  Limit volume intake to 1.5 L daily.  Elevate legs when resting. Continue Bumex 0.5 mg daily.     2. Lumbar Disc Disease- Follow up with Neurosurgery.      3. Obesity- Continue exercise and lifestyle modification for weight loss. Taking wegovy.  Refer to Bariatric Surgery for evaluation given loose skin concerns.     4. ASCVD risk score is: 0.5%  on 1/23/2024.    5. Bruising- Etiology unclear.  Refer to Hematology for evaluation.     Follow up in 6 months

## 2024-07-22 NOTE — PROGRESS NOTES
[facility]      Chief Complaint   Patient presents with    Lymphedema       Patient ID: Leia Shultz is a 49 y.o. female with BLE lymphedema/lipedema, venous insufficiency, Raynaud's disease, Gastric sleeve surgery (2014), Obesity, never smoker presents for a follow up appointment. Pertinent history/events are as follows:    Patient kindly referred by Ms. Debby Meng for evaluation of both leg swelling.    -At our initial clinic visit on 1/24/2024, Ms. Shultz reported swelling of both legs for many years.  She also reports she bruise easily, she experiences pain in her left knee, right thigh region.  She reports she has family history positive for lymphedema. She wears compression stockings daily.  She also reports of using some devices for edema in her legs bought online.  She walks 10,000 steps daily, follows keto diet.  She claimed during her childhood her she had some fracture in back, compound # L1.  Plan:   BL LE Edema: Likely due to Venous Insufficiency/ Lymphedema/Lipedema. Check BLE Venous Ultrasound and Ankle brachial Index. Refer to lymphedema clinic.  Recommend wearing graduated compression hose.  Limit sodium intake to 2000 mg daily.  Limit volume intake to 1.5 L daily.  Elevate legs when resting. Start Bumex 0.5 mg daily and check CMP in 1 week. Check MRI Lumbar Spine and refer to Neurosurgery to evaluate any neurological cause for her pain in lower extremity given her history of compound fracture of lumbar spine in her childhood.   Obesity: Encouraged diet, exercise and lifestyle modification for weight loss. Taking wegovy.   ASCVD risk score is: 0.5%  on 1/23/2024.    -5/7/2024 clinic visit: Ms. Shultz reports significant improvement in BLE edema.  She has completed lymphedema clinic and continue to perform techniques at home.  She was evaluated by Neurosurgery for lumbar disc disease.  MRI Lumbar Spine on 2/14/2024 revealed broad-based disc bulge, facet degenerative change  and ligamentum flavum thickening which contribute to moderate central canal narrowing L2 2-L3 and L3-L4 and mild-moderate central canal narrowing L4-L5.  WILMAN Study on 2/8/2024 demonstrated normal resting ABIs bilaterally.  BLE Venous Ultrasound on 1/29/2024 revealed no evidence of deep venous thrombosis in either lower extremity. Findings suggestive of left popliteal fossa cyst measuring up to 2.7 cm.  Plan:   BL LE Edema- Due to venous Insufficiency/ Lymphedema/Lipedema. Now significantly improved.   Continue to perform lymphedema clinic techniques at home.  Continue graduated compression hose.  Limit sodium intake to 2000 mg daily.  Limit volume intake to 1.5 L daily.  Elevate legs when resting. Continue Bumex 0.5 mg daily.   Lumbar Disc Disease- Follow up with Neurosurgery.    Obesity- Continue exercise and lifestyle modification for weight loss. Taking wegovy.   ASCVD risk score is: 0.5%  on 1/23/2024.    HPI:  Ms. Shultz reports loose skin since losing a significant amount of weight.  She is concerned about lipedema involving the weight loss.  She reports bruising at bilateral lower extremities.  She has no claudication symptoms.    Past Medical History:   Diagnosis Date    Anemia     Arthritis     Heart murmur     Lipedema     Lymphedema     Urticaria      Past Surgical History:   Procedure Laterality Date    COSMETIC SURGERY  October 2016    excess skin removal on arms and stomach    DILATION AND CURETTAGE OF UTERUS  2002    gastric sleeve      LAPAROSCOPIC GASTRIC BANDING      ROOT CANAL      2    WISDOM TOOTH EXTRACTION      all 4     Social History     Socioeconomic History    Marital status:     Number of children: 0   Tobacco Use    Smoking status: Never     Passive exposure: Never    Smokeless tobacco: Never   Substance and Sexual Activity    Alcohol use: Yes     Comment: rare    Drug use: Never    Sexual activity: Not Currently     Partners: Male     Birth control/protection: I.U.D.      Comment: 17 -vasectomy     Social Determinants of Health     Financial Resource Strain: Low Risk  (2024)    Received from [facility]    Overall Financial Resource Strain (CARDIA)     Difficulty of Paying Living Expenses: Not very hard   Food Insecurity: No Food Insecurity (2024)    Received from [facility]    Hunger Vital Sign     Worried About Running Out of Food in the Last Year: Never true     Ran Out of Food in the Last Year: Never true   Transportation Needs: No Transportation Needs (2024)    Received from [facility]    PRAPARE - Transportation     Lack of Transportation (Medical): No     Lack of Transportation (Non-Medical): No   Physical Activity: Insufficiently Active (2024)    Received from [facility]    Exercise Vital Sign     Days of Exercise per Week: 3 days     Minutes of Exercise per Session: 30 min   Stress: Stress Concern Present (2024)    Received from [facility]    [employer] [place] [employer] - Occupational Stress Questionnaire     Feeling of Stress : To some extent   Housing Stability: Low Risk  (2024)    Housing Stability Vital Sign     Unable to Pay for Housing in the Last Year: No     Number of Places Lived in the Last Year: 1     Unstable Housing in the Last Year: No     Family History   Problem Relation Name Age of Onset    Allergies Mother Shefali Tarikelgerard     Clotting disorder Mother Shefali Ningman     Hypotension Mother Shefali Tarikelgerard     Arthritis Mother Shefali Fosselgerard     Depression Mother Shefali Tarikelgerard     Skin cancer Father Daniel Bitting     Cancer Father Daniel Novoa         Skin Cancer    Allergies Sister      Irregular heart beat Sister Gisselle         prolonged QT    Crohn's disease Sister Gisselle     Arthritis Sister Gisselle         Letnaunchyn    Cancer Brother Byron Novoa          2018 from rare form of spinal cancer    Early death Brother Byron Novoa         Cancer at 39    Alcohol abuse  Maternal Uncle Tim          of alcohol-related liver failure    Early death Maternal Uncle Tim     No Known Problems Maternal Grandmother      No Known Problems Maternal Grandfather      No Known Problems Paternal Grandmother      No Known Problems Paternal Grandfather         Review of patient's allergies indicates:   Allergen Reactions    Fire ant Anxiety, Blisters, Dermatitis, Hives, Itching, Rash and Swelling    Pollen extracts Dermatitis, Hives and Rash    Sulfa (sulfonamide antibiotics) Nausea Only       Medication List with Changes/Refills   Current Medications    BUMETANIDE (BUMEX) 1 MG TABLET    Take 0.5 tablets (0.5 mg total) by mouth once daily.    EPINEPHRINE (EPIPEN 2-KEVIN) 0.3 MG/0.3 ML ATIN    Inject 0.3 mLs (0.3 mg total) into the muscle once. for 1 dose    KETOCONAZOLE (NIZORAL) 2 % CREAM    Apply topically once daily.    MULTIVITAMIN ORAL    Take by mouth once daily.    NAFTIFINE 2 % CREA    Apply 1 Application topically once daily.    ONDANSETRON (ZOFRAN) 8 MG TABLET    Take 8 mg by mouth as needed for Nausea.    SEMAGLUTIDE, WEIGHT LOSS, (WEGOVY) 2.4 MG/0.75 ML PNIJ    Inject 2.4 mg into the skin once a week    SERTRALINE (ZOLOFT) 100 MG TABLET    Take 1.5 tablets (150 mg total) by mouth once daily. As needed.    TRAZODONE (DESYREL) 100 MG TABLET    Take 1.5 tablets (150 mg total) by mouth every evening.   Discontinued Medications    PREDNISONE (DELTASONE) 20 MG TABLET    Take 3 tab day 1, take 2 tab day 2, take 1 tab day 3, take 0.5 tab day 4 and 5    SERTRALINE (ZOLOFT) 50 MG TABLET    Take 50 mg by mouth.       Review of Systems  Constitution: Denies chills, fever, and sweats.  HENT: Denies headaches or blurry vision.  Cardiovascular: Denies chest pain or irregular heart beat.  Respiratory: Denies cough or shortness of breath.  Gastrointestinal: Denies abdominal pain, nausea, or vomiting.  Musculoskeletal: Positive for both legs muscle cramps, both leg swelling  Neurological: Denies  "dizziness or focal weakness.  Psychiatric/Behavioral: Normal mental status.  Hematologic/Lymphatic: Denies bleeding problem or easy bruising/bleeding.  Skin: Denies rash or suspicious lesions    Physical Examination  BP 95/75   Pulse 86   Ht 5' 2" (1.575 m)   Wt 67.3 kg (148 lb 5.9 oz)   BMI 27.14 kg/m²     Constitutional: No acute distress, conversant  HEENT: Sclera anicteric, Pupils equal, round and reactive to light, extraocular motions intact, Oropharynx clear  Neck: No JVD, no carotid bruits  Cardiovascular: regular rate and rhythm, no murmur, rubs or gallops, normal S1/S2  Pulmonary: Clear to auscultation bilaterally  Abdominal: Abdomen soft, nontender, nondistended, positive bowel sounds  Extremities: both lower extremity prominent Varicose veins,  both lower extremity edema, changes consistent with lymphedema and lipedema  Pulses:  Carotid pulses are 2+ on the right side, and 2+ on the left side.  Radial pulses are 2+ on the right side, and 2+ on the left side.   Femoral pulses are 2+ on the right side, and 2+ on the left side.  Popliteal pulses are 2+ on the right side, and 2+ on the left side.   Dorsalis pedis pulses are 2+ on the right side, and 2+ on the left side.   Posterior tibial pulses are 2+ on the right side, and 2+ on the left side.    Skin: No ecchymosis, erythema, or ulcers  Psych: Alert and oriented x 3, appropriate affect  Neuro: CNII-XII intact, no focal deficits    Labs:  Most Recent Data  CBC:   Lab Results   Component Value Date    WBC 6.95 01/23/2024    HGB 14.4 01/23/2024    HCT 43.9 01/23/2024     01/23/2024    MCV 98 01/23/2024    RDW 12.6 01/23/2024     BMP:   Lab Results   Component Value Date     02/01/2024    K 4.1 02/01/2024     02/01/2024    CO2 28 02/01/2024    BUN 13 02/01/2024    CREATININE 0.7 02/01/2024    GLU 80 02/01/2024    CALCIUM 9.0 02/01/2024    MG 2.1 02/12/2018     LFTS;   Lab Results   Component Value Date    PROT 6.8 02/01/2024    ALBUMIN " 3.7 02/01/2024    BILITOT 0.7 02/01/2024    AST 20 02/01/2024    ALKPHOS 58 02/01/2024    ALT 13 02/01/2024     COAGS:   Lab Results   Component Value Date    INR 0.9 03/17/2022     FLP:   Lab Results   Component Value Date    CHOL 213 (H) 01/23/2024    HDL 73 01/23/2024    LDLCALC 129.6 01/23/2024    TRIG 52 01/23/2024    CHOLHDL 34.3 01/23/2024     CARDIAC:   Lab Results   Component Value Date    BNP 18 10/24/2023       Imaging:    EKG 10/23/2023:  Normal sinus rhythm    MRI Lumbar Spine 2/14/2024:  Broad-based disc bulge, facet degenerative change and ligamentum flavum thickening which contribute to moderate central canal narrowing L2 2-L3 and L3-L4 and mild-moderate central canal narrowing L4-L5     WILMAN Study 2/8/2024:    Normal resting ABIs bilaterally.    PVR waveforms are moderately dampened at the low thigh level bilaterally, and ankle level bilaterally.    BLE Venous Ultrasound 1/29/2024:  No evidence of deep venous thrombosis in either lower extremity.   Findings suggestive of left popliteal fossa cyst measuring up to 2.7 cm.    Assessment/Plan:  Leia Shultz is a 49 y.o. female with BLE lymphedema/lipedema, venous insufficiency, Raynaud's disease, Gastric sleeve surgery (2014), Obesity, never smoker presents for a follow up appointment.    BLE Edema- Due to venous Insufficiency/ Lymphedema/Lipedema. Now significantly improved and stable.  Continue to perform lymphedema clinic techniques at home.  Continue graduated compression hose.  Limit sodium intake to 2000 mg daily.  Limit volume intake to 1.5 L daily.  Elevate legs when resting. Continue Bumex 0.5 mg daily.     2. Lumbar Disc Disease- Follow up with Neurosurgery.      3. Obesity- Continue exercise and lifestyle modification for weight loss. Taking wegovy.  Refer to Bariatric Surgery for evaluation given loose skin concerns.     4. ASCVD risk score is: 0.5%  on 1/23/2024.    5. Bruising- Etiology unclear.  Refer to Hematology for  evaluation.     Follow up in 6 months    Total duration of face to face visit time 30 minutes.  Total time spent counseling greater than fifty percent of total visit time.  Counseling included discussion regarding imaging findings, diagnosis, possibilities, treatment options, risks and benefits.  The patient had many questions regarding the options and long-term effects.    Heriberto Kirby MD, PhD  Interventional Cardiology

## 2024-07-22 NOTE — PROGRESS NOTES
"ALLERGY & IMMUNOLOGY CLINIC   HISTORY OF PRESENT ILLNESS   Referral from: No ref. provider found  CC: fire ant allergy    HPI: Leia Shultz is a 49 y.o. female  History obtained from patient    Since last visit:  Has been doing well  +fire ant  No further stings, typically happens when she gardens which she enjoys doing  +dust mite, only allergies in the spring time not year round  +low positive to cats, no issues around them    Initial visit  Left leg twice became very swollen and is painful   No itch  Happened twice in last 2 weeks  Resolves within a day    When gets stung by fire ant: lightheaded, nausea, pass out or feels like it, can't raise arms above heart level. Has happened 3 times since 2021, since moving to [facility].   First time pulled a weed ended up to 30 stings  Last time was 1 sting and still had same symptoms  Arm remained swollen for 3 weeks  Left a pustule  1 month ago was last sting    Drug Allergies:   Review of patient's allergies indicates:   Allergen Reactions    Fire ant Anxiety, Blisters, Dermatitis, Hives, Itching, Rash and Swelling    Pollen extracts Dermatitis, Hives and Rash    Sulfa (sulfonamide antibiotics) Nausea Only         MEDICAL HISTORY   SurgHx:  Past Surgical History:   Procedure Laterality Date    COSMETIC SURGERY  October 2016    excess skin removal on arms and stomach    DILATION AND CURETTAGE OF UTERUS  2002    gastric sleeve      LAPAROSCOPIC GASTRIC BANDING      ROOT CANAL      2    WISDOM TOOTH EXTRACTION      all 4        PHYSICAL EXAM   VS: Ht 5' 2" (1.575 m)   Wt 67.4 kg (148 lb 9.4 oz)   BMI 27.18 kg/m²   GENERAL: NAD, well nourished, well appearing  EYES: no conjunctival injection, no discharge, no infraorbital shiners   no increased WOB  DERM: no rashes, +BL LE lymphedema     ASSESSMENT & PLAN     Fire ant anaphylaxis: severe (lightheaded, LOC)  - Tryptase 5.7  - Fire ant IgE 3  - Would like to start VIT  - Consent signed for VIT  - Has an epipen  - Vials ordered " for fire ant  - Premeds sent    Chronic mixed rhinitis: sensitized to dust mite and low to cat  - Deferred treatment, as symptoms mostly in spring (no AIT or Odactra, no issues around cat)    Adverse reaction to food: swelling of left leg x 2  - Reviewed multiple food reactions that we know about including many caused by foods, however IgE testing is the only one available (along with celiac- a sensitivity) and she does not have IgE mediated reactions based on history (which is anaphylaxis - symptoms like hives, vomiting, SOB, lightheadedness)  - Can try food elimination, which can be more helpful if you have a suspected food, stop eating it for 3 days, then reintroduce with a large serving and see if you notice a change    Intermittent edema of leg  - Impressive on photo, happened twice entire left leg during last 2 weeks  - Not consistent with a food allergy  - Would recommend continued evaluation with her lymphedema vascular surgery specialist    Sulfa allergy  - Can review at follow-up    Follow up: 8/20 for anna Anthony (premeds sent, consent signed, has epipen)    I spent a total of 60 minutes on the day of the visit. This includes face to face time and non-face to face time preparing to see the patient (eg, review of tests), obtaining and/or reviewing separately obtained history, documenting clinical information in the electronic or other health record, independently interpreting results and communicating results to the patient/family/caregiver, or care coordinator.

## 2024-07-22 NOTE — TELEPHONE ENCOUNTER
----- Message from Radha GLYNN May sent at 7/22/2024 12:50 PM CDT -----  Regarding: appt  Contact: [phone]  Pt requesting to have today's appt switched to a virtual visit due to the power lines being down on 90. Pls call to discuss.

## 2024-07-23 ENCOUNTER — PATIENT MESSAGE (OUTPATIENT)
Dept: NEUROSURGERY | Facility: CLINIC | Age: 49
End: 2024-07-23

## 2024-07-23 ENCOUNTER — OFFICE VISIT (OUTPATIENT)
Dept: NEUROSURGERY | Facility: CLINIC | Age: 49
End: 2024-07-23
Payer: COMMERCIAL

## 2024-07-23 VITALS
BODY MASS INDEX: 27.3 KG/M2 | HEART RATE: 102 BPM | WEIGHT: 148.38 LBS | HEIGHT: 62 IN | SYSTOLIC BLOOD PRESSURE: 130 MMHG | DIASTOLIC BLOOD PRESSURE: 77 MMHG | OXYGEN SATURATION: 98 %

## 2024-07-23 DIAGNOSIS — G95.9 MYELOPATHY: ICD-10-CM

## 2024-07-23 DIAGNOSIS — M51.24 THORACIC DISC HERNIATION: Primary | ICD-10-CM

## 2024-07-23 PROCEDURE — 99214 OFFICE O/P EST MOD 30 MIN: CPT | Mod: S$GLB,,, | Performed by: PHYSICIAN ASSISTANT

## 2024-07-23 RX ORDER — GABAPENTIN 300 MG/1
300 CAPSULE ORAL 3 TIMES DAILY
Qty: 90 CAPSULE | Refills: 1 | Status: SHIPPED | OUTPATIENT
Start: 2024-07-23

## 2024-07-23 NOTE — PROGRESS NOTES
[facility]  Neurosurgery    SUBJECTIVE:     Interval history 07/23/2024:   Patient returns to clinic as a referral from vascular surgery for evaluation pain in her arms and legs.  She reports tingling in her calves as well as significant burning pain in her anterior shins.  Her arms are tender to the touch along her forearms.  All symptoms have been present for around 6 weeks and are getting worse.  She notes increased leg pain with walking up stairs.  She has also been bruising more frequently and has been referred to a hematologist.    Of note, she does have a known disc herniation at T11-12.  Complaints of knee pain from her last visit are now fully resolved.    History of Present Illness 02/15/2024:  Leia Shultz is a 49 y.o. female with lymphedema, vitamin deficiency, and chronic back pain who presents with left knee pain. Left knee pain is located behind her knee and has been present for some time. Pain increased in severity recently without a known trauma or inciting event. She suffered a fracture in her lumbar spine as a child and reports multiple disc herniations over the years. Lumbar MRI was ordered by cardiology and she was referred to neurosurgery for review.     Endorses chronic thoracic back pain, unchanged. Denies numbness and weakness of BLE. Denies pain radiating from her back to her knee.   .  Recent BLE ultrasound revealed a 2.7cm popliteal cyst behind her left knee. She believes this is likely the source of her pain. It has not yet been evaluated by orthopedics.     (Not in a hospital admission)      Review of patient's allergies indicates:   Allergen Reactions    Fire ant Anxiety, Blisters, Dermatitis, Hives, Itching, Rash and Swelling    Pollen extracts Dermatitis, Hives and Rash    Sulfa (sulfonamide antibiotics) Nausea Only       Past Medical History:   Diagnosis Date    Anemia     Arthritis     Heart murmur     Lipedema     Lymphedema     Urticaria      Past Surgical History:  "  Procedure Laterality Date    COSMETIC SURGERY  2016    excess skin removal on arms and stomach    DILATION AND CURETTAGE OF UTERUS      gastric sleeve      LAPAROSCOPIC GASTRIC BANDING      ROOT CANAL      2    WISDOM TOOTH EXTRACTION      all 4     Family History   Problem Relation Name Age of Onset    Allergies Mother Shefali Mccrary     Clotting disorder Mother Shefali Mccrary     Hypotension Mother Shefali Mccrary     Arthritis Mother Shefali Mccrary     Depression Mother Shefali Mccrary     Skin cancer Father Daniel Bitting     Cancer Father Daniel Bitfidel         Skin Cancer    Allergies Sister      Irregular heart beat Sister Gisselle         prolonged QT    Crohn's disease Sister Gisselle     Arthritis Sister Gisselle         Letnaunchyn    Cancer Brother Byron Novoa          2018 from rare form of spinal cancer    Early death Brother Byron Novoa         Cancer at 39    Alcohol abuse Maternal Uncle Tim          of alcohol-related liver failure    Early death Maternal Uncle Tim     No Known Problems Maternal Grandmother      No Known Problems Maternal Grandfather      No Known Problems Paternal Grandmother      No Known Problems Paternal Grandfather       Social History     Tobacco Use    Smoking status: Never     Passive exposure: Never    Smokeless tobacco: Never   Substance Use Topics    Alcohol use: Yes     Comment: rare    Drug use: Never        Review of Systems:  As noted in HPI    OBJECTIVE:     Vital Signs (Most Recent):  Vitals:    24 0925   BP: 130/77   Pulse: 102   SpO2: 98%   Weight: 67.3 kg (148 lb 5.9 oz)   Height: 5' 2" (1.575 m)   PainSc:   3   PainLoc: Leg         Physical Exam:  General: well developed, well nourished, no distress.   Head: normocephalic, atraumatic  Neurologic: Awake, Alert, Oriented x 4. Thought content appropriate.  GCS: Motor: 6/Verbal: 5/Eyes: 4 GCS Total: 15  Language: No aphasia  Speech: No dysarthria  Cranial nerves: face " symmetric, tongue midline, CN II-XII grossly intact.   Eyes: pupils equal, round, reactive to light with accomodation, EOMI.  Pulmonary: normal respirations, no signs of respiratory distress  Abdomen: soft, non-distended, not tender to palpation    Sensory: intact to light touch throughout b/l upper and lower extremities    Motor Strength: Moves all extremities spontaneously with good tone.  Full strength upper and lower extremities. No abnormal movements seen.     Strength  Deltoids Triceps Biceps Wrist Extension Wrist Flexion Hand    Upper: R 5/5 5/5 5/5 5/5 5/5 5/5    L 5/5 5/5 5/5 5/5 5/5 5/5     Iliopsoas Quadriceps  Tibialis  anterior Gastro- cnemius EHL   Lower: R 5/5 5/5  5/5 5/5 5/5    L 5/5 5/5  5/5 5/5 5/5     Vilchis: absent  Clonus: absent    Skin: Skin is warm, dry and intact.  Bruising noted to b/l shins    Gait: normal     Lymphedema to BLE        Diagnostic Results:  I have personally reviewed imaging and agree with the findings.     MRI lumbar spine, 2/14/24:  Broad-based disc bulge, facet degenerative change and ligamentum flavum thickening which contribute to moderate central canal narrowing L2 2-L3 and L3-L4 and mild-moderate central canal narrowing L4-L5.   Moderate disc herniation at T11-12 causing mild-moderate central stenosis     BLE ultrasound 1/29/2024  No evidence of deep venous thrombosis in either lower extremity.     Findings suggestive of left popliteal fossa cyst measuring up to 2.7 cm.    ASSESSMENT/PLAN:     Leia Shultz is a 49 y.o. female who presents for evaluation of b/l upper and lower extremity pains.  She also endorses leg pain and fatigue when walking up stairs.  She has a known thoracic disc herniation at T11-12.  I will order an updated MRI to rule out progression of thoracic stenosis.  I will also order a cervical MRI given complaints of radicular arm pain.    Virtual follow up for imaging review     Please feel free to call with any further  questions        Florence Franco PA-C  [facility]  Department of Neurosurgery  [phone]    Disclaimer: This note was dictated by speech recognition. Minor errors in transcription may be present.  Please call with any questions.

## 2024-07-23 NOTE — PATIENT INSTRUCTIONS
Begin taking Gabapentin 300mg nightly for 3-5 days.  If this does not make you drowsy when you wake up in the morning, you may increase to twice per day for 3-5 days.  If tolerable increase to 3 times per day.    ---It can take a few weeks for the medication to build up in your system. If you don't notice a change in your symptoms after 3 weeks, call Neurosurgery to discuss increasing the dose.

## 2024-08-08 ENCOUNTER — HOSPITAL ENCOUNTER (OUTPATIENT)
Dept: RADIOLOGY | Facility: HOSPITAL | Age: 49
Discharge: HOME | End: 2024-08-08
Attending: PHYSICIAN ASSISTANT
Payer: COMMERCIAL

## 2024-08-08 ENCOUNTER — CLINICAL SUPPORT (OUTPATIENT)
Dept: REHABILITATION | Facility: HOSPITAL | Age: 49
End: 2024-08-08
Payer: COMMERCIAL

## 2024-08-08 DIAGNOSIS — R60.9 LIPEDEMA: Chronic | ICD-10-CM

## 2024-08-08 DIAGNOSIS — M51.24 THORACIC DISC HERNIATION: ICD-10-CM

## 2024-08-08 DIAGNOSIS — G95.9 MYELOPATHY: ICD-10-CM

## 2024-08-08 DIAGNOSIS — I89.0 LYMPHEDEMA: Primary | Chronic | ICD-10-CM

## 2024-08-08 PROCEDURE — 97140 MANUAL THERAPY 1/> REGIONS: CPT

## 2024-08-08 PROCEDURE — 72146 MRI CHEST SPINE W/O DYE: CPT | Mod: 26,,, | Performed by: RADIOLOGY

## 2024-08-08 PROCEDURE — 72141 MRI NECK SPINE W/O DYE: CPT | Mod: 26,,, | Performed by: RADIOLOGY

## 2024-08-08 PROCEDURE — 72146 MRI CHEST SPINE W/O DYE: CPT | Mod: TC

## 2024-08-08 PROCEDURE — 72141 MRI NECK SPINE W/O DYE: CPT | Mod: TC

## 2024-08-09 ENCOUNTER — PATIENT MESSAGE (OUTPATIENT)
Dept: REHABILITATION | Facility: HOSPITAL | Age: 49
End: 2024-08-09
Payer: COMMERCIAL

## 2024-08-12 ENCOUNTER — TELEPHONE (OUTPATIENT)
Dept: NEUROSURGERY | Facility: CLINIC | Age: 49
End: 2024-08-12
Payer: COMMERCIAL

## 2024-08-13 ENCOUNTER — OFFICE VISIT (OUTPATIENT)
Dept: NEUROSURGERY | Facility: CLINIC | Age: 49
End: 2024-08-13
Payer: COMMERCIAL

## 2024-08-13 DIAGNOSIS — I89.0 LYMPHEDEMA: Chronic | ICD-10-CM

## 2024-08-13 DIAGNOSIS — M51.24 THORACIC DISC HERNIATION: Primary | ICD-10-CM

## 2024-08-13 DIAGNOSIS — M79.605 PAIN IN BOTH LOWER EXTREMITIES: ICD-10-CM

## 2024-08-13 DIAGNOSIS — M79.604 PAIN IN BOTH LOWER EXTREMITIES: ICD-10-CM

## 2024-08-13 PROCEDURE — 99213 OFFICE O/P EST LOW 20 MIN: CPT | Mod: 95,,, | Performed by: PHYSICIAN ASSISTANT

## 2024-08-13 NOTE — PROGRESS NOTES
The patient location is:  Home  The chief complaint leading to consultation is:  Imaging review    Visit type: audiovisual    Face to Face time with patient:  10 minutes  20 minutes of total time spent on the encounter, which includes face to face time and non-face to face time preparing to see the patient (eg, review of tests), Obtaining and/or reviewing separately obtained history, Documenting clinical information in the electronic or other health record, Independently interpreting results (not separately reported) and communicating results to the patient/family/caregiver, or Care coordination (not separately reported).         Each patient to whom he or she provides medical services by telemedicine is:  (1) informed of the relationship between the physician and patient and the respective role of any other health care provider with respect to management of the patient; and (2) notified that he or she may decline to receive medical services by telemedicine and may withdraw from such care at any time.    Notes:     [facility]  Neurosurgery    SUBJECTIVE:     Interval history 08/13/2024:   Patient returns via virtual visit for imaging review.  She reports that the numbness in her arms are significantly improved.  She does still have the burning pain in her legs.  The T11-12 disc herniation is stable compared to her prior MRI with no more than moderate central stenosis.  She also has an additional disc herniation noted at T7-8 which is not causing any severe central or foraminal stenosis.    She does report a long h/o pain in her mid back, just below her bra line.  She wears a 30 G versus 30 H bra size and wonders if this could be contributing to her chronic back pain.    Interval history 07/23/2024:   Patient returns to clinic as a referral from vascular surgery for evaluation pain in her arms and legs.  She reports tingling in her calves as well as significant burning pain in her anterior shins.  Her arms  are tender to the touch along her forearms.  All symptoms have been present for around 6 weeks and are getting worse.  She notes increased leg pain with walking up stairs.  She has also been bruising more frequently and has been referred to a hematologist.    Of note, she does have a known disc herniation at T11-12.  Complaints of knee pain from her last visit are now fully resolved.    History of Present Illness 02/15/2024:  Leia Shultz is a 49 y.o. female with lymphedema, vitamin deficiency, and chronic back pain who presents with left knee pain. Left knee pain is located behind her knee and has been present for some time. Pain increased in severity recently without a known trauma or inciting event. She suffered a fracture in her lumbar spine as a child and reports multiple disc herniations over the years. Lumbar MRI was ordered by cardiology and she was referred to neurosurgery for review.     Endorses chronic thoracic back pain, unchanged. Denies numbness and weakness of BLE. Denies pain radiating from her back to her knee.   .  Recent BLE ultrasound revealed a 2.7cm popliteal cyst behind her left knee. She believes this is likely the source of her pain. It has not yet been evaluated by orthopedics.     (Not in a hospital admission)      Review of patient's allergies indicates:   Allergen Reactions    Fire ant Anxiety, Blisters, Dermatitis, Hives, Itching, Rash and Swelling    Pollen extracts Dermatitis, Hives and Rash    Sulfa (sulfonamide antibiotics) Nausea Only       Past Medical History:   Diagnosis Date    Anemia     Arthritis     Heart murmur     Lipedema     Lymphedema     Urticaria      Past Surgical History:   Procedure Laterality Date    COSMETIC SURGERY  October 2016    excess skin removal on arms and stomach    DILATION AND CURETTAGE OF UTERUS  2002    gastric sleeve      LAPAROSCOPIC GASTRIC BANDING      ROOT CANAL      2    WISDOM TOOTH EXTRACTION      all 4       Social History     Tobacco  Use    Smoking status: Never     Passive exposure: Never    Smokeless tobacco: Never   Substance Use Topics    Alcohol use: Yes     Comment: rare    Drug use: Never        Review of Systems:  As noted in HPI    OBJECTIVE:     Vital Signs (Most Recent):  There were no vitals filed for this visit.    Physical Exam:  General: well developed, well nourished, no distress.   Head: normocephalic, atraumatic  Neurologic: Awake, Alert, Oriented x 4. Thought content appropriate.  GCS: Motor: 6/Verbal: 5/Eyes: 4 GCS Total: 15  Language: No aphasia  Speech: No dysarthria  Cranial nerves: face symmetric, tongue midline, CN II-XII grossly intact.   Eyes: pupils equal, round, reactive to light with accomodation, EOMI.  Pulmonary: normal respirations, no signs of respiratory distress    Motor Strength: Moves all extremities spontaneously with good tone.    Skin: Skin is warm, dry and intact.  Bruising noted to b/l shins    Diagnostic Results:  I have personally reviewed imaging and agree with the findings.     MRI cervical spine 8/8/24  Mild cervical spondylosis  No severe central stenosis at any level  Right sided cystic lesion at C6-7    MRI thoracic spine 8/8/24  Multilevel thoracic spondylosis as detailed.  Findings most pronounced at T11-T12 contributing to moderate spinal canal stenosis and left greater than right neural foraminal narrowing, stable compared to prior imaging.  T7-8 disc herniation with no significant central or foraminal stenosis  Moderate degenerative changes in the mid-lower thoracic spine     MRI lumbar spine, 2/14/24:  Broad-based disc bulge, facet degenerative change and ligamentum flavum thickening which contribute to moderate central canal narrowing L2 2-L3 and L3-L4 and mild-moderate central canal narrowing L4-L5.   Moderate disc herniation at T11-12 causing mild-moderate central stenosis     BLE ultrasound 1/29/2024  No evidence of deep venous thrombosis in either lower extremity.     Findings  suggestive of left popliteal fossa cyst measuring up to 2.7 cm.    ASSESSMENT/PLAN:     Leia Shultz is a 49 y.o. female who presents via virtual visit for imaging review 2/2 complaints of b/l upper and lower extremity pains. Subjective arm numbness is reportedly improved. Imaging reveals a stable disc herniation at T11-12 along with a smaller disc herniation at T7-8 without significant nerve impingement. She does have advanced degenerative changes in the mid-lower thoracic spine. We discussed her chronic mid back pain. While this is multifactorial, I do agree that her large breast size is likely a contributing factor.     Cervical MRI with no significant nerve pinching. No further workup needed at this time given mild degenerative findings and improvement in subjective arm numbness. Will discuss right C6-7 cystic lesion with Dr. Hickman    Burning leg pain is not explained by spinal imaging. I suspect lymphedema is a contributing factor. We discussed the possibility of an EMG, but the swelling would produce limited results. She is working towards weight loss so she can qualify for lymphedema surgery. If sxs are not improved with this intervention, we can reconsider an EMG.     She has a known thoracic disc herniation at T11-12.  I will order an updated MRI to rule out progression of thoracic stenosis.  I will also order a cervical MRI given complaints of radicular arm pain.    Virtual follow up for imaging review     Please feel free to call with any further questions        Florence Franco PA-C  [facility]  Department of Neurosurgery  [phone]    Disclaimer: This note was dictated by speech recognition. Minor errors in transcription may be present.  Please call with any questions.

## 2024-08-13 NOTE — Clinical Note
No rush, can wait til after call.  Can you take a look at her cervical MRI? She has a cystic lesion at C6-7, for which she is asymptomatic. Would you recommend rescanning with contrast or leave it alone as long as she doesn't develop a C7 radiculopathy.   Thanks,  Miri

## 2024-08-20 ENCOUNTER — PATIENT MESSAGE (OUTPATIENT)
Dept: ALLERGY | Facility: CLINIC | Age: 49
End: 2024-08-20
Payer: COMMERCIAL

## 2024-08-20 ENCOUNTER — OFFICE VISIT (OUTPATIENT)
Dept: ALLERGY | Facility: CLINIC | Age: 49
End: 2024-08-20
Payer: COMMERCIAL

## 2024-08-20 VITALS — HEIGHT: 62 IN | WEIGHT: 148 LBS | BODY MASS INDEX: 27.23 KG/M2

## 2024-08-20 DIAGNOSIS — Z91.038 ALLERGY TO ANT BITE: Primary | ICD-10-CM

## 2024-08-20 PROCEDURE — 95180 RAPID DESENSITIZATION: CPT | Mod: S$GLB,,, | Performed by: STUDENT IN AN ORGANIZED HEALTH CARE EDUCATION/TRAINING PROGRAM

## 2024-08-20 PROCEDURE — 99999 PR PBB SHADOW E&M-EST. PATIENT-LVL II: CPT | Mod: PBBFAC,,, | Performed by: STUDENT IN AN ORGANIZED HEALTH CARE EDUCATION/TRAINING PROGRAM

## 2024-08-20 PROCEDURE — 99499 UNLISTED E&M SERVICE: CPT | Mod: S$GLB,,, | Performed by: STUDENT IN AN ORGANIZED HEALTH CARE EDUCATION/TRAINING PROGRAM

## 2024-08-20 NOTE — PROGRESS NOTES
"ALLERGY & IMMUNOLOGY PROCEDURE CLINIC -  RUSH FIRE ANT VENOM IMMUNOTHERAPY   HISTORY OF PRESENT ILLNESS   CC: Fire ant allergy, here for rus VIT procedure    HPI: Leia Shultz is a 49 y.o. 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  Patient is otherwise feeling well no beta blockers, no recent albuterol use in last week, no acute illness.  Patient took the following premedications as directed, starting 3 days prior to today's procedure: prednisone 20 mg bid, cetirizine 10 mg bid, famotidine 20 mg bid, and montelukast 10 mg daily      PHYSICAL EXAM   Ht 5' 2" (1.575 m)   Wt 67.1 kg (148 lb)   BMI 27.07 kg/m²   GEN:   NAD  LUNGS: no visibly increased work of breathing  DERMA: no rashes on upper extremities at planned injection sites     ALLERGEN TESTING   Immunocaps:  Fire ant IgE 3      PROCEDURE   PROCEDURE: VENOM RUSH - RAPID INDUCTION OF IMMUNOTHERAPY  Date: 8/20  Reviewed with patient the purpose of today's rus immunotherapy.  Described the procedure in detail and discussed the potential adverse reactions.  All patient questions answered.  Patient agrees to undergo the procedure today, and written informed consent was obtained at a previous visit.  Record of today's procedure as follows:    Rush immunotherapy for venom (fire ant)  Vial contains: Fire ant immunotherapy  The maintenance vials (RED TOPS) are 1:1 v/v (undiluted); three dilutions provided 1:10, 1:100,  and 1:1000 v/v.     Week 1 (Rus):   RUSH AIT   v/v Color mL   1:1,000 Green 0.3   1:100 Blue 0.1   1:100 Blue 0.3   1:10 Yellow 0.05   1:10 Yellow 0.1   1:10 Yellow 0.2   1:10 Yellow 0.3   1:1 Red 0.05   1:1 Red 0.1   1:1 Red 0.2   1:1 Red 0.3       Week 2:  0.25mL 1:1v/v, wait 30 minutes, then   0.25mL 1:1v/v, wait 30 minutes  Week 3:  Skip  Week 4:  0.5 mL 1:1v/v     And then every four weeks: 1 mL 1:1v/v for 3-5+ years total (after 6 months can consider spacing further, " up to Q12 weeks)  *20% recurrence rate after discontinuation (highest recurrence in honeybee).  **Lifelong VIT: if severe index event (syncope), if anaphylaxis on VIT double maintenance dose and lifelong VIT, frequent exposure (this re-sensitizes patients), and in patients with systemic mastocytosis.    PROCEDURE:  Start: 8:00  End: 14:00  Total procedure time which includes two hours of close observation in clinic after final injection: 6 hours (360 minutes)  Systemic reaction: None     ASSESSMENT & PLAN     Leia Shultz is a 49 y.o. female with     Fire Ant Allergy  Patient's venom allergy posed a serious risk for future systemic reaction, so patient  started on venom immunotherapy today via rapid induction.  Once at maintenance  dosing, patient advised to remain on VIT monthly for at least 3-5 yrs.      NEXT PLANNED DOSE: Split dose (0.25mL red x 2, for total dose 0.5mL red) next week, as SCIT doses at subsequent visits are based on todays highest  tolerated dose. If this patient reports in the next 14 days for injections, dosing will be as as above in procedure section.  Follow-up with physician: at least annually    Rachael Bowens MD  Allergy/Immunology Fellow

## 2024-08-22 ENCOUNTER — OFFICE VISIT (OUTPATIENT)
Dept: HEMATOLOGY/ONCOLOGY | Facility: CLINIC | Age: 49
End: 2024-08-22
Payer: COMMERCIAL

## 2024-08-22 ENCOUNTER — LAB VISIT (OUTPATIENT)
Dept: LAB | Facility: HOSPITAL | Age: 49
End: 2024-08-22
Attending: STUDENT IN AN ORGANIZED HEALTH CARE EDUCATION/TRAINING PROGRAM
Payer: COMMERCIAL

## 2024-08-22 VITALS
DIASTOLIC BLOOD PRESSURE: 74 MMHG | SYSTOLIC BLOOD PRESSURE: 104 MMHG | WEIGHT: 145.5 LBS | HEART RATE: 84 BPM | HEIGHT: 62 IN | OXYGEN SATURATION: 100 % | BODY MASS INDEX: 26.78 KG/M2

## 2024-08-22 DIAGNOSIS — T14.8XXA BRUISING: ICD-10-CM

## 2024-08-22 DIAGNOSIS — T14.8XXA BRUISING: Primary | ICD-10-CM

## 2024-08-22 DIAGNOSIS — Z98.84 HISTORY OF GASTRIC RESTRICTIVE SURGERY: ICD-10-CM

## 2024-08-22 DIAGNOSIS — Z76.89 ENCOUNTER TO ESTABLISH CARE: ICD-10-CM

## 2024-08-22 LAB
APTT PPP: 24.5 SEC (ref 21–32)
FOLATE SERPL-MCNC: 2.7 NG/ML (ref 4–24)
INR PPP: 0.9 (ref 0.8–1.2)
PROTHROMBIN TIME: 10.2 SEC (ref 9–12.5)
VIT B12 SERPL-MCNC: 428 PG/ML (ref 210–950)

## 2024-08-22 PROCEDURE — 99204 OFFICE O/P NEW MOD 45 MIN: CPT | Mod: S$GLB,,, | Performed by: STUDENT IN AN ORGANIZED HEALTH CARE EDUCATION/TRAINING PROGRAM

## 2024-08-22 PROCEDURE — 85610 PROTHROMBIN TIME: CPT | Performed by: STUDENT IN AN ORGANIZED HEALTH CARE EDUCATION/TRAINING PROGRAM

## 2024-08-22 PROCEDURE — 83921 ORGANIC ACID SINGLE QUANT: CPT | Performed by: STUDENT IN AN ORGANIZED HEALTH CARE EDUCATION/TRAINING PROGRAM

## 2024-08-22 PROCEDURE — 85730 THROMBOPLASTIN TIME PARTIAL: CPT | Performed by: STUDENT IN AN ORGANIZED HEALTH CARE EDUCATION/TRAINING PROGRAM

## 2024-08-22 PROCEDURE — 82746 ASSAY OF FOLIC ACID SERUM: CPT | Performed by: STUDENT IN AN ORGANIZED HEALTH CARE EDUCATION/TRAINING PROGRAM

## 2024-08-22 PROCEDURE — 84630 ASSAY OF ZINC: CPT | Performed by: STUDENT IN AN ORGANIZED HEALTH CARE EDUCATION/TRAINING PROGRAM

## 2024-08-22 PROCEDURE — 99999 PR PBB SHADOW E&M-EST. PATIENT-LVL III: CPT | Mod: PBBFAC,,, | Performed by: STUDENT IN AN ORGANIZED HEALTH CARE EDUCATION/TRAINING PROGRAM

## 2024-08-22 PROCEDURE — 82525 ASSAY OF COPPER: CPT | Performed by: STUDENT IN AN ORGANIZED HEALTH CARE EDUCATION/TRAINING PROGRAM

## 2024-08-22 PROCEDURE — 36415 COLL VENOUS BLD VENIPUNCTURE: CPT | Performed by: STUDENT IN AN ORGANIZED HEALTH CARE EDUCATION/TRAINING PROGRAM

## 2024-08-22 PROCEDURE — 82607 VITAMIN B-12: CPT | Performed by: STUDENT IN AN ORGANIZED HEALTH CARE EDUCATION/TRAINING PROGRAM

## 2024-08-22 NOTE — PROGRESS NOTES
Hematology- Oncology Clinic Note :     8/22/2024    Chief Complaint   Patient presents with    Establish Care    easy bruising         HPI    Pt is a 49 y.o. female with BLE lymphedema/lipedema, venous insufficiency, Raynaud's disease, Gastric sleeve surgery (2014), Obesity, never smoker presents for a follow up appointment regarding easy bruising.  Pt has reported easy bruising and swelling of both legs for many years.  She also reports she bruise easily but when bleeding it stops quickly but is slow to hear.  She reports she has family history positive for lymphedema and wears compression stockings daily.  Pt denies NSAId use but has been taking steroids along with tx for rheum issues.  No fmhx of bleeding or bruising.  Overall feeling weel and reports bruising improved when lymphedema is controlled.  Pt agreeable to further workup with labs today.        Active Problem List with Overview Notes    Diagnosis Date Noted    Bruising 07/22/2024    Loose skin 07/22/2024    Overweight (BMI 25.0-29.9) 05/07/2024    Bilateral lower extremity edema 01/24/2024    Lipedema 01/24/2024    Chronic bilateral low back pain with bilateral sciatica 01/24/2024    Lymphedema 01/23/2024    Class 1 obesity due to excess calories without serious comorbidity with body mass index (BMI) of 34.0 to 34.9 in adult 02/06/2023    Poor posture 01/08/2020    Weakness of trunk musculature 01/08/2020    Low folate 02/12/2018    History of gastric restrictive surgery 11/21/2017    Vitamin D deficiency 11/21/2017    Vitamin B 12 deficiency 11/21/2017       Patient Active Problem List    Diagnosis Date Noted    Bruising 07/22/2024    Loose skin 07/22/2024    Overweight (BMI 25.0-29.9) 05/07/2024    Bilateral lower extremity edema 01/24/2024    Lipedema 01/24/2024    Chronic bilateral low back pain with bilateral sciatica 01/24/2024    Lymphedema 01/23/2024    Class 1 obesity due to excess calories without serious comorbidity with body mass index (BMI)  of 34.0 to 34.9 in adult 2023    Poor posture 2020    Weakness of trunk musculature 2020    Low folate 2018    History of gastric restrictive surgery 2017    Vitamin D deficiency 2017    Vitamin B 12 deficiency 2017     Past Medical History:   Diagnosis Date    Anemia     Arthritis     Heart murmur     Lipedema     Lymphedema     Urticaria       Past Surgical History:   Procedure Laterality Date    COSMETIC SURGERY  2016    excess skin removal on arms and stomach    DILATION AND CURETTAGE OF UTERUS      gastric sleeve      LAPAROSCOPIC GASTRIC BANDING      ROOT CANAL      2    WISDOM TOOTH EXTRACTION      all 4      (Not in a hospital admission)    Review of patient's allergies indicates:   Allergen Reactions    Fire ant Anxiety, Blisters, Dermatitis, Hives, Itching, Rash and Swelling    Pollen extracts Dermatitis, Hives and Rash    Sulfa (sulfonamide antibiotics) Nausea Only      Social History     Tobacco Use    Smoking status: Never     Passive exposure: Never    Smokeless tobacco: Never   Substance Use Topics    Alcohol use: Yes     Comment: rare      Family History   Problem Relation Name Age of Onset    Allergies Mother Shefali Mccrary     Clotting disorder Mother Shefali Montanezelgerard     Hypotension Mother Shefali Fosselman     Arthritis Mother Shefaliamy Montanezelgerard     Depression Mother Shefali Mccrary     Skin cancer Father Daniel Bitting     Cancer Father Daniel Bitting         Skin Cancer    Allergies Sister      Irregular heart beat Sister Gisselle         prolonged QT    Crohn's disease Sister Gisselle     Arthritis Sister Gisselle         Letnaunchyn    Cancer Brother Byron Novoa          2018 from rare form of spinal cancer    Early death Brother Byron Bitting         Cancer at 39    Alcohol abuse Maternal Uncle Tim          of alcohol-related liver failure    Early death Maternal Uncle Tim     No Known Problems Maternal Grandmother      No  Known Problems Maternal Grandfather      No Known Problems Paternal Grandmother      No Known Problems Paternal Grandfather          Review of Systems :  Review of Systems   Constitutional:  Negative for fever, malaise/fatigue and weight loss.   HENT:  Negative for congestion and hearing loss.    Eyes:  Negative for blurred vision and discharge.   Respiratory:  Negative for cough and shortness of breath.    Cardiovascular:  Positive for leg swelling. Negative for chest pain.   Gastrointestinal:  Negative for abdominal pain, blood in stool, constipation, diarrhea, heartburn, melena, nausea and vomiting.   Genitourinary:  Negative for dysuria and hematuria.   Musculoskeletal:  Negative for joint pain and myalgias.   Skin:  Negative for itching and rash.   Neurological:  Negative for dizziness.   Endo/Heme/Allergies:  Bruises/bleeds easily.   Psychiatric/Behavioral:  Negative for depression. The patient is not nervous/anxious.        Physical Exam :  Wt Readings from Last 3 Encounters:   08/22/24 66 kg (145 lb 8.1 oz)   08/20/24 67.1 kg (148 lb)   07/23/24 67.3 kg (148 lb 5.9 oz)     Temp Readings from Last 3 Encounters:   05/23/24 98.2 °F (36.8 °C) (Oral)   01/23/24 97.9 °F (36.6 °C) (Oral)   11/10/23 98 °F (36.7 °C) (Oral)     BP Readings from Last 3 Encounters:   08/22/24 104/74   07/23/24 130/77   07/22/24 95/75     Pulse Readings from Last 3 Encounters:   08/22/24 84   07/23/24 102   07/22/24 86     Body mass index is 26.61 kg/m².    Physical Exam  Vitals reviewed.   HENT:      Head: Normocephalic and atraumatic.      Nose: Nose normal.      Mouth/Throat:      Mouth: Mucous membranes are moist.   Eyes:      Pupils: Pupils are equal, round, and reactive to light.   Cardiovascular:      Rate and Rhythm: Normal rate and regular rhythm.      Heart sounds: Normal heart sounds.   Pulmonary:      Breath sounds: Normal breath sounds.   Abdominal:      General: Abdomen is flat.   Musculoskeletal:         General: Normal  range of motion.      Cervical back: Normal range of motion.      Right lower leg: Edema present.      Left lower leg: Edema present.   Skin:     General: Skin is warm and dry.   Neurological:      Mental Status: She is alert. Mental status is at baseline.   Psychiatric:         Mood and Affect: Mood normal.         Behavior: Behavior normal.           Pertinent Diagnostic studies:    No results found for this or any previous visit (from the past 24 hour(s)).    Assessment/Plan :     Easy bruising  -Most likely secondary to meds, co morbidities and lymphedema  -Will check PT/PTT, INR and vitamin labs today  -RTC in 2 weeks for MD virtual visit        thoracic disc herniation at T11-12   -Followed by neurosurgery      Time spent on case: 45 minutes     Summary of orders placed this encounter:  Orders Placed This Encounter   Procedures    APTT    Protime-INR    COPPER, SERUM    ZINC    FOLATE    METHYLMALONIC ACID, SERUM    VITAMIN B12       Future Appointments   Date Time Provider Department Center   8/22/2024 10:15 AM LAB, [facility] LAB [facility]   8/29/2024  1:30 PM INJECTION, ALLERGY [facility] ALLIMM [facility]   9/6/2024  9:30 AM [facility] BARIATRIC MED WT LOSS FIN COORD [facility] BARIAT [facility]   9/9/2024 11:00 AM Celestina Marinelli, OT [facility] OP [facility] [place] 2 fl         Stas Murillo MD   Hematology/oncology, [facility]

## 2024-08-26 LAB
METHYLMALONATE SERPL-SCNC: 0.2 UMOL/L
ZINC SERPL-MCNC: 74 UG/DL (ref 60–130)

## 2024-08-28 LAB — COPPER SERPL-MCNC: 1478 UG/L (ref 810–1990)

## 2024-08-29 ENCOUNTER — CLINICAL SUPPORT (OUTPATIENT)
Dept: ALLERGY | Facility: CLINIC | Age: 49
End: 2024-08-29
Payer: COMMERCIAL

## 2024-08-29 DIAGNOSIS — Z91.038 ALLERGY TO ANT BITE: Primary | ICD-10-CM

## 2024-08-29 PROCEDURE — 99999 PR PBB SHADOW E&M-EST. PATIENT-LVL I: CPT | Mod: PBBFAC,,,

## 2024-08-29 PROCEDURE — 95115 IMMUNOTHERAPY ONE INJECTION: CPT | Mod: S$GLB,,, | Performed by: STUDENT IN AN ORGANIZED HEALTH CARE EDUCATION/TRAINING PROGRAM

## 2024-08-29 NOTE — PROGRESS NOTES
Pt. Here today for Fire ant maintenance split dose, will get 0.25 ml in left upper arm wait 30 minutes , then in left lower arm will be given 0.25 ml and wait additional 30 minutes.   Pt. Will return in 2 weeks for maintenance dose of 0.5 ml then monthly      Pt. Did well with today's dose.

## 2024-09-05 ENCOUNTER — OFFICE VISIT (OUTPATIENT)
Dept: HEMATOLOGY/ONCOLOGY | Facility: CLINIC | Age: 49
End: 2024-09-05
Payer: COMMERCIAL

## 2024-09-05 DIAGNOSIS — E53.8 FOLATE DEFICIENCY: Primary | ICD-10-CM

## 2024-09-05 DIAGNOSIS — T14.8XXA BRUISING: ICD-10-CM

## 2024-09-05 DIAGNOSIS — Z71.2 ENCOUNTER TO DISCUSS TEST RESULTS: ICD-10-CM

## 2024-09-05 PROCEDURE — 99213 OFFICE O/P EST LOW 20 MIN: CPT | Mod: 95,,, | Performed by: STUDENT IN AN ORGANIZED HEALTH CARE EDUCATION/TRAINING PROGRAM

## 2024-09-05 NOTE — Clinical Note
-RTC in 3 months for MD virtual visit with all the labs I ordered today to be done 2 weeks prior to MD visit

## 2024-09-05 NOTE — PROGRESS NOTES
Hematology- Oncology Clinic Note :     9/5/2024    Chief Complaint   Patient presents with    Follow-up    Results       The patient location is: home  The chief complaint leading to consultation is: results  Visit type: Virtual visit with synchronous audio and video  Total time spent with patient: 15 minutes  Each patient to whom he or she provides medical services by telemedicine is:  (1) informed of the relationship between the physician and patient and the respective role of any other health care provider with respect to management of the patient; and (2) notified that he or she may decline to receive medical services by telemedicine and may withdraw from such care at any time.          HPI    Pt is a 49 y.o. female with BLE lymphedema/lipedema, venous insufficiency, Raynaud's disease, Gastric sleeve surgery (2014), Obesity, never smoker presents for a follow up appointment regarding easy bruising.  Pt has reported easy bruising and swelling of both legs for many years.  She also reports she bruise easily but when bleeding it stops quickly but is slow to hear.  She reports she has family history positive for lymphedema and wears compression stockings daily.  Pt denies NSAId use but has been taking steroids along with tx for rheum issues.  No fmhx of bleeding or bruising.  Overall feeling weel and reports bruising improved when lymphedema is controlled.       Workup results explained to pt and significant only for folate def.  Will restart folate vitamins and will recheck in 3 months.  No issues at time of visit.        Active Problem List with Overview Notes    Diagnosis Date Noted    Bruising 07/22/2024    Loose skin 07/22/2024    Overweight (BMI 25.0-29.9) 05/07/2024    Bilateral lower extremity edema 01/24/2024    Lipedema 01/24/2024    Chronic bilateral low back pain with bilateral sciatica 01/24/2024    Lymphedema 01/23/2024    Class 1 obesity due to excess calories without serious comorbidity with body  mass index (BMI) of 34.0 to 34.9 in adult 02/06/2023    Poor posture 01/08/2020    Weakness of trunk musculature 01/08/2020    Low folate 02/12/2018    History of gastric restrictive surgery 11/21/2017    Vitamin D deficiency 11/21/2017    Vitamin B 12 deficiency 11/21/2017       Patient Active Problem List    Diagnosis Date Noted    Bruising 07/22/2024    Loose skin 07/22/2024    Overweight (BMI 25.0-29.9) 05/07/2024    Bilateral lower extremity edema 01/24/2024    Lipedema 01/24/2024    Chronic bilateral low back pain with bilateral sciatica 01/24/2024    Lymphedema 01/23/2024    Class 1 obesity due to excess calories without serious comorbidity with body mass index (BMI) of 34.0 to 34.9 in adult 02/06/2023    Poor posture 01/08/2020    Weakness of trunk musculature 01/08/2020    Low folate 02/12/2018    History of gastric restrictive surgery 11/21/2017    Vitamin D deficiency 11/21/2017    Vitamin B 12 deficiency 11/21/2017     Past Medical History:   Diagnosis Date    Anemia     Arthritis     Heart murmur     Lipedema     Lymphedema     Urticaria       Past Surgical History:   Procedure Laterality Date    COSMETIC SURGERY  October 2016    excess skin removal on arms and stomach    DILATION AND CURETTAGE OF UTERUS  2002    gastric sleeve      LAPAROSCOPIC GASTRIC BANDING      ROOT CANAL      2    WISDOM TOOTH EXTRACTION      all 4      (Not in a hospital admission)    Review of patient's allergies indicates:   Allergen Reactions    Fire ant Anxiety, Blisters, Dermatitis, Hives, Itching, Rash and Swelling    Pollen extracts Dermatitis, Hives and Rash    Sulfa (sulfonamide antibiotics) Nausea Only      Social History     Tobacco Use    Smoking status: Never     Passive exposure: Never    Smokeless tobacco: Never   Substance Use Topics    Alcohol use: Yes     Comment: rare      Family History   Problem Relation Name Age of Onset    Allergies Mother Shefali Mccrary     Clotting disorder Mother Shefali Mccrary      Hypotension Mother Shefali Mccrary     Arthritis Mother Shefali Mccrary     Depression Mother Shefali Mccrary     Skin cancer Father Daniel Bitting     Cancer Father Daniel Novoa         Skin Cancer    Allergies Sister      Irregular heart beat Sister Gisselle         prolonged QT    Crohn's disease Sister Gisselle     Arthritis Sister Gisselle         Letnaunchyn    Cancer Brother Byron Novoa          2018 from rare form of spinal cancer    Early death Brother Byron Bitfidel         Cancer at 39    Alcohol abuse Maternal Uncle Tim          of alcohol-related liver failure    Early death Maternal Uncle Tim     No Known Problems Maternal Grandmother      No Known Problems Maternal Grandfather      No Known Problems Paternal Grandmother      No Known Problems Paternal Grandfather          Review of Systems :  Review of Systems   Constitutional:  Negative for fever, malaise/fatigue and weight loss.   HENT:  Negative for congestion and hearing loss.    Eyes:  Negative for blurred vision and discharge.   Respiratory:  Negative for cough and shortness of breath.    Cardiovascular:  Positive for leg swelling. Negative for chest pain.   Gastrointestinal:  Negative for abdominal pain, blood in stool, constipation, diarrhea, heartburn, melena, nausea and vomiting.   Genitourinary:  Negative for dysuria and hematuria.   Musculoskeletal:  Negative for joint pain and myalgias.   Skin:  Negative for itching and rash.   Neurological:  Negative for dizziness.   Endo/Heme/Allergies:  Bruises/bleeds easily.   Psychiatric/Behavioral:  Negative for depression. The patient is not nervous/anxious.        Physical Exam :  Wt Readings from Last 3 Encounters:   24 66 kg (145 lb 8.1 oz)   24 67.1 kg (148 lb)   24 67.3 kg (148 lb 5.9 oz)     Temp Readings from Last 3 Encounters:   24 98.2 °F (36.8 °C) (Oral)   24 97.9 °F (36.6 °C) (Oral)   11/10/23 98 °F (36.7 °C) (Oral)     BP Readings from  Last 3 Encounters:   08/22/24 104/74   07/23/24 130/77   07/22/24 95/75     Pulse Readings from Last 3 Encounters:   08/22/24 84   07/23/24 102   07/22/24 86     There is no height or weight on file to calculate BMI.    Physical Exam  Vitals reviewed.   HENT:      Head: Normocephalic and atraumatic.      Nose: Nose normal.      Mouth/Throat:      Mouth: Mucous membranes are moist.   Eyes:      Pupils: Pupils are equal, round, and reactive to light.   Cardiovascular:      Rate and Rhythm: Normal rate and regular rhythm.      Heart sounds: Normal heart sounds.   Pulmonary:      Breath sounds: Normal breath sounds.   Abdominal:      General: Abdomen is flat.   Musculoskeletal:         General: Normal range of motion.      Cervical back: Normal range of motion.      Right lower leg: Edema present.      Left lower leg: Edema present.   Skin:     General: Skin is warm and dry.   Neurological:      Mental Status: She is alert. Mental status is at baseline.   Psychiatric:         Mood and Affect: Mood normal.         Behavior: Behavior normal.           Pertinent Diagnostic studies:    No results found for this or any previous visit (from the past 24 hour(s)).    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   -Followed by neurosurgery      Time spent on case: 20 minutes     Summary of orders placed this encounter:  Orders Placed This Encounter   Procedures    COPPER, SERUM    ZINC    METHYLMALONIC ACID, SERUM    FOLATE       Future Appointments   Date Time Provider Department Center   9/9/2024 11:00 AM Celestina Marinelli, OT [facility] OP [facility]2 [place] 2 fl   9/12/2024 10:00 AM INJECTION, ALLERGY [facility] J[facility] [place] [facility]ana   11/21/2024  8:00 AM LAB, WB  [facility] LAB [facility] Hos   12/5/2024  8:00 AM Stas Murillo MD [facility] HEM ONC [facility] Cli         Stas Murillo MD   Hematology/oncology, [facility]

## 2024-09-09 ENCOUNTER — CLINICAL SUPPORT (OUTPATIENT)
Dept: REHABILITATION | Facility: HOSPITAL | Age: 49
End: 2024-09-09
Payer: COMMERCIAL

## 2024-09-09 DIAGNOSIS — I89.0 LYMPHEDEMA: Primary | Chronic | ICD-10-CM

## 2024-09-09 DIAGNOSIS — R60.9 LIPEDEMA: Chronic | ICD-10-CM

## 2024-09-09 PROCEDURE — 97535 SELF CARE MNGMENT TRAINING: CPT

## 2024-09-09 NOTE — PROGRESS NOTES
[facility]  Occupational Therapy Discharge Note  Lymphedema    Date: 9/9/2024  Name: Leia Shultz  Clinic Number: [id number]    Therapy Diagnosis:   Encounter Diagnoses   Name Primary?    Lymphedema Yes    Lipedema        Physician: Debby Meng NP    Physician Orders: Eval and Treat  Medical Diagnosis: I89.0 (ICD-10-CM) - Lymphedema, not elsewhere classified   Evaluation Date: 7/1/2024  Insurance Authorization Period Expiration: 12/31/2024  Plan of Care Certification Period: 9/22/2024  Visit # / Visits authorized: 1 / 20  FOTO: see media, 1 / 3     Precautions:  Standard    Time In: 11:05  Time Out: 11:45  Total Billable Time: 40 minutes    SUBJECTIVE     Pt reports: She has completed self mld and bandaging of BUE and BLE for 3 weeks without improvement of symptoms. Bruising continues across BLE.    Leia reported wearing compression outside of therapy visits: yes  She was compliant with home exercise program given last session.   Response to previous treatment:increased lymphedema in legs  Functional change: none    Pain: forearms, BLE, 2/10 presently 6/10 last week before resuming Keto diet.       OBJECTIVE     Pt arrived wearing compression in NAD.      Objective Measures updated at progress report unless specified.    LE Girth Measurements: taken in supine  LANDMARK RIGHT LE  7/1/24 LEFT LE  7/1/24 RIGHT LE  8/8/24 LEFT LE  8/8/24 RIGHT LE  9/9/24 LEFT LE  9/9/24   SBP - - 42.0 cm 44.0 cm - -   10 below SBP 42.0 cm 42.0 cm 41.0 cm 40.0 cm 39.0 cm 39.0 cm   20 below SBP 42.0 cm 42.5 cm 41.5 cm 41.0 cm 40.0 cm 40.5 cm   30 below SBP 34.5 cm 36.0 cm 35.0 cm 35.0 cm 34.5 cm 34.0 cm   35 below SBP 27.5 cm 29.0 cm 29.0 cm 29.0 cm 28.0 cm 26.5 cm   Ankle 23.5 cm 25.5 cm 23.5 cm 23.5 cm 24.5 cm 26.0 cm   Forefoot 20.5 cm 21.0 cm 20.5 cm 21.0 cm 20.5 cm 21.0 cm      LANDMARK RIGHT LE  7/1/24 LEFT LE  7/1/24   SBP - -   10 below SBP 42.0 cm 42.0 cm   20 below SBP 42.0 cm 42.5 cm  "  30 below SBP 34.5 cm 36.0 cm   35 below SBP 27.5 cm 29.0 cm   Ankle 23.5 cm 25.5 cm   Forefoot 20.5 cm 21.0 cm     LANDMARK RIGHT UE  9/9/24 LEFT UE  9/9/24   E + 6" 27.5 cm 27.5 cm   E + 4" 27.5 cm 26.5 cm   E + 2" 26.0 cm 25.0 cm   Elbow 22.5 cm 22.0 cm   W + 4" 20.0 cm 19.5 cm   W + 2" 16.0 cm 16.0 cm   Wrist 15.0 cm 15.0 cm   MCP 17.5 cm 17.0 cm       Treatment     Leia received the treatments listed below:       Self-care/home management techniques were applied for 40 minutes, including:    Educated pt in:    Home management plan  Compression schedule- AM/PM  Self mld techniques vs home pneumatic pump  Signs/symptoms of infection        Patient Education and Home Exercises      Education provided:   - Progress towards goals       Assessment     No significant reductions in girth. Discussed home management plan to optimize conservative management of lipedema. Pt reports she is going to seek medical advise for lipedema reduction procedure.     Anticipated barriers to occupational therapy: none    Goals:  Short Term Goals: (2 weeks)  Goals: Progressing / MET   1. Patient will demonstrate 100% knowledge of lymphedema precautions and signs of infection to allow for reduced lymphedema risk, infection risk, and/or exacerbation of condition.  MET   2. Patient and/or caregiver will order/obtain appropriate compression garments to maintain lymphatic and venous support. MET   3. Patient will tolerate daily activities wearing compression garments to allow for lymphatic drainage support, skin elasticity, and reduction in shape and size of limb.  MET      Long Term Goals: (4 weeks)  Goals: Progressing / MET   1. Patient and/or caregiver to denis/doff compression garment independently and with daily compliance to assist in lymphedema management, skin elasticity, and tissue density MET   2. Patient and/or caregiver will be independent in use of pneumatic compression pump or self manual lymphatic drainage techniques to " areas within reach to enhance lymphatic drainage and skin condition.  MET   3. Patient to be independent and compliant with home exercise program to allow for increased function in affected limb. MET          PLAN     Discharge.     Celestina Marinelli, OT, CLWT

## 2024-09-12 ENCOUNTER — TELEPHONE (OUTPATIENT)
Dept: ALLERGY | Facility: CLINIC | Age: 49
End: 2024-09-12
Payer: COMMERCIAL

## 2024-09-12 NOTE — TELEPHONE ENCOUNTER
----- Message from Hemalatha French sent at 9/12/2024  8:26 AM CDT -----    Name of Caller:ALLY BURGOS [[id number]]      When is the first available appointment? N/a      Symptoms: allergy injection    Best Call Back Number Telephone Information:  Mobile          [phone]            Additional Information: requesting office to reschedule allergy shot. Please advise

## 2024-09-13 ENCOUNTER — TELEPHONE (OUTPATIENT)
Dept: ALLERGY | Facility: CLINIC | Age: 49
End: 2024-09-13
Payer: COMMERCIAL

## 2024-09-13 ENCOUNTER — CLINICAL SUPPORT (OUTPATIENT)
Dept: ALLERGY | Facility: CLINIC | Age: 49
End: 2024-09-13
Payer: COMMERCIAL

## 2024-09-13 ENCOUNTER — HOSPITAL ENCOUNTER (EMERGENCY)
Facility: HOSPITAL | Age: 49
Discharge: HOME | End: 2024-09-13
Attending: EMERGENCY MEDICINE
Payer: COMMERCIAL

## 2024-09-13 VITALS
DIASTOLIC BLOOD PRESSURE: 67 MMHG | HEIGHT: 62 IN | WEIGHT: 150 LBS | TEMPERATURE: 98 F | OXYGEN SATURATION: 100 % | HEART RATE: 74 BPM | SYSTOLIC BLOOD PRESSURE: 108 MMHG | BODY MASS INDEX: 27.6 KG/M2 | RESPIRATION RATE: 19 BRPM

## 2024-09-13 DIAGNOSIS — R07.89 CHEST TIGHTNESS: ICD-10-CM

## 2024-09-13 DIAGNOSIS — Z91.038 HYMENOPTERA ALLERGY: ICD-10-CM

## 2024-09-13 DIAGNOSIS — T78.2XXA ANAPHYLAXIS, INITIAL ENCOUNTER: Primary | ICD-10-CM

## 2024-09-13 DIAGNOSIS — J30.9 CHRONIC ALLERGIC RHINITIS: ICD-10-CM

## 2024-09-13 LAB
ALBUMIN SERPL BCP-MCNC: 3.6 G/DL (ref 3.5–5.2)
ALP SERPL-CCNC: 59 U/L (ref 55–135)
ALT SERPL W/O P-5'-P-CCNC: 12 U/L (ref 10–44)
ANION GAP SERPL CALC-SCNC: 11 MMOL/L (ref 8–16)
AST SERPL-CCNC: 14 U/L (ref 10–40)
BASOPHILS # BLD AUTO: 0.04 K/UL (ref 0–0.2)
BASOPHILS NFR BLD: 0.4 % (ref 0–1.9)
BILIRUB SERPL-MCNC: 0.3 MG/DL (ref 0.1–1)
BUN SERPL-MCNC: 11 MG/DL (ref 6–20)
CALCIUM SERPL-MCNC: 8.9 MG/DL (ref 8.7–10.5)
CHLORIDE SERPL-SCNC: 103 MMOL/L (ref 95–110)
CO2 SERPL-SCNC: 22 MMOL/L (ref 23–29)
CREAT SERPL-MCNC: 0.7 MG/DL (ref 0.5–1.4)
DIFFERENTIAL METHOD BLD: ABNORMAL
EOSINOPHIL # BLD AUTO: 0.1 K/UL (ref 0–0.5)
EOSINOPHIL NFR BLD: 1.1 % (ref 0–8)
ERYTHROCYTE [DISTWIDTH] IN BLOOD BY AUTOMATED COUNT: 11.9 % (ref 11.5–14.5)
EST. GFR  (NO RACE VARIABLE): >60 ML/MIN/1.73 M^2
GLUCOSE SERPL-MCNC: 82 MG/DL (ref 70–110)
HCT VFR BLD AUTO: 38 % (ref 37–48.5)
HGB BLD-MCNC: 12.8 G/DL (ref 12–16)
IMM GRANULOCYTES # BLD AUTO: 0.05 K/UL (ref 0–0.04)
IMM GRANULOCYTES NFR BLD AUTO: 0.5 % (ref 0–0.5)
LYMPHOCYTES # BLD AUTO: 2.5 K/UL (ref 1–4.8)
LYMPHOCYTES NFR BLD: 27 % (ref 18–48)
MCH RBC QN AUTO: 31.7 PG (ref 27–31)
MCHC RBC AUTO-ENTMCNC: 33.7 G/DL (ref 32–36)
MCV RBC AUTO: 94 FL (ref 82–98)
MONOCYTES # BLD AUTO: 0.6 K/UL (ref 0.3–1)
MONOCYTES NFR BLD: 6.3 % (ref 4–15)
NEUTROPHILS # BLD AUTO: 6 K/UL (ref 1.8–7.7)
NEUTROPHILS NFR BLD: 64.7 % (ref 38–73)
NRBC BLD-RTO: 0 /100 WBC
PLATELET # BLD AUTO: 300 K/UL (ref 150–450)
PMV BLD AUTO: 8.6 FL (ref 9.2–12.9)
POTASSIUM SERPL-SCNC: 3.7 MMOL/L (ref 3.5–5.1)
PROT SERPL-MCNC: 6.2 G/DL (ref 6–8.4)
RBC # BLD AUTO: 4.04 M/UL (ref 4–5.4)
SODIUM SERPL-SCNC: 136 MMOL/L (ref 136–145)
WBC # BLD AUTO: 9.28 K/UL (ref 3.9–12.7)

## 2024-09-13 PROCEDURE — 93010 ELECTROCARDIOGRAM REPORT: CPT | Mod: ,,, | Performed by: INTERNAL MEDICINE

## 2024-09-13 PROCEDURE — 93005 ELECTROCARDIOGRAM TRACING: CPT

## 2024-09-13 PROCEDURE — 85025 COMPLETE CBC W/AUTO DIFF WBC: CPT | Performed by: EMERGENCY MEDICINE

## 2024-09-13 PROCEDURE — 83520 IMMUNOASSAY QUANT NOS NONAB: CPT | Performed by: EMERGENCY MEDICINE

## 2024-09-13 PROCEDURE — 99284 EMERGENCY DEPT VISIT MOD MDM: CPT | Mod: 25

## 2024-09-13 PROCEDURE — 80053 COMPREHEN METABOLIC PANEL: CPT | Performed by: EMERGENCY MEDICINE

## 2024-09-13 NOTE — ED PROVIDER NOTES
Emergency Department Provider Note    Leia Shultz   49 y.o. female   [id number]      2024       History     This history was obtained from the patient and by review of Dr. Valle's (Allergy and immunology) documentation from earlier today.  She drove herself to the hospital for her clinic appointment. Her boyfriend is at the bedside.    She is a 49-year-old with the below past medical history. She also has a history of fire ant-induced anaphylaxis and presented to Allergy and immunology clinic today for a full dose of venom immunotherapy after receiving an ultrarush induction on  and a split dose on .  She developed tiredness, bilateral arm heaviness, and chest tightness approximately 10 minutes into the infusion.  She had continued symptoms after 5 minutes of monitoring and was given 0.5 mL of 1:1000 epinephrine intramuscularly.  She had continued symptoms and was brought to the ED on a stretcher.  She now feels much better.         Past Medical History:   Diagnosis Date    Anemia     Arthritis     Heart murmur     Lipedema     Lymphedema     Urticaria       Past Surgical History:   Procedure Laterality Date    COSMETIC SURGERY  2016    excess skin removal on arms and stomach    DILATION AND CURETTAGE OF UTERUS      gastric sleeve      LAPAROSCOPIC GASTRIC BANDING      ROOT CANAL      2    WISDOM TOOTH EXTRACTION      all 4      Family History   Problem Relation Name Age of Onset    Allergies Mother Shefali Fosselman     Clotting disorder Mother Shefali Fosselman     Hypotension Mother Shefali Fosselman     Arthritis Mother Shefali Fosselman     Depression Mother Shefali Fosselman     Skin cancer Father Daniel Bitting     Cancer Father Daniel Bitting         Skin Cancer    Allergies Sister      Irregular heart beat Sister Gisselle         prolonged QT    Crohn's disease Sister Gisselle     Arthritis Sister Gisselle         Letnaunchyn    Cancer Brother Byron Novoa          2018  from rare form of spinal cancer    Early death Brother Byron Novoa         Cancer at 39    Alcohol abuse Maternal Uncle Tim          of alcohol-related liver failure    Early death Maternal Uncle Tim     No Known Problems Maternal Grandmother      No Known Problems Maternal Grandfather      No Known Problems Paternal Grandmother      No Known Problems Paternal Grandfather        Social History     Socioeconomic History    Marital status:     Number of children: 0   Tobacco Use    Smoking status: Never     Passive exposure: Never    Smokeless tobacco: Never   Substance and Sexual Activity    Alcohol use: Yes     Comment: rare    Drug use: Never    Sexual activity: Yes     Partners: Male     Birth control/protection: I.U.D.     Comment: 17 -vasectomy     Social Determinants of Health     Financial Resource Strain: Low Risk  (2024)    Received from [facility]    Overall Financial Resource Strain (CARDIA)     Difficulty of Paying Living Expenses: Not very hard   Food Insecurity: No Food Insecurity (2024)    Received from [facility]    Hunger Vital Sign     Worried About Running Out of Food in the Last Year: Never true     Ran Out of Food in the Last Year: Never true   Transportation Needs: No Transportation Needs (2024)    Received from [facility]    PRAPARE - Transportation     Lack of Transportation (Medical): No     Lack of Transportation (Non-Medical): No   Physical Activity: Insufficiently Active (2024)    Received from [facility]    Exercise Vital Sign     Days of Exercise per Week: 3 days     Minutes of Exercise per Session: 30 min   Stress: Stress Concern Present (2024)    Received from [facility]    [employer] [place] [employer] - Occupational Stress Questionnaire     Feeling of Stress : To some extent   Housing Stability: Low Risk  (2024)    Housing Stability Vital Sign     Unable to Pay for Housing in the Last Year: No     Number of  Places Lived in the Last Year: 1     Unstable Housing in the Last Year: No      Review of patient's allergies indicates:   Allergen Reactions    Fire ant Anxiety, Blisters, Dermatitis, Hives, Itching, Rash and Swelling    Pollen extracts Dermatitis, Hives and Rash    Sulfa (sulfonamide antibiotics) Nausea Only           Physical Examination     Initial Vitals [09/13/24 1647]   BP Pulse Resp Temp SpO2   (!) 140/60 88 16 97.5 °F (36.4 °C) 100 %      MAP       --           Physical Exam    Nursing note and vitals reviewed.  Constitutional: She is not diaphoretic. No distress.   HENT:   Head: Normocephalic and atraumatic.   Mouth/Throat: Uvula is midline and oropharynx is clear and moist. No trismus in the jaw. No uvula swelling.   No tongue swelling.  No sublingual edema.  No gingival edema.   Cardiovascular:  Normal rate, regular rhythm and normal heart sounds.     Exam reveals no gallop and no friction rub.       No murmur heard.  Pulmonary/Chest: No stridor. No respiratory distress. She has no wheezes. She has no rhonchi.   Abdominal: Abdomen is soft. She exhibits no distension. There is no abdominal tenderness.   Musculoskeletal:         General: No edema.     Neurological: She is alert and oriented to person, place, and time. GCS score is 15. GCS eye subscore is 4. GCS verbal subscore is 5. GCS motor subscore is 6.   Skin: Skin is warm and dry. No pallor.            Labs     Labs Reviewed   CBC W/ AUTO DIFFERENTIAL - Abnormal       Result Value    WBC 9.28      RBC 4.04      Hemoglobin 12.8      Hematocrit 38.0      MCV 94      MCH 31.7 (*)     MCHC 33.7      RDW 11.9      Platelets 300      MPV 8.6 (*)     Immature Granulocytes 0.5      Gran # (ANC) 6.0      Immature Grans (Abs) 0.05 (*)     Lymph # 2.5      Mono # 0.6      Eos # 0.1      Baso # 0.04      nRBC 0      Gran % 64.7      Lymph % 27.0      Mono % 6.3      Eosinophil % 1.1      Basophil % 0.4      Differential Method Automated     COMPREHENSIVE  METABOLIC PANEL - Abnormal    Sodium 136      Potassium 3.7      Chloride 103      CO2 22 (*)     Glucose 82      BUN 11      Creatinine 0.7      Calcium 8.9      Total Protein 6.2      Albumin 3.6      Total Bilirubin 0.3      Alkaline Phosphatase 59      AST 14      ALT 12      eGFR >60.0      Anion Gap 11     TRYPTASE        Imaging     Imaging Results    None           ED Course     The patient received the following medications:  None        ED Course as of 09/14/24 1217   Fri Sep 13, 2024   1851 EKG 12-lead  Independently interpreted by me:   Normal sinus rhythm. Ventricular rate 78 bpm.  Normal axis.  Normal QRS duration and QT interval.  No ST segment elevation or depression.  Normal T-wave morphology. Overall impression:  Normal EKG. [LP]   1949 CBC auto differential(!) [LP]   1949 MCH(!): 31.7 [LP]   1949 MPV(!): 8.6 [LP]   1949 Immature Grans (Abs)(!): 0.05 [LP]   1949 Comprehensive metabolic panel(!) [LP]   1949 CO2(!): 22 [LP]   2000 I just reassessed the patient.  She feels drained but is not having intraoral swelling, throat discomfort, breathing difficulty, or shortness of breath. [LP]      ED Course User Index  [LP] Otto Pride III, MD        Medical Decision Making                 Medical Decision Making  The patient was monitored in the ED after receiving IM epinephrine for anaphylaxis.  She had continued improvement in her symptoms and was discharged.  I confirmed access to an EpiPen prior to her discharge.  She will follow-up with her Allergy and immunology specialist.    Problems Addressed:  Anaphylaxis, initial encounter: acute illness or injury  Chest tightness: acute illness or injury    Amount and/or Complexity of Data Reviewed  Labs: ordered. Decision-making details documented in ED Course.  ECG/medicine tests:  Decision-making details documented in ED Course.              Diagnoses       ICD-10-CM ICD-9-CM   1. Anaphylaxis, initial encounter  T78.2XXA 995.0   2. Chest tightness   R07.89 786.59         Dispostion      ED Disposition Condition    Discharge Stable            ED Prescriptions    None         Follow-up Information       Follow up With Specialties Details Why Contact Info    Cameron Valle MD Allergy, Pediatrics, Pediatric Allergy  Call to discuss follow-up plan. [address]  [place] [address]  [phone]      ER   Return to the ER for swelling of your tongue or lips, difficulty breathing, persistent nausea, or any other symptoms concerning for serious allergic reaction.               Otto Pride III, MD  09/14/24 1214

## 2024-09-13 NOTE — PROGRESS NOTES
ALLERGY & IMMUNOLOGY CLINIC     HISTORY OF PRESENT ILLNESS     HPI: Leia Shultz is a 49 y.o. female with fire ant-induced anaphylaxis, s/p ultrarush induction of VIT 8/20 and split dose 8/29 following up today for her full VIT dose. About 5 minutes after injection, she developed some tiredness, heaviness in the arms, chest tightness at which time I was called to assess.      MEDICAL HISTORY   MedHx:   Patient Active Problem List   Diagnosis    History of gastric restrictive surgery    Vitamin D deficiency    Vitamin B 12 deficiency    Low folate    Poor posture    Weakness of trunk musculature    Class 1 obesity due to excess calories without serious comorbidity with body mass index (BMI) of 34.0 to 34.9 in adult    Lymphedema    Bilateral lower extremity edema    Lipedema    Chronic bilateral low back pain with bilateral sciatica    Overweight (BMI 25.0-29.9)    Bruising    Loose skin       Medications:   Current Outpatient Medications on File Prior to Visit   Medication Sig Dispense Refill    bumetanide (BUMEX) 1 MG tablet Take 0.5 tablets (0.5 mg total) by mouth once daily. 15 tablet 11    cetirizine (ZYRTEC) 10 MG tablet Take 1 tablet (10 mg total) by mouth 2 (two) times a day. Start Sunday morning 2 days before Rush. for 5 days 10 tablet 0    EPINEPHrine (EPIPEN 2-KEVIN) 0.3 mg/0.3 mL AtIn Inject 0.3 mLs (0.3 mg total) into the muscle once. for 1 dose 2 each 0    famotidine (PEPCID) 20 MG tablet Take 1 tablet (20 mg total) by mouth 2 (two) times daily. Start Inder morning 2 days before Anthony, and take morning of Rush. for 5 days 10 tablet 0    gabapentin (NEURONTIN) 300 MG capsule Take 1 capsule (300 mg total) by mouth 3 (three) times daily. 90 capsule 1    ketoconazole (NIZORAL) 2 % cream Apply topically once daily. (Patient not taking: Reported on 8/22/2024) 60 g 0    MULTIVITAMIN ORAL Take by mouth once daily. (Patient not taking: Reported on 8/22/2024)      naftifine 2 % Crea Apply 1 Application topically  once daily. (Patient taking differently: Apply 1 Application topically as needed.) 45 g 0    ondansetron (ZOFRAN) 8 MG tablet Take 8 mg by mouth as needed for Nausea.      semaglutide, weight loss, (WEGOVY) 2.4 mg/0.75 mL PnIj Inject 2.4 mg into the skin once a week 3 mL 3    semaglutide, weight loss, (WEGOVY) 2.4 mg/0.75 mL PnIj Inject 2.4 mg into the skin once a week 3 mL 6    sertraline (ZOLOFT) 100 MG tablet Take 1.5 tablets (150 mg total) by mouth once daily. As needed. 90 tablet 2    traZODone (DESYREL) 100 MG tablet Take 1.5 tablets (150 mg total) by mouth every evening. 90 tablet 2    triamcinolone acetonide 0.1% (KENALOG) 0.1 % ointment Apply topically 2 (two) times daily. (Patient not taking: Reported on 8/22/2024) 80 g 1     No current facility-administered medications on file prior to visit.       SurgHx:  Past Surgical History:   Procedure Laterality Date    COSMETIC SURGERY  October 2016    excess skin removal on arms and stomach    DILATION AND CURETTAGE OF UTERUS  2002    gastric sleeve      LAPAROSCOPIC GASTRIC BANDING      ROOT CANAL      2    WISDOM TOOTH EXTRACTION      all 4      PHYSICAL EXAM   VS: normotensive, mild tachycardia, normal RR  GENERAL: Alert, NAD, well-appearing, cooperative  EYES: no conjunctival injection, no infraorbital shiners  LUNGS: CTAB, no w/r/c, no increased WOB  HEART: mild tachycardia, normal S1/S2, no m/g/r  DERM: no rashes, no skin breaks  NEURO: normal movements     ASSESSMENT & PLAN     Leia Shultz is a 49 y.o. female with     Anaphylaxis, initial encounter    Hymenoptera allergy    Given rapid onset of symptoms, this is a neurological versus immunological reaction.     Discussed options of monitoring versus epi with patient, decided to monitor 5 minutes  Lay down on exam table for 5 minutes of observation. No change in physical exam but patient with increase in mucus production, on-going chest tightness (left sided) and feeling as though she would have  difficulty standing up.     Epi 0.5mL of 1:1000 administered into anterolateral left thigh.   At 15 minutes after epi patient still with tiredness 132/68, pule 91, sat 100%     Tiredness and difficult speaking, sitting has progressively worsened despite excellent vital signs. [facility] team called for transport to ED    Discussed with EMS transfer, along with request for a tryptase level to be drawn while there. This will be very helpful in telling us whether this is an allergic reaction or whether this is primarily driven as a neurological response.

## 2024-09-13 NOTE — ED TRIAGE NOTES
Pt  arrives to ED  from allergy clinic after experiencing  an allergic reaction. Pt has been  going to the clinic for exposure therapy for her fire ant venom allergy and today was her first full dose of the treatment. After the full dose, pt felt a tightening in her through , a feeling of paralysis  and feeling of going to pass out.

## 2024-09-13 NOTE — TELEPHONE ENCOUNTER
----- Message from Elham Marie sent at 9/13/2024 11:39 AM CDT -----  Regarding: r/s inj (two weeks from last shot)  Contact: Pt @ [phone]  Pt called in to schedule an appt; no available appts in Epic. Pt is asking for a call back soon to schedule. Thanks.              Patient's DX: schedule Inj

## 2024-09-13 NOTE — PROGRESS NOTES
Pt. Reports that after last injection her throat became slightly scratchy.  Claritin Redi tab was given before injection      9 minutes after getting injection pt reports arms feeling heavy, and throat scratchy.  Rosibel was called to assess pt.  Pt. Was moved to exam room and put an a supine position, reports feeling heavy in her arms and chest, as per  lungs are clear.  V/S taken B/P 112/67,P81,O2sat 98  308 pm Pt. Was reassessed by , states she has chest tightness, and arms and legs feel heavy.Adrenalin 0.5 ml was given  335 pm V/S B/P/132/68, P 91, 0 2 [id number], pt. Very fatigue. As per  [facility] team was CALLED.

## 2024-09-14 LAB
OHS QRS DURATION: 84 MS
OHS QTC CALCULATION: 460 MS

## 2024-09-14 NOTE — DISCHARGE INSTRUCTIONS
We know that you have many choices and are honored that you chose us. We hope that we met or exceeded your expectations and goals for this visit and will keep the [facility] family in mind for your future needs and those of your family and friends.     - Dr. Pride

## 2024-09-16 LAB — TRYPTASE LEVEL: 2.8 NG/ML

## 2024-09-17 ENCOUNTER — OFFICE VISIT (OUTPATIENT)
Dept: ALLERGY | Facility: CLINIC | Age: 49
End: 2024-09-17
Payer: COMMERCIAL

## 2024-09-17 ENCOUNTER — OFFICE VISIT (OUTPATIENT)
Dept: FAMILY MEDICINE | Facility: CLINIC | Age: 49
End: 2024-09-17
Payer: COMMERCIAL

## 2024-09-17 VITALS
OXYGEN SATURATION: 99 % | SYSTOLIC BLOOD PRESSURE: 98 MMHG | HEIGHT: 62 IN | HEART RATE: 90 BPM | TEMPERATURE: 98 F | DIASTOLIC BLOOD PRESSURE: 62 MMHG | BODY MASS INDEX: 26.43 KG/M2 | WEIGHT: 143.63 LBS

## 2024-09-17 DIAGNOSIS — T63.421D FIRE ANT BITE, ACCIDENTAL OR UNINTENTIONAL, SUBSEQUENT ENCOUNTER: ICD-10-CM

## 2024-09-17 DIAGNOSIS — I89.0 LYMPHEDEMA: Primary | Chronic | ICD-10-CM

## 2024-09-17 DIAGNOSIS — R23.3 EASY BRUISING: ICD-10-CM

## 2024-09-17 DIAGNOSIS — E88.2 LIPOMATOSIS: ICD-10-CM

## 2024-09-17 DIAGNOSIS — M79.601 BILATERAL ARM PAIN: ICD-10-CM

## 2024-09-17 DIAGNOSIS — M79.604 BILATERAL LEG PAIN: ICD-10-CM

## 2024-09-17 DIAGNOSIS — E66.3 OVERWEIGHT (BMI 25.0-29.9): ICD-10-CM

## 2024-09-17 DIAGNOSIS — M79.602 BILATERAL ARM PAIN: ICD-10-CM

## 2024-09-17 DIAGNOSIS — M79.605 BILATERAL LEG PAIN: ICD-10-CM

## 2024-09-17 DIAGNOSIS — T78.2XXA ANAPHYLAXIS, INITIAL ENCOUNTER: Primary | ICD-10-CM

## 2024-09-17 DIAGNOSIS — R60.9 SWELLING: ICD-10-CM

## 2024-09-17 PROCEDURE — 99215 OFFICE O/P EST HI 40 MIN: CPT | Mod: 95,,, | Performed by: STUDENT IN AN ORGANIZED HEALTH CARE EDUCATION/TRAINING PROGRAM

## 2024-09-17 PROCEDURE — 99213 OFFICE O/P EST LOW 20 MIN: CPT | Mod: S$GLB,,,

## 2024-09-17 PROCEDURE — 99999 PR PBB SHADOW E&M-EST. PATIENT-LVL IV: CPT | Mod: PBBFAC,,,

## 2024-09-17 NOTE — PROGRESS NOTES
HPI     Chief Complaint:  Chief Complaint   Patient presents with    Follow-up       Leia Shultz is a 49 y.o. female with multiple medical diagnoses as listed in the medical history and problem list that presents for   Chief Complaint   Patient presents with    Follow-up    .     Patient is know to me with her last appointment with me on 6/27/2024.     HPI  Pt presents for follow up.  Has been using compression and therapy to manage lymphedema symptoms. Has lost 30 pounds with Wegovy.  Lipedema specialist Dr. Estrada, appointment scheduled for April 2nd for lipedema surgery consultation.    Assessment & Plan     Problem List Items Addressed This Visit          Endocrine    Overweight (BMI 25.0-29.9)  Continue healthy diet and exercise for weight loss.  Continue Wegovy for weight loss.       Other    Lymphedema - Primary (Chronic)  Managed with compression and therapy.  Followed by vascular.     Other Visit Diagnoses       Swelling      Swelling to bilateral lower extremities.  Managed with compression and therapy.  Followed by vascular.    Lipomatosis      Excess fat deposits remaining since weight loss.  Continue Wegovy for further fat loss along with healthy balanced diet and exercise.    Easy bruising      Easy bruising to skin which has been worked up by Hematology.  Follow-up with hematology.    Bilateral leg pain      Currently in physical therapy for bilateral leg pain.  Continue follow up with physical therapy.    Bilateral arm pain      Currently in physical therapy for pain in both arms.  Continue to follow-up with physical therapy.              --------------------------------------------      Health Maintenance:  Health Maintenance         Date Due Completion Date    HIV Screening Never done ---    Influenza Vaccine (1) 09/01/2024 11/6/2023    Override on 9/23/2020: Done    COVID-19 Vaccine (4 - 2023-24 season) 09/01/2024 10/1/2021    Mammogram 01/18/2025 1/18/2024    Colorectal Cancer Screening  "06/03/2025 4/7/2023    Hemoglobin A1c (Diabetic Prevention Screening) 01/23/2027 1/23/2024    Lipid Panel 01/23/2029 1/23/2024    Cervical Cancer Screening 01/30/2029 1/30/2024    TETANUS VACCINE 12/06/2029 12/6/2019            Health maintenance reviewed    Follow Up:  No follow-ups on file.    Exam     Review of Systems:  (as noted above)  Review of Systems   Constitutional:  Positive for activity change. Negative for unexpected weight change.   HENT:  Negative for hearing loss, rhinorrhea and trouble swallowing.    Eyes:  Negative for discharge and visual disturbance.   Respiratory:  Negative for chest tightness and wheezing.    Cardiovascular:  Negative for chest pain and palpitations.   Gastrointestinal:  Negative for blood in stool, constipation, diarrhea and vomiting.   Endocrine: Negative for polydipsia and polyuria.   Genitourinary:  Negative for difficulty urinating, dysuria, hematuria and menstrual problem.   Musculoskeletal:  Negative for arthralgias, joint swelling and neck pain.   Neurological:  Positive for weakness and headaches.   Psychiatric/Behavioral:  Positive for dysphoric mood. Negative for confusion.        Physical Exam:   Physical Exam  Constitutional:       General: She is not in acute distress.     Appearance: Normal appearance. She is not ill-appearing.   Cardiovascular:      Rate and Rhythm: Normal rate.   Pulmonary:      Effort: Pulmonary effort is normal.   Musculoskeletal:      Right lower leg: Swelling and tenderness present. Edema present.      Left lower leg: Swelling and tenderness present. Edema present.   Neurological:      Mental Status: She is alert.       Vitals:    09/17/24 0829   BP: 98/62   BP Location: Right arm   Patient Position: Sitting   BP Method: Large (Manual)   Pulse: 90   Temp: 97.8 °F (36.6 °C)   TempSrc: Oral   SpO2: 99%   Weight: 65.2 kg (143 lb 10.1 oz)   Height: 5' 2" (1.575 m)      Body mass index is 26.27 kg/m².        History     Past Medical " History:  Past Medical History:   Diagnosis Date    Anemia     Arthritis     Heart murmur     Lipedema     Lymphedema     Urticaria        Past Surgical History:  Past Surgical History:   Procedure Laterality Date    COSMETIC SURGERY  October 2016    excess skin removal on arms and stomach    DILATION AND CURETTAGE OF UTERUS  2002    gastric sleeve      LAPAROSCOPIC GASTRIC BANDING      ROOT CANAL      2    WISDOM TOOTH EXTRACTION      all 4       Social History:  Social History     Socioeconomic History    Marital status:     Number of children: 0   Tobacco Use    Smoking status: Never     Passive exposure: Never    Smokeless tobacco: Never   Substance and Sexual Activity    Alcohol use: Yes     Comment: rare    Drug use: Never    Sexual activity: Yes     Partners: Male     Birth control/protection: I.U.D.     Comment: 11/21/17 -vasectomy     Social Determinants of Health     Financial Resource Strain: Low Risk  (7/21/2024)    Received from [facility]    Overall Financial Resource Strain (CARDIA)     Difficulty of Paying Living Expenses: Not very hard   Food Insecurity: No Food Insecurity (7/21/2024)    Received from [facility]    Hunger Vital Sign     Worried About Running Out of Food in the Last Year: Never true     Ran Out of Food in the Last Year: Never true   Transportation Needs: No Transportation Needs (7/21/2024)    Received from [facility]    PRAPARE - Transportation     Lack of Transportation (Medical): No     Lack of Transportation (Non-Medical): No   Physical Activity: Insufficiently Active (7/21/2024)    Received from [facility]    Exercise Vital Sign     Days of Exercise per Week: 3 days     Minutes of Exercise per Session: 30 min   Stress: Stress Concern Present (7/21/2024)    Received from [facility]    [place] citizen [place] [employer] - Occupational Stress Questionnaire     Feeling of Stress : To some extent   Housing Stability: Low Risk  (1/23/2024)    Housing Stability  Vital Sign     Unable to Pay for Housing in the Last Year: No     Number of Places Lived in the Last Year: 1     Unstable Housing in the Last Year: No       Family History:  Family History   Problem Relation Name Age of Onset    Allergies Mother Shefali Mccrary     Clotting disorder Mother Shefali Mccrary     Hypotension Mother Shefali Mccrary     Arthritis Mother Shefali Mccrary     Depression Mother Shefali Mccrary     Skin cancer Father Daniel Bitting     Cancer Father Daniel Bitfidel         Skin Cancer    Allergies Sister      Irregular heart beat Sister Gisselle         prolonged QT    Crohn's disease Sister Gisselle     Arthritis Sister Gisselle         Letnaunchyn    Cancer Brother Byron Novoa          2018 from rare form of spinal cancer    Early death Brother Byron Novoa         Cancer at 39    Alcohol abuse Maternal Uncle Tim          of alcohol-related liver failure    Early death Maternal Uncle Tim     No Known Problems Maternal Grandmother      No Known Problems Maternal Grandfather      No Known Problems Paternal Grandmother      No Known Problems Paternal Grandfather         Allergies and Medications: (updated and reviewed)  Review of patient's allergies indicates:   Allergen Reactions    Fire ant Anxiety, Blisters, Dermatitis, Hives, Itching, Rash and Swelling    Pollen extracts Dermatitis, Hives and Rash    Sulfa (sulfonamide antibiotics) Nausea Only     Current Outpatient Medications   Medication Sig Dispense Refill    cetirizine (ZYRTEC) 10 MG tablet Take 1 tablet (10 mg total) by mouth 2 (two) times a day. Start  morning 2 days before Rush. for 5 days 10 tablet 0    EPINEPHrine (EPIPEN 2-KEVIN) 0.3 mg/0.3 mL AtIn Inject 0.3 mLs (0.3 mg total) into the muscle once. for 1 dose 2 each 0    gabapentin (NEURONTIN) 300 MG capsule Take 1 capsule (300 mg total) by mouth 3 (three) times daily. 90 capsule 1    ondansetron (ZOFRAN) 8 MG tablet Take 8 mg by mouth as needed for  Nausea.      semaglutide, weight loss, (WEGOVY) 2.4 mg/0.75 mL PnIj Inject 2.4 mg into the skin once a week 3 mL 3    semaglutide, weight loss, (WEGOVY) 2.4 mg/0.75 mL PnIj Inject 2.4 mg into the skin once a week 3 mL 6    sertraline (ZOLOFT) 100 MG tablet Take 1.5 tablets (150 mg total) by mouth once daily. As needed. 90 tablet 2    traZODone (DESYREL) 100 MG tablet Take 1.5 tablets (150 mg total) by mouth every evening. 90 tablet 2    bumetanide (BUMEX) 1 MG tablet Take 0.5 tablets (0.5 mg total) by mouth once daily. 15 tablet 11    famotidine (PEPCID) 20 MG tablet Take 1 tablet (20 mg total) by mouth 2 (two) times daily. Start Sunday morning 2 days before Anthony, and take morning of Rush. for 5 days 10 tablet 0    ketoconazole (NIZORAL) 2 % cream Apply topically once daily. 60 g 0    MULTIVITAMIN ORAL Take by mouth once daily.      naftifine 2 % Crea Apply 1 Application topically once daily. (Patient taking differently: Apply 1 Application topically as needed.) 45 g 0    triamcinolone acetonide 0.1% (KENALOG) 0.1 % ointment Apply topically 2 (two) times daily. 80 g 1     No current facility-administered medications for this visit.       Patient Care Team:  Robyn Guerra MD as PCP - General (Family Medicine)  Lindsey East LPN as Care Coordinator         - The patient is given an After Visit Summary that lists all medications with directions, allergies, education, orders placed during this encounter and follow-up instructions.      - I have reviewed the patient's medical information including past medical, family, and social history sections including the medications and allergies.      - We discussed the patient's current medications.     This note was created by combination of typed  and MModal dictation.  Transcription errors may be present.  If there are any questions, please contact me.       Debby Meng NP

## 2024-09-17 NOTE — PROGRESS NOTES
Location: home ([place])  Type: Audiovisual    ALLERGY & IMMUNOLOGY CLINIC   HISTORY OF PRESENT ILLNESS   Referral from: No ref. provider found  CC: fire ant allergy    HPI: Leia Shultz is a 49 y.o. female  History obtained from patient    Since last visit:  Has been doing well  +fire ant  No further stings, typically happens when she gardens which she enjoys doing  +dust mite, only allergies in the spring time not year round  +low positive to cats, no issues around them    Initial visit  Left leg twice became very swollen and is painful   No itch  Happened twice in last 2 weeks  Resolves within a day    When gets stung by fire ant: lightheaded, nausea, pass out or feels like it, can't raise arms above heart level. Has happened 3 times since 2021, since moving to [facility].   First time pulled a weed ended up to 30 stings  Last time was 1 sting and still had same symptoms  Arm remained swollen for 3 weeks  Left a pustule  1 month ago was last sting    Drug Allergies:   Review of patient's allergies indicates:   Allergen Reactions    Fire ant Anxiety, Blisters, Dermatitis, Hives, Itching, Rash and Swelling    Pollen extracts Dermatitis, Hives and Rash    Sulfa (sulfonamide antibiotics) Nausea Only         MEDICAL HISTORY   SurgHx:  Past Surgical History:   Procedure Laterality Date    COSMETIC SURGERY  October 2016    excess skin removal on arms and stomach    DILATION AND CURETTAGE OF UTERUS  2002    gastric sleeve      LAPAROSCOPIC GASTRIC BANDING      ROOT CANAL      2    WISDOM TOOTH EXTRACTION      all 4        PHYSICAL EXAM   VS: There were no vitals taken for this visit.  GENERAL: NAD, well nourished, well appearing  EYES: no conjunctival injection, no discharge, no infraorbital shiners   no increased WOB  DERM: no rashes, +BL LE lymphedema     ASSESSMENT & PLAN     Fire ant anaphylaxis: (lightheaded, LOC); with reaction x1 after VIT maintenance with normal tryptase 4 hours after episode  - Fire ant IgE 3  -  Baseline tryptase 5  - Fire ant Rush 8/20/24, then 9/13/24 developed sensation in throat, tiredness, improved with epinephrine sent to ER, tryptase 3.3 (drawn 4 hours after reaction) thought to be a neurological reaction (a type of dissociation). Not consistent with psychosomatic reaction. This also prompts us to wonder whether her initial reactions could be neurologically mediated, however she has never had these reactions outside of stings x4 and VIT x1. We reviewed can back down next dose, increase frequency to Q2 week shots, stop shots, or continue maintenance, and she would like to re-try maintenance with premedications antihistamine and prednisone.     Chronic mixed rhinitis: sensitized to dust mite and low to cat  - Deferred treatment, as symptoms mostly in spring (no AIT or Odactra, no issues around cat)    Lipedema  - B/L legs    Sulfa allergy  - Can review at follow-up    Follow-up: in 1 month 10/2024 for fire ant 0.5mL (regular maintenance dose). She will take an antihistamine prior to all visits (she did not take one prior to last visit), and also add on prednisone premed for next 3 or so visits, which she has at home.     I spent a total of 40 minutes on the day of the visit. This includes face to face time and non-face to face time preparing to see the patient (eg, review of tests), obtaining and/or reviewing separately obtained history, documenting clinical information in the electronic or other health record, independently interpreting results and communicating results to the patient/family/caregiver, or care coordinator.

## 2024-10-07 DIAGNOSIS — T78.40XA ALLERGIC REACTION, INITIAL ENCOUNTER: ICD-10-CM

## 2024-10-07 RX ORDER — EPINEPHRINE 0.3 MG/.3ML
1 INJECTION SUBCUTANEOUS ONCE
Qty: 2 EACH | Refills: 0 | Status: SHIPPED | OUTPATIENT
Start: 2024-10-07 | End: 2024-10-07

## 2024-10-08 ENCOUNTER — OFFICE VISIT (OUTPATIENT)
Dept: ALLERGY | Facility: CLINIC | Age: 49
End: 2024-10-08
Payer: COMMERCIAL

## 2024-10-08 DIAGNOSIS — T78.1XXA ADVERSE FOOD REACTION, INITIAL ENCOUNTER: ICD-10-CM

## 2024-10-08 DIAGNOSIS — T78.2XXD ANAPHYLAXIS, SUBSEQUENT ENCOUNTER: Primary | ICD-10-CM

## 2024-10-08 PROCEDURE — 99215 OFFICE O/P EST HI 40 MIN: CPT | Mod: 95,,, | Performed by: STUDENT IN AN ORGANIZED HEALTH CARE EDUCATION/TRAINING PROGRAM

## 2024-10-08 RX ORDER — PREDNISONE 20 MG/1
10 TABLET ORAL DAILY
Qty: 10 TABLET | Refills: 0 | Status: SHIPPED | OUTPATIENT
Start: 2024-10-08

## 2024-10-08 RX ORDER — PREDNISONE 20 MG/1
20 TABLET ORAL DAILY
Qty: 10 TABLET | Refills: 0 | Status: SHIPPED | OUTPATIENT
Start: 2024-10-08 | End: 2024-10-08

## 2024-10-08 NOTE — PROGRESS NOTES
Location: home ([place])  Type: Audiovisual    ALLERGY & IMMUNOLOGY CLINIC   HISTORY OF PRESENT ILLNESS   Referral from: No ref. provider found  CC: new reaction and response to epipen    HPI: Leia Shultz is a 49 y.o. female  History obtained from patient    About 2 weeks ago ate a new food, a gummy bear from a friend  Within 5 minutes of eating gummy bear vomited 2x, felt like something was stuck in her throat. Felt her throat was sore and this symptom lasted until next morning.   No issues with gelatin, wonders if it is from another ingredient  20-25 minute after ingestion used her epipen and felt chest was pounding, shaky overall  When uses epipen with fire ants she has a different response to it - gets tired and weak, feels like she is under anesthesia and feels like she can't move  Doing well on fire ant VIT  No further stings, typically happens when she gardens which she enjoys doing    Prior history  +dust mite, only allergies in the spring time not year round  +low positive to cats, no issues around them    Left leg twice became very swollen and is painful   No itch  Happened twice in last 2 weeks  Resolves within a day    When gets stung by fire ant: lightheaded, nausea, pass out or feels like it, can't raise arms above heart level. Has happened 3 times since 2021, since moving to [facility].   First time pulled a weed ended up to 30 stings  Last time was 1 sting and still had same symptoms  Arm remained swollen for 3 weeks  Left a pustule  1 month ago was last sting    Drug Allergies:   Review of patient's allergies indicates:   Allergen Reactions    Fire ant Anxiety, Blisters, Dermatitis, Hives, Itching, Rash and Swelling    Pollen extracts Dermatitis, Hives and Rash    Sulfa (sulfonamide antibiotics) Nausea Only         MEDICAL HISTORY   SurgHx:  Past Surgical History:   Procedure Laterality Date    COSMETIC SURGERY  October 2016    excess skin removal on arms and stomach    DILATION AND CURETTAGE OF  UTERUS  2002    gastric sleeve      LAPAROSCOPIC GASTRIC BANDING      ROOT CANAL      2    WISDOM TOOTH EXTRACTION      all 4        PHYSICAL EXAM   VS: There were no vitals taken for this visit.  GENERAL: NAD, well nourished, well appearing  EYES: no conjunctival injection, no discharge, no infraorbital shiners   no increased WOB  DERM: no rashes, +BL LE lymphedema     ASSESSMENT & PLAN     Gummy bear reaction, after first time trying this gummy bear, of vomiting and sensation in throat; relief with epipen  - No prior issues with gelatin, wonders if from another ingredient  - Can bring the gummy bear in for skin prick testing or ingestion challenge with [facility] or [facility]  - Reviewed could have been other causes like anxiety or neurologically mediated, does not particularly resonate with patient as she was relaxed at home with her dog    Fire ant anaphylaxis: (lightheaded, LOC); with reaction x1 after VIT maintenance with normal tryptase 4 hours after episode  - Fire ant IgE 3  - Baseline tryptase 5.7 (during fire ant reaction 2.8)  - Fire ant Rush 8/20/24, then 9/13/24 developed sensation in throat, tiredness, improved with epinephrine sent to ER, tryptase 3.3 (drawn 4 hours after reaction) thought to be a neurological reaction (a type of dissociation). Not consistent with psychosomatic reaction. This also prompts us to wonder whether her initial reactions could be neurologically mediated, however she has never had these reactions outside of stings x4 and VIT x1. We reviewed can back down next dose, increase frequency to Q2 week shots, stop shots, or continue maintenance, and she would like to re-try maintenance with premedications antihistamine and prednisone.   - Prednisone 10mg x10 tabs sent today, after this prescription no further refills. Aware of risks of steroids.   - If her fire ant reactions are due to neurological response, this could explain why even with epinephrine she feels slow and under  anesthesia (vs how she felt when she used epipen with gummy bear - shaky and tachycardic). Reviewed CBT to help with teaching coping mechanisms, as stress can contribute to this.    Chronic mixed rhinitis: sensitized to dust mite and low to cat  - Deferred treatment, as symptoms mostly in spring (no AIT or Odactra, no issues around cat)    Lipedema  - B/L legs    Sulfa allergy  - Can review at follow-up    Follow-up: for fire ant 0.5mL (regular maintenance dose). She will take an antihistamine prior to all visits (she did not take one prior to last visit), and also continue on prednisone premed for next few visits    I spent a total of 40 minutes on the day of the visit. This includes face to face time and non-face to face time preparing to see the patient (eg, review of tests), obtaining and/or reviewing separately obtained history, documenting clinical information in the electronic or other health record, independently interpreting results and communicating results to the patient/family/caregiver, or care coordinator.

## 2024-10-11 ENCOUNTER — CLINICAL SUPPORT (OUTPATIENT)
Dept: ALLERGY | Facility: CLINIC | Age: 49
End: 2024-10-11
Payer: COMMERCIAL

## 2024-10-11 DIAGNOSIS — Z91.038 ALLERGY TO ANT BITE: Primary | ICD-10-CM

## 2024-10-11 PROCEDURE — 99999 PR PBB SHADOW E&M-EST. PATIENT-LVL I: CPT | Mod: PBBFAC,,,

## 2024-10-11 NOTE — PROGRESS NOTES
Prior to injection pt. At home took Claritin and prednisone. [facility] allergy injection was given. Pt. Was being monitored by  and the nurse. Pt. Stayed in Trendelenburg for 30 minutes, then placed in a up right sitting position x 20 minutes . No problems voiced, or noted .Pt. Walked to injection room, scheduled next month appointment. Will wait another 15 minutes sitting up in a chair, and was released to self with no problems noted or voiced. Pts. Total wait time was 1 hour and 15 minutes. Will return in a month for 0.5mL red vial fire ant, and will take antihistamine before (but plans to hold prednisone next time). We will continue Trendelenburg positioning. She plans to remind us next visit to reorder vials.

## 2024-11-06 ENCOUNTER — PATIENT MESSAGE (OUTPATIENT)
Dept: REHABILITATION | Facility: HOSPITAL | Age: 49
End: 2024-11-06
Payer: COMMERCIAL

## 2024-11-08 ENCOUNTER — CLINICAL SUPPORT (OUTPATIENT)
Dept: ALLERGY | Facility: CLINIC | Age: 49
End: 2024-11-08
Payer: COMMERCIAL

## 2024-11-08 DIAGNOSIS — Z91.038 ALLERGY TO ANT BITE: ICD-10-CM

## 2024-11-08 DIAGNOSIS — J30.9 CHRONIC ALLERGIC RHINITIS: Primary | ICD-10-CM

## 2024-11-08 PROCEDURE — 99999 PR PBB SHADOW E&M-EST. PATIENT-LVL I: CPT | Mod: PBBFAC,,,

## 2024-11-11 ENCOUNTER — CLINICAL SUPPORT (OUTPATIENT)
Dept: REHABILITATION | Facility: HOSPITAL | Age: 49
End: 2024-11-11
Payer: COMMERCIAL

## 2024-11-11 ENCOUNTER — DOCUMENTATION ONLY (OUTPATIENT)
Dept: REHABILITATION | Facility: HOSPITAL | Age: 49
End: 2024-11-11
Payer: COMMERCIAL

## 2024-11-11 DIAGNOSIS — R53.1 WEAKNESS: Primary | ICD-10-CM

## 2024-11-11 PROCEDURE — 97750 PHYSICAL PERFORMANCE TEST: CPT

## 2024-11-11 NOTE — PROGRESS NOTES
Progress Notes    [facility]  Occupational Therapy Discharge Note  Lymphedema     Date: 9/9/2024  Name: Leia Shultz  Clinic Number: [id number]     Therapy Diagnosis:           Encounter Diagnoses   Name Primary?    Lymphedema Yes    Lipedema         Physician: Debby Meng NP     Physician Orders: Eval and Treat  Medical Diagnosis: I89.0 (ICD-10-CM) - Lymphedema, not elsewhere classified   Evaluation Date: 7/1/2024  Insurance Authorization Period Expiration: 12/31/2024  Plan of Care Certification Period: 9/22/2024  Visit # / Visits authorized: 2 / 20  FOTO: see media, 1 / 3     Precautions:  Standard     Time In: 850am  Time Out: 10:05am  Total Billable Time: 75 minutes     SUBJECTIVE      Pt reports: Patient reports today for follow up testing. She needs lipedema mobility testing. Dr. Mahan works through [employer] and requires this testing prior to any surgical intervention. She provided paperwork which describes all tests/measures which need to be conducted today.   Pain: did not quantify      OBJECTIVE      Lipedemia mobility testing performed in conjuction with Christopher Guzman PT, DPT, OCS.   Observations: Pt arrived wearing compression in NAD.     Taken from initial evaluation:      Lymph Screening/LE Girth Measurements: taken in supine     LANDMARK RIGHT LE  7/1/24 LEFT LE  7/1/24 RIGHT LE  8/8/24 LEFT LE  8/8/24   SBP - - 42.0 cm 44.0 cm   10 below SBP 42.0 cm 42.0 cm 41.0 cm 40.0 cm   20 below SBP 42.0 cm 42.5 cm 41.5 cm 41.0 cm   30 below SBP 34.5 cm 36.0 cm 35.0 cm 35.0 cm   35 below SBP 27.5 cm 29.0 cm 29.0 cm 29.0 cm   Ankle 23.5 cm 25.5 cm 23.5 cm 23.5 cm   Forefoot 20.5 cm 21.0 cm 20.5 cm 21.0 cm           Taken from today's screening:      Vitals:   HR: 90  O2: 98  BP: 109/87     Posture observation     Posture Alignment in sitting:   Head: forward head   Scapulae: increased kyphosis   Trunk: increased kyphosis       strength (RHD)     (Measured in lbs)  "Right Left   Position 1 40 35   Position 2 46 42   Position 3 44 37   Position 4 35 30   Position 5 30 25      6-minute walk test     Distance Covered: 1556 ft Vitals post: , 93 O2      Balance assessment     AMOS SENSORY TESTING:(P= Pass, F= Fail; note any sway; hold each position for 30")     Condition 1: (firm surface/feet together/eyes open) Pass   Condition 2: (firm surface/feet together/eyes closed) Pass   Condition 3: (firm surface/feet in tandem/eyes open) Pass, Min Sway   Condition 4: (firm surface/feet in tandem/eyes closed) Pass, Mod Sway   Condition 5: (soft surface/feet together/eyes open) Pass, Min Sway   Condition 6: (soft surface/feet together/eyes closed) Pass, Mod Sway   Condition 7: (Fakuda step test), measure distance varied from center starting position 21*  3'9" fwd, 6" lateral        General Balance   Single Limb Stance R LE 57 seconds   Single Limb Stance L LE 74 seconds   30 second Chair Rise 9 reps completed with no arms         RANGE OF MOTION--LOWER EXTREMITIES     (R) LE Hip: WNL except IR: 33              Knee: 0-114*              Ankle: 14* Dorsiflexion - 50* Plantarflexion; 12* Eversion - 41* Inversion     (L) LE: Hip: WNL except IR: 34              Knee: 0-114*              Ankle: 14* Dorsiflexion - 50* Plantarflexion; 8* Eversion - 40* Inversion     Lower Extremity Strength  Right LE   Left LE     Hip Flexion: 4-/5 Hip Flexion: 3+/5   Hip Extension:  3+/5 Hip Extension: 3+/5   Hip Abduction: 3+/5 Hip Abduction: 3/5   Hip Adduction: 3-/5 Hip Adduction 3-/5   Knee Extension: 4/5 Knee Extension: 4-/5   Knee Flexion: 4-/5 Knee Flexion: 4-/5   Ankle Dorsiflexion: 4/5 Ankle Dorsiflexion: 4/5   Ankle Plantarflexion: 4/5 Ankle Plantarflexion: 4+/5      Ankle strength measurement     MMT/Reps Right Left   Plantarflexion 13 reps, 4/5 20 reps 4+/5   Dorsiflexion 46 42      OH endurance Test (2lbs ankle weight each UE)     Endurance tolerance: 98 seconds  RPE scale: 16/20      Lifting " tests     (Lbs) Floor to waist 30 RPE scale: 16/20      Subjective Reported Outcome Measures     Lower Extremity Functional Scale 45 - Mild to moderate functional limitation    Disability Arm Shoulder Hand 22.7 (Raw Score 21)       Treatment      Leia received the treatments listed below:    Physical Performance Testing as indicated above via Lipedema Mobility Testing Form provided by patient.       Patient Education and Home Exercises       Education provided: None today.       Assessment      Pt to to clinic today for Lipedema Mobility testing which her provider requested she perform prior to any surgical intervention for baseline testing and supportive documentation. No follow ups recommeded specific to Lipedema Mobility Testing. Lymph OT has previously commented RTC in 4 weeks for reassessment after completing self mld to BUE daily.      Pt will continue to benefit from skilled outpatient occupational therapy to address the deficits listed in the problem list on initial evaluation provide pt/family education and to maximize pt's level of independence in the home and community environment.       Pt's spiritual, cultural and educational needs considered and pt agreeable to plan of care and goals.      Anticipated barriers to occupational therapy: none      Goals:  Short Term Goals: (2 weeks)  Goals: Progressing / MET   1. Patient will demonstrate 100% knowledge of lymphedema precautions and signs of infection to allow for reduced lymphedema risk, infection risk, and/or exacerbation of condition.  NOT MET   2. Patient and/or caregiver will order/obtain appropriate compression garments to maintain lymphatic and venous support. NOT MET   3. Patient will tolerate daily activities wearing compression garments to allow for lymphatic drainage support, skin elasticity, and reduction in shape and size of limb.  NOT MET      Long Term Goals: (4 weeks)  Goals: Progressing / MET   1. Patient and/or caregiver to denis/doff  compression garment independently and with daily compliance to assist in lymphedema management, skin elasticity, and tissue density NOT MET   2. Patient and/or caregiver will be independent in use of pneumatic compression pump or self manual lymphatic drainage techniques to areas within reach to enhance lymphatic drainage and skin condition.  NOT MET   3. Patient to be independent and compliant with home exercise program to allow for increased function in affected limb. NOT MET       PLAN      RTC in 4 weeks for reassessment after completing self mld to BUE daily.     LILA Hernandez OTR/L, CHT  11/11/2024

## 2024-11-11 NOTE — PROGRESS NOTES
[facility]  Occupational Therapy Discharge Note  Lymphedema     Date: 9/9/2024  Name: Leia Shultz  Clinic Number: [id number]     Therapy Diagnosis:        Encounter Diagnoses   Name Primary?    Lymphedema Yes    Lipedema         Physician: Debby Meng NP     Physician Orders: Eval and Treat  Medical Diagnosis: I89.0 (ICD-10-CM) - Lymphedema, not elsewhere classified   Evaluation Date: 7/1/2024  Insurance Authorization Period Expiration: 12/31/2024  Plan of Care Certification Period: 9/22/2024  Visit # / Visits authorized: 2 / 20  FOTO: see media, 1 / 3     Precautions:  Standard     Time In: 850am  Time Out: 10:05am  Total Billable Time: 75 minutes    SUBJECTIVE     Pt reports: Patient reports today for follow up testing. She needs lipedema mobility testing. Dr. Mahan works through [employer] and requires this testing prior to any surgical intervention. She provided paperwork which describes all tests/measures which need to be conducted today.   Pain: did not quantify     OBJECTIVE      Lipedemia mobility testing performed in conjuction with Christopher Guzman PT, DPT, OCS.   Observations: Pt arrived wearing compression in NAD.    Taken from initial evaluation:     Lymph Screening/LE Girth Measurements: taken in supine    LANDMARK RIGHT LE  7/1/24 LEFT LE  7/1/24 RIGHT LE  8/8/24 LEFT LE  8/8/24   SBP - - 42.0 cm 44.0 cm   10 below SBP 42.0 cm 42.0 cm 41.0 cm 40.0 cm   20 below SBP 42.0 cm 42.5 cm 41.5 cm 41.0 cm   30 below SBP 34.5 cm 36.0 cm 35.0 cm 35.0 cm   35 below SBP 27.5 cm 29.0 cm 29.0 cm 29.0 cm   Ankle 23.5 cm 25.5 cm 23.5 cm 23.5 cm   Forefoot 20.5 cm 21.0 cm 20.5 cm 21.0 cm          Taken from today's screening:     Vitals:   HR: 90  O2: 98  BP: 109/87    Posture observation    Posture Alignment in sitting:   Head: forward head   Scapulae: increased kyphosis   Trunk: increased kyphosis      strength (RHD)    (Measured in lbs) Right Left   Position 1 40 35  "  Position 2 46 42   Position 3 44 37   Position 4 35 30   Position 5 30 25     6-minute walk test    Distance Covered: 1556 ft Vitals post: , 93 O2     Balance assessment    AMOS SENSORY TESTING:(P= Pass, F= Fail; note any sway; hold each position for 30")    Condition 1: (firm surface/feet together/eyes open) Pass   Condition 2: (firm surface/feet together/eyes closed) Pass   Condition 3: (firm surface/feet in tandem/eyes open) Pass, Min Sway   Condition 4: (firm surface/feet in tandem/eyes closed) Pass, Mod Sway   Condition 5: (soft surface/feet together/eyes open) Pass, Min Sway   Condition 6: (soft surface/feet together/eyes closed) Pass, Mod Sway   Condition 7: (Fakuda step test), measure distance varied from center starting position 21*  3'9" fwd, 6" lateral      General Balance   Single Limb Stance R LE 57 seconds   Single Limb Stance L LE 74 seconds   30 second Chair Rise 9 reps completed with no arms         RANGE OF MOTION--LOWER EXTREMITIES    (R) LE Hip: WNL except IR: 33   Knee: 0-114*   Ankle: 14* Dorsiflexion - 50* Plantarflexion; 12* Eversion - 41* Inversion    (L) LE: Hip: WNL except IR: 34   Knee: 0-114*   Ankle: 14* Dorsiflexion - 50* Plantarflexion; 8* Eversion - 40* Inversion    Lower Extremity Strength  Right LE  Left LE    Hip Flexion: 4-/5 Hip Flexion: 3+/5   Hip Extension:  3+/5 Hip Extension: 3+/5   Hip Abduction: 3+/5 Hip Abduction: 3/5   Hip Adduction: 3-/5 Hip Adduction 3-/5   Knee Extension: 4/5 Knee Extension: 4-/5   Knee Flexion: 4-/5 Knee Flexion: 4-/5   Ankle Dorsiflexion: 4/5 Ankle Dorsiflexion: 4/5   Ankle Plantarflexion: 4/5 Ankle Plantarflexion: 4+/5     Ankle strength measurement    MMT/Reps Right Left   Plantarflexion 13 reps, 4/5 20 reps 4+/5   Dorsiflexion 46 42     OH endurance Test (2lbs ankle weight each UE)    Endurance tolerance: 98 seconds  RPE scale: 16/20     Lifting tests    (Lbs) Floor to waist 30 RPE scale: 16/20     Subjective Reported Outcome " Measures    Lower Extremity Functional Scale 45 - Mild to moderate functional limitation    Disability Arm Shoulder Hand 22.7 (Raw Score 21)      Treatment      Leia received the treatments listed below:    Physical Performance Testing as indicated above via Lipedema Mobility Testing Form provided by patient.      Patient Education and Home Exercises       Education provided: None today.       Assessment      Pt to to clinic today for Lipedema Mobility testing which her provider requested she perform prior to any surgical intervention for baseline testing and supportive documentation. No follow ups recommeded specific to Lipedema Mobility Testing. Lymph OT has previously commented RTC in 4 weeks for reassessment after completing self mld to BUE daily.     Pt will continue to benefit from skilled outpatient occupational therapy to address the deficits listed in the problem list on initial evaluation provide pt/family education and to maximize pt's level of independence in the home and community environment.      Pt's spiritual, cultural and educational needs considered and pt agreeable to plan of care and goals.     Anticipated barriers to occupational therapy: none     Goals:  Short Term Goals: (2 weeks)  Goals: Progressing / MET   1. Patient will demonstrate 100% knowledge of lymphedema precautions and signs of infection to allow for reduced lymphedema risk, infection risk, and/or exacerbation of condition.  NOT MET   2. Patient and/or caregiver will order/obtain appropriate compression garments to maintain lymphatic and venous support. NOT MET   3. Patient will tolerate daily activities wearing compression garments to allow for lymphatic drainage support, skin elasticity, and reduction in shape and size of limb.  NOT MET      Long Term Goals: (4 weeks)  Goals: Progressing / MET   1. Patient and/or caregiver to denis/doff compression garment independently and with daily compliance to assist in lymphedema  management, skin elasticity, and tissue density NOT MET   2. Patient and/or caregiver will be independent in use of pneumatic compression pump or self manual lymphatic drainage techniques to areas within reach to enhance lymphatic drainage and skin condition.  NOT MET   3. Patient to be independent and compliant with home exercise program to allow for increased function in affected limb. NOT MET       PLAN      RTC in 4 weeks for reassessment after completing self mld to BUE daily.

## 2024-11-15 DIAGNOSIS — T78.40XA ALLERGIC REACTION, INITIAL ENCOUNTER: ICD-10-CM

## 2024-11-19 RX ORDER — EPINEPHRINE 0.3 MG/.3ML
1 INJECTION SUBCUTANEOUS ONCE
Qty: 2 EACH | Refills: 0 | Status: SHIPPED | OUTPATIENT
Start: 2024-11-19 | End: 2024-11-19

## 2024-11-21 ENCOUNTER — LAB VISIT (OUTPATIENT)
Dept: LAB | Facility: HOSPITAL | Age: 49
End: 2024-11-21
Attending: STUDENT IN AN ORGANIZED HEALTH CARE EDUCATION/TRAINING PROGRAM
Payer: COMMERCIAL

## 2024-11-21 DIAGNOSIS — E53.8 FOLATE DEFICIENCY: ICD-10-CM

## 2024-11-21 DIAGNOSIS — T14.8XXA BRUISING: ICD-10-CM

## 2024-11-21 LAB — FOLATE SERPL-MCNC: 13.1 NG/ML (ref 4–24)

## 2024-11-21 PROCEDURE — 82525 ASSAY OF COPPER: CPT | Performed by: STUDENT IN AN ORGANIZED HEALTH CARE EDUCATION/TRAINING PROGRAM

## 2024-11-21 PROCEDURE — 82746 ASSAY OF FOLIC ACID SERUM: CPT | Performed by: STUDENT IN AN ORGANIZED HEALTH CARE EDUCATION/TRAINING PROGRAM

## 2024-11-21 PROCEDURE — 83921 ORGANIC ACID SINGLE QUANT: CPT | Performed by: STUDENT IN AN ORGANIZED HEALTH CARE EDUCATION/TRAINING PROGRAM

## 2024-11-21 PROCEDURE — 84630 ASSAY OF ZINC: CPT | Performed by: STUDENT IN AN ORGANIZED HEALTH CARE EDUCATION/TRAINING PROGRAM

## 2024-11-25 LAB
METHYLMALONATE SERPL-SCNC: 0.2 UMOL/L
ZINC SERPL-MCNC: 79 UG/DL (ref 60–130)

## 2024-11-26 LAB — COPPER SERPL-MCNC: 1272 UG/L (ref 810–1990)

## 2024-12-05 ENCOUNTER — TELEPHONE (OUTPATIENT)
Dept: ALLERGY | Facility: CLINIC | Age: 49
End: 2024-12-05
Payer: COMMERCIAL

## 2024-12-05 NOTE — TELEPHONE ENCOUNTER
----- Message from Carol sent at 12/5/2024 10:37 AM CST -----  Regarding: appt access  Contact: [phone]  Pt calling to reschedule allergy injection. Pls call   15-May-2018

## 2024-12-09 ENCOUNTER — CLINICAL SUPPORT (OUTPATIENT)
Dept: ALLERGY | Facility: CLINIC | Age: 49
End: 2024-12-09
Payer: COMMERCIAL

## 2024-12-09 DIAGNOSIS — J30.9 CHRONIC ALLERGIC RHINITIS: Primary | ICD-10-CM

## 2024-12-09 PROCEDURE — 95115 IMMUNOTHERAPY ONE INJECTION: CPT | Mod: S$GLB,,, | Performed by: STUDENT IN AN ORGANIZED HEALTH CARE EDUCATION/TRAINING PROGRAM

## 2024-12-09 PROCEDURE — 99999 PR PBB SHADOW E&M-EST. PATIENT-LVL I: CPT | Mod: PBBFAC,,,

## 2024-12-13 ENCOUNTER — OFFICE VISIT (OUTPATIENT)
Dept: FAMILY MEDICINE | Facility: CLINIC | Age: 49
End: 2024-12-13
Payer: COMMERCIAL

## 2024-12-13 VITALS
RESPIRATION RATE: 19 BRPM | TEMPERATURE: 98 F | BODY MASS INDEX: 25.44 KG/M2 | SYSTOLIC BLOOD PRESSURE: 88 MMHG | HEIGHT: 62 IN | HEART RATE: 89 BPM | OXYGEN SATURATION: 96 % | DIASTOLIC BLOOD PRESSURE: 66 MMHG | WEIGHT: 138.25 LBS

## 2024-12-13 DIAGNOSIS — H65.93 FLUID LEVEL BEHIND TYMPANIC MEMBRANE OF BOTH EARS: Primary | ICD-10-CM

## 2024-12-13 PROCEDURE — 99213 OFFICE O/P EST LOW 20 MIN: CPT | Mod: S$GLB,,, | Performed by: FAMILY MEDICINE

## 2024-12-13 PROCEDURE — 99999 PR PBB SHADOW E&M-EST. PATIENT-LVL V: CPT | Mod: PBBFAC,,, | Performed by: FAMILY MEDICINE

## 2024-12-13 RX ORDER — FLUTICASONE PROPIONATE 50 MCG
2 SPRAY, SUSPENSION (ML) NASAL DAILY
Qty: 16 G | Refills: 0 | Status: SHIPPED | OUTPATIENT
Start: 2024-12-13

## 2024-12-15 NOTE — PROGRESS NOTES
"  Patient Name: Leia Shultz    : 1975  MRN: [id number]      Subjective:     Patient ID: Leia is a 49 y.o. female    Chief Complaint:  Ear Fullness (Dizziness water feeling that pt feel, happens threwq out the day when she woke up this morning pt felt throat hurting having postnasal drip)    History of Present Illness    Leia presents today with dizziness and ear discomfort.    She reports feeling like she is underwater in her ears when tilting her head, accompanied by dizziness during these episodes and when changing positions such as sitting to standing or bending over. She describes her ears as feeling like they contain cotton, with external soreness and internal itching. She experiences muffled sounds and feels off balance. She also reports post nasal drip this morning.    She is currently receiving venom treatment allergy shots, with most recent administration on Monday. She reports possible allergic symptoms related to recent drastic temperature changes. She takes Zyrtec as prescribed by her allergist.      ROS:  General: -fever, -chills, -fatigue, -weight gain, -weight loss  Eyes: -vision changes, -redness, -discharge  ENT: -ear pain, -nasal congestion, -sore throat, +post nasal drip  Cardiovascular: -chest pain, -palpitations, -lower extremity edema  Respiratory: -cough, -shortness of breath  Gastrointestinal: -abdominal pain, -nausea, -vomiting, -diarrhea, -constipation, -blood in stool  Genitourinary: -dysuria, -hematuria, -frequency  Musculoskeletal: -joint pain, -muscle pain  Neurological: -headache, +dizziness, -numbness, -tingling  Psychiatric: -anxiety, -depression, -sleep difficulty  Allergic: +allergic reactions         Objective:   BP (!) 88/66 (BP Location: Left arm, Patient Position: Sitting)   Pulse 89   Temp 97.7 °F (36.5 °C) (Oral)   Resp 19   Ht 5' 2" (1.575 m)   Wt 62.7 kg (138 lb 3.7 oz)   SpO2 96%   BMI 25.28 kg/m²     Physical Exam  HENT:      Head: Normocephalic and " atraumatic. No raccoon eyes.      Right Ear: A middle ear effusion is present. There is no impacted cerumen.      Left Ear: A middle ear effusion is present. There is no impacted cerumen.      Nose: Rhinorrhea present.      Mouth/Throat:      Pharynx: No oropharyngeal exudate or posterior oropharyngeal erythema.   Neurological:      Mental Status: She is alert.          Assessment        ICD-10-CM ICD-9-CM   1. Fluid level behind tympanic membrane of both ears  H65.93 381.4         Plan:   Assessment & Plan    IMPRESSION:  Assessed patient's symptoms of dizziness, ear discomfort, and congestion as likely due to fluid buildup behind the ears, consistent with allergies  Noted recent allergy shot could have contributed to current symptoms  Considered recent temperature changes as a potential trigger for allergic response  Determined treatment approach to focus on drying up fluid using antihistamine and steroid nasal spray    MIDDLE EAR EFFUSION:  Explained relationship between nasal congestion, eustachian tube, and ear symptoms.  Described how fluid buildup affects vestibular cochlear nerve, impacting balance and hearing.  Discussed that symptoms may take about 1 week to resolve with treatment.  Started steroid nasal spray.  Continued Zyrtec (cetirizine) as previously prescribed by allergist.  Leia to notify allergist about current symptoms, especially in relation to recent allergy shot.    FOLLOW-UP:  Follow up in 1 week if symptoms do not improve.           1. Fluid level behind tympanic membrane of both ears  -     fluticasone propionate (FLONASE) 50 mcg/actuation nasal spray; 2 sprays (100 mcg total) by Each Nostril route once daily.  Dispense: 16 g; Refill: 0             -Sadi Capps Jr., MD, AAHIVS      This note was generated with the assistance of ambient listening technology. Verbal consent was obtained by the patient and accompanying visitor(s) for the recording of patient appointment to facilitate  this note. I attest to having reviewed and edited the generated note for accuracy, though some syntax or spelling errors may persist. Please contact the author of this note for any clarification.      There are no Patient Instructions on file for this visit.      No follow-ups on file.   Future Appointments   Date Time Provider Department Center   1/7/2025 12:30 PM INJECTION, ALLERGY [facility] [address]   1/9/2025 11:30 AM [facility]

## 2025-01-05 DIAGNOSIS — F41.9 ANXIETY: ICD-10-CM

## 2025-01-06 RX ORDER — SERTRALINE HYDROCHLORIDE 100 MG/1
150 TABLET, FILM COATED ORAL DAILY
Qty: 135 TABLET | Refills: 1 | Status: SHIPPED | OUTPATIENT
Start: 2025-01-06

## 2025-01-07 ENCOUNTER — CLINICAL SUPPORT (OUTPATIENT)
Dept: ALLERGY | Facility: CLINIC | Age: 50
End: 2025-01-07
Payer: COMMERCIAL

## 2025-01-07 DIAGNOSIS — Z91.038 ALLERGY TO ANT BITE: ICD-10-CM

## 2025-01-07 DIAGNOSIS — J30.9 CHRONIC ALLERGIC RHINITIS: Primary | ICD-10-CM

## 2025-01-07 PROCEDURE — 99999 PR PBB SHADOW E&M-EST. PATIENT-LVL I: CPT | Mod: PBBFAC,,,

## 2025-01-07 PROCEDURE — 95115 IMMUNOTHERAPY ONE INJECTION: CPT | Mod: S$GLB,,, | Performed by: STUDENT IN AN ORGANIZED HEALTH CARE EDUCATION/TRAINING PROGRAM

## 2025-01-17 ENCOUNTER — OFFICE VISIT (OUTPATIENT)
Dept: OBSTETRICS AND GYNECOLOGY | Facility: CLINIC | Age: 50
End: 2025-01-17
Payer: COMMERCIAL

## 2025-01-17 VITALS — WEIGHT: 134.5 LBS | BODY MASS INDEX: 24.6 KG/M2

## 2025-01-17 DIAGNOSIS — Z01.419 WELL WOMAN EXAM WITH ROUTINE GYNECOLOGICAL EXAM: Primary | ICD-10-CM

## 2025-01-17 DIAGNOSIS — Z30.431 IUD CHECK UP: ICD-10-CM

## 2025-01-17 PROCEDURE — 99999 PR PBB SHADOW E&M-EST. PATIENT-LVL III: CPT | Mod: PBBFAC,,, | Performed by: OBSTETRICS & GYNECOLOGY

## 2025-01-17 PROCEDURE — 99396 PREV VISIT EST AGE 40-64: CPT | Mod: S$GLB,,, | Performed by: OBSTETRICS & GYNECOLOGY

## 2025-01-17 RX ORDER — BUMETANIDE 1 MG/1
0.5 TABLET ORAL DAILY
Qty: 45 TABLET | Refills: 3 | Status: SHIPPED | OUTPATIENT
Start: 2025-01-17 | End: 2026-01-17

## 2025-01-17 NOTE — PROGRESS NOTES
Subjective     Patient ID: Leia Shultz is a 49 y.o. female.    Chief Complaint:  Annual Exam      History of Present Illness  HPI  Annual Exam-Postmenopausal  Patient presents for annual exam. The patient has no complaints today. The patient is sexually active. GYN screening history: last pap: approximate date 2024 and was normal. The patient is not taking hormone replacement therapy. Patient denies post-menopausal vaginal bleeding. The patient wears seatbelts: yes. The patient participates in regular exercise: yes. Has the patient ever been transfused or tattooed?: no. The patient reports that there is not domestic violence in her life.    Pt has copper IUD placed 2019.    Pt's has been perimenopausal for several years.    GYN & OB History  Patient's last menstrual period was 10/28/2024.   Date of Last Pap: 2/3/2024    OB History    Para Term  AB Living   1 0 0   1     SAB IAB Ectopic Multiple Live Births   0              # Outcome Date GA Lbr Sohail/2nd Weight Sex Type Anes PTL Lv   1 AB                Review of Systems  Review of Systems   Constitutional: Negative.    Respiratory: Negative.     Cardiovascular: Negative.    Gastrointestinal: Negative.    Endocrine: Negative.    Genitourinary: Negative.    Musculoskeletal: Negative.    Integumentary:         lymphedema   Neurological: Negative.    Psychiatric/Behavioral: Negative.     Breast: negative.           Objective   Physical Exam:   Constitutional: She is oriented to person, place, and time. She appears well-developed and well-nourished. No distress.    HENT:   Head: Normocephalic and atraumatic.     Neck: No thyromegaly present.     Pulmonary/Chest: Effort normal. No respiratory distress. Right breast exhibits no inverted nipple, no mass, no nipple discharge, no skin change, no tenderness, presence, no bleeding, no swelling, no mastectomy, no augmentation and no lumpectomy. Left breast exhibits no inverted nipple, no mass, no nipple  discharge, no skin change, no tenderness, presence, no bleeding, no swelling, no mastectomy, no augmentation and no lumpectomy. Breasts are symmetrical.        Abdominal: Soft. She exhibits no distension and no mass. There is no abdominal tenderness. There is no rebound and no guarding.     Genitourinary:    Urethra, bladder, vagina, uterus, right adnexa and left adnexa normal.      Pelvic exam was performed with patient in the lithotomy position.   The external female genitalia was normal.   No external genitalia lesions identified,Genitalia hair distrobution normal .     Labial bartholins normal.There is no rash, tenderness, lesion or injury on the right labia. There is no rash, tenderness, lesion or injury on the left labia. Cervix is normal. No no adexnal prolapse or no masses or organomegaly. Right adnexum displays no mass, no tenderness and no fullness. Left adnexum displays no mass, no tenderness and no fullness. Vagina exhibits no lesion. No erythema, vaginal discharge, tenderness, bleeding, rectocele, cystocele or prolapse of vaginal walls in the vagina.    No foreign body in the vagina.      No signs of injury in the vagina.   Vagina was moist.Cervix exhibits no motion tenderness, no lesion, no discharge, no friability, no tenderness and no polyp. Uterus consistancy normal and Uerus contour normal  Uterus is not deviated, not enlarged, not fixed, not tender, not hosting fibroids and no uterine prolapse. Normal urethral meatus.Urethral Meatus exhibits: no urethral lesionUrethra findings: no urethral mass, no tenderness, no urethral scarring and no prolapsedBladder findings: no bladder distention and no bladder tendernessIUD strings visualized.          Musculoskeletal: Normal range of motion and moves all extremeties. No tenderness.       Neurological: She is alert and oriented to person, place, and time. No cranial nerve deficit. Coordination normal.    Skin: Skin is warm. She is not diaphoretic. No  erythema.    Psychiatric: She has a normal mood and affect. Her behavior is normal. Judgment and thought content normal.       Female chaperone, Meredith Boggs,  present for entire exam       Assessment and Plan     1. Well woman exam with routine gynecological exam    2. IUD check up          Plan:  Pap not indicated at this time  Pt already scheduled for [facility] next week  Pt states she is up to date with colon cancer screening with recent cologuard

## 2025-01-28 ENCOUNTER — OFFICE VISIT (OUTPATIENT)
Dept: FAMILY MEDICINE | Facility: CLINIC | Age: 50
End: 2025-01-28
Payer: COMMERCIAL

## 2025-01-28 VITALS
TEMPERATURE: 98 F | HEART RATE: 105 BPM | BODY MASS INDEX: 24.34 KG/M2 | WEIGHT: 132.25 LBS | OXYGEN SATURATION: 97 % | SYSTOLIC BLOOD PRESSURE: 120 MMHG | HEIGHT: 62 IN | DIASTOLIC BLOOD PRESSURE: 76 MMHG

## 2025-01-28 DIAGNOSIS — Z00.00 ANNUAL PHYSICAL EXAM: Primary | ICD-10-CM

## 2025-01-28 PROCEDURE — 99396 PREV VISIT EST AGE 40-64: CPT | Mod: S$GLB,,,

## 2025-01-28 PROCEDURE — 99999 PR PBB SHADOW E&M-EST. PATIENT-LVL IV: CPT | Mod: PBBFAC,,,

## 2025-01-28 NOTE — PROGRESS NOTES
HPI:   Leia Shultz is a 49 year old  female that arrived at clinic today for her annual exam . Ms. Shultz expressed concern related to joint pain. She also expresses has concern about bilateral knees, wrist, and ankles and knuckles. Leia states that she has experienced her knee pain for > 1 year and she is not sure if she should be tested for RA and OA. Pain in bilateral knees is experienced when touching knees together, therefore she uses a pillow at night while lying down. She has no alleviating factors.  She also expressed the need to be aware of the most optimal time to stop losing weight. Patient is ambulatory without assistive devices and shows no signs and symptoms of acute distress.    VS: BP: 120/76 HR:105 O2:97%RA T:98.0    ROS:  Constitutional: negative for fatigue, fever, and unintentional weight loss  EENT: negative for vision changes, ear pain, nasal discomfort, and sore throat  Cardiovascular: negative for chest pain, palpitations, and edema  Respiratory: negative for SOB, cough, and adventitious breath sounds   Gastrointestinal:negative for nausea, vomiting, and diarrhea  Genitourinary: negative for dysuria, hematuria, urgency, and frequency LMP 10/28/24.   Musculoskeletal: Positive for joint tenderness.   Integumentary:negative for skin rashes and discoloration  Neurologic: negative for headache, dizziness, and confusion  Psychiatric: Negative for SI/HI AV/VH  Endocrine:negative for polyuria  Hematologic/Lymphatic:negative for lymphadenopathy and bruising     Allergic:  Sulfa reaction- Nausea  Fire Ants- anxiety, blister dermatitis, hives, itching, rash, swelling  Pollen- dermatitis, hives, rash    Screening/Immunization:  Mammogram scheduled for 1/31/2025    Plan:  Annual lab testing with RA factor, ESR, C reactive protein, Folate, and Vitamin D.   Orthopedic follow up    Ms. Shultz is in agreement to receive laboratory testing on today. Discussion held about possibility of orthopedic  follow up, patient is not in agreement at this time. She would like to receive laboratory values back and revisit possibility. The patient was educated on her current BMI and weight and advised that goal weight is her choice. Ms. Carrington verbalized understanding of patient education and is to return or call clinic for medical needs as needed.

## 2025-01-28 NOTE — PROGRESS NOTES
"  HPI     Chief Complaint:  Chief Complaint   Patient presents with    Annual Exam       Leia Shultz is a 49 y.o. female with multiple medical diagnoses as listed in the medical history and problem list that presents for   Chief Complaint   Patient presents with    Annual Exam    .     {Last Office Visit:25013}     Knee Pain   The incident occurred more than 1 week ago. The incident occurred at home. There was no injury mechanism. The pain is present in the left knee and right knee. The pain is moderate. The pain has been Fluctuating since onset. The symptoms are aggravated by palpation and weight bearing.     Will see lipedema specialist in April BMI 24 today          Assessment & Plan     Problem List Items Addressed This Visit    None        --------------------------------------------      Health Maintenance:  Health Maintenance         Date Due Completion Date    HIV Screening Never done ---    Mammogram 01/18/2025 1/18/2024    Colorectal Cancer Screening 06/03/2025 4/7/2023    Lipid Panel 01/23/2029 1/23/2024    Cervical Cancer Screening 01/30/2029 1/30/2024    TETANUS VACCINE 12/06/2029 12/6/2019    RSV Vaccine (Age 60+ and Pregnant patients) (1 - 1-dose 75+ series) 02/18/2050 ---            {4healthmaintenance:01106}    Follow Up:  No follow-ups on file.    Exam     Review of Systems:  (as noted above)  Review of Systems    Physical Exam:   Physical Exam  Vitals:    01/28/25 0856   BP: 120/76   BP Location: Right arm   Patient Position: Sitting   Pulse: 105   Temp: 98 °F (36.7 °C)   TempSrc: Oral   SpO2: 97%   Weight: 60 kg (132 lb 4.4 oz)   Height: 5' 2" (1.575 m)      Body mass index is 24.19 kg/m².        History     Past Medical History:  Past Medical History:   Diagnosis Date    Anemia     Arthritis     Heart murmur     Lipedema     Lymphedema     Urticaria        Past Surgical History:  Past Surgical History:   Procedure Laterality Date    COSMETIC SURGERY  October 2016    excess skin removal on " arms and stomach    DILATION AND CURETTAGE OF UTERUS  2002    gastric sleeve      LAPAROSCOPIC GASTRIC BANDING      ROOT CANAL      2    WISDOM TOOTH EXTRACTION      all 4       Social History:  Social History     Socioeconomic History    Marital status:     Number of children: 0   Tobacco Use    Smoking status: Never     Passive exposure: Never    Smokeless tobacco: Never   Substance and Sexual Activity    Alcohol use: Yes     Comment: rare    Drug use: Never    Sexual activity: Yes     Partners: Male     Birth control/protection: I.U.D.     Comment: 11/21/17 -vasectomy     Social Drivers of Health     Financial Resource Strain: Low Risk  (7/21/2024)    Received from [facility]    Overall Financial Resource Strain (CARDIA)     Difficulty of Paying Living Expenses: Not very hard   Food Insecurity: No Food Insecurity (7/21/2024)    Received from [facility]    Hunger Vital Sign     Worried About Running Out of Food in the Last Year: Never true     Ran Out of Food in the Last Year: Never true   Transportation Needs: No Transportation Needs (7/21/2024)    Received from [facility]    PRAPARE - Transportation     Lack of Transportation (Medical): No     Lack of Transportation (Non-Medical): No   Physical Activity: Insufficiently Active (7/21/2024)    Received from [facility]    Exercise Vital Sign     Days of Exercise per Week: 3 days     Minutes of Exercise per Session: 30 min   Stress: Stress Concern Present (7/21/2024)    Received from [facility]    [place] citizen [place] [employer] - Occupational Stress Questionnaire     Feeling of Stress : To some extent   Housing Stability: Low Risk  (1/23/2024)    Housing Stability Vital Sign     Unable to Pay for Housing in the Last Year: No     Number of Places Lived in the Last Year: 1     Unstable Housing in the Last Year: No       Family History:  Family History   Problem Relation Name Age of Onset    Allergies Mother Shefali Mccrary     Clotting  disorder Mother Shefali Mccrary     Hypotension Mother Shefali Mccrary     Arthritis Mother Shefali Mccrary     Depression Mother Shefali Mccrary     Skin cancer Father Daniel Bitting     Cancer Father Daniel Novoa         Skin Cancer    Allergies Sister      Irregular heart beat Sister Gisselle         prolonged QT    Crohn's disease Sister Gisselle     Arthritis Sister Gisselle         Letnaunchyn    Cancer Brother Byron Novoa          2018 from rare form of spinal cancer    Early death Brother Byron Bitfidel         Cancer at 39    Alcohol abuse Maternal Uncle Tim          of alcohol-related liver failure    Early death Maternal Uncle Tim     No Known Problems Maternal Grandmother      No Known Problems Maternal Grandfather      No Known Problems Paternal Grandmother      No Known Problems Paternal Grandfather         Allergies and Medications: (updated and reviewed)  Review of patient's allergies indicates:   Allergen Reactions    Fire ant Anxiety, Blisters, Dermatitis, Hives, Itching, Rash and Swelling    Pollen extracts Dermatitis, Hives and Rash    Sulfa (sulfonamide antibiotics) Nausea Only     Current Outpatient Medications   Medication Sig Dispense Refill    bumetanide (BUMEX) 1 MG tablet TAKE 0.5 TABLETS (0.5 MG TOTAL) BY MOUTH ONCE DAILY. 45 tablet 3    cetirizine (ZYRTEC) 10 MG tablet Take 1 tablet (10 mg total) by mouth 2 (two) times a day. Start  morning 2 days before Rush. for 5 days 10 tablet 0    EPINEPHrine (EPIPEN 2-KEVIN) 0.3 mg/0.3 mL AtIn Inject 0.3 mLs (0.3 mg total) into the muscle once. for 1 dose 2 each 0    MULTIVITAMIN ORAL Take by mouth once daily.      ondansetron (ZOFRAN) 8 MG tablet Take 8 mg by mouth as needed for Nausea.      semaglutide, weight loss, (WEGOVY) 2.4 mg/0.75 mL PnIj Inject 2.4 mg into the skin once a week 3 mL 3    semaglutide, weight loss, (WEGOVY) 2.4 mg/0.75 mL PnIj Inject 2.4 mg into the skin once a week 3 mL 6    sertraline (ZOLOFT) 100  MG tablet TAKE 1.5 TABLETS (150 MG TOTAL) BY MOUTH ONCE DAILY. AS NEEDED. 135 tablet 1    traZODone (DESYREL) 100 MG tablet Take 1.5 tablets (150 mg total) by mouth every evening. 90 tablet 2    famotidine (PEPCID) 20 MG tablet Take 1 tablet (20 mg total) by mouth 2 (two) times daily. Start Sunday morning 2 days before Anthony, and take morning of Rush. for 5 days 10 tablet 0    fluticasone propionate (FLONASE) 50 mcg/actuation nasal spray 2 sprays (100 mcg total) by Each Nostril route once daily. 16 g 0    gabapentin (NEURONTIN) 300 MG capsule Take 1 capsule (300 mg total) by mouth 3 (three) times daily. 90 capsule 1    ketoconazole (NIZORAL) 2 % cream Apply topically once daily. 60 g 0    naftifine 2 % Crea Apply 1 Application topically once daily. 45 g 0    predniSONE (DELTASONE) 20 MG tablet Take 0.5 tablets (10 mg total) by mouth once daily. 10 tablet 0    triamcinolone acetonide 0.1% (KENALOG) 0.1 % ointment Apply topically 2 (two) times daily. 80 g 1     No current facility-administered medications for this visit.       Patient Care Team:  Robyn Guerra MD as PCP - General (Family Medicine)  Lindsey East LPN as Care Coordinator         - The patient is given an After Visit Summary that lists all medications with directions, allergies, education, orders placed during this encounter and follow-up instructions.      - I have reviewed the patient's medical information including past medical, family, and social history sections including the medications and allergies.      - We discussed the patient's current medications.     This note was created by combination of typed  and MModal dictation.  Transcription errors may be present.  If there are any questions, please contact me.       Debby Meng NP

## 2025-01-29 ENCOUNTER — LAB VISIT (OUTPATIENT)
Dept: LAB | Facility: HOSPITAL | Age: 50
End: 2025-01-29
Payer: COMMERCIAL

## 2025-01-29 ENCOUNTER — PATIENT MESSAGE (OUTPATIENT)
Dept: FAMILY MEDICINE | Facility: CLINIC | Age: 50
End: 2025-01-29

## 2025-01-29 ENCOUNTER — OFFICE VISIT (OUTPATIENT)
Dept: FAMILY MEDICINE | Facility: CLINIC | Age: 50
End: 2025-01-29
Payer: COMMERCIAL

## 2025-01-29 DIAGNOSIS — E78.00 ELEVATED LDL CHOLESTEROL LEVEL: Primary | ICD-10-CM

## 2025-01-29 DIAGNOSIS — Z00.00 ANNUAL PHYSICAL EXAM: ICD-10-CM

## 2025-01-29 DIAGNOSIS — F41.9 ANXIETY: ICD-10-CM

## 2025-01-29 LAB
ALBUMIN SERPL BCP-MCNC: 4.2 G/DL (ref 3.5–5.2)
ALP SERPL-CCNC: 66 U/L (ref 40–150)
ALT SERPL W/O P-5'-P-CCNC: 9 U/L (ref 10–44)
ANION GAP SERPL CALC-SCNC: 8 MMOL/L (ref 8–16)
AST SERPL-CCNC: 18 U/L (ref 10–40)
BASOPHILS # BLD AUTO: 0.07 K/UL (ref 0–0.2)
BASOPHILS NFR BLD: 1.2 % (ref 0–1.9)
BILIRUB SERPL-MCNC: 0.6 MG/DL (ref 0.1–1)
BUN SERPL-MCNC: 10 MG/DL (ref 6–20)
CALCIUM SERPL-MCNC: 9.9 MG/DL (ref 8.7–10.5)
CHLORIDE SERPL-SCNC: 102 MMOL/L (ref 95–110)
CHOLEST SERPL-MCNC: 253 MG/DL (ref 120–199)
CHOLEST/HDLC SERPL: 3.4 {RATIO} (ref 2–5)
CO2 SERPL-SCNC: 28 MMOL/L (ref 23–29)
CREAT SERPL-MCNC: 0.8 MG/DL (ref 0.5–1.4)
CRP SERPL-MCNC: 0.7 MG/L (ref 0–8.2)
DIFFERENTIAL METHOD BLD: ABNORMAL
EOSINOPHIL # BLD AUTO: 0.3 K/UL (ref 0–0.5)
EOSINOPHIL NFR BLD: 5.4 % (ref 0–8)
ERYTHROCYTE [DISTWIDTH] IN BLOOD BY AUTOMATED COUNT: 11.5 % (ref 11.5–14.5)
ERYTHROCYTE [SEDIMENTATION RATE] IN BLOOD BY PHOTOMETRIC METHOD: <2 MM/HR (ref 0–36)
EST. GFR  (NO RACE VARIABLE): >60 ML/MIN/1.73 M^2
ESTIMATED AVG GLUCOSE: 85 MG/DL (ref 68–131)
FOLATE SERPL-MCNC: 16.4 NG/ML (ref 4–24)
GLUCOSE SERPL-MCNC: 83 MG/DL (ref 70–110)
HBA1C MFR BLD: 4.6 % (ref 4–5.6)
HCT VFR BLD AUTO: 41.7 % (ref 37–48.5)
HDLC SERPL-MCNC: 74 MG/DL (ref 40–75)
HDLC SERPL: 29.2 % (ref 20–50)
HGB BLD-MCNC: 14.1 G/DL (ref 12–16)
IMM GRANULOCYTES # BLD AUTO: 0.02 K/UL (ref 0–0.04)
IMM GRANULOCYTES NFR BLD AUTO: 0.3 % (ref 0–0.5)
LDLC SERPL CALC-MCNC: 166.6 MG/DL (ref 63–159)
LYMPHOCYTES # BLD AUTO: 2.3 K/UL (ref 1–4.8)
LYMPHOCYTES NFR BLD: 38.9 % (ref 18–48)
MCH RBC QN AUTO: 31.5 PG (ref 27–31)
MCHC RBC AUTO-ENTMCNC: 33.8 G/DL (ref 32–36)
MCV RBC AUTO: 93 FL (ref 82–98)
MONOCYTES # BLD AUTO: 0.5 K/UL (ref 0.3–1)
MONOCYTES NFR BLD: 7.7 % (ref 4–15)
NEUTROPHILS # BLD AUTO: 2.8 K/UL (ref 1.8–7.7)
NEUTROPHILS NFR BLD: 46.5 % (ref 38–73)
NONHDLC SERPL-MCNC: 179 MG/DL
NRBC BLD-RTO: 0 /100 WBC
PLATELET # BLD AUTO: 313 K/UL (ref 150–450)
PMV BLD AUTO: 8.5 FL (ref 9.2–12.9)
POTASSIUM SERPL-SCNC: 4.1 MMOL/L (ref 3.5–5.1)
PROT SERPL-MCNC: 7.2 G/DL (ref 6–8.4)
RBC # BLD AUTO: 4.47 M/UL (ref 4–5.4)
RHEUMATOID FACT SERPL-ACNC: <13 IU/ML (ref 0–15)
SODIUM SERPL-SCNC: 138 MMOL/L (ref 136–145)
TRIGL SERPL-MCNC: 62 MG/DL (ref 30–150)
WBC # BLD AUTO: 5.97 K/UL (ref 3.9–12.7)

## 2025-01-29 PROCEDURE — 98005 SYNCH AUDIO-VIDEO EST LOW 20: CPT | Mod: 95,,,

## 2025-01-29 PROCEDURE — 85652 RBC SED RATE AUTOMATED: CPT

## 2025-01-29 PROCEDURE — 36415 COLL VENOUS BLD VENIPUNCTURE: CPT

## 2025-01-29 PROCEDURE — 83036 HEMOGLOBIN GLYCOSYLATED A1C: CPT

## 2025-01-29 PROCEDURE — 82746 ASSAY OF FOLIC ACID SERUM: CPT

## 2025-01-29 PROCEDURE — 86431 RHEUMATOID FACTOR QUANT: CPT

## 2025-01-29 PROCEDURE — 86140 C-REACTIVE PROTEIN: CPT

## 2025-01-29 PROCEDURE — 80053 COMPREHEN METABOLIC PANEL: CPT

## 2025-01-29 PROCEDURE — 82652 VIT D 1 25-DIHYDROXY: CPT

## 2025-01-29 PROCEDURE — 80061 LIPID PANEL: CPT

## 2025-01-29 PROCEDURE — 85025 COMPLETE CBC W/AUTO DIFF WBC: CPT

## 2025-01-29 NOTE — PROGRESS NOTES
The patient location is:  Patient Home  The chief complaint leading to consultation is: as below  Visit type: Virtual visit with synchronous audio and video  Total time spent with patient: 20 minutes  Each patient to whom he or she provides medical services by telemedicine is:  (1) informed of the relationship between the physician and patient and the respective role of any other health care provider with respect to management of the patient; and (2) notified that she may decline to receive medical services by telemedicine and may withdraw from such care at any time.      HPI     Chief Complaint:  No chief complaint on file.      Leia Shultz is a 49 y.o. female with multiple medical diagnoses as listed in the medical history and problem list that presents for test results.  Pt is not new to me but is known to this clinic with her last appointment being 1/28/2025.      HPI  Pt presents for test results.  Recently completed lipid panel with increase in LDL.  Diet consists of lots of protein, ground chicken etc.  Avoids processed foods, cooks at home.  Has been more stressed this past year.      Assessment & Plan     Problem List Items Addressed This Visit    None  Visit Diagnoses       Elevated LDL cholesterol level    -  Primary   and total cholesterol 253 on recent lipid panel. Discussed starting fish oil and continue with healthy diet and exercise. Recheck lipid panel in 6 months.    Relevant Orders    Lipid Panel    Anxiety      Stable mood. The current medical regimen is effective;  continue present plan and medications.              --------------------------------------------      Health Maintenance:  Health Maintenance         Date Due Completion Date    HIV Screening Never done ---    Mammogram 01/18/2025 1/18/2024    Colorectal Cancer Screening 06/03/2025 4/7/2023    Cervical Cancer Screening 01/30/2029 1/30/2024    TETANUS VACCINE 12/06/2029 12/6/2019    Lipid Panel 01/29/2030 1/29/2025    RSV  Vaccine (Age 60+ and Pregnant patients) (1 - 1-dose 75+ series) 02/18/2050 ---            Health maintenance reviewed and Lipid panel ordered    Follow Up:  Follow up in about 6 months (around 7/29/2025).    Exam     Review of Systems:  (as noted above)  Review of Systems   Constitutional:  Negative for activity change and unexpected weight change.   HENT:  Negative for hearing loss, rhinorrhea and trouble swallowing.    Eyes:  Negative for discharge and visual disturbance.   Respiratory:  Negative for chest tightness and wheezing.    Cardiovascular:  Negative for chest pain and palpitations.   Gastrointestinal:  Negative for blood in stool, constipation, diarrhea and vomiting.   Endocrine: Negative for polydipsia and polyuria.   Genitourinary:  Negative for difficulty urinating, dysuria, hematuria and menstrual problem.   Musculoskeletal:  Negative for arthralgias, joint swelling and neck pain.   Neurological:  Negative for weakness and headaches.   Psychiatric/Behavioral:  Negative for confusion and dysphoric mood.        Physical Exam:   Physical Exam  There were no vitals filed for this visit.   There is no height or weight on file to calculate BMI.        History     Past Medical History:  Past Medical History:   Diagnosis Date    Anemia     Arthritis     Heart murmur     Lipedema     Lymphedema     Urticaria        Past Surgical History:  Past Surgical History:   Procedure Laterality Date    COSMETIC SURGERY  October 2016    excess skin removal on arms and stomach    DILATION AND CURETTAGE OF UTERUS  2002    gastric sleeve      LAPAROSCOPIC GASTRIC BANDING      ROOT CANAL      2    WISDOM TOOTH EXTRACTION      all 4       Social History:  Social History     Socioeconomic History    Marital status:     Number of children: 0   Tobacco Use    Smoking status: Never     Passive exposure: Never    Smokeless tobacco: Never   Substance and Sexual Activity    Alcohol use: Yes     Comment: rare    Drug use:  Never    Sexual activity: Yes     Partners: Male     Birth control/protection: I.U.D.     Comment: 17 -vasectomy     Social Drivers of Health     Financial Resource Strain: Low Risk  (2025)    Overall Financial Resource Strain (CARDIA)     Difficulty of Paying Living Expenses: Not hard at all   Food Insecurity: No Food Insecurity (2025)    Hunger Vital Sign     Worried About Running Out of Food in the Last Year: Never true     Ran Out of Food in the Last Year: Never true   Transportation Needs: No Transportation Needs (2024)    Received from [facility]    PRAPARE - Transportation     Lack of Transportation (Medical): No     Lack of Transportation (Non-Medical): No   Physical Activity: Insufficiently Active (2025)    Exercise Vital Sign     Days of Exercise per Week: 2 days     Minutes of Exercise per Session: 30 min   Stress: Stress Concern Present (2025)    [place] [employer] - Occupational Stress Questionnaire     Feeling of Stress : Rather much   Housing Stability: Low Risk  (2024)    Housing Stability Vital Sign     Unable to Pay for Housing in the Last Year: No     Number of Places Lived in the Last Year: 1     Unstable Housing in the Last Year: No       Family History:  Family History   Problem Relation Name Age of Onset    Allergies Mother Shefali Hermann     Clotting disorder Mother Shefali Ningman     Hypotension Mother Shefali Fosselman     Arthritis Mother Shefali Fosselman     Depression Mother Shefali Fosselgerard     Skin cancer Father Daniel Bitting     Cancer Father Daniel Bitting         Skin Cancer    Allergies Sister      Irregular heart beat Sister Gisselle         prolonged QT    Crohn's disease Sister Gisselle     Arthritis Sister Gisselle         Letnaunchyn    Cancer Brother Byron Novoa          2018 from rare form of spinal cancer    Early death Brother Byron Bitting         Cancer at 39    Alcohol abuse Maternal Uncle  Tim          of alcohol-related liver failure    Early death Maternal Uncle Tim     No Known Problems Maternal Grandmother      No Known Problems Maternal Grandfather      No Known Problems Paternal Grandmother      No Known Problems Paternal Grandfather         Allergies and Medications: (updated and reviewed)  Review of patient's allergies indicates:   Allergen Reactions    Fire ant Anxiety, Blisters, Dermatitis, Hives, Itching, Rash and Swelling    Pollen extracts Dermatitis, Hives and Rash    Sulfa (sulfonamide antibiotics) Nausea Only     Current Outpatient Medications   Medication Sig Dispense Refill    bumetanide (BUMEX) 1 MG tablet TAKE 0.5 TABLETS (0.5 MG TOTAL) BY MOUTH ONCE DAILY. 45 tablet 3    cetirizine (ZYRTEC) 10 MG tablet Take 1 tablet (10 mg total) by mouth 2 (two) times a day. Start  morning 2 days before Rush. for 5 days 10 tablet 0    EPINEPHrine (EPIPEN 2-KEVIN) 0.3 mg/0.3 mL AtIn Inject 0.3 mLs (0.3 mg total) into the muscle once. for 1 dose 2 each 0    famotidine (PEPCID) 20 MG tablet Take 1 tablet (20 mg total) by mouth 2 (two) times daily. Start  morning 2 days before Anthony, and take morning of Rush. for 5 days 10 tablet 0    fluticasone propionate (FLONASE) 50 mcg/actuation nasal spray 2 sprays (100 mcg total) by Each Nostril route once daily. 16 g 0    gabapentin (NEURONTIN) 300 MG capsule Take 1 capsule (300 mg total) by mouth 3 (three) times daily. 90 capsule 1    ketoconazole (NIZORAL) 2 % cream Apply topically once daily. 60 g 0    MULTIVITAMIN ORAL Take by mouth once daily.      naftifine 2 % Crea Apply 1 Application topically once daily. 45 g 0    ondansetron (ZOFRAN) 8 MG tablet Take 8 mg by mouth as needed for Nausea.      predniSONE (DELTASONE) 20 MG tablet Take 0.5 tablets (10 mg total) by mouth once daily. 10 tablet 0    semaglutide, weight loss, (WEGOVY) 2.4 mg/0.75 mL PnIj Inject 2.4 mg into the skin once a week 3 mL 3    semaglutide, weight loss, (WEGOVY) 2.4  mg/0.75 mL PnIj Inject 2.4 mg into the skin once a week 3 mL 6    semaglutide, weight loss, (WEGOVY) 2.4 mg/0.75 mL PnIj Inject 2.4 mg into the skin once a week 3 mL 6    sertraline (ZOLOFT) 100 MG tablet TAKE 1.5 TABLETS (150 MG TOTAL) BY MOUTH ONCE DAILY. AS NEEDED. 135 tablet 1    traZODone (DESYREL) 100 MG tablet Take 1.5 tablets (150 mg total) by mouth every evening. 90 tablet 2    triamcinolone acetonide 0.1% (KENALOG) 0.1 % ointment Apply topically 2 (two) times daily. 80 g 1     No current facility-administered medications for this visit.       Patient Care Team:  Robyn Guerra MD as PCP - General (Family Medicine)  Lindsey East LPN as Care Coordinator       - The patient was sent an After Visit Summary virtually that lists all medications with directions, allergies, education, orders placed during this encounter and follow-up instructions.      - I have reviewed the patient's medical information including past medical, family, and social history sections including the medications and allergies.      - We discussed the patient's current medications.     This note was created by combination of typed  and MModal dictation.  Transcription errors may be present.  If there are any questions, please contact me.  Debby Meng NP

## 2025-01-29 NOTE — PATIENT INSTRUCTIONS
Medical Fitness--[phone]  Imaging, Xray, CT, MRI, Ultrasound---[phone]  Bariatrics---[phone]  Breast Surgery---[phone]  Case Management---[phone]  Colonoscopy---[phone]  DME---[phone]  Infectious Disease---[phone]  Interventional Radiology---[phone]  Medical Records---[phone]  [facility]---[phone]  Optometry/Ophthalmology---[phone]  [facility] Bar---[phone]  Physical Therapy---[phone]  Psychiatry---[phone] or [phone]  Plastic Surgery---[phone]  Recovery--[phone] option 2, or [phone].  Sleep Study---[phone]  Smoking Cessation---[phone]  Wound Care---[phone]  Referral Desk---[phone]

## 2025-01-31 ENCOUNTER — HOSPITAL ENCOUNTER (OUTPATIENT)
Dept: RADIOLOGY | Facility: HOSPITAL | Age: 50
Discharge: HOME | End: 2025-01-31
Attending: FAMILY MEDICINE
Payer: COMMERCIAL

## 2025-01-31 DIAGNOSIS — Z12.31 ENCOUNTER FOR SCREENING MAMMOGRAM FOR BREAST CANCER: ICD-10-CM

## 2025-01-31 LAB — 1,25(OH)2D3 SERPL-MCNC: 51 PG/ML (ref 20–79)

## 2025-01-31 PROCEDURE — 77067 SCR MAMMO BI INCL CAD: CPT | Mod: TC

## 2025-01-31 PROCEDURE — 77063 BREAST TOMOSYNTHESIS BI: CPT | Mod: 26,,, | Performed by: RADIOLOGY

## 2025-01-31 PROCEDURE — 77067 SCR MAMMO BI INCL CAD: CPT | Mod: 26,,, | Performed by: RADIOLOGY

## 2025-02-04 ENCOUNTER — CLINICAL SUPPORT (OUTPATIENT)
Dept: ALLERGY | Facility: CLINIC | Age: 50
End: 2025-02-04
Payer: COMMERCIAL

## 2025-02-04 DIAGNOSIS — J30.9 CHRONIC ALLERGIC RHINITIS: Primary | ICD-10-CM

## 2025-02-04 PROCEDURE — 99999 PR PBB SHADOW E&M-EST. PATIENT-LVL I: CPT | Mod: PBBFAC,,,

## 2025-02-04 PROCEDURE — 95115 IMMUNOTHERAPY ONE INJECTION: CPT | Mod: S$GLB,,, | Performed by: ALLERGY & IMMUNOLOGY

## 2025-03-07 ENCOUNTER — CLINICAL SUPPORT (OUTPATIENT)
Dept: ALLERGY | Facility: CLINIC | Age: 50
End: 2025-03-07
Payer: COMMERCIAL

## 2025-03-07 DIAGNOSIS — J30.9 CHRONIC ALLERGIC RHINITIS: Primary | ICD-10-CM

## 2025-03-07 PROCEDURE — 99999 PR PBB SHADOW E&M-EST. PATIENT-LVL I: CPT | Mod: PBBFAC,,,

## 2025-03-07 NOTE — PROGRESS NOTES
Pt. Here today for maintenance split dose, will get 0.25 ml in left arm wait 30 minutes , then in right arm will be given 0.25 ml and wait additional 30 minutes.      20 minutes into new vial split dose pt started feeling like her arms and legs felt heavy, Dr.Carlson called to assess pt.  Pt. Was placed in exam room #2 and was observed for 20 minutes and put back into injection room. AS per  ok into give second half of split dose if pt. Feels ok.  Pt. Stated she feels ok and wants to proceed with second half of split dose.  Second half of split dose was given and pt. Was watched for 40 minutes with no problems reported.

## 2025-03-08 DIAGNOSIS — F51.01 PRIMARY INSOMNIA: ICD-10-CM

## 2025-03-10 RX ORDER — TRAZODONE HYDROCHLORIDE 100 MG/1
150 TABLET ORAL NIGHTLY
Qty: 135 TABLET | Refills: 1 | Status: SHIPPED | OUTPATIENT
Start: 2025-03-10

## 2025-03-18 ENCOUNTER — PATIENT MESSAGE (OUTPATIENT)
Dept: ADMINISTRATIVE | Facility: HOSPITAL | Age: 50
End: 2025-03-18
Payer: COMMERCIAL

## 2025-03-24 ENCOUNTER — PATIENT OUTREACH (OUTPATIENT)
Dept: ADMINISTRATIVE | Facility: HOSPITAL | Age: 50
End: 2025-03-24
Payer: COMMERCIAL

## 2025-03-24 DIAGNOSIS — Z12.11 SCREENING FOR COLORECTAL CANCER: Primary | ICD-10-CM

## 2025-03-24 DIAGNOSIS — Z12.12 SCREENING FOR COLORECTAL CANCER: Primary | ICD-10-CM

## 2025-04-03 ENCOUNTER — TELEPHONE (OUTPATIENT)
Dept: ALLERGY | Facility: CLINIC | Age: 50
End: 2025-04-03
Payer: COMMERCIAL

## 2025-04-03 NOTE — TELEPHONE ENCOUNTER
----- Message from Nurse Resendiz sent at 4/3/2025  2:15 PM CDT -----    ----- Message -----  From: Sheyla Douglas  Sent: 4/3/2025  12:10 PM CDT  To: [facility] Allergy Clinical Staff    Type:  Reschedule ApptCaller is requesting to reschedule appointment.  Name of Caller: PtDate to Reschedule: 04/04/25Rescheduled To Date: next week would be okReason for reschedule: autoimmune flare upWhen is the first available appointment? N/aBest Call Back Number:[phone] (home) Additional Information:

## 2025-04-09 ENCOUNTER — CLINICAL SUPPORT (OUTPATIENT)
Dept: ALLERGY | Facility: CLINIC | Age: 50
End: 2025-04-09
Payer: COMMERCIAL

## 2025-04-09 DIAGNOSIS — J30.9 CHRONIC ALLERGIC RHINITIS: Primary | ICD-10-CM

## 2025-04-09 DIAGNOSIS — Z91.038 ALLERGY TO INSECT BITES AND STINGS: ICD-10-CM

## 2025-04-09 PROCEDURE — 95145 ANTIGEN THERAPY SERVICES: CPT | Mod: S$GLB,,, | Performed by: ALLERGY & IMMUNOLOGY

## 2025-04-09 PROCEDURE — 95115 IMMUNOTHERAPY ONE INJECTION: CPT | Mod: ,,, | Performed by: ALLERGY & IMMUNOLOGY

## 2025-04-09 PROCEDURE — 99999 PR PBB SHADOW E&M-EST. PATIENT-LVL I: CPT | Mod: PBBFAC,,,

## 2025-04-14 ENCOUNTER — RESULTS FOLLOW-UP (OUTPATIENT)
Dept: FAMILY MEDICINE | Facility: CLINIC | Age: 50
End: 2025-04-14

## 2025-04-30 ENCOUNTER — PATIENT MESSAGE (OUTPATIENT)
Dept: OBSTETRICS AND GYNECOLOGY | Facility: CLINIC | Age: 50
End: 2025-04-30
Payer: COMMERCIAL

## 2025-05-02 ENCOUNTER — LAB VISIT (OUTPATIENT)
Dept: LAB | Facility: HOSPITAL | Age: 50
End: 2025-05-02
Attending: OBSTETRICS & GYNECOLOGY
Payer: COMMERCIAL

## 2025-05-02 ENCOUNTER — OFFICE VISIT (OUTPATIENT)
Dept: OBSTETRICS AND GYNECOLOGY | Facility: CLINIC | Age: 50
End: 2025-05-02
Payer: COMMERCIAL

## 2025-05-02 VITALS
DIASTOLIC BLOOD PRESSURE: 64 MMHG | WEIGHT: 143.31 LBS | BODY MASS INDEX: 26.21 KG/M2 | SYSTOLIC BLOOD PRESSURE: 114 MMHG

## 2025-05-02 DIAGNOSIS — N95.1 MENOPAUSAL SYMPTOMS: ICD-10-CM

## 2025-05-02 DIAGNOSIS — N95.1 MENOPAUSAL SYMPTOMS: Primary | ICD-10-CM

## 2025-05-02 LAB — TSH SERPL-ACNC: 1.09 UIU/ML (ref 0.4–4)

## 2025-05-02 PROCEDURE — 36415 COLL VENOUS BLD VENIPUNCTURE: CPT

## 2025-05-02 PROCEDURE — 83001 ASSAY OF GONADOTROPIN (FSH): CPT

## 2025-05-02 PROCEDURE — 99999 PR PBB SHADOW E&M-EST. PATIENT-LVL III: CPT | Mod: PBBFAC,,, | Performed by: OBSTETRICS & GYNECOLOGY

## 2025-05-02 PROCEDURE — 84443 ASSAY THYROID STIM HORMONE: CPT

## 2025-05-02 PROCEDURE — 83002 ASSAY OF GONADOTROPIN (LH): CPT

## 2025-05-02 PROCEDURE — 82670 ASSAY OF TOTAL ESTRADIOL: CPT

## 2025-05-02 PROCEDURE — 83036 HEMOGLOBIN GLYCOSYLATED A1C: CPT

## 2025-05-02 NOTE — PROGRESS NOTES
Cc:  check menopausal status    HPI:  50 yr  presents on recommendation of pt's psychiatrist.  Pt is not having menses and previously had severe hot flashes and night sweats.  Currently pt is still having those sx's but more mild; however, pt is having severe anxiety, brain fog and very poor sleep.  Pt had Paragard IUD currently in place.  Recommendation was for menopausal labs.    Vitals:    25 1430   BP: 114/64   Weight: 65 kg (143 lb 4.8 oz)     Physical Exam:   Constitutional: She is oriented to person, place, and time. She appears well-developed and well-nourished. No distress.    HENT:   Head: Normocephalic and atraumatic.     Neck: No thyromegaly present.     Pulmonary/Chest: Effort normal. No respiratory distress.        Abdominal: Soft. She exhibits no distension. There is no abdominal tenderness.             Musculoskeletal: Normal range of motion and moves all extremeties. No tenderness.       Neurological: She is alert and oriented to person, place, and time. No cranial nerve deficit. Coordination normal.    Skin: Skin is warm. She is not diaphoretic.    Psychiatric: She has a normal mood and affect. Her behavior is normal. Judgment and thought content normal.     Assessment/Plan  1. Menopausal symptoms      Labs:  TSH, HgA1c, FSH/LH and estrogen  Discussed trying HRT if menopausal (most likely) rather than starting Benzo first.

## 2025-05-03 LAB
EAG (OHS): 88 MG/DL (ref 68–131)
ESTRADIOL SERPL HS-MCNC: 17 PG/ML
FSH SERPL-ACNC: 98.44 MIU/ML
HBA1C MFR BLD: 4.7 % (ref 4–5.6)
LH SERPL-ACNC: 31.2 MIU/ML

## 2025-05-04 ENCOUNTER — RESULTS FOLLOW-UP (OUTPATIENT)
Dept: OBSTETRICS AND GYNECOLOGY | Facility: HOSPITAL | Age: 50
End: 2025-05-04

## 2025-05-09 ENCOUNTER — TELEPHONE (OUTPATIENT)
Dept: OBSTETRICS AND GYNECOLOGY | Facility: CLINIC | Age: 50
End: 2025-05-09
Payer: COMMERCIAL

## 2025-05-09 NOTE — TELEPHONE ENCOUNTER
Called and lvm in re: to todays appt.  called to emergency surgery and need to r/s appt. Left cb info  requesting pt to return ofc call.    ALBANIA Carvajal  [phone]

## 2025-05-12 ENCOUNTER — OFFICE VISIT (OUTPATIENT)
Dept: OBSTETRICS AND GYNECOLOGY | Facility: CLINIC | Age: 50
End: 2025-05-12
Payer: COMMERCIAL

## 2025-05-12 VITALS
HEIGHT: 62 IN | DIASTOLIC BLOOD PRESSURE: 75 MMHG | BODY MASS INDEX: 24.06 KG/M2 | SYSTOLIC BLOOD PRESSURE: 116 MMHG | WEIGHT: 130.75 LBS

## 2025-05-12 DIAGNOSIS — N95.1 MENOPAUSAL SYMPTOM: Primary | ICD-10-CM

## 2025-05-12 PROCEDURE — 99999 PR PBB SHADOW E&M-EST. PATIENT-LVL III: CPT | Mod: PBBFAC,,, | Performed by: OBSTETRICS & GYNECOLOGY

## 2025-05-12 PROCEDURE — 99213 OFFICE O/P EST LOW 20 MIN: CPT | Mod: S$GLB,,, | Performed by: OBSTETRICS & GYNECOLOGY

## 2025-05-12 RX ORDER — ESTRADIOL AND NORETHINDRONE ACETATE 1; .5 MG/1; MG/1
1 TABLET ORAL DAILY
Qty: 30 TABLET | Refills: 11 | Status: SHIPPED | OUTPATIENT
Start: 2025-05-12 | End: 2026-05-12

## 2025-05-14 ENCOUNTER — CLINICAL SUPPORT (OUTPATIENT)
Dept: ALLERGY | Facility: CLINIC | Age: 50
End: 2025-05-14
Payer: COMMERCIAL

## 2025-05-14 DIAGNOSIS — Z91.038 ALLERGY TO INSECT BITES AND STINGS: Primary | ICD-10-CM

## 2025-05-14 DIAGNOSIS — Z91.038 ALLERGY TO ANT BITE: ICD-10-CM

## 2025-05-14 PROCEDURE — 95115 IMMUNOTHERAPY ONE INJECTION: CPT | Mod: S$GLB,,, | Performed by: ALLERGY & IMMUNOLOGY

## 2025-05-14 PROCEDURE — 99999 PR PBB SHADOW E&M-EST. PATIENT-LVL I: CPT | Mod: PBBFAC,,,

## 2025-05-15 ENCOUNTER — PATIENT MESSAGE (OUTPATIENT)
Facility: CLINIC | Age: 50
End: 2025-05-15
Payer: COMMERCIAL

## 2025-05-15 ENCOUNTER — TELEPHONE (OUTPATIENT)
Facility: CLINIC | Age: 50
End: 2025-05-15
Payer: COMMERCIAL

## 2025-05-15 NOTE — TELEPHONE ENCOUNTER
Detailed message was left for patient in regards to spacing out Fire ant injection. Will send a portal message.

## 2025-05-15 NOTE — TELEPHONE ENCOUNTER
----- Message from Cameron Valle MD sent at 5/14/2025  4:32 PM CDT -----  She can space them out now. Technically can space to every 12 weeks. If she wants to talk with me about this she is welcome to have an appointment, but if she feels comfortable first spacing to Q6 weeks, and then you can talk with her about her comfort with every 12 weeks as she does well with that. I've changed her treatment plan to allow up to every 12 weeks between injections.  ----- Message -----  From: Maci Clifford LPN  Sent: 5/14/2025  10:54 AM CDT  To: Cameron Valle MD    Pt. Wants to know at what point can she spread her Fire ant injection out to 5/6 weeks

## 2025-05-22 NOTE — PROGRESS NOTES
"Cc:  lab f/u    HPI: 50 yr  presents on recommendation of pt's psychiatrist. Pt is not having menses and previously had severe hot flashes and night sweats. Currently pt is still having those sx's but more mild; however, pt is having severe anxiety, brain fog and very poor sleep. Pt had Paragard IUD currently in place. Recommendation was for menopausal labs which do reveal menopause.  FH=98 and estradiol=17.  Pt is now willing to try HRT to see if it can relieve her sx's.    Vitals:    25 1556   BP: 116/75   Weight: 59.3 kg (130 lb 11.7 oz)   Height: 5' 2" (1.575 m)     Physical Exam:   Constitutional: She is oriented to person, place, and time. She appears well-developed and well-nourished. No distress.    HENT:   Head: Normocephalic and atraumatic.     Neck: No thyromegaly present.     Pulmonary/Chest: Effort normal. No respiratory distress.        Abdominal: Soft. She exhibits no distension.             Musculoskeletal: Normal range of motion and moves all extremeties. No tenderness.       Neurological: She is alert and oriented to person, place, and time. No cranial nerve deficit. Coordination normal.    Skin: She is not diaphoretic.    Psychiatric: She has a normal mood and affect. Her behavior is normal. Judgment and thought content normal.     Assessment/Plan  1. Menopausal symptom  estradiol-norethindrone (ACTIVELLA) 1-0.5 mg per tablet        Re-eval medication relief and tolerability in 1 month  "

## 2025-06-04 ENCOUNTER — TELEPHONE (OUTPATIENT)
Dept: ALLERGY | Facility: CLINIC | Age: 50
End: 2025-06-04
Payer: COMMERCIAL

## 2025-06-16 ENCOUNTER — OFFICE VISIT (OUTPATIENT)
Dept: OBSTETRICS AND GYNECOLOGY | Facility: CLINIC | Age: 50
End: 2025-06-16
Payer: COMMERCIAL

## 2025-06-16 VITALS — SYSTOLIC BLOOD PRESSURE: 122 MMHG | WEIGHT: 131.5 LBS | DIASTOLIC BLOOD PRESSURE: 74 MMHG | BODY MASS INDEX: 24.05 KG/M2

## 2025-06-16 DIAGNOSIS — N95.1 MENOPAUSAL SYMPTOM: ICD-10-CM

## 2025-06-16 PROCEDURE — 99999 PR PBB SHADOW E&M-EST. PATIENT-LVL III: CPT | Mod: PBBFAC,,, | Performed by: OBSTETRICS & GYNECOLOGY

## 2025-06-16 PROCEDURE — 99212 OFFICE O/P EST SF 10 MIN: CPT | Mod: S$GLB,,, | Performed by: OBSTETRICS & GYNECOLOGY

## 2025-06-16 RX ORDER — ESTRADIOL AND NORETHINDRONE ACETATE 1; .5 MG/1; MG/1
1 TABLET ORAL DAILY
Qty: 90 TABLET | Refills: 3 | Status: SHIPPED | OUTPATIENT
Start: 2025-06-16 | End: 2026-06-16

## 2025-06-16 NOTE — PROGRESS NOTES
"Cc:  HRT f/u    HPI:  pt originally seen 25: "50 yr  presents on recommendation of pt's psychiatrist. Pt is not having menses and previously had severe hot flashes and night sweats. Currently pt is still having those sx's but more mild; however, pt is having severe anxiety, brain fog and very poor sleep. Pt had Paragard IUD currently in place. Recommendation was for menopausal labs which do reveal menopause.  FH=98 and estradiol=17.  Pt is now willing to try HRT to see if it can relieve her sx's. "    Pt was given Rx for Activella.  Pt states she has done very well with the med.  She is sleeping better, not having hot flashes or night sweats.  She feels like she has more energy.  Pt is overall very happy with med and desires to continue.    Vitals:    25 1443   BP: 122/74   Weight: 59.6 kg (131 lb 8.1 oz)     Physical Exam:   Constitutional: She is oriented to person, place, and time. She appears well-developed and well-nourished. No distress.    HENT:   Head: Normocephalic and atraumatic.     Neck: No thyromegaly present.     Pulmonary/Chest: Effort normal. No respiratory distress.        Abdominal: Soft. She exhibits no distension. There is no abdominal tenderness.             Musculoskeletal: Normal range of motion and moves all extremeties. No tenderness.       Neurological: She is alert and oriented to person, place, and time. No cranial nerve deficit. Coordination normal.    Skin: She is not diaphoretic.    Psychiatric: She has a normal mood and affect. Her behavior is normal. Judgment and thought content normal.     Assessment/Plan  1. Menopausal symptom      Activella refilled x 1 yr with 90 day distributions  "

## 2025-06-18 ENCOUNTER — PATIENT MESSAGE (OUTPATIENT)
Dept: REHABILITATION | Facility: HOSPITAL | Age: 50
End: 2025-06-18
Payer: COMMERCIAL

## 2025-06-27 ENCOUNTER — CLINICAL SUPPORT (OUTPATIENT)
Dept: ALLERGY | Facility: CLINIC | Age: 50
End: 2025-06-27
Payer: COMMERCIAL

## 2025-06-27 DIAGNOSIS — Z91.038 ALLERGY TO INSECT BITES AND STINGS: Primary | ICD-10-CM

## 2025-06-27 PROCEDURE — 99999 PR PBB SHADOW E&M-EST. PATIENT-LVL I: CPT | Mod: PBBFAC,,,

## 2025-07-07 ENCOUNTER — OFFICE VISIT (OUTPATIENT)
Dept: PRIMARY CARE CLINIC | Facility: CLINIC | Age: 50
End: 2025-07-07
Payer: COMMERCIAL

## 2025-07-07 DIAGNOSIS — N95.1 MENOPAUSAL SYMPTOM: ICD-10-CM

## 2025-07-07 PROCEDURE — 98004 SYNCH AUDIO-VIDEO EST SF 10: CPT | Mod: 95,,, | Performed by: FAMILY MEDICINE

## 2025-07-07 RX ORDER — ESTRADIOL AND NORETHINDRONE ACETATE 1; .5 MG/1; MG/1
1 TABLET ORAL DAILY
Qty: 84 TABLET | Refills: 0 | Status: SHIPPED | OUTPATIENT
Start: 2025-07-07 | End: 2026-07-07

## 2025-07-10 DIAGNOSIS — F41.9 ANXIETY: ICD-10-CM

## 2025-07-10 NOTE — TELEPHONE ENCOUNTER
Refill Routing Note   Medication(s) are not appropriate for processing by [facility] for the following reason(s):        Outside of protocol  Non-participating provider    [facility] action(s):  Route        Medication Therapy Plan: PRN usage outside [facility] protocol      Appointments  past 12m or future 3m with PCP    Date Provider   Last Visit   1/29/2025 Debby Meng NP   Next Visit   Visit date not found Debby Meng NP   ED visits in past 90 days: 0        Note composed:11:14 AM 07/10/2025

## 2025-07-10 NOTE — PROGRESS NOTES
Answers submitted by the patient for this visit:  Review of Systems Questionnaire (Submitted on 7/7/2025)  activity change: No  unexpected weight change: No  neck pain: No  hearing loss: No  rhinorrhea: No  trouble swallowing: No  eye discharge: No  visual disturbance: No  chest tightness: No  wheezing: No  chest pain: No  palpitations: No  blood in stool: No  constipation: No  vomiting: No  diarrhea: No  polydipsia: No  polyuria: No  difficulty urinating: No  hematuria: No  menstrual problem: No  dysuria: No  joint swelling: No  arthralgias: No  headaches: No  weakness: No  confusion: No  dysphoric mood: No  The patient location is: [place]  The chief complaint leading to consultation is: refill medication    Visit type: audiovisual    Face to Face time with patient:   7 minutes of total time spent on the encounter, which includes face to face time and non-face to face time preparing to see the patient (eg, review of tests), Obtaining and/or reviewing separately obtained history, Documenting clinical information in the electronic or other health record, Independently interpreting results (not separately reported) and communicating results to the patient/family/caregiver, or Care coordination (not separately reported).         Each patient to whom he or she provides medical services by telemedicine is:  (1) informed of the relationship between the physician and patient and the respective role of any other health care provider with respect to management of the patient; and (2) notified that he or she may decline to receive medical services by telemedicine and may withdraw from such care at any time.    Notes:   HPI:  Patient requesting refill on Activella for hot flashes.  Mammogram UTD.      Physical exam:  General: Alert, no acute distress   HEENT:  NC/AT, no facial swelling of deformity  Neck:  supple  Resp:  Normal effort, no respiratory distress   Psych:  Normal affect     Assessment & Plan      Menopausal  symptom  Refill for Activella sent.

## 2025-07-11 RX ORDER — SERTRALINE HYDROCHLORIDE 100 MG/1
150 TABLET, FILM COATED ORAL DAILY
Qty: 135 TABLET | Refills: 1 | Status: SHIPPED | OUTPATIENT
Start: 2025-07-11

## 2025-07-15 ENCOUNTER — OFFICE VISIT (OUTPATIENT)
Dept: FAMILY MEDICINE | Facility: CLINIC | Age: 50
End: 2025-07-15
Payer: COMMERCIAL

## 2025-07-15 VITALS
BODY MASS INDEX: 24.01 KG/M2 | TEMPERATURE: 98 F | RESPIRATION RATE: 18 BRPM | HEIGHT: 62 IN | HEART RATE: 69 BPM | DIASTOLIC BLOOD PRESSURE: 62 MMHG | WEIGHT: 130.5 LBS | OXYGEN SATURATION: 99 % | SYSTOLIC BLOOD PRESSURE: 108 MMHG
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
## 2025-07-15 PROCEDURE — 99999 PR PBB SHADOW E&M-EST. PATIENT-LVL V: CPT | Mod: PBBFAC,,, | Performed by: NURSE PRACTITIONER

## 2025-07-15 RX ORDER — MUPIROCIN 20 MG/G
OINTMENT TOPICAL 3 TIMES DAILY
Qty: 15 G | Refills: 0 | Status: SHIPPED | OUTPATIENT
Start: 2025-07-15

## 2025-07-15 RX ORDER — METHYLPREDNISOLONE 4 MG/1
TABLET ORAL
Qty: 1 EACH | Refills: 0 | Status: SHIPPED | OUTPATIENT
Start: 2025-07-15 | End: 2025-08-05

## 2025-07-15 NOTE — PROGRESS NOTES
Routine Office Visit    Patient Name: Leia Shultz    : 1975  MRN: [id number]    Chief Complaint:  Insect bite    Subjective:  Leia is a 50 y.o. female who presents today for:    Insect bite - patient who is known to me with medical history as documented below reports today for evaluation.  Here for evaluation of the insect bite on her right leg about 2 days ago.  She notes that the area of redness around the bite has gotten larger and pretty painful.  She denies any itching to the area.  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  She denies any fevers or chills or respiratory symptoms.    Past Medical History  Past Medical History:   Diagnosis Date    Anemia     Arthritis     Heart murmur     Lipedema     Lymphedema     Urticaria        Family History  Family History   Problem Relation Name Age of Onset    Allergies Mother Shefali Fosselman     Clotting disorder Mother Shefali Fosselman     Hypotension Mother Shefali Fosselman     Arthritis Mother Shefali Fosselman     Depression Mother Shefali Fosselman     Skin cancer Father Daniel Bitting     Cancer Father Daniel Bitting         Skin Cancer    Allergies Sister      Irregular heart beat Sister Gisselle         prolonged QT    Crohn's disease Sister Gisselle     Arthritis Sister Gisselle Veranaunchyn    Alcohol abuse Maternal Uncle Tim          of alcohol-related liver failure    Early death Maternal Uncle Tim     No Known Problems Maternal Grandmother      No Known Problems Maternal Grandfather      No Known Problems Paternal Grandmother      No Known Problems Paternal Grandfather      Cancer Brother Byron Novoa          2018 from rare form of spinal cancer    Early death Brother Byron Novoa         Cancer at 39       Current Medications  Medications Ordered Prior to Encounter[1]    Allergies   Review of patient's allergies indicates:   Allergen Reactions    Fire ant  "Anxiety, Blisters, Dermatitis, Hives, Itching, Rash and Swelling    Pollen extracts Dermatitis, Hives and Rash    Sulfa (sulfonamide antibiotics) Nausea Only       Review of Systems (Pertinent positives)  Review of Systems   Constitutional:  Negative for chills and fever.   HENT: Negative.     Eyes: Negative.    Respiratory:  Negative for cough, hemoptysis and sputum production.    Cardiovascular: Negative.  Negative for chest pain, palpitations, orthopnea and claudication.   Genitourinary: Negative.    Skin:  Positive for rash. Negative for itching.   Neurological: Negative.        /62 (BP Location: Left arm, Patient Position: Sitting)   Pulse 69   Temp 98 °F (36.7 °C) (Oral)   Resp 18   Ht 5' 2" (1.575 m)   Wt 59.2 kg (130 lb 8.2 oz)   LMP 10/01/2024   SpO2 99%   BMI 23.87 kg/m²     Physical Exam  Vitals reviewed.   Constitutional:       General: She is not in acute distress.     Appearance: Normal appearance. She is not ill-appearing, toxic-appearing or diaphoretic.   HENT:      Head: Normocephalic and atraumatic.   Cardiovascular:      Pulses: Normal pulses.   Pulmonary:      Effort: Pulmonary effort is normal. No respiratory distress.      Breath sounds: No wheezing.   Skin:     General: Skin is warm and dry.      Comments: On right lateral upper leg there is an area of redness with central purulence.  +TTP.  No overlying fluctuance.  No abscess.  Roughly 8 x 4 cm   Neurological:      General: No focal deficit present.      Mental Status: She is alert and oriented to person, place, and time.   Psychiatric:         Mood and Affect: Mood normal.         Behavior: Behavior normal.                  Assessment/Plan:  Leia Shultz is a 50 y.o. female who presents today for :    Leia was seen today for insect bite and leg pain.    Diagnoses and all orders for this visit:    Insect bite, unspecified site, initial encounter  -     mupirocin (BACTROBAN) 2 % ointment; Apply topically 3 (three) times " daily.  -     methylPREDNISolone (MEDROL DOSEPACK) 4 mg tablet; use as directed    No need for I&D.  No abscess.  Treat with Bactroban ointment and Medrol Dosepak.  If needed, can consider Keflex or doxycycline for secondary cellulitis.  Follow up p.r.n.        This office note has been dictated.  This dictation has been generated using M-Modal Fluency Direct dictation; some phonetic errors may occur.          [1]   Current Outpatient Medications on File Prior to Visit   Medication Sig Dispense Refill    bumetanide (BUMEX) 1 MG tablet TAKE 0.5 TABLETS (0.5 MG TOTAL) BY MOUTH ONCE DAILY. 45 tablet 3    estradiol-norethindrone (ACTIVELLA) 1-0.5 mg per tablet Take 1 tablet by mouth once daily. 84 tablet 0    MULTIVITAMIN ORAL Take by mouth once daily.      semaglutide, weight loss, (WEGOVY) 2.4 mg/0.75 mL PnIj Inject 2.4 mg into the skin once a week 3 mL 6    sertraline (ZOLOFT) 100 MG tablet TAKE 1.5 TABLETS (150 MG TOTAL) BY MOUTH ONCE DAILY. AS NEEDED. 135 tablet 1    traZODone (DESYREL) 100 MG tablet TAKE 1.5 TABLETS (150 MG TOTAL) BY MOUTH EVERY EVENING. 135 tablet 1    EPINEPHrine (EPIPEN 2-KEVIN) 0.3 mg/0.3 mL AtIn Inject 0.3 mLs (0.3 mg total) into the muscle once. for 1 dose 2 each 0    semaglutide, weight loss, (WEGOVY) 2.4 mg/0.75 mL PnIj Inject 2.4 mg into the skin once a week (Patient not taking: Reported on 7/15/2025) 3 mL 3    semaglutide, weight loss, (WEGOVY) 2.4 mg/0.75 mL PnIj Inject 2.4 mg into the skin once a week (Patient not taking: Reported on 7/15/2025) 3 mL 6    semaglutide, weight loss, (WEGOVY) 2.4 mg/0.75 mL PnIj Inject 2.4 mg into the skin once a week (Patient not taking: Reported on 7/15/2025) 3 mL 0    [DISCONTINUED] cetirizine (ZYRTEC) 10 MG tablet Take 1 tablet (10 mg total) by mouth 2 (two) times a day. Start Sunday morning 2 days before Rush. for 5 days 10 tablet 0    [DISCONTINUED] famotidine (PEPCID) 20 MG tablet Take 1 tablet (20 mg total) by mouth 2 (two) times daily. Start Sunday  morning 2 days before Rush, and take morning of Rush. for 5 days 10 tablet 0    [DISCONTINUED] fluticasone propionate (FLONASE) 50 mcg/actuation nasal spray 2 sprays (100 mcg total) by Each Nostril route once daily. 16 g 0    [DISCONTINUED] gabapentin (NEURONTIN) 300 MG capsule Take 1 capsule (300 mg total) by mouth 3 (three) times daily. 90 capsule 1    [DISCONTINUED] ketoconazole (NIZORAL) 2 % cream Apply topically once daily. 60 g 0    [DISCONTINUED] naftifine 2 % Crea Apply 1 Application topically once daily. 45 g 0    [DISCONTINUED] ondansetron (ZOFRAN) 8 MG tablet Take 8 mg by mouth as needed for Nausea.      [DISCONTINUED] predniSONE (DELTASONE) 20 MG tablet Take 0.5 tablets (10 mg total) by mouth once daily. 10 tablet 0    [DISCONTINUED] triamcinolone acetonide 0.1% (KENALOG) 0.1 % ointment Apply topically 2 (two) times daily. 80 g 1     No current facility-administered medications on file prior to visit.

## 2025-07-16 ENCOUNTER — PATIENT MESSAGE (OUTPATIENT)
Dept: FAMILY MEDICINE | Facility: CLINIC | Age: 50
End: 2025-07-16
Payer: COMMERCIAL

## 2025-07-30 ENCOUNTER — DOCUMENTATION ONLY (OUTPATIENT)
Dept: ALLERGY | Facility: CLINIC | Age: 50
End: 2025-07-30
Payer: COMMERCIAL

## 2025-08-08 ENCOUNTER — CLINICAL SUPPORT (OUTPATIENT)
Dept: ALLERGY | Facility: CLINIC | Age: 50
End: 2025-08-08
Payer: COMMERCIAL

## 2025-08-08 DIAGNOSIS — Z91.038 ALLERGY TO INSECT BITES AND STINGS: Primary | ICD-10-CM

## 2025-08-08 PROCEDURE — 99999 PR PBB SHADOW E&M-EST. PATIENT-LVL I: CPT | Mod: PBBFAC,,,

## 2025-08-08 PROCEDURE — 95115 IMMUNOTHERAPY ONE INJECTION: CPT | Mod: S$GLB,,, | Performed by: STUDENT IN AN ORGANIZED HEALTH CARE EDUCATION/TRAINING PROGRAM

## 2025-08-20 ENCOUNTER — OFFICE VISIT (OUTPATIENT)
Dept: FAMILY MEDICINE | Facility: CLINIC | Age: 50
End: 2025-08-20
Payer: COMMERCIAL

## 2025-08-20 DIAGNOSIS — R53.83 FATIGUE, UNSPECIFIED TYPE: Primary | ICD-10-CM

## 2025-08-20 DIAGNOSIS — M54.2 NECK PAIN: ICD-10-CM

## 2025-08-20 DIAGNOSIS — N95.1 MENOPAUSAL SYMPTOM: ICD-10-CM

## 2025-08-20 PROCEDURE — 98004 SYNCH AUDIO-VIDEO EST SF 10: CPT | Mod: 95,,,

## 2025-08-20 RX ORDER — ESTRADIOL AND NORETHINDRONE ACETATE 1; .5 MG/1; MG/1
1 TABLET ORAL DAILY
Qty: 90 TABLET | Refills: 1 | Status: SHIPPED | OUTPATIENT
Start: 2025-08-20 | End: 2026-08-20

## 2025-08-21 ENCOUNTER — LAB VISIT (OUTPATIENT)
Dept: LAB | Facility: HOSPITAL | Age: 50
End: 2025-08-21
Payer: COMMERCIAL

## 2025-08-21 DIAGNOSIS — R53.83 FATIGUE, UNSPECIFIED TYPE: ICD-10-CM

## 2025-08-21 DIAGNOSIS — E78.00 ELEVATED LDL CHOLESTEROL LEVEL: ICD-10-CM

## 2025-08-21 LAB
25(OH)D3+25(OH)D2 SERPL-MCNC: 70 NG/ML (ref 30–96)
ABSOLUTE EOSINOPHIL (OHS): 0.21 K/UL
ABSOLUTE MONOCYTE (OHS): 0.49 K/UL (ref 0.3–1)
ABSOLUTE NEUTROPHIL COUNT (OHS): 3.21 K/UL (ref 1.8–7.7)
BASOPHILS # BLD AUTO: 0.07 K/UL
BASOPHILS NFR BLD AUTO: 1.1 %
CHOLEST SERPL-MCNC: 204 MG/DL (ref 120–199)
CHOLEST/HDLC SERPL: 3 {RATIO} (ref 2–5)
ERYTHROCYTE [DISTWIDTH] IN BLOOD BY AUTOMATED COUNT: 11.6 % (ref 11.5–14.5)
FOLATE SERPL-MCNC: 14.6 NG/ML (ref 4–24)
HCT VFR BLD AUTO: 42.1 % (ref 37–48.5)
HDLC SERPL-MCNC: 67 MG/DL (ref 40–75)
HDLC SERPL: 32.8 % (ref 20–50)
HGB BLD-MCNC: 14 GM/DL (ref 12–16)
IMM GRANULOCYTES # BLD AUTO: 0.04 K/UL (ref 0–0.04)
IMM GRANULOCYTES NFR BLD AUTO: 0.6 % (ref 0–0.5)
LDLC SERPL CALC-MCNC: 126.8 MG/DL (ref 63–159)
LYMPHOCYTES # BLD AUTO: 2.39 K/UL (ref 1–4.8)
MCH RBC QN AUTO: 31.5 PG (ref 27–31)
MCHC RBC AUTO-ENTMCNC: 33.3 G/DL (ref 32–36)
MCV RBC AUTO: 95 FL (ref 82–98)
NONHDLC SERPL-MCNC: 137 MG/DL
NUCLEATED RBC (/100WBC) (OHS): 0 /100 WBC
PLATELET # BLD AUTO: 360 K/UL (ref 150–450)
PMV BLD AUTO: 8.4 FL (ref 9.2–12.9)
RBC # BLD AUTO: 4.45 M/UL (ref 4–5.4)
RELATIVE EOSINOPHIL (OHS): 3.3 %
RELATIVE LYMPHOCYTE (OHS): 37.3 % (ref 18–48)
RELATIVE MONOCYTE (OHS): 7.6 % (ref 4–15)
RELATIVE NEUTROPHIL (OHS): 50.1 % (ref 38–73)
TRIGL SERPL-MCNC: 51 MG/DL (ref 30–150)
VIT B12 SERPL-MCNC: 341 PG/ML (ref 210–950)
WBC # BLD AUTO: 6.41 K/UL (ref 3.9–12.7)

## 2025-08-21 PROCEDURE — 82607 VITAMIN B-12: CPT

## 2025-08-21 PROCEDURE — 82746 ASSAY OF FOLIC ACID SERUM: CPT

## 2025-08-21 PROCEDURE — 82306 VITAMIN D 25 HYDROXY: CPT

## 2025-08-21 PROCEDURE — 85025 COMPLETE CBC W/AUTO DIFF WBC: CPT

## 2025-08-21 PROCEDURE — 36415 COLL VENOUS BLD VENIPUNCTURE: CPT

## 2025-08-21 PROCEDURE — 83718 ASSAY OF LIPOPROTEIN: CPT

## 2025-08-27 ENCOUNTER — OFFICE VISIT (OUTPATIENT)
Dept: NEUROSURGERY | Facility: CLINIC | Age: 50
End: 2025-08-27
Payer: COMMERCIAL

## 2025-08-27 ENCOUNTER — PATIENT MESSAGE (OUTPATIENT)
Dept: FAMILY MEDICINE | Facility: CLINIC | Age: 50
End: 2025-08-27
Payer: COMMERCIAL

## 2025-08-27 VITALS
HEIGHT: 62 IN | SYSTOLIC BLOOD PRESSURE: 135 MMHG | OXYGEN SATURATION: 97 % | BODY MASS INDEX: 24.42 KG/M2 | WEIGHT: 132.69 LBS | DIASTOLIC BLOOD PRESSURE: 80 MMHG | HEART RATE: 98 BPM

## 2025-08-27 DIAGNOSIS — M54.9 MID BACK PAIN: ICD-10-CM

## 2025-08-27 DIAGNOSIS — M54.50 CHRONIC BILATERAL LOW BACK PAIN WITHOUT SCIATICA: ICD-10-CM

## 2025-08-27 DIAGNOSIS — G89.29 CHRONIC BILATERAL LOW BACK PAIN WITHOUT SCIATICA: ICD-10-CM

## 2025-08-27 DIAGNOSIS — M54.2 NECK PAIN: Primary | ICD-10-CM

## 2025-08-27 PROCEDURE — 99213 OFFICE O/P EST LOW 20 MIN: CPT | Mod: S$GLB,,, | Performed by: PHYSICIAN ASSISTANT
